# Patient Record
Sex: MALE | Race: WHITE | Employment: OTHER | URBAN - METROPOLITAN AREA
[De-identification: names, ages, dates, MRNs, and addresses within clinical notes are randomized per-mention and may not be internally consistent; named-entity substitution may affect disease eponyms.]

---

## 2017-02-07 ENCOUNTER — APPOINTMENT (OUTPATIENT)
Dept: CT IMAGING | Age: 77
End: 2017-02-07
Payer: MEDICARE

## 2017-02-07 ENCOUNTER — HOSPITAL ENCOUNTER (EMERGENCY)
Age: 77
Discharge: HOME | End: 2017-02-07
Attending: EMERGENCY MEDICINE
Payer: MEDICARE

## 2017-02-07 ENCOUNTER — APPOINTMENT (OUTPATIENT)
Dept: GENERAL RADIOLOGY | Age: 77
End: 2017-02-07
Payer: MEDICARE

## 2017-02-07 VITALS
RESPIRATION RATE: 15 BRPM | HEART RATE: 83 BPM | DIASTOLIC BLOOD PRESSURE: 70 MMHG | BODY MASS INDEX: 35.36 KG/M2 | WEIGHT: 220 LBS | TEMPERATURE: 97.7 F | SYSTOLIC BLOOD PRESSURE: 127 MMHG | OXYGEN SATURATION: 95 % | HEIGHT: 66 IN

## 2017-02-07 DIAGNOSIS — K57.32 DIVERTICULITIS OF COLON: Primary | ICD-10-CM

## 2017-02-07 LAB
-: ABNORMAL
ABSOLUTE BANDS #: 0.67 K/UL (ref 0–1)
ABSOLUTE EOS #: 0 K/UL (ref 0–0.4)
ABSOLUTE LYMPH #: 1.01 K/UL (ref 1–4.8)
ABSOLUTE MONO #: 1.51 K/UL (ref 0.1–1.3)
ALBUMIN SERPL-MCNC: 3.8 G/DL (ref 3.5–5.2)
ALBUMIN/GLOBULIN RATIO: NORMAL (ref 1–2.5)
ALP BLD-CCNC: 52 U/L (ref 40–129)
ALT SERPL-CCNC: 15 U/L (ref 5–41)
AMORPHOUS: ABNORMAL
ANION GAP SERPL CALCULATED.3IONS-SCNC: 16 MMOL/L (ref 9–17)
AST SERPL-CCNC: 21 U/L
BACTERIA: ABNORMAL
BANDS: 4 % (ref 0–10)
BASOPHILS # BLD: 1 % (ref 0–2)
BASOPHILS ABSOLUTE: 0.17 K/UL (ref 0–0.2)
BILIRUB SERPL-MCNC: 0.47 MG/DL (ref 0.3–1.2)
BILIRUBIN DIRECT: 0.14 MG/DL
BILIRUBIN URINE: ABNORMAL
BILIRUBIN, INDIRECT: 0.33 MG/DL (ref 0–1)
BUN BLDV-MCNC: 17 MG/DL (ref 8–23)
BUN/CREAT BLD: ABNORMAL (ref 9–20)
CALCIUM SERPL-MCNC: 9.6 MG/DL (ref 8.6–10.4)
CASTS UA: ABNORMAL /LPF
CHLORIDE BLD-SCNC: 100 MMOL/L (ref 98–107)
CO2: 23 MMOL/L (ref 20–31)
COLOR: ABNORMAL
COMMENT UA: ABNORMAL
CREAT SERPL-MCNC: 0.94 MG/DL (ref 0.7–1.2)
CRYSTALS, UA: ABNORMAL /HPF
DIFFERENTIAL TYPE: ABNORMAL
EOSINOPHILS RELATIVE PERCENT: 0 % (ref 0–4)
EPITHELIAL CELLS UA: ABNORMAL /HPF
GFR AFRICAN AMERICAN: >60 ML/MIN
GFR NON-AFRICAN AMERICAN: >60 ML/MIN
GFR SERPL CREATININE-BSD FRML MDRD: ABNORMAL ML/MIN/{1.73_M2}
GFR SERPL CREATININE-BSD FRML MDRD: ABNORMAL ML/MIN/{1.73_M2}
GLOBULIN: NORMAL G/DL (ref 1.5–3.8)
GLUCOSE BLD-MCNC: 119 MG/DL (ref 70–99)
GLUCOSE URINE: NEGATIVE
HCT VFR BLD CALC: 47.5 % (ref 41–53)
HEMOGLOBIN: 15.9 G/DL (ref 13.5–17.5)
KETONES, URINE: ABNORMAL
LEUKOCYTE ESTERASE, URINE: NEGATIVE
LYMPHOCYTES # BLD: 6 % (ref 24–44)
MCH RBC QN AUTO: 30.2 PG (ref 26–34)
MCHC RBC AUTO-ENTMCNC: 33.5 G/DL (ref 31–37)
MCV RBC AUTO: 90.3 FL (ref 80–100)
MONOCYTES # BLD: 9 % (ref 1–7)
MORPHOLOGY: NORMAL
MUCUS: ABNORMAL
NITRITE, URINE: NEGATIVE
OTHER OBSERVATIONS UA: ABNORMAL
PDW BLD-RTO: 13.7 % (ref 11.5–14.9)
PH UA: 5 (ref 5–8)
PLATELET # BLD: 200 K/UL (ref 150–450)
PLATELET ESTIMATE: ABNORMAL
PMV BLD AUTO: 8 FL (ref 6–12)
POTASSIUM SERPL-SCNC: 3.9 MMOL/L (ref 3.7–5.3)
PROTEIN UA: NEGATIVE
RBC # BLD: 5.26 M/UL (ref 4.5–5.9)
RBC # BLD: ABNORMAL 10*6/UL
RBC UA: ABNORMAL /HPF
RENAL EPITHELIAL, UA: ABNORMAL /HPF
SEG NEUTROPHILS: 80 % (ref 36–66)
SEGMENTED NEUTROPHILS ABSOLUTE COUNT: 13.44 K/UL (ref 1.3–9.1)
SODIUM BLD-SCNC: 139 MMOL/L (ref 135–144)
SPECIFIC GRAVITY UA: 1.03 (ref 1–1.03)
TOTAL PROTEIN: 7.3 G/DL (ref 6.4–8.3)
TRICHOMONAS: ABNORMAL
TURBIDITY: CLEAR
URINE HGB: NEGATIVE
UROBILINOGEN, URINE: NORMAL
WBC # BLD: 16.8 K/UL (ref 3.5–11)
WBC # BLD: ABNORMAL 10*3/UL
WBC UA: ABNORMAL /HPF
YEAST: ABNORMAL

## 2017-02-07 PROCEDURE — 2580000003 HC RX 258: Performed by: EMERGENCY MEDICINE

## 2017-02-07 PROCEDURE — 71020 XR CHEST STANDARD TWO VW: CPT

## 2017-02-07 PROCEDURE — 96375 TX/PRO/DX INJ NEW DRUG ADDON: CPT

## 2017-02-07 PROCEDURE — 36415 COLL VENOUS BLD VENIPUNCTURE: CPT

## 2017-02-07 PROCEDURE — 93005 ELECTROCARDIOGRAM TRACING: CPT

## 2017-02-07 PROCEDURE — 99285 EMERGENCY DEPT VISIT HI MDM: CPT

## 2017-02-07 PROCEDURE — 71020 XR CHEST STANDARD TWO VW: CPT | Performed by: RADIOLOGY

## 2017-02-07 PROCEDURE — 6370000000 HC RX 637 (ALT 250 FOR IP): Performed by: EMERGENCY MEDICINE

## 2017-02-07 PROCEDURE — 96374 THER/PROPH/DIAG INJ IV PUSH: CPT

## 2017-02-07 PROCEDURE — 6360000004 HC RX CONTRAST MEDICATION: Performed by: EMERGENCY MEDICINE

## 2017-02-07 PROCEDURE — 6360000002 HC RX W HCPCS: Performed by: EMERGENCY MEDICINE

## 2017-02-07 PROCEDURE — 74177 CT ABD & PELVIS W/CONTRAST: CPT | Performed by: RADIOLOGY

## 2017-02-07 PROCEDURE — 74177 CT ABD & PELVIS W/CONTRAST: CPT

## 2017-02-07 PROCEDURE — 81001 URINALYSIS AUTO W/SCOPE: CPT

## 2017-02-07 PROCEDURE — 94762 N-INVAS EAR/PLS OXIMTRY CONT: CPT

## 2017-02-07 PROCEDURE — 80076 HEPATIC FUNCTION PANEL: CPT

## 2017-02-07 PROCEDURE — 87493 C DIFF AMPLIFIED PROBE: CPT

## 2017-02-07 PROCEDURE — 87505 NFCT AGENT DETECTION GI: CPT

## 2017-02-07 PROCEDURE — 85025 COMPLETE CBC W/AUTO DIFF WBC: CPT

## 2017-02-07 PROCEDURE — 80048 BASIC METABOLIC PNL TOTAL CA: CPT

## 2017-02-07 RX ORDER — DILTIAZEM HYDROCHLORIDE 300 MG/1
300 CAPSULE, COATED, EXTENDED RELEASE ORAL DAILY
COMMUNITY
End: 2021-04-07

## 2017-02-07 RX ORDER — METRONIDAZOLE 500 MG/1
500 TABLET ORAL ONCE
Status: COMPLETED | OUTPATIENT
Start: 2017-02-07 | End: 2017-02-07

## 2017-02-07 RX ORDER — ONDANSETRON 2 MG/ML
4 INJECTION INTRAMUSCULAR; INTRAVENOUS ONCE
Status: COMPLETED | OUTPATIENT
Start: 2017-02-07 | End: 2017-02-07

## 2017-02-07 RX ORDER — ONDANSETRON 4 MG/1
4 TABLET, ORALLY DISINTEGRATING ORAL EVERY 8 HOURS PRN
Qty: 6 TABLET | Refills: 0 | Status: SHIPPED | OUTPATIENT
Start: 2017-02-07 | End: 2017-02-07 | Stop reason: ALTCHOICE

## 2017-02-07 RX ORDER — CIPROFLOXACIN 500 MG/1
500 TABLET, FILM COATED ORAL ONCE
Status: COMPLETED | OUTPATIENT
Start: 2017-02-07 | End: 2017-02-07

## 2017-02-07 RX ORDER — MORPHINE SULFATE 4 MG/ML
4 INJECTION, SOLUTION INTRAMUSCULAR; INTRAVENOUS ONCE
Status: COMPLETED | OUTPATIENT
Start: 2017-02-07 | End: 2017-02-07

## 2017-02-07 RX ORDER — 0.9 % SODIUM CHLORIDE 0.9 %
1000 INTRAVENOUS SOLUTION INTRAVENOUS ONCE
Status: COMPLETED | OUTPATIENT
Start: 2017-02-07 | End: 2017-02-07

## 2017-02-07 RX ORDER — HYDROCODONE BITARTRATE AND ACETAMINOPHEN 5; 325 MG/1; MG/1
1 TABLET ORAL EVERY 6 HOURS PRN
Qty: 10 TABLET | Refills: 0 | Status: SHIPPED | OUTPATIENT
Start: 2017-02-07 | End: 2017-02-14

## 2017-02-07 RX ORDER — CIPROFLOXACIN 500 MG/1
500 TABLET, FILM COATED ORAL 2 TIMES DAILY
Qty: 14 TABLET | Refills: 0 | Status: SHIPPED | OUTPATIENT
Start: 2017-02-07 | End: 2017-02-14

## 2017-02-07 RX ORDER — ONDANSETRON 4 MG/1
4 TABLET, ORALLY DISINTEGRATING ORAL EVERY 8 HOURS PRN
Qty: 6 TABLET | Refills: 0 | Status: SHIPPED | OUTPATIENT
Start: 2017-02-07 | End: 2017-03-22 | Stop reason: ALTCHOICE

## 2017-02-07 RX ORDER — METRONIDAZOLE 500 MG/1
500 TABLET ORAL 3 TIMES DAILY
Qty: 21 TABLET | Refills: 0 | Status: SHIPPED | OUTPATIENT
Start: 2017-02-07 | End: 2017-02-14

## 2017-02-07 RX ADMIN — IOVERSOL 130 ML: 741 INJECTION INTRA-ARTERIAL; INTRAVENOUS at 18:06

## 2017-02-07 RX ADMIN — CIPROFLOXACIN HYDROCHLORIDE 500 MG: 500 TABLET, FILM COATED ORAL at 20:52

## 2017-02-07 RX ADMIN — SODIUM CHLORIDE 1000 ML: 9 INJECTION, SOLUTION INTRAVENOUS at 16:00

## 2017-02-07 RX ADMIN — ONDANSETRON 4 MG: 2 INJECTION INTRAMUSCULAR; INTRAVENOUS at 19:47

## 2017-02-07 RX ADMIN — MORPHINE SULFATE 4 MG: 4 INJECTION, SOLUTION INTRAMUSCULAR; INTRAVENOUS at 19:47

## 2017-02-07 RX ADMIN — METRONIDAZOLE 500 MG: 500 TABLET ORAL at 20:52

## 2017-02-07 RX ADMIN — SODIUM CHLORIDE 1000 ML: 9 INJECTION, SOLUTION INTRAVENOUS at 19:47

## 2017-02-07 ASSESSMENT — PAIN DESCRIPTION - DESCRIPTORS: DESCRIPTORS: BURNING;ACHING

## 2017-02-07 ASSESSMENT — PAIN DESCRIPTION - LOCATION: LOCATION: ABDOMEN;RECTUM

## 2017-02-07 ASSESSMENT — PAIN SCALES - GENERAL: PAINLEVEL_OUTOF10: 8

## 2017-02-07 ASSESSMENT — PAIN DESCRIPTION - PAIN TYPE: TYPE: ACUTE PAIN

## 2017-02-07 ASSESSMENT — PAIN DESCRIPTION - FREQUENCY: FREQUENCY: INTERMITTENT

## 2017-02-08 LAB
EKG ATRIAL RATE: 82 BPM
EKG P AXIS: 31 DEGREES
EKG P-R INTERVAL: 178 MS
EKG Q-T INTERVAL: 360 MS
EKG QRS DURATION: 90 MS
EKG QTC CALCULATION (BAZETT): 420 MS
EKG R AXIS: 86 DEGREES
EKG T AXIS: -5 DEGREES
EKG VENTRICULAR RATE: 82 BPM

## 2017-02-08 ASSESSMENT — ENCOUNTER SYMPTOMS
ABDOMINAL PAIN: 0
BACK PAIN: 0
BLOOD IN STOOL: 0
DIARRHEA: 1
VOMITING: 0
RHINORRHEA: 0
NAUSEA: 1
COUGH: 0
CONSTIPATION: 0
SHORTNESS OF BREATH: 0

## 2017-02-09 ENCOUNTER — TELEPHONE (OUTPATIENT)
Dept: PHARMACY | Age: 77
End: 2017-02-09

## 2017-02-09 LAB
C DIFFICILE TOXINS, PCR: ABNORMAL
CAMPYLOBACTER PCR: NORMAL
SALMONELLA PCR: NORMAL
SHIGATOXIN GENE PCR: NORMAL
SHIGELLA SP PCR: NORMAL
SPECIMEN DESCRIPTION: ABNORMAL
SPECIMEN: NORMAL

## 2017-02-10 ENCOUNTER — HOSPITAL ENCOUNTER (OUTPATIENT)
Dept: PHARMACY | Age: 77
Setting detail: THERAPIES SERIES
Discharge: HOME | End: 2017-02-10
Payer: MEDICARE

## 2017-02-10 LAB
INR BLD: 3.2
PROTIME: 38.7 SECONDS

## 2017-02-10 PROCEDURE — G0463 HOSPITAL OUTPT CLINIC VISIT: HCPCS

## 2017-02-10 PROCEDURE — 85610 PROTHROMBIN TIME: CPT

## 2017-02-17 ENCOUNTER — HOSPITAL ENCOUNTER (OUTPATIENT)
Dept: PHARMACY | Age: 77
Setting detail: THERAPIES SERIES
Discharge: HOME | End: 2017-02-17
Payer: MEDICARE

## 2017-02-17 DIAGNOSIS — I48.19 PERSISTENT ATRIAL FIBRILLATION (HCC): ICD-10-CM

## 2017-02-17 LAB
INR BLD: 2.6
PROTIME: 31.4 SECONDS

## 2017-02-17 PROCEDURE — 85610 PROTHROMBIN TIME: CPT

## 2017-02-17 PROCEDURE — G0463 HOSPITAL OUTPT CLINIC VISIT: HCPCS

## 2017-02-24 ENCOUNTER — HOSPITAL ENCOUNTER (OUTPATIENT)
Dept: PHARMACY | Age: 77
Setting detail: THERAPIES SERIES
Discharge: HOME | End: 2017-02-24
Payer: MEDICARE

## 2017-02-24 LAB
INR BLD: 1.8
PROTIME: 21.2 SECONDS

## 2017-02-24 PROCEDURE — 85610 PROTHROMBIN TIME: CPT

## 2017-02-24 PROCEDURE — G0463 HOSPITAL OUTPT CLINIC VISIT: HCPCS

## 2017-03-10 ENCOUNTER — HOSPITAL ENCOUNTER (OUTPATIENT)
Dept: PHARMACY | Age: 77
Setting detail: THERAPIES SERIES
Discharge: HOME | End: 2017-03-10
Payer: MEDICARE

## 2017-03-10 ENCOUNTER — HOSPITAL ENCOUNTER (OUTPATIENT)
Dept: ULTRASOUND IMAGING | Age: 77
Discharge: HOME | End: 2017-03-10
Payer: MEDICARE

## 2017-03-10 LAB
INR BLD: 1.7
PROTIME: 20.3 SECONDS

## 2017-03-10 PROCEDURE — G0463 HOSPITAL OUTPT CLINIC VISIT: HCPCS

## 2017-03-10 PROCEDURE — 76882 US LMTD JT/FCL EVL NVASC XTR: CPT

## 2017-03-10 PROCEDURE — 85610 PROTHROMBIN TIME: CPT

## 2017-03-22 ENCOUNTER — HOSPITAL ENCOUNTER (OUTPATIENT)
Dept: PHARMACY | Age: 77
Setting detail: THERAPIES SERIES
Discharge: HOME | End: 2017-03-22
Payer: MEDICARE

## 2017-03-22 DIAGNOSIS — I48.19 PERSISTENT ATRIAL FIBRILLATION (HCC): ICD-10-CM

## 2017-03-22 LAB
INR BLD: 1.6
PROTIME: 18.6 SECONDS

## 2017-03-22 PROCEDURE — 85610 PROTHROMBIN TIME: CPT

## 2017-03-22 PROCEDURE — G0463 HOSPITAL OUTPT CLINIC VISIT: HCPCS

## 2017-03-22 RX ORDER — WARFARIN SODIUM 5 MG/1
5 TABLET ORAL DAILY
COMMUNITY
End: 2021-04-07

## 2017-04-05 ENCOUNTER — HOSPITAL ENCOUNTER (OUTPATIENT)
Dept: PHARMACY | Age: 77
Setting detail: THERAPIES SERIES
Discharge: HOME | End: 2017-04-05
Payer: MEDICARE

## 2017-04-05 DIAGNOSIS — I48.19 PERSISTENT ATRIAL FIBRILLATION (HCC): ICD-10-CM

## 2017-04-05 LAB
INR BLD: 1.7
PROTIME: 20.2 SECONDS

## 2017-04-05 PROCEDURE — 85610 PROTHROMBIN TIME: CPT

## 2017-04-05 PROCEDURE — G0463 HOSPITAL OUTPT CLINIC VISIT: HCPCS

## 2017-04-19 ENCOUNTER — HOSPITAL ENCOUNTER (OUTPATIENT)
Dept: PHARMACY | Age: 77
Setting detail: THERAPIES SERIES
Discharge: HOME | End: 2017-04-19
Payer: MEDICARE

## 2017-04-19 DIAGNOSIS — I48.19 PERSISTENT ATRIAL FIBRILLATION (HCC): ICD-10-CM

## 2017-04-19 LAB
INR BLD: 2.2
PROTIME: 25.9 SECONDS

## 2017-04-19 PROCEDURE — 85610 PROTHROMBIN TIME: CPT

## 2017-04-19 PROCEDURE — G0463 HOSPITAL OUTPT CLINIC VISIT: HCPCS

## 2017-05-17 ENCOUNTER — HOSPITAL ENCOUNTER (OUTPATIENT)
Dept: PHARMACY | Age: 77
Setting detail: THERAPIES SERIES
Discharge: HOME | End: 2017-05-17
Payer: MEDICARE

## 2017-05-17 LAB
INR BLD: 3
PROTIME: 36.3 SECONDS

## 2017-05-17 PROCEDURE — G0463 HOSPITAL OUTPT CLINIC VISIT: HCPCS

## 2017-05-17 PROCEDURE — 85610 PROTHROMBIN TIME: CPT

## 2017-05-17 PROCEDURE — 99211 OFF/OP EST MAY X REQ PHY/QHP: CPT

## 2017-06-14 ENCOUNTER — HOSPITAL ENCOUNTER (OUTPATIENT)
Dept: PHARMACY | Age: 77
Setting detail: THERAPIES SERIES
Discharge: HOME | End: 2017-06-14
Payer: MEDICARE

## 2017-06-14 DIAGNOSIS — I48.19 PERSISTENT ATRIAL FIBRILLATION (HCC): ICD-10-CM

## 2017-06-14 LAB
INR BLD: 2.5
PROTIME: 29.9 SECONDS

## 2017-06-14 PROCEDURE — 85610 PROTHROMBIN TIME: CPT

## 2017-06-14 PROCEDURE — 99211 OFF/OP EST MAY X REQ PHY/QHP: CPT

## 2017-06-14 PROCEDURE — G0463 HOSPITAL OUTPT CLINIC VISIT: HCPCS

## 2017-07-12 ENCOUNTER — HOSPITAL ENCOUNTER (OUTPATIENT)
Dept: PHARMACY | Age: 77
Setting detail: THERAPIES SERIES
Discharge: HOME | End: 2017-07-12
Payer: MEDICARE

## 2017-07-12 DIAGNOSIS — I48.19 PERSISTENT ATRIAL FIBRILLATION (HCC): ICD-10-CM

## 2017-07-12 LAB
INR BLD: 2.6
PROTIME: 30.9 SECONDS

## 2017-07-12 PROCEDURE — G0463 HOSPITAL OUTPT CLINIC VISIT: HCPCS

## 2017-07-12 PROCEDURE — 99211 OFF/OP EST MAY X REQ PHY/QHP: CPT

## 2017-07-12 PROCEDURE — 85610 PROTHROMBIN TIME: CPT

## 2017-08-09 ENCOUNTER — HOSPITAL ENCOUNTER (OUTPATIENT)
Dept: PHARMACY | Age: 77
Setting detail: THERAPIES SERIES
Discharge: HOME | End: 2017-08-09
Payer: MEDICARE

## 2017-08-09 LAB
INR BLD: 2.4
PROTIME: 31.3 SECONDS

## 2017-08-09 PROCEDURE — 99211 OFF/OP EST MAY X REQ PHY/QHP: CPT

## 2017-08-09 PROCEDURE — 85610 PROTHROMBIN TIME: CPT

## 2017-09-13 ENCOUNTER — HOSPITAL ENCOUNTER (OUTPATIENT)
Dept: PHARMACY | Age: 77
Setting detail: THERAPIES SERIES
Discharge: HOME | End: 2017-09-13
Payer: MEDICARE

## 2017-09-13 DIAGNOSIS — I48.19 PERSISTENT ATRIAL FIBRILLATION (HCC): ICD-10-CM

## 2017-09-13 LAB
INR BLD: 2.5
PROTIME: 30.2 SECONDS

## 2017-09-13 PROCEDURE — 85610 PROTHROMBIN TIME: CPT

## 2017-09-13 PROCEDURE — 99211 OFF/OP EST MAY X REQ PHY/QHP: CPT

## 2017-10-11 ENCOUNTER — HOSPITAL ENCOUNTER (OUTPATIENT)
Dept: PHARMACY | Age: 77
Setting detail: THERAPIES SERIES
Discharge: HOME | End: 2017-10-11
Payer: MEDICARE

## 2017-10-11 DIAGNOSIS — I48.19 PERSISTENT ATRIAL FIBRILLATION (HCC): ICD-10-CM

## 2017-10-11 LAB
INR BLD: 3.6
PROTIME: 42.7 SECONDS

## 2017-10-11 PROCEDURE — 85610 PROTHROMBIN TIME: CPT

## 2017-10-11 PROCEDURE — 99211 OFF/OP EST MAY X REQ PHY/QHP: CPT

## 2017-10-18 ENCOUNTER — HOSPITAL ENCOUNTER (OUTPATIENT)
Dept: PHARMACY | Age: 77
Setting detail: THERAPIES SERIES
Discharge: HOME | End: 2017-10-18
Payer: MEDICARE

## 2017-10-18 DIAGNOSIS — I48.19 PERSISTENT ATRIAL FIBRILLATION (HCC): ICD-10-CM

## 2017-10-18 LAB
INR BLD: 2.4
PROTIME: 29.2 SECONDS

## 2017-10-18 PROCEDURE — 99211 OFF/OP EST MAY X REQ PHY/QHP: CPT

## 2017-10-18 PROCEDURE — 85610 PROTHROMBIN TIME: CPT

## 2017-10-18 NOTE — PROGRESS NOTES
Patient states compliant all of the time with regimen. No bleeding or thromboembolic side effects noted. No significant med or dietary changes. No significant recent illness or disease state changes. PT/INR done in office per protocol. INR was therapeutic at 2.4 (Goal 2-3). Warfarin regimen will be continued at current dose of 5mg every day. Will retest in 2 weeks. Patient understands dosing directions and information discussed. Dosing schedule and follow up appointment given to patient. Progress note routed to referring physicians office.

## 2017-11-01 ENCOUNTER — HOSPITAL ENCOUNTER (OUTPATIENT)
Dept: PHARMACY | Age: 77
Setting detail: THERAPIES SERIES
Discharge: HOME | End: 2017-11-01
Payer: MEDICARE

## 2017-11-01 DIAGNOSIS — I48.19 PERSISTENT ATRIAL FIBRILLATION (HCC): ICD-10-CM

## 2017-11-01 LAB
INR BLD: 3
PROTIME: 36.5 SECONDS

## 2017-11-01 PROCEDURE — 85610 PROTHROMBIN TIME: CPT

## 2017-11-01 PROCEDURE — 99211 OFF/OP EST MAY X REQ PHY/QHP: CPT

## 2017-11-29 ENCOUNTER — HOSPITAL ENCOUNTER (OUTPATIENT)
Age: 77
Discharge: HOME | End: 2017-11-29
Payer: MEDICARE

## 2017-11-29 ENCOUNTER — HOSPITAL ENCOUNTER (OUTPATIENT)
Dept: PHARMACY | Age: 77
Setting detail: THERAPIES SERIES
Discharge: HOME | End: 2017-11-29
Payer: MEDICARE

## 2017-11-29 DIAGNOSIS — I48.19 PERSISTENT ATRIAL FIBRILLATION (HCC): ICD-10-CM

## 2017-11-29 LAB
-: NORMAL
CREATININE URINE: 110.2 MG/DL (ref 39–259)
INR BLD: 3.4
MICROALBUMIN/CREAT 24H UR: <12 MG/L
MICROALBUMIN/CREAT UR-RTO: NORMAL MCG/MG CREAT
PROTIME: 40.6 SECONDS
REASON FOR REJECTION: NORMAL
ZZ NTE CLEAN UP: ORDERED TEST: NORMAL
ZZ NTE WITH NAME CLEAN UP: SPECIMEN SOURCE: NORMAL

## 2017-11-29 PROCEDURE — 85610 PROTHROMBIN TIME: CPT

## 2017-11-29 PROCEDURE — 36415 COLL VENOUS BLD VENIPUNCTURE: CPT

## 2017-11-29 PROCEDURE — 99211 OFF/OP EST MAY X REQ PHY/QHP: CPT

## 2017-11-29 PROCEDURE — 82043 UR ALBUMIN QUANTITATIVE: CPT

## 2017-11-29 PROCEDURE — 82570 ASSAY OF URINE CREATININE: CPT

## 2017-11-29 RX ORDER — TAMSULOSIN HYDROCHLORIDE 0.4 MG/1
0.4 CAPSULE ORAL DAILY
COMMUNITY
End: 2018-08-28

## 2017-12-13 ENCOUNTER — HOSPITAL ENCOUNTER (OUTPATIENT)
Age: 77
Setting detail: SPECIMEN
Discharge: HOME | End: 2017-12-13
Payer: MEDICARE

## 2017-12-13 ENCOUNTER — HOSPITAL ENCOUNTER (OUTPATIENT)
Dept: PHARMACY | Age: 77
Setting detail: THERAPIES SERIES
Discharge: HOME | End: 2017-12-13
Payer: MEDICARE

## 2017-12-13 DIAGNOSIS — I48.19 PERSISTENT ATRIAL FIBRILLATION (HCC): ICD-10-CM

## 2017-12-13 LAB
ALT SERPL-CCNC: 19 U/L (ref 5–41)
ANION GAP SERPL CALCULATED.3IONS-SCNC: 12 MMOL/L (ref 9–17)
BUN BLDV-MCNC: 19 MG/DL (ref 8–23)
CHLORIDE BLD-SCNC: 108 MMOL/L (ref 98–107)
CHOLESTEROL/HDL RATIO: 4.2
CHOLESTEROL: 142 MG/DL
CO2: 26 MMOL/L (ref 20–31)
CREAT SERPL-MCNC: 0.82 MG/DL (ref 0.7–1.2)
GFR AFRICAN AMERICAN: >60 ML/MIN
GFR NON-AFRICAN AMERICAN: >60 ML/MIN
GFR SERPL CREATININE-BSD FRML MDRD: NORMAL ML/MIN/{1.73_M2}
GFR SERPL CREATININE-BSD FRML MDRD: NORMAL ML/MIN/{1.73_M2}
HDLC SERPL-MCNC: 34 MG/DL
INR BLD: 2.4
LDL CHOLESTEROL: 79 MG/DL (ref 0–130)
POTASSIUM SERPL-SCNC: 4.3 MMOL/L (ref 3.7–5.3)
PROTIME: 29.3 SECONDS
SODIUM BLD-SCNC: 146 MMOL/L (ref 135–144)
TRIGL SERPL-MCNC: 144 MG/DL
VLDLC SERPL CALC-MCNC: ABNORMAL MG/DL (ref 1–30)

## 2017-12-13 PROCEDURE — 99211 OFF/OP EST MAY X REQ PHY/QHP: CPT

## 2017-12-13 PROCEDURE — 80051 ELECTROLYTE PANEL: CPT

## 2017-12-13 PROCEDURE — 84460 ALANINE AMINO (ALT) (SGPT): CPT

## 2017-12-13 PROCEDURE — 80061 LIPID PANEL: CPT

## 2017-12-13 PROCEDURE — 85610 PROTHROMBIN TIME: CPT

## 2017-12-13 PROCEDURE — 84520 ASSAY OF UREA NITROGEN: CPT

## 2017-12-13 PROCEDURE — 82565 ASSAY OF CREATININE: CPT

## 2017-12-13 NOTE — PROGRESS NOTES
Patient states compliant all of the time with regimen. No bleeding or thromboembolic side effects noted. No significant med or dietary changes. No significant recent illness or disease state changes. PT/INR done in office per protocol. INR was therapeutic at 2.4 (Goal 2-3). Warfarin regimen will be continued at current dose of 2.5mg on Wed/Sat and 5mg 5x weekly. Will retest in 4 weeks. Patient to have colonoscopy done on 1/17/18. Need INR on 1/16/18 with results faxed to [phone]. Will be off warfarin 5 days prior to colonoscopy per patient. Patient understands dosing directions and information discussed. Dosing schedule and follow up appointment given to patient. Progress note routed to referring physicians office.

## 2018-01-16 ENCOUNTER — HOSPITAL ENCOUNTER (OUTPATIENT)
Dept: PHARMACY | Age: 78
Setting detail: THERAPIES SERIES
Discharge: HOME | End: 2018-01-16
Payer: MEDICARE

## 2018-01-16 LAB
INR BLD: 2
PROTIME: 24 SECONDS

## 2018-01-16 PROCEDURE — 85610 PROTHROMBIN TIME: CPT

## 2018-01-16 PROCEDURE — 99211 OFF/OP EST MAY X REQ PHY/QHP: CPT

## 2018-02-13 ENCOUNTER — HOSPITAL ENCOUNTER (OUTPATIENT)
Dept: PHARMACY | Age: 78
Setting detail: THERAPIES SERIES
Discharge: HOME | End: 2018-02-13
Payer: MEDICARE

## 2018-02-13 DIAGNOSIS — I48.19 PERSISTENT ATRIAL FIBRILLATION (HCC): ICD-10-CM

## 2018-02-13 LAB
INR BLD: 2.2
PROTIME: 26.5 SECONDS

## 2018-02-13 PROCEDURE — 85610 PROTHROMBIN TIME: CPT

## 2018-02-13 PROCEDURE — 99211 OFF/OP EST MAY X REQ PHY/QHP: CPT

## 2018-03-13 ENCOUNTER — HOSPITAL ENCOUNTER (OUTPATIENT)
Dept: PHARMACY | Age: 78
Setting detail: THERAPIES SERIES
Discharge: HOME | End: 2018-03-13
Payer: MEDICARE

## 2018-03-13 LAB
INR BLD: 2.3
PROTIME: 27.6 SECONDS

## 2018-03-13 PROCEDURE — 99211 OFF/OP EST MAY X REQ PHY/QHP: CPT

## 2018-03-13 PROCEDURE — 85610 PROTHROMBIN TIME: CPT

## 2018-03-13 NOTE — PROGRESS NOTES
Patient states compliant with regimen. No bleeding or thromboembolic side effects noted. No significant med or dietary changes. No significant recent illness or disease state changes. PT/INR done in office per protocol. INR today is 2.3. Patient understands dosing directions and information discussed. Dosing card given to patient. Progress note faxed to office. INR therapeutic at 2.3. Goal 2-3  Continue warfarin 2.5 mg Sun/Thurs and 5 mg 5x weekly  Recheck INR in four weeks  The patient has an appt with  [facility] next week for clearance to be off warfarin  For a colonoscopy. Once the colonoscopy is rescheduled , our clinic will set up lovenox bridging.

## 2018-04-10 ENCOUNTER — HOSPITAL ENCOUNTER (OUTPATIENT)
Dept: PHARMACY | Age: 78
Discharge: HOME | End: 2018-04-10

## 2018-04-11 ENCOUNTER — HOSPITAL ENCOUNTER (OUTPATIENT)
Dept: PHARMACY | Age: 78
Setting detail: THERAPIES SERIES
Discharge: HOME | End: 2018-04-11
Payer: MEDICARE

## 2018-04-11 DIAGNOSIS — I48.91 ATRIAL FIBRILLATION, UNSPECIFIED TYPE (HCC): ICD-10-CM

## 2018-04-11 LAB
INR BLD: 1.1
PROTIME: 13.7 SECONDS

## 2018-04-11 PROCEDURE — 99211 OFF/OP EST MAY X REQ PHY/QHP: CPT

## 2018-04-11 PROCEDURE — 85610 PROTHROMBIN TIME: CPT

## 2018-04-20 ENCOUNTER — HOSPITAL ENCOUNTER (OUTPATIENT)
Dept: PHARMACY | Age: 78
Setting detail: THERAPIES SERIES
Discharge: HOME | End: 2018-04-20
Payer: MEDICARE

## 2018-04-20 LAB
INR BLD: 1.6
PROTIME: 19.4 SECONDS

## 2018-04-20 PROCEDURE — 99211 OFF/OP EST MAY X REQ PHY/QHP: CPT

## 2018-04-20 PROCEDURE — 85610 PROTHROMBIN TIME: CPT

## 2018-04-27 ENCOUNTER — HOSPITAL ENCOUNTER (OUTPATIENT)
Dept: VASCULAR LAB | Age: 78
Discharge: HOME | End: 2018-04-27
Payer: MEDICARE

## 2018-04-27 PROCEDURE — 93978 VASCULAR STUDY: CPT

## 2018-05-04 ENCOUNTER — HOSPITAL ENCOUNTER (OUTPATIENT)
Dept: PHARMACY | Age: 78
Setting detail: THERAPIES SERIES
Discharge: HOME | End: 2018-05-04
Payer: MEDICARE

## 2018-05-04 LAB
INR BLD: 2.7
PROTIME: 32.2 SECONDS

## 2018-05-04 PROCEDURE — 85610 PROTHROMBIN TIME: CPT

## 2018-05-04 PROCEDURE — 99211 OFF/OP EST MAY X REQ PHY/QHP: CPT

## 2018-06-01 ENCOUNTER — HOSPITAL ENCOUNTER (OUTPATIENT)
Dept: PHARMACY | Age: 78
Setting detail: THERAPIES SERIES
Discharge: HOME | End: 2018-06-01
Payer: MEDICARE

## 2018-06-01 LAB
INR BLD: 2.7
PROTIME: 32.4 SECONDS

## 2018-06-01 PROCEDURE — 85610 PROTHROMBIN TIME: CPT

## 2018-06-01 PROCEDURE — 99211 OFF/OP EST MAY X REQ PHY/QHP: CPT

## 2018-06-29 ENCOUNTER — HOSPITAL ENCOUNTER (OUTPATIENT)
Dept: PHARMACY | Age: 78
Setting detail: THERAPIES SERIES
Discharge: HOME | End: 2018-06-29
Payer: MEDICARE

## 2018-06-29 DIAGNOSIS — I48.19 PERSISTENT ATRIAL FIBRILLATION (HCC): ICD-10-CM

## 2018-06-29 LAB
INR BLD: 3.2
PROTIME: 38.8 SECONDS

## 2018-06-29 PROCEDURE — 99211 OFF/OP EST MAY X REQ PHY/QHP: CPT

## 2018-06-29 PROCEDURE — 85610 PROTHROMBIN TIME: CPT

## 2018-07-13 ENCOUNTER — HOSPITAL ENCOUNTER (OUTPATIENT)
Dept: PHARMACY | Age: 78
Setting detail: THERAPIES SERIES
Discharge: HOME | End: 2018-07-13
Payer: MEDICARE

## 2018-07-13 LAB
INR BLD: 2.5
PROTIME: 30.4 SECONDS

## 2018-07-13 PROCEDURE — 85610 PROTHROMBIN TIME: CPT

## 2018-07-13 PROCEDURE — 99211 OFF/OP EST MAY X REQ PHY/QHP: CPT

## 2018-07-13 NOTE — PROGRESS NOTES
Patient states compliant with regimen. No bleeding or thromboembolic side effects noted. No significant med or dietary changes. No significant recent illness or disease state changes. PT/INR done in office per protocol. INR today is 2.5. Patient understands dosing directions and information discussed. Dosing card given to patient. Progress note faxed to office. INR therapeutic at 2.5.  Goal 2-3  Continue warfarin dose of 2.5 mg Mon/Fri and 5 mg 5x weekly  The patient is to hold warfarin x five days for a colonoscopy on 7-  The INR is to be checked on 7- with results faxed to Dr Roseanne Michelle at [phone]

## 2018-07-23 ENCOUNTER — HOSPITAL ENCOUNTER (OUTPATIENT)
Dept: PHARMACY | Age: 78
Setting detail: THERAPIES SERIES
Discharge: HOME | End: 2018-07-23
Payer: MEDICARE

## 2018-07-23 LAB
INR BLD: 1.2
PROTIME: 13.9 SECONDS

## 2018-07-23 PROCEDURE — 85610 PROTHROMBIN TIME: CPT

## 2018-07-23 PROCEDURE — 99211 OFF/OP EST MAY X REQ PHY/QHP: CPT

## 2018-07-24 ENCOUNTER — ANESTHESIA EVENT (OUTPATIENT)
Dept: ENDOSCOPY | Age: 78
End: 2018-07-24
Payer: MEDICARE

## 2018-07-24 ENCOUNTER — ANESTHESIA (OUTPATIENT)
Dept: ENDOSCOPY | Age: 78
End: 2018-07-24
Payer: MEDICARE

## 2018-07-24 ENCOUNTER — HOSPITAL ENCOUNTER (OUTPATIENT)
Age: 78
Setting detail: OUTPATIENT SURGERY
Discharge: HOME | End: 2018-07-24
Attending: INTERNAL MEDICINE | Admitting: INTERNAL MEDICINE
Payer: MEDICARE

## 2018-07-24 VITALS
OXYGEN SATURATION: 93 % | DIASTOLIC BLOOD PRESSURE: 69 MMHG | RESPIRATION RATE: 12 BRPM | SYSTOLIC BLOOD PRESSURE: 123 MMHG

## 2018-07-24 VITALS
WEIGHT: 225 LBS | OXYGEN SATURATION: 96 % | RESPIRATION RATE: 16 BRPM | SYSTOLIC BLOOD PRESSURE: 120 MMHG | BODY MASS INDEX: 36.16 KG/M2 | DIASTOLIC BLOOD PRESSURE: 73 MMHG | HEART RATE: 57 BPM | HEIGHT: 66 IN | TEMPERATURE: 98.6 F

## 2018-07-24 PROCEDURE — 2500000003 HC RX 250 WO HCPCS: Performed by: NURSE ANESTHETIST, CERTIFIED REGISTERED

## 2018-07-24 PROCEDURE — 2709999900 HC NON-CHARGEABLE SUPPLY: Performed by: INTERNAL MEDICINE

## 2018-07-24 PROCEDURE — 2580000003 HC RX 258: Performed by: NURSE ANESTHETIST, CERTIFIED REGISTERED

## 2018-07-24 PROCEDURE — 88305 TISSUE EXAM BY PATHOLOGIST: CPT

## 2018-07-24 PROCEDURE — 6360000002 HC RX W HCPCS: Performed by: NURSE ANESTHETIST, CERTIFIED REGISTERED

## 2018-07-24 PROCEDURE — 3609010300 HC COLONOSCOPY W/BIOPSY SINGLE/MULTIPLE: Performed by: INTERNAL MEDICINE

## 2018-07-24 PROCEDURE — 7100000010 HC PHASE II RECOVERY - FIRST 15 MIN: Performed by: INTERNAL MEDICINE

## 2018-07-24 PROCEDURE — 3700000000 HC ANESTHESIA ATTENDED CARE: Performed by: INTERNAL MEDICINE

## 2018-07-24 PROCEDURE — 3700000001 HC ADD 15 MINUTES (ANESTHESIA): Performed by: INTERNAL MEDICINE

## 2018-07-24 PROCEDURE — 7100000011 HC PHASE II RECOVERY - ADDTL 15 MIN: Performed by: INTERNAL MEDICINE

## 2018-07-24 RX ORDER — LIDOCAINE HYDROCHLORIDE 10 MG/ML
INJECTION, SOLUTION EPIDURAL; INFILTRATION; INTRACAUDAL; PERINEURAL PRN
Status: DISCONTINUED | OUTPATIENT
Start: 2018-07-24 | End: 2018-07-24 | Stop reason: SDUPTHER

## 2018-07-24 RX ORDER — GLYCOPYRROLATE 1 MG/5 ML
SYRINGE (ML) INTRAVENOUS PRN
Status: DISCONTINUED | OUTPATIENT
Start: 2018-07-24 | End: 2018-07-24 | Stop reason: SDUPTHER

## 2018-07-24 RX ORDER — SODIUM CHLORIDE 9 MG/ML
INJECTION, SOLUTION INTRAVENOUS CONTINUOUS PRN
Status: DISCONTINUED | OUTPATIENT
Start: 2018-07-24 | End: 2018-07-24 | Stop reason: SDUPTHER

## 2018-07-24 RX ORDER — PROPOFOL 10 MG/ML
INJECTION, EMULSION INTRAVENOUS PRN
Status: DISCONTINUED | OUTPATIENT
Start: 2018-07-24 | End: 2018-07-24 | Stop reason: SDUPTHER

## 2018-07-24 RX ORDER — SODIUM CHLORIDE 9 MG/ML
INJECTION, SOLUTION INTRAVENOUS CONTINUOUS
Status: CANCELLED | OUTPATIENT
Start: 2018-07-24

## 2018-07-24 RX ADMIN — PROPOFOL 320 MG: 10 INJECTION, EMULSION INTRAVENOUS at 09:51

## 2018-07-24 RX ADMIN — Medication 0.2 MG: at 10:02

## 2018-07-24 RX ADMIN — SODIUM CHLORIDE: 9 INJECTION, SOLUTION INTRAVENOUS at 09:46

## 2018-07-24 RX ADMIN — LIDOCAINE HYDROCHLORIDE 50 MG: 10 INJECTION, SOLUTION EPIDURAL; INFILTRATION; INTRACAUDAL at 09:51

## 2018-07-24 ASSESSMENT — PAIN - FUNCTIONAL ASSESSMENT: PAIN_FUNCTIONAL_ASSESSMENT: 0-10

## 2018-07-24 ASSESSMENT — PAIN SCALES - GENERAL
PAINLEVEL_OUTOF10: 0

## 2018-07-24 NOTE — ANESTHESIA POSTPROCEDURE EVALUATION
Department of Anesthesiology  Postprocedure Note    Patient: Angelina Coronado  MRN: [id number]  YOB: 1940  Date of evaluation: 7/24/2018  Time:  10:34 AM     Procedure Summary     Date:  07/24/18 Room / Location:  [place] [address] / [facility]    Anesthesia Start:  0946 Anesthesia Stop:  1022    Procedure:  COLONOSCOPY WITH BIOPSY Diagnosis:  (HISTORY OF POLYPS )    Surgeon:  Kyleigh Hall MD Responsible Provider:  Jayla Bhardwaj MD    Anesthesia Type:  MAC ASA Status:  3          Anesthesia Type: MAC    Tania Phase I: Tania Score: 10    Tania Phase II: Tania Score: 8    Last vitals: Reviewed and per EMR flowsheets.        Anesthesia Post Evaluation    Patient location during evaluation: PACU  Patient participation: complete - patient participated  Level of consciousness: awake and alert  Pain score: 0  Airway patency: patent  Nausea & Vomiting: no nausea and no vomiting  Complications: no  Cardiovascular status: blood pressure returned to baseline and hemodynamically stable  Respiratory status: acceptable  Hydration status: stable

## 2018-07-24 NOTE — ANESTHESIA PRE PROCEDURE
Department of Anesthesiology  Preprocedure Note       Name:  Carey Phan   Age:  78 y.o.  :  1940                                          MRN:  [id number]         Date:  2018      Surgeon: Xander Caal):  Christin Belle MD    Procedure: Procedure(s):  COLONOSCOPY WITH APC    Medications prior to admission:   Prior to Admission medications    Medication Sig Start Date End Date Taking? Authorizing Provider   diltiazem (CARTIA XT) 300 MG extended release capsule Take 300 mg by mouth daily   Yes Historical Provider, MD   metoprolol (LOPRESSOR) 50 MG tablet Take 50 mg by mouth 2 times daily. Yes Historical Provider, MD   aspirin 325 MG tablet Take 325 mg by mouth daily. Yes Historical Provider, MD   pravastatin (PRAVACHOL) 40 MG tablet Take 40 mg by mouth daily. Yes Historical Provider, MD   omeprazole (PRILOSEC) 20 MG capsule Take 20 mg by mouth daily. Yes Historical Provider, MD   tamsulosin (FLOMAX) 0.4 MG capsule Take 0.4 mg by mouth daily    Historical Provider, MD   warfarin (COUMADIN) 5 MG tablet Take 5 mg by mouth daily Managed by [facility] Coumadin clinic - 2.5mg on   and 5mg 6x weekly    Historical Provider, MD       Current medications:    No current facility-administered medications for this encounter. Allergies:     Allergies   Allergen Reactions    Oxycodone     Pcn [Penicillins]        Problem List:    Patient Active Problem List   Diagnosis Code    Atrial fibrillation ([facility] [address] I48.91    Chest pain R07.9       Past Medical History:        Diagnosis Date    Arthritis     Atrial fibrillation ([facility] [address]     CAD (coronary artery disease)     Colon polyps     Hyperlipidemia     Hypertension     Prediabetes     Sleep apnea     not tested, no cpap       Past Surgical History:        Procedure Laterality Date    CATARACT REMOVAL Bilateral     CORONARY ARTERY BYPASS GRAFT      4 vessel    TOTAL KNEE ARTHROPLASTY Bilateral     VENTRAL HERNIA REPAIR         Social

## 2018-07-24 NOTE — H&P
History and Physical    Pt Name: Jazmin Tavares  MRN: [id number]  YOB: 1940  Date of evaluation: 7/24/2018    SUBJECTIVE:   History of Chief Complaint:    Patient complains of history of colon polyps. He had a colonoscopy in the office several months ago, had polyps. He has been scheduled for this today for a \"clean out\" of the polyps. He has had colonoscopy in the past.  Past Medical History    has a past medical history of Arthritis; Atrial fibrillation ([address] CAD (coronary artery disease); Colon polyps; Hyperlipidemia; Hypertension; Prediabetes; and Sleep apnea. Past Surgical History   has a past surgical history that includes Coronary artery bypass graft; Cataract removal (Bilateral); ventral hernia repair; and Total knee arthroplasty (Bilateral). Medications  Prior to Admission medications    Medication Sig Start Date End Date Taking? Authorizing Provider   diltiazem (CARTIA XT) 300 MG extended release capsule Take 300 mg by mouth daily   Yes Historical Provider, MD   metoprolol (LOPRESSOR) 50 MG tablet Take 50 mg by mouth 2 times daily. Yes Historical Provider, MD   aspirin 325 MG tablet Take 325 mg by mouth daily. Yes Historical Provider, MD   pravastatin (PRAVACHOL) 40 MG tablet Take 40 mg by mouth daily. Yes Historical Provider, MD   omeprazole (PRILOSEC) 20 MG capsule Take 20 mg by mouth daily. Yes Historical Provider, MD   tamsulosin (FLOMAX) 0.4 MG capsule Take 0.4 mg by mouth daily    Historical Provider, MD   warfarin (COUMADIN) 5 MG tablet Take 5 mg by mouth daily Managed by [facility] Coumadin clinic - 2.5mg on Thursdays  and 5mg 6x weekly    Historical Provider, MD     Allergies  is allergic to oxycodone and pcn [penicillins]. Family History  family history includes Alzheimer's Disease in his mother; Arthritis in his mother; Heart Disease in his father. Social History   reports that he has never smoked. He does not have any smokeless tobacco history on file.   He actually

## 2018-07-25 LAB — SURGICAL PATHOLOGY REPORT: NORMAL

## 2018-08-01 ENCOUNTER — HOSPITAL ENCOUNTER (OUTPATIENT)
Dept: PHARMACY | Age: 78
Setting detail: THERAPIES SERIES
Discharge: HOME | End: 2018-08-01
Payer: MEDICARE

## 2018-08-01 DIAGNOSIS — I48.19 PERSISTENT ATRIAL FIBRILLATION (HCC): ICD-10-CM

## 2018-08-01 LAB
INR BLD: 1.6
INR BLD: 6
PROTIME: 19 SECONDS

## 2018-08-01 PROCEDURE — 99211 OFF/OP EST MAY X REQ PHY/QHP: CPT

## 2018-08-01 PROCEDURE — 85610 PROTHROMBIN TIME: CPT

## 2018-08-01 NOTE — PROGRESS NOTES
Patient states compliant all of the time with regimen. Patient resumed warfarin after colonoscopy on 7/24/18. Medication list reviewed. No changes. No bleeding or thromboembolic side effects noted. Diet, use of Vitamin K reviewed. No significant changes. No significant recent illness or disease state changes. No upcomming surgeries or procedures. New prescription for warfarin needed? No    PT/INR done in office per protocol. INR was subtherapeutic. INR = 1.6, goal range 2-3    Warfarin regimen will be continued at current dose 2.5 mg Mon/Thu and 5 mg all other days. Will retest in 2 weeks. Patient understands dosing directions and information discussed. Dosing schedule and follow up appointment given to patient. Progress note routed to referring physicians office.

## 2018-08-09 ENCOUNTER — HOSPITAL ENCOUNTER (OUTPATIENT)
Dept: PHARMACY | Age: 78
Setting detail: THERAPIES SERIES
Discharge: HOME | End: 2018-08-09
Payer: MEDICARE

## 2018-08-09 DIAGNOSIS — I48.19 PERSISTENT ATRIAL FIBRILLATION (HCC): ICD-10-CM

## 2018-08-09 LAB
INR BLD: 2.6
PROTIME: 31.6 SECONDS

## 2018-08-09 PROCEDURE — 85610 PROTHROMBIN TIME: CPT

## 2018-08-09 PROCEDURE — 99211 OFF/OP EST MAY X REQ PHY/QHP: CPT

## 2018-08-19 ENCOUNTER — HOSPITAL ENCOUNTER (OUTPATIENT)
Age: 78
Setting detail: OBSERVATION
Discharge: HOME | End: 2018-08-20
Attending: EMERGENCY MEDICINE | Admitting: INTERNAL MEDICINE
Payer: MEDICARE

## 2018-08-19 DIAGNOSIS — K92.2 LOWER GI BLEED: Primary | ICD-10-CM

## 2018-08-19 PROBLEM — K62.5 RECTAL BLEEDING: Status: ACTIVE | Noted: 2018-08-19

## 2018-08-19 LAB
ABO/RH: NORMAL
ABSOLUTE EOS #: 0.2 K/UL (ref 0–0.4)
ABSOLUTE IMMATURE GRANULOCYTE: ABNORMAL K/UL (ref 0–0.3)
ABSOLUTE LYMPH #: 2.3 K/UL (ref 1–4.8)
ABSOLUTE MONO #: 0.9 K/UL (ref 0.1–1.3)
ANION GAP SERPL CALCULATED.3IONS-SCNC: 12 MMOL/L (ref 9–17)
ANTIBODY SCREEN: NEGATIVE
ARM BAND NUMBER: NORMAL
BASOPHILS # BLD: 1 % (ref 0–2)
BASOPHILS ABSOLUTE: 0.1 K/UL (ref 0–0.2)
BLOOD BANK COMMENT: NORMAL
BUN BLDV-MCNC: 21 MG/DL (ref 8–23)
BUN/CREAT BLD: NORMAL (ref 9–20)
CALCIUM SERPL-MCNC: 9.9 MG/DL (ref 8.6–10.4)
CHLORIDE BLD-SCNC: 106 MMOL/L (ref 98–107)
CO2: 24 MMOL/L (ref 20–31)
CREAT SERPL-MCNC: 1.1 MG/DL (ref 0.7–1.2)
DIFFERENTIAL TYPE: ABNORMAL
EOSINOPHILS RELATIVE PERCENT: 2 % (ref 0–4)
EXPIRATION DATE: NORMAL
GFR AFRICAN AMERICAN: >60 ML/MIN
GFR NON-AFRICAN AMERICAN: >60 ML/MIN
GFR SERPL CREATININE-BSD FRML MDRD: NORMAL ML/MIN/{1.73_M2}
GFR SERPL CREATININE-BSD FRML MDRD: NORMAL ML/MIN/{1.73_M2}
GLUCOSE BLD-MCNC: 98 MG/DL (ref 70–99)
HCT VFR BLD CALC: 45.3 % (ref 41–53)
HEMOGLOBIN: 15.2 G/DL (ref 13.5–17.5)
IMMATURE GRANULOCYTES: ABNORMAL %
INR BLD: 2.8
LYMPHOCYTES # BLD: 24 % (ref 24–44)
MCH RBC QN AUTO: 30.4 PG (ref 26–34)
MCHC RBC AUTO-ENTMCNC: 33.5 G/DL (ref 31–37)
MCV RBC AUTO: 90.6 FL (ref 80–100)
MONOCYTES # BLD: 10 % (ref 1–7)
NRBC AUTOMATED: ABNORMAL PER 100 WBC
PDW BLD-RTO: 13.7 % (ref 11.5–14.9)
PLATELET # BLD: 212 K/UL (ref 150–450)
PLATELET ESTIMATE: ABNORMAL
PMV BLD AUTO: 7.8 FL (ref 6–12)
POTASSIUM SERPL-SCNC: 4.6 MMOL/L (ref 3.7–5.3)
PROTHROMBIN TIME: 27.4 SEC (ref 9.7–12)
RBC # BLD: 5 M/UL (ref 4.5–5.9)
RBC # BLD: ABNORMAL 10*6/UL
SEG NEUTROPHILS: 63 % (ref 36–66)
SEGMENTED NEUTROPHILS ABSOLUTE COUNT: 5.9 K/UL (ref 1.3–9.1)
SODIUM BLD-SCNC: 142 MMOL/L (ref 135–144)
WBC # BLD: 9.4 K/UL (ref 3.5–11)
WBC # BLD: ABNORMAL 10*3/UL

## 2018-08-19 PROCEDURE — 85025 COMPLETE CBC W/AUTO DIFF WBC: CPT

## 2018-08-19 PROCEDURE — 99219 PR INITIAL OBSERVATION CARE/DAY 50 MINUTES: CPT | Performed by: INTERNAL MEDICINE

## 2018-08-19 PROCEDURE — 99284 EMERGENCY DEPT VISIT MOD MDM: CPT

## 2018-08-19 PROCEDURE — 80048 BASIC METABOLIC PNL TOTAL CA: CPT

## 2018-08-19 PROCEDURE — 86850 RBC ANTIBODY SCREEN: CPT

## 2018-08-19 PROCEDURE — 85610 PROTHROMBIN TIME: CPT

## 2018-08-19 PROCEDURE — 36415 COLL VENOUS BLD VENIPUNCTURE: CPT

## 2018-08-19 PROCEDURE — 86900 BLOOD TYPING SEROLOGIC ABO: CPT

## 2018-08-19 PROCEDURE — G0378 HOSPITAL OBSERVATION PER HR: HCPCS

## 2018-08-19 PROCEDURE — 86901 BLOOD TYPING SEROLOGIC RH(D): CPT

## 2018-08-19 RX ORDER — SODIUM CHLORIDE 0.9 % (FLUSH) 0.9 %
10 SYRINGE (ML) INJECTION EVERY 12 HOURS SCHEDULED
Status: DISCONTINUED | OUTPATIENT
Start: 2018-08-19 | End: 2018-08-20 | Stop reason: HOSPADM

## 2018-08-19 RX ORDER — ACETAMINOPHEN 325 MG/1
650 TABLET ORAL EVERY 4 HOURS PRN
Status: DISCONTINUED | OUTPATIENT
Start: 2018-08-19 | End: 2018-08-20 | Stop reason: HOSPADM

## 2018-08-19 RX ORDER — SODIUM CHLORIDE 0.9 % (FLUSH) 0.9 %
10 SYRINGE (ML) INJECTION PRN
Status: DISCONTINUED | OUTPATIENT
Start: 2018-08-19 | End: 2018-08-20 | Stop reason: HOSPADM

## 2018-08-19 ASSESSMENT — ENCOUNTER SYMPTOMS
VOMITING: 0
EYE ITCHING: 0
NAUSEA: 0
COUGH: 0
CONSTIPATION: 0
ABDOMINAL PAIN: 0
DIARRHEA: 0
SHORTNESS OF BREATH: 0
RHINORRHEA: 0
BACK PAIN: 0
BLOOD IN STOOL: 1
EYE REDNESS: 0

## 2018-08-19 ASSESSMENT — PAIN SCALES - GENERAL: PAINLEVEL_OUTOF10: 0

## 2018-08-20 VITALS
BODY MASS INDEX: 36.16 KG/M2 | SYSTOLIC BLOOD PRESSURE: 147 MMHG | HEART RATE: 54 BPM | HEIGHT: 66 IN | DIASTOLIC BLOOD PRESSURE: 76 MMHG | WEIGHT: 225 LBS | RESPIRATION RATE: 14 BRPM | TEMPERATURE: 97 F | OXYGEN SATURATION: 95 %

## 2018-08-20 LAB
HCT VFR BLD CALC: 41 % (ref 41–53)
HCT VFR BLD CALC: 44.8 % (ref 41–53)
HEMOGLOBIN: 14 G/DL (ref 13.5–17.5)
HEMOGLOBIN: 15.3 G/DL (ref 13.5–17.5)

## 2018-08-20 PROCEDURE — 99215 OFFICE O/P EST HI 40 MIN: CPT | Performed by: INTERNAL MEDICINE

## 2018-08-20 PROCEDURE — 36415 COLL VENOUS BLD VENIPUNCTURE: CPT

## 2018-08-20 PROCEDURE — 99217 PR OBSERVATION CARE DISCHARGE MANAGEMENT: CPT | Performed by: INTERNAL MEDICINE

## 2018-08-20 PROCEDURE — 85014 HEMATOCRIT: CPT

## 2018-08-20 PROCEDURE — 85018 HEMOGLOBIN: CPT

## 2018-08-20 PROCEDURE — G0378 HOSPITAL OBSERVATION PER HR: HCPCS

## 2018-08-20 NOTE — PROGRESS NOTES
Pt is a/o x4, denies any bm overnight. Pt states he is passing gas but denies pain or discomfort at this time. Dr. Pike Common paged for diet order.

## 2018-08-20 NOTE — CONSULTS
Inpatient consult to GI  Consult performed by: Neli Arteaga ordered by: Cori Roman           GI Consult Note:    Name: Arabella Dougherty  MRN: [id number]     Micaelalyside: [de-identified]  Room: 2061/2061-01    Admit Date: 8/19/2018  PCP: Kym Mei    Physician Requesting Consult: Nicol Bonds MD     Reason for Consult:  Rectal bleeding  Elevated INR  Recent Colonoscopy   Morbid obesity     Chief Complaint:     Chief Complaint   Patient presents with    Rectal Bleeding       History Obtained From:     Patient his wife and EMR    History of Present Illness:      Arabella Dougherty is a  78 y.o.  male who presents with Rectal Bleeding    This patient has hx of multiple ch med issues  He has been admitted with above  He had a recent Colonoscopy done by Dr Nancy Valderrama and had polypectomy  He has been on coumadin and had a large BM apparently with some blood at home  He also claims passing some clots? He has stable HGB  Has not had any further bleeding since admission   Has morbid obesity   Has mild abd pains  No nausea or any vomiting  Eager to eat and go home    Symptoms:  Onset:  Location:  abdomen  Duration:  day(s)  Severity:  mild  Quality:  stable      Past Medical History:     Past Medical History:   Diagnosis Date    Arthritis     Atrial fibrillation ([facility] [address]     CAD (coronary artery disease)     Colon polyps     Hyperlipidemia     Hypertension     Prediabetes     Sleep apnea     not tested, no cpap        Past Surgical History:     Past Surgical History:   Procedure Laterality Date    CATARACT REMOVAL Bilateral     COLONOSCOPY  7/24/2018    COLONOSCOPY WITH BIOPSY performed by Tree Yusuf MD at [facility]      4 vessel    TOTAL KNEE ARTHROPLASTY Bilateral     VENTRAL HERNIA REPAIR          Medications Prior to Admission:       Prior to Admission medications    Medication Sig Start Date End Date Taking?  Authorizing Provider   tamsulosin (FLOMAX) 0.4 08/19/2018    BASOSABS 0.10 08/19/2018    DIFFTYPE NOT REPORTED 08/19/2018     Hemoglobin/Hematocrit:    Lab Results   Component Value Date    HGB 14.0 08/20/2018    HCT 41.0 08/20/2018     CMP:    Lab Results   Component Value Date     08/19/2018    K 4.6 08/19/2018     08/19/2018    CO2 24 08/19/2018    BUN 21 08/19/2018    CREATININE 1.10 08/19/2018    GFRAA >60 08/19/2018    LABGLOM >60 08/19/2018    GLUCOSE 98 08/19/2018    GLUCOSE 120 03/26/2012    PROT 7.3 02/07/2017    LABALBU 3.8 02/07/2017    CALCIUM 9.9 08/19/2018    BILITOT 0.47 02/07/2017    ALKPHOS 52 02/07/2017    AST 21 02/07/2017    ALT 19 12/13/2017     BMP:    Lab Results   Component Value Date     08/19/2018    K 4.6 08/19/2018     08/19/2018    CO2 24 08/19/2018    BUN 21 08/19/2018    LABALBU 3.8 02/07/2017    CREATININE 1.10 08/19/2018    CALCIUM 9.9 08/19/2018    GFRAA >60 08/19/2018    LABGLOM >60 08/19/2018    GLUCOSE 98 08/19/2018    GLUCOSE 120 03/26/2012     PT/INR:    Lab Results   Component Value Date    PROTIME 27.4 08/19/2018    PROTIME 31.6 08/09/2018    INR 2.8 08/19/2018     PTT:    Lab Results   Component Value Date    APTT 39.4 10/15/2013   [APTT}    Assesment:     Primary Problem  <principal problem not specified>    Active Hospital Problems    Diagnosis Date Noted    Rectal bleeding [K62.5] 08/19/2018     Rectal bleeding  Elevated INR  Recent Colonoscopy   Morbid obesity       Plan:     1. At this time patient is doing well with stable HGB  2. His colonoscopy report was reviewed with him  3. Will give him soft diet  4. F/u HGB  5. Watch for another 12 hours or so   6. If stable will be OK for DC to be followed as out patient with Dr Alvarenga Ask  7. This was explained to the patient and his wife and their questions were answered  8. Was  nursing staff        Thank you for allowing me to participate in the care of your patient. Please feel free to contact me with any questions or concerns.

## 2018-08-20 NOTE — DISCHARGE SUMMARY
[place] [address] Internal Medicine    Discharge Summary     Patient ID: Robles Skinner  :  1940   MRN: [id number]     ACCOUNT:  [de-identified]   Patient's PCP: Miguel A Medal Date: 2018   Discharge Date:  18    Length of Stay: 0  Code Status:  Full Code  Admitting Physician: Evelia Momin MD  Discharge Physician: Zahra Munguia MD     Active Discharge Diagnoses:     Primary Problem  <principal problem not specified>      [place] Problems    Diagnosis Date Noted    Lower GI bleed [K92.2]     Morbid obesity ([address] [E66.01]     S/P colonoscopy with polypectomy [Z98.890]     Elevated INR [R79.1]     Rectal bleeding [K62.5] 2018       Admission Condition:  fair     Discharged Condition: good    Hospital Stay:     Hospital Course:  Robles Skinner is a 78 y.o. male who was admitted for the management of   .     , presented with Rectal Bleeding      ,                         <principal problem not specified>;                               Active Problems:    Rectal bleeding    Lower GI bleed    Morbid obesity ([address]    S/P colonoscopy with polypectomy    Elevated INR  Resolved Problems:    * No resolved hospital problems. *       Significant therapeutic interventions:       supportive therapy  Rectal bleeding  Elevated INR  Recent Colonoscopy   Morbid obesity         Plan:      1. At this time patient is doing well with stable HGB  2. His colonoscopy report was reviewed with him  3. Will give him soft diet  4.  F/u HGB       Significant Diagnostic Studies:   Labs / Micro:       Results for orders placed or performed during the hospital encounter of 18   CBC Auto Differential   Result Value Ref Range    WBC 9.4 3.5 - 11.0 k/uL    RBC 5.00 4.5 - 5.9 m/uL    Hemoglobin 15.2 13.5 - 17.5 g/dL    Hematocrit 45.3 41 - 53 %    MCV 90.6 80 - 100 fL    MCH 30.4 26 - 34 pg    MCHC 33.5 31 - 37 g/dL    RDW 13.7 11.5 - 14.9 % Disposition: Home    Physician Follow Up: With PCP and as specified     Requiring Further Evaluation/Follow Up POST HOSPITALIZATION/Incidental Findings:    Diet: regular     Activity: As tolerated    I  Discharge Medications:      Medication List      CONTINUE taking these medications    CARTIA  MG extended release capsule  Generic drug:  diltiazem     metoprolol tartrate 50 MG tablet  Commonly known as:  LOPRESSOR     omeprazole 20 MG delayed release capsule  Commonly known as:  PRILOSEC     pravastatin 40 MG tablet  Commonly known as:  PRAVACHOL     tamsulosin 0.4 MG capsule  Commonly known as:  FLOMAX     warfarin 5 MG tablet  Commonly known as:  COUMADIN  Take as directed. If you are unsure how to take this medication, talk to your nurse or doctor. STOP taking these medications    aspirin 325 MG tablet              Time spent on discharge planning ;          [] less than 30 minutes . [x]   more  than 30 minutes . Ellectronically signed by   Chata Luis MD      Thank you Dr. Mingo Odom for the opportunity to be involved in this patient's care. Please note that this chart was generated using voice recognition Dragon dictation software. Although every effort was made to ensure the accuracy of this automated transcription, some errors in transcription may have occurred.

## 2018-08-20 NOTE — ED PROVIDER NOTES
[facility] [facility]  Emergency Department Encounter  Emergency Medicine Resident     Pt Name: Steven Souza  MRN: [id number]  Mylesgfbriseida 1940  Date of evaluation: 8/19/18  PCP:  Michael Marti       Chief Complaint   Patient presents with    Rectal Bleeding       HISTORY OF PRESENT ILLNESS  (Location/Symptom, Timing/Onset, Context/Setting, Quality, Duration, Modifying Factors, Severity.)      Steven Souza is a 78 y.o. male who presents with One-day history of rectal bleeding. Patient had recent colonoscopy approximately one month ago showing internal hemorrhoids, diverticulosis without evidence of colon cancer. Patient said that earlier today he had an episode of stool with blood saturating the entire bowel movement and filling the toilet with blood. Patient is anticoagulated on warfarin for atrial fibrillation, was concerned that this bleeding was significant and came to the emergency department for evaluation. Patient says that he has pain with defecation. long history of constipation, takes fiber supplements. PAST MEDICAL / SURGICAL / SOCIAL / FAMILY HISTORY      has a past medical history of Arthritis; Atrial fibrillation ([address] CAD (coronary artery disease); Colon polyps; Hyperlipidemia; Hypertension; Prediabetes; and Sleep apnea. has a past surgical history that includes Coronary artery bypass graft; Cataract removal (Bilateral); ventral hernia repair; Total knee arthroplasty (Bilateral); and Colonoscopy (7/24/2018). Social History     Social History    Marital status:      Spouse name: N/A    Number of children: N/A    Years of education: N/A     Occupational History    Not on file.      Social History Main Topics    Smoking status: Never Smoker    Smokeless tobacco: Not on file    Alcohol use No    Drug use: No    Sexual activity: Not on file     Other Topics Concern    Not on file     Social History Narrative    No narrative on file       Family (1.676 m)   Wt 225 lb (102.1 kg)   SpO2 95%   BMI 36.32 kg/m²     Physical Exam   Constitutional: He is oriented to person, place, and time. He appears well-developed and well-nourished. HENT:   Head: Normocephalic and atraumatic. Eyes: Pupils are equal, round, and reactive to light. Conjunctivae are normal.   Neck: Normal range of motion. Neck supple. Cardiovascular: Normal rate, regular rhythm and normal heart sounds. Pulmonary/Chest: Effort normal and breath sounds normal. No respiratory distress. He has no wheezes. Abdominal: Soft. Bowel sounds are normal. He exhibits no distension. There is no tenderness. Genitourinary:   Genitourinary Comments: Gross blood without evidence of fissure or hemorrhoid. No palpable hemorrhoids on digital rectal examination. Musculoskeletal: Normal range of motion. He exhibits no edema or deformity. Neurological: He is alert and oriented to person, place, and time. Coordination normal.   Skin: Skin is warm and dry. No rash noted.        DIFFERENTIAL  DIAGNOSIS     PLAN (LABS / IMAGING / EKG):  Orders Placed This Encounter   Procedures    CBC Auto Differential    Basic Metabolic Panel    Protime-INR    Vital signs per unit routine    Notify physician    Notify physician    Place intermittent pneumatic compression device    Full Code    Inpatient consult to Primary Care Provider    Inpatient consult to Internal Medicine    Inpatient consult to GI    TYPE AND SCREEN    PATIENT STATUS (FROM ED OR OR/PROCEDURAL) Observation       MEDICATIONS ORDERED:  Orders Placed This Encounter   Medications    sodium chloride flush 0.9 % injection 10 mL    sodium chloride flush 0.9 % injection 10 mL    acetaminophen (TYLENOL) tablet 650 mg       DDX: Internal hemorrhoid, diverticulosis    DIAGNOSTIC RESULTS / EMERGENCY DEPARTMENT COURSE / MDM     LABS:  Results for orders placed or performed during the hospital encounter of 08/19/18   CBC Auto Differential   Result suspect diverticular bleeding. Patient did have significant amount of blood in toilet. We will obtain CBC, BMP and INR. Likely admission. RADIOLOGY:  None    EKG  None    All EKG's are interpreted by the Emergency Department Physician who either signs or Co-signs this chart in the absence of a cardiologist.    EMERGENCY DEPARTMENT COURSE:  Patient's hemoglobin is within normal limits, blood pressure and other vital signs are within normal limits as well. Patient has an INR that is therapeutic for atrial fibrillation. Patient to be admitted to internal medicine for serial hemoglobin and hematocrit checks. PROCEDURES:  None    CONSULTS:  IP CONSULT TO PRIMARY CARE PROVIDER  IP CONSULT TO INTERNAL MEDICINE  IP CONSULT TO GI    CRITICAL CARE:  None    FINAL IMPRESSION      1.  Lower GI bleed          DISPOSITION / PLAN     DISPOSITION Admitted 08/19/2018 09:24:41 PM      PATIENT REFERRED TO:  Gerhard Serna  [address] [facility] [address] [id number]            DISCHARGE MEDICATIONS:  Current Discharge Medication List          Vincent Brown MD  Emergency Medicine Resident    (Please note that portions of this note were completed with a voice recognition program.  Efforts were made to edit the dictations but occasionally words are mis-transcribed.)       Vincent Brown MD  08/19/18 [id number]

## 2018-08-20 NOTE — DISCHARGE INSTR - DIET

## 2018-08-21 NOTE — H&P
History and Physical Service  [facility]    HISTORY AND PHYSICAL EXAMINATION            Date:   8/19/2018  Patient name:  Cindy Alvarez  MRN:   [id number]  Account:  [de-identified]  YOB: 1940  PCP:    Nallely Dixon  Code Status:    Prior    Chief Complaint:     Chief Complaint   Patient presents with    Rectal Bleeding       History obtained from  ; Patient, electronic medical record    History of Present Illness: The patient is a 78 y.o. Non-/non  male who presents ; Rectal bleeding . Hx colonoscopy and polypectomy a few weeks ago . Pro time elevated    Cindy Alvarez is a 78 y.o. male who presents with One-day history of rectal bleeding. Patient had recent colonoscopy approximately one month ago showing internal hemorrhoids, diverticulosis without evidence of colon cancer. Patient said that earlier today he had an episode of stool with blood saturating the entire bowel movement and filling the toilet with blood. Patient is anticoagulated on warfarin for atrial fibrillation, was concerned that this bleeding was significant and came to the emergency department for evaluation. Patient says that he has pain with defecation. long history of constipation, takes fiber supplements. Review of Systems:     POSITIVE AND NEGATIVES AS DESCRIBED IN HISTORY OF PRESENT ILLNESS ;  IN ADDITION ;            General ROS:                                                              Negative for worsening or new  PAIN . Psychological ROS:          Negative for depressed mood . Respiratory ROS:            Negative for cough,                                             Negative for shortness of breath . Negative for wheezing ,     Cardiovascular ROS:     Negative for chest pain,                                             Negative for palpitations . Mon/Fri  and 5mg 5x weekly   Yes Historical Provider, MD   diltiazem (CARTIA XT) 300 MG extended release capsule Take 300 mg by mouth daily   Yes Historical Provider, MD   metoprolol (LOPRESSOR) 50 MG tablet Take 50 mg by mouth 2 times daily. Yes Historical Provider, MD   pravastatin (PRAVACHOL) 40 MG tablet Take 40 mg by mouth daily. Yes Historical Provider, MD   omeprazole (PRILOSEC) 20 MG capsule Take 20 mg by mouth daily. Yes Historical Provider, MD         Physical Exam:     Physical Exam   Vitals: BP (!) 147/76   Pulse 54   Temp 97 °F (36.1 °C) (Oral)   Resp 14   Ht 5' 6\" (1.676 m)   Wt 225 lb (102.1 kg)   SpO2 95%   BMI 36.32 kg/m²                 Body mass index is 36.32 kg/m². General Appearance:   -WNL-                Skin:                             No rash or erythema  HEENT ;                           Head                        Symmetrical , within normal limits                                    No tenderness over frontal  sinuses. No tenderness over maxillary sinuses. Eye                           Conjunctiva normal ,, sclera non-icteric . Ear                           External ear ok . Hearing okay , no discharge from ears . [] Otoscopy findings wnl     Nose                         w.n.l. Throat                       Oropharynx  findings wnl               Neck:                            No mass , no thyroid enlargement                                           Pulmonary/Chest:        Clear to auscultation bilaterally . No wheezes, rales or rhonchi . No abnormality on percussion                                                        Cardiovascular:            Normal rate, regular rhythm,                                          No murmur or  Gallop . for input(s): PROT, LABALBU, LABA1C, I6FFQUH, H4NUQTD, FT4, TSH, AST, ALT, LDH, GGT, ALKPHOS, LABGGT, BILITOT, BILIDIR, AMMONIA, AMYLASE, LIPASE, LACTATE, CHOL, HDL, LDLCHOLESTEROL, CHOLHDLRATIO, TRIG, VLDL, CQG04LS, PHENYTOIN, PHENYF, URICACID, POCGLU in the last 72 hours. Imaging/Diagnostics:       No results found. Impressions :      1. Active Problems:    Rectal bleeding    Lower GI bleed    Morbid obesity (HCC)    S/P colonoscopy with polypectomy    Elevated INR  Resolved Problems:    * No resolved hospital problems. *        2.  has a past medical history of Arthritis; Atrial fibrillation ([address] CAD (coronary artery disease); Colon polyps; Hyperlipidemia; Hypertension; Prediabetes; and Sleep apnea. Plans: 1. Will workup and treat       Orders Placed This Encounter   Procedures    CBC Auto Differential    Basic Metabolic Panel    Protime-INR    Hgb/Hct    Hgb/Hct    Notify physician    Inpatient consult to Primary Care Provider    Inpatient consult to Internal Medicine    Inpatient consult to GI    TYPE AND SCREEN    PATIENT STATUS (FROM ED OR OR/PROCEDURAL) Observation    Discharge patient        No current facility-administered medications for this encounter. Current Outpatient Prescriptions   Medication Sig Dispense Refill    tamsulosin (FLOMAX) 0.4 MG capsule Take 0.4 mg by mouth daily      warfarin (COUMADIN) 5 MG tablet Take 5 mg by mouth daily Managed by [address] [place] [address] Coumadin clinic - 2.5mg on Mon/Fri  and 5mg 5x weekly      diltiazem (CARTIA XT) 300 MG extended release capsule Take 300 mg by mouth daily      metoprolol (LOPRESSOR) 50 MG tablet Take 50 mg by mouth 2 times daily.  pravastatin (PRAVACHOL) 40 MG tablet Take 40 mg by mouth daily.  omeprazole (PRILOSEC) 20 MG capsule Take 20 mg by mouth daily.           g I consult .        Ellis Ahumada MD  8/20/2018  9:18 PM      Please note that this chart was generated using voice recognition Dragon

## 2018-08-23 ENCOUNTER — HOSPITAL ENCOUNTER (OUTPATIENT)
Dept: PHARMACY | Age: 78
Discharge: HOME | End: 2018-08-23

## 2018-08-28 ENCOUNTER — HOSPITAL ENCOUNTER (EMERGENCY)
Age: 78
Discharge: HOME | End: 2018-08-28
Attending: EMERGENCY MEDICINE
Payer: MEDICARE

## 2018-08-28 VITALS
OXYGEN SATURATION: 94 % | TEMPERATURE: 97.7 F | HEIGHT: 66 IN | HEART RATE: 58 BPM | BODY MASS INDEX: 36.16 KG/M2 | DIASTOLIC BLOOD PRESSURE: 68 MMHG | RESPIRATION RATE: 18 BRPM | WEIGHT: 225 LBS | SYSTOLIC BLOOD PRESSURE: 135 MMHG

## 2018-08-28 DIAGNOSIS — K62.5 RECTAL BLEEDING: Primary | ICD-10-CM

## 2018-08-28 LAB
ABO/RH: NORMAL
ABSOLUTE EOS #: 0.2 K/UL (ref 0–0.4)
ABSOLUTE IMMATURE GRANULOCYTE: ABNORMAL K/UL (ref 0–0.3)
ABSOLUTE LYMPH #: 2.4 K/UL (ref 1–4.8)
ABSOLUTE MONO #: 0.9 K/UL (ref 0.1–1.3)
ANION GAP SERPL CALCULATED.3IONS-SCNC: 11 MMOL/L (ref 9–17)
ANTIBODY SCREEN: NEGATIVE
ARM BAND NUMBER: NORMAL
BASOPHILS # BLD: 1 % (ref 0–2)
BASOPHILS ABSOLUTE: 0.1 K/UL (ref 0–0.2)
BUN BLDV-MCNC: 13 MG/DL (ref 8–23)
BUN/CREAT BLD: ABNORMAL (ref 9–20)
CALCIUM SERPL-MCNC: 9.6 MG/DL (ref 8.6–10.4)
CHLORIDE BLD-SCNC: 107 MMOL/L (ref 98–107)
CO2: 23 MMOL/L (ref 20–31)
CREAT SERPL-MCNC: 1.31 MG/DL (ref 0.7–1.2)
DIFFERENTIAL TYPE: ABNORMAL
EOSINOPHILS RELATIVE PERCENT: 3 % (ref 0–4)
EXPIRATION DATE: NORMAL
GFR AFRICAN AMERICAN: >60 ML/MIN
GFR NON-AFRICAN AMERICAN: 53 ML/MIN
GFR SERPL CREATININE-BSD FRML MDRD: ABNORMAL ML/MIN/{1.73_M2}
GFR SERPL CREATININE-BSD FRML MDRD: ABNORMAL ML/MIN/{1.73_M2}
GLUCOSE BLD-MCNC: 94 MG/DL (ref 70–99)
HCT VFR BLD CALC: 45.3 % (ref 41–53)
HEMOGLOBIN: 14.9 G/DL (ref 13.5–17.5)
IMMATURE GRANULOCYTES: ABNORMAL %
INR BLD: 2.7
LYMPHOCYTES # BLD: 30 % (ref 24–44)
MCH RBC QN AUTO: 29.2 PG (ref 26–34)
MCHC RBC AUTO-ENTMCNC: 32.8 G/DL (ref 31–37)
MCV RBC AUTO: 89.2 FL (ref 80–100)
MONOCYTES # BLD: 11 % (ref 1–7)
NRBC AUTOMATED: ABNORMAL PER 100 WBC
PARTIAL THROMBOPLASTIN TIME: 41 SEC (ref 23–31)
PDW BLD-RTO: 13.8 % (ref 11.5–14.9)
PLATELET # BLD: 202 K/UL (ref 150–450)
PLATELET ESTIMATE: ABNORMAL
PMV BLD AUTO: 7.5 FL (ref 6–12)
POTASSIUM SERPL-SCNC: 4.2 MMOL/L (ref 3.7–5.3)
PROTHROMBIN TIME: 27.2 SEC (ref 9.7–12)
RBC # BLD: 5.08 M/UL (ref 4.5–5.9)
RBC # BLD: ABNORMAL 10*6/UL
SEG NEUTROPHILS: 55 % (ref 36–66)
SEGMENTED NEUTROPHILS ABSOLUTE COUNT: 4.4 K/UL (ref 1.3–9.1)
SODIUM BLD-SCNC: 141 MMOL/L (ref 135–144)
WBC # BLD: 7.9 K/UL (ref 3.5–11)
WBC # BLD: ABNORMAL 10*3/UL

## 2018-08-28 PROCEDURE — 99284 EMERGENCY DEPT VISIT MOD MDM: CPT

## 2018-08-28 PROCEDURE — 86850 RBC ANTIBODY SCREEN: CPT

## 2018-08-28 PROCEDURE — 85730 THROMBOPLASTIN TIME PARTIAL: CPT

## 2018-08-28 PROCEDURE — 86901 BLOOD TYPING SEROLOGIC RH(D): CPT

## 2018-08-28 PROCEDURE — 85025 COMPLETE CBC W/AUTO DIFF WBC: CPT

## 2018-08-28 PROCEDURE — 86900 BLOOD TYPING SEROLOGIC ABO: CPT

## 2018-08-28 PROCEDURE — 80048 BASIC METABOLIC PNL TOTAL CA: CPT

## 2018-08-28 PROCEDURE — 85610 PROTHROMBIN TIME: CPT

## 2018-08-28 PROCEDURE — 36415 COLL VENOUS BLD VENIPUNCTURE: CPT

## 2018-08-28 RX ORDER — ACETAMINOPHEN 325 MG/1
650 TABLET ORAL 2 TIMES DAILY PRN
COMMUNITY

## 2018-08-28 ASSESSMENT — ENCOUNTER SYMPTOMS
BACK PAIN: 0
VOMITING: 0
BLOOD IN STOOL: 1
ABDOMINAL PAIN: 0
NAUSEA: 0
SHORTNESS OF BREATH: 0

## 2018-08-28 ASSESSMENT — PAIN DESCRIPTION - LOCATION: LOCATION: RECTUM

## 2018-08-28 ASSESSMENT — PAIN DESCRIPTION - PAIN TYPE: TYPE: ACUTE PAIN

## 2018-08-28 ASSESSMENT — PAIN SCALES - GENERAL: PAINLEVEL_OUTOF10: 3

## 2018-08-28 NOTE — ED PROVIDER NOTES
kg)   SpO2 94%   BMI 36.32 kg/m²     Physical Exam   Constitutional: He is oriented to person, place, and time. He appears well-developed. No distress. HENT:   Head: Normocephalic and atraumatic. Eyes: Pupils are equal, round, and reactive to light. EOM are normal.   Cardiovascular: Normal rate. Pulmonary/Chest: Effort normal and breath sounds normal. No stridor. No respiratory distress. Abdominal: Soft. He exhibits no distension. There is no tenderness. There is no guarding. Musculoskeletal: Normal range of motion. Neurological: He is alert and oriented to person, place, and time. Skin: Skin is warm. He is not diaphoretic. No pallor. DIFFERENTIAL  DIAGNOSIS     PLAN (LABS / IMAGING / EKG):  Orders Placed This Encounter   Procedures    CBC Auto Differential    Protime-INR    APTT    Basic Metabolic Panel    Inpatient consult to Primary Care Provider    Inpatient consult to General Surgery    TYPE AND SCREEN       MEDICATIONS ORDERED:  No orders of the defined types were placed in this encounter.       DDX: Diverticular bleed, hemorrhoidal bleed, anal fissure, elevated INR    DIAGNOSTIC RESULTS / EMERGENCY DEPARTMENT COURSE / MDM     LABS:  Results for orders placed or performed during the hospital encounter of 08/28/18   CBC Auto Differential   Result Value Ref Range    WBC 7.9 3.5 - 11.0 k/uL    RBC 5.08 4.5 - 5.9 m/uL    Hemoglobin 14.9 13.5 - 17.5 g/dL    Hematocrit 45.3 41 - 53 %    MCV 89.2 80 - 100 fL    MCH 29.2 26 - 34 pg    MCHC 32.8 31 - 37 g/dL    RDW 13.8 11.5 - 14.9 %    Platelets 298 072 - 720 k/uL    MPV 7.5 6.0 - 12.0 fL    NRBC Automated NOT REPORTED per 100 WBC    Differential Type NOT REPORTED     Seg Neutrophils 55 36 - 66 %    Lymphocytes 30 24 - 44 %    Monocytes 11 (H) 1 - 7 %    Eosinophils % 3 0 - 4 %    Basophils 1 0 - 2 %    Immature Granulocytes NOT REPORTED 0 %    Segs Absolute 4.40 1.3 - 9.1 k/uL    Absolute Lymph # 2.40 1.0 - 4.8 k/uL    Absolute Mono # 0. 90 0.1 - 1.3 k/uL    Absolute Eos # 0.20 0.0 - 0.4 k/uL    Basophils # 0.10 0.0 - 0.2 k/uL    Absolute Immature Granulocyte NOT REPORTED 0.00 - 0.30 k/uL    WBC Morphology NOT REPORTED     RBC Morphology NOT REPORTED     Platelet Estimate NOT REPORTED    Protime-INR   Result Value Ref Range    Protime 27.2 (H) 9.7 - 12.0 sec    INR 2.7    APTT   Result Value Ref Range    PTT 41.0 (H) 23.0 - 31.0 sec   Basic Metabolic Panel   Result Value Ref Range    Glucose 94 70 - 99 mg/dL    BUN 13 8 - 23 mg/dL    CREATININE 1.31 (H) 0.70 - 1.20 mg/dL    Bun/Cre Ratio NOT REPORTED 9 - 20    Calcium 9.6 8.6 - 10.4 mg/dL    Sodium 141 135 - 144 mmol/L    Potassium 4.2 3.7 - 5.3 mmol/L    Chloride 107 98 - 107 mmol/L    CO2 23 20 - 31 mmol/L    Anion Gap 11 9 - 17 mmol/L    GFR Non-African American 53 (L) >60 mL/min    GFR African American >60 >60 mL/min    GFR Comment          GFR Staging NOT REPORTED    TYPE AND SCREEN   Result Value Ref Range    Expiration Date 08/31/2018     Arm Band Number [id number]     ABO/Rh AB POSITIVE     Antibody Screen NEGATIVE          RADIOLOGY:  None    EKG  None    All EKG's are interpreted by the Emergency Department Physician who either signs or Co-signs this chart in the absence of a cardiologist.    EMERGENCY DEPARTMENT COURSE:  68-year-old male presents with rectal bleeding. Patient appears well in room, stable vital signs. Not actively hemorrhaging. Patient concerns she's had multiple episodes today of bloody bowel movements. Currently taking Coumadin for history of A. fib. Will get CBC, BMP, INR, PTT. Hemoccult-positive, rectal exam performed patient does have skin tags without evidence of thrombosed or bleeding hemorrhoids. No rectal pain on exam.    6:49 PM Spoke with Dr Royce Stanley, he is recommended patient call his office tomorrow morning to arrange outpatient colonoscopy. He is recommending holding Coumadin for 2 days to limit GI bleeding. Up to the patient on plan, agreeable. Encourage patient to return immediately if he would've worsening symptoms. PROCEDURES:  None    CONSULTS:  IP CONSULT TO PRIMARY CARE PROVIDER  IP CONSULT TO GENERAL SURGERY    CRITICAL CARE:  None    FINAL IMPRESSION      1. Rectal bleeding          DISPOSITION / PLAN     DISPOSITION  Discharged home      PATIENT REFERRED TO:  Waqar Shanks MD  [address] [place] [address]  [phone]    Call in 1 day  call on 8/29 to schedule outpatient appointment.     [facility] ED  [facility] [id number] [phone]    If symptoms worsen      DISCHARGE MEDICATIONS:  New Prescriptions    No medications on file       Diejeffrey Stage, DO  Emergency Medicine Resident    (Please note that portions of this note were completed with a voice recognition program.  Efforts were made to edit the dictations but occasionally words are mis-transcribed.)        Marilin Patrick DO  08/28/18 [id number]

## 2018-08-28 NOTE — ED PROVIDER NOTES
[facility]     Emergency Department     Faculty Attestation    I performed a history and physical examination of the patient and discussed management with the resident. I reviewed the residents note and agree with the documented findings and plan of care. Any areas of disagreement are noted on the chart. I was personally present for the key portions of any procedures. I have documented in the chart those procedures where I was not present during the key portions. I have reviewed the emergency nurses triage note. I agree with the chief complaint, past medical history, past surgical history, allergies, medications, social and family history as documented unless otherwise noted below. Documentation of the HPI, Physical Exam and Medical Decision Making performed by medical students or scribes is based on my personal performance of the HPI, PE and MDM. For APC cases, I have personally evaluated and examined the patient in conjunction with the APC and agree with the assessment, treatment plan, and disposition of the patient as recorded by the APCAdditional findings are as noted.       [address] [place] [address]       Chief Complaint   Patient presents with    Rectal Bleeding       RECENT VITALS:   BP (!) 172/82   Pulse 62   Temp 97.7 °F (36.5 °C) (Oral)   Resp 16   Ht 5' 6\" (1.676 m)   Wt 225 lb (102.1 kg)   SpO2 96%   BMI 36.32 kg/m²       PERTINENT ATTENDING PHYSICIAN COMMENTS:      [address],  MD, [place], [facility]  Attending Emergency Physician           Maggi Galan MD  08/28/18 7241

## 2018-08-29 ENCOUNTER — HOSPITAL ENCOUNTER (OUTPATIENT)
Dept: PHARMACY | Age: 78
Setting detail: THERAPIES SERIES
Discharge: HOME | End: 2018-08-29
Payer: MEDICARE

## 2018-08-29 DIAGNOSIS — I48.91 ATRIAL FIBRILLATION, UNSPECIFIED TYPE (HCC): ICD-10-CM

## 2018-08-29 LAB
INR BLD: 2
PROTIME: 23.9 SECONDS

## 2018-08-29 PROCEDURE — 99212 OFFICE O/P EST SF 10 MIN: CPT

## 2018-08-29 PROCEDURE — 85610 PROTHROMBIN TIME: CPT

## 2018-08-29 NOTE — PROGRESS NOTES
Patient states compliant all of the time with regimen skipping dose yesterday as instructed. No bleeding or thromboembolic side effects noted. No significant med or dietary changes. Patient was to ED twice in last week for rectal bleeding. Most recent was yesterday with INR in ED of 2.7. Patient states he has not had a bowel movement since yesterday so no further blood. Patient has been constipated. Taking stool softenen and has a laxative to take when no bowel movement in two days. PT/INR done in office per protocol. INR was therapeutic at 2 (goal 2-3) and moving down as did skip dose of warfarin yesterday as instructed. Patient has appt with Dr. Jayro Edwards later today to discuss potential of colonoscopy. Warfarin regimen will be held for today with further instructions by Dr Jayro Edwards. Patient instructed to call us with plan for any procedures after appt this afternoon. Will retest as needed to be determined after appt later today with Dr Jayro Edwards depending on scheduled procedures. Patient understands dosing directions and information discussed. Dosing schedule and follow up appointment given to patient. Progress note routed to referring physicians office.

## 2018-08-30 ENCOUNTER — TELEPHONE (OUTPATIENT)
Dept: PHARMACY | Age: 78
End: 2018-08-30

## 2018-08-30 NOTE — TELEPHONE ENCOUNTER
Patient called to say he has told to remain off warfarin until next Monday 9/3. Will resume warfarin at normal dose of 2.5mg on Monday and Friday and 5mg 5x weekly. Patient will come in for INR on 9/10. Patient states he has had 3 BM with no blood since speaking to him yesterday so no scopes planned as of now but will inform us of any planned procedures.

## 2018-09-10 ENCOUNTER — HOSPITAL ENCOUNTER (OUTPATIENT)
Dept: PHARMACY | Age: 78
Setting detail: THERAPIES SERIES
Discharge: HOME | End: 2018-09-10
Payer: MEDICARE

## 2018-09-10 LAB
INR BLD: 1.5
PROTIME: 17.9 SECONDS

## 2018-09-10 PROCEDURE — 85610 PROTHROMBIN TIME: CPT

## 2018-09-10 PROCEDURE — 99212 OFFICE O/P EST SF 10 MIN: CPT

## 2018-09-10 NOTE — PROGRESS NOTES
Patient states compliant most of the time with regimen. Patient restarted warfarin at 2.5 mg Mon/Fri and 5 mg all other days on 9/3. Pt saw Dr. Tiffany Perdomo and no colonscopy scheduled at this time. No blood since ER visit on 8/28. No thromboembolic side effects noted. Aspirin was stopped at ER visit on 8/28. No significant dietary changes. No significant recent illness or disease state changes. PT/INR done in office per protocol. INR was subtherapeutic at 1.5 (goal 2-3). Warfarin regimen will be 5 mg today and then 2.5 mg Mon/Fri and 5 mg all other days. Will retest in 1 week. Patient is leaving to go out of town and will not be back until 9/18 late. Will schedule appointment on 9/19. Patient understands dosing directions and information discussed. Dosing schedule and follow up appointment given to patient. Progress note routed to referring physicians office.

## 2018-09-19 ENCOUNTER — HOSPITAL ENCOUNTER (OUTPATIENT)
Dept: PHARMACY | Age: 78
Setting detail: THERAPIES SERIES
Discharge: HOME | End: 2018-09-19
Payer: MEDICARE

## 2018-09-19 DIAGNOSIS — I48.91 ATRIAL FIBRILLATION, UNSPECIFIED TYPE (HCC): ICD-10-CM

## 2018-09-19 LAB
INR BLD: 2.5
PROTIME: 30.5 SECONDS

## 2018-09-19 PROCEDURE — 99211 OFF/OP EST MAY X REQ PHY/QHP: CPT

## 2018-09-19 PROCEDURE — 85610 PROTHROMBIN TIME: CPT

## 2018-09-19 NOTE — PROGRESS NOTES
Patient states compliant all of the time with regimen. No bleeding or thromboembolic side effects noted. No significant med or dietary changes. Still off ASA. No further rectal bleeding per patient. PT/INR done in office per protocol. INR was therapeutic at 2.5 (goal 2-3). Warfarin regimen will be continued at current dose of 2.5mg on Mon/Fri and 5mg all other days. Will retest in 3 weeks. Patient understands dosing directions and information discussed. Dosing schedule and follow up appointment given to patient. Progress note routed to referring physicians office.

## 2018-10-09 ENCOUNTER — HOSPITAL ENCOUNTER (OUTPATIENT)
Dept: PHARMACY | Age: 78
Setting detail: THERAPIES SERIES
Discharge: HOME | End: 2018-10-09
Payer: MEDICARE

## 2018-10-09 DIAGNOSIS — I48.91 ATRIAL FIBRILLATION, UNSPECIFIED TYPE (HCC): ICD-10-CM

## 2018-10-09 LAB
INR BLD: 2.8
PROTIME: 33.8 SECONDS

## 2018-10-09 PROCEDURE — 99211 OFF/OP EST MAY X REQ PHY/QHP: CPT

## 2018-10-09 PROCEDURE — 85610 PROTHROMBIN TIME: CPT

## 2018-11-06 ENCOUNTER — HOSPITAL ENCOUNTER (OUTPATIENT)
Dept: PHARMACY | Age: 78
Setting detail: THERAPIES SERIES
Discharge: HOME | End: 2018-11-06
Payer: MEDICARE

## 2018-11-06 ENCOUNTER — HOSPITAL ENCOUNTER (OUTPATIENT)
Age: 78
Discharge: HOME | End: 2018-11-06
Payer: MEDICARE

## 2018-11-06 LAB
INR BLD: 2.4
PROTHROMBIN TIME: 24.1 SEC (ref 9.7–12)

## 2018-11-06 PROCEDURE — 99211 OFF/OP EST MAY X REQ PHY/QHP: CPT

## 2018-11-06 PROCEDURE — 85610 PROTHROMBIN TIME: CPT

## 2018-11-06 PROCEDURE — 36415 COLL VENOUS BLD VENIPUNCTURE: CPT

## 2018-12-04 ENCOUNTER — HOSPITAL ENCOUNTER (OUTPATIENT)
Dept: PHARMACY | Age: 78
Setting detail: THERAPIES SERIES
Discharge: HOME | End: 2018-12-04
Payer: MEDICARE

## 2018-12-04 LAB
INR BLD: 2.6
PROTIME: 31.1 SECONDS

## 2018-12-04 PROCEDURE — 85610 PROTHROMBIN TIME: CPT

## 2018-12-04 PROCEDURE — 99211 OFF/OP EST MAY X REQ PHY/QHP: CPT

## 2019-01-08 ENCOUNTER — HOSPITAL ENCOUNTER (OUTPATIENT)
Dept: PHARMACY | Age: 79
Setting detail: THERAPIES SERIES
Discharge: HOME | End: 2019-01-08
Payer: MEDICARE

## 2019-01-08 LAB
INR BLD: 2.7
PROTIME: 32.3 SECONDS

## 2019-01-08 PROCEDURE — 85610 PROTHROMBIN TIME: CPT

## 2019-01-08 PROCEDURE — 99211 OFF/OP EST MAY X REQ PHY/QHP: CPT

## 2019-02-12 ENCOUNTER — HOSPITAL ENCOUNTER (OUTPATIENT)
Dept: PHARMACY | Age: 79
Setting detail: THERAPIES SERIES
Discharge: HOME | End: 2019-02-12
Payer: MEDICARE

## 2019-02-12 LAB
INR BLD: 2.5
PROTIME: 29.7 SECONDS

## 2019-02-12 PROCEDURE — 85610 PROTHROMBIN TIME: CPT

## 2019-02-12 PROCEDURE — 99211 OFF/OP EST MAY X REQ PHY/QHP: CPT

## 2019-02-16 ENCOUNTER — APPOINTMENT (OUTPATIENT)
Dept: GENERAL RADIOLOGY | Age: 79
End: 2019-02-16
Payer: MEDICARE

## 2019-02-16 ENCOUNTER — HOSPITAL ENCOUNTER (OUTPATIENT)
Age: 79
Setting detail: OBSERVATION
Discharge: HOME | End: 2019-02-17
Attending: EMERGENCY MEDICINE | Admitting: FAMILY MEDICINE
Payer: MEDICARE

## 2019-02-16 DIAGNOSIS — R07.9 CHEST PAIN, UNSPECIFIED TYPE: Primary | ICD-10-CM

## 2019-02-16 LAB
ABSOLUTE EOS #: 0.2 K/UL (ref 0–0.4)
ABSOLUTE IMMATURE GRANULOCYTE: ABNORMAL K/UL (ref 0–0.3)
ABSOLUTE LYMPH #: 1.8 K/UL (ref 1–4.8)
ABSOLUTE MONO #: 0.8 K/UL (ref 0.1–1.3)
ANION GAP SERPL CALCULATED.3IONS-SCNC: 10 MMOL/L (ref 9–17)
BASOPHILS # BLD: 1 % (ref 0–2)
BASOPHILS ABSOLUTE: 0.1 K/UL (ref 0–0.2)
BNP INTERPRETATION: NORMAL
BUN BLDV-MCNC: 16 MG/DL (ref 8–23)
BUN/CREAT BLD: ABNORMAL (ref 9–20)
CALCIUM SERPL-MCNC: 9.7 MG/DL (ref 8.6–10.4)
CHLORIDE BLD-SCNC: 105 MMOL/L (ref 98–107)
CHOLESTEROL/HDL RATIO: 5.2
CHOLESTEROL: 140 MG/DL
CO2: 27 MMOL/L (ref 20–31)
CREAT SERPL-MCNC: 0.86 MG/DL (ref 0.7–1.2)
DIFFERENTIAL TYPE: ABNORMAL
EOSINOPHILS RELATIVE PERCENT: 2 % (ref 0–4)
GFR AFRICAN AMERICAN: >60 ML/MIN
GFR NON-AFRICAN AMERICAN: >60 ML/MIN
GFR SERPL CREATININE-BSD FRML MDRD: ABNORMAL ML/MIN/{1.73_M2}
GFR SERPL CREATININE-BSD FRML MDRD: ABNORMAL ML/MIN/{1.73_M2}
GLUCOSE BLD-MCNC: 113 MG/DL (ref 70–99)
HCT VFR BLD CALC: 44.7 % (ref 41–53)
HDLC SERPL-MCNC: 27 MG/DL
HEMOGLOBIN: 15.3 G/DL (ref 13.5–17.5)
IMMATURE GRANULOCYTES: ABNORMAL %
INR BLD: 2.3
LDL CHOLESTEROL: 61 MG/DL (ref 0–130)
LYMPHOCYTES # BLD: 18 % (ref 24–44)
MCH RBC QN AUTO: 31.1 PG (ref 26–34)
MCHC RBC AUTO-ENTMCNC: 34.2 G/DL (ref 31–37)
MCV RBC AUTO: 91 FL (ref 80–100)
MONOCYTES # BLD: 8 % (ref 1–7)
NRBC AUTOMATED: ABNORMAL PER 100 WBC
PDW BLD-RTO: 13.5 % (ref 11.5–14.9)
PLATELET # BLD: 184 K/UL (ref 150–450)
PLATELET ESTIMATE: ABNORMAL
PMV BLD AUTO: 7.5 FL (ref 6–12)
POTASSIUM SERPL-SCNC: 4.1 MMOL/L (ref 3.7–5.3)
PRO-BNP: 102 PG/ML
PROTHROMBIN TIME: 25.5 SEC (ref 11.8–14.6)
RBC # BLD: 4.91 M/UL (ref 4.5–5.9)
RBC # BLD: ABNORMAL 10*6/UL
SEG NEUTROPHILS: 71 % (ref 36–66)
SEGMENTED NEUTROPHILS ABSOLUTE COUNT: 6.9 K/UL (ref 1.3–9.1)
SODIUM BLD-SCNC: 142 MMOL/L (ref 135–144)
TRIGL SERPL-MCNC: 259 MG/DL
TROPONIN INTERP: NORMAL
TROPONIN T: NORMAL NG/ML
TROPONIN, HIGH SENSITIVITY: 11 NG/L (ref 0–22)
VLDLC SERPL CALC-MCNC: ABNORMAL MG/DL (ref 1–30)
WBC # BLD: 9.7 K/UL (ref 3.5–11)
WBC # BLD: ABNORMAL 10*3/UL

## 2019-02-16 PROCEDURE — 99285 EMERGENCY DEPT VISIT HI MDM: CPT

## 2019-02-16 PROCEDURE — 85610 PROTHROMBIN TIME: CPT

## 2019-02-16 PROCEDURE — 84484 ASSAY OF TROPONIN QUANT: CPT

## 2019-02-16 PROCEDURE — 80061 LIPID PANEL: CPT

## 2019-02-16 PROCEDURE — 83880 ASSAY OF NATRIURETIC PEPTIDE: CPT

## 2019-02-16 PROCEDURE — G0378 HOSPITAL OBSERVATION PER HR: HCPCS

## 2019-02-16 PROCEDURE — 6370000000 HC RX 637 (ALT 250 FOR IP): Performed by: EMERGENCY MEDICINE

## 2019-02-16 PROCEDURE — 71046 X-RAY EXAM CHEST 2 VIEWS: CPT

## 2019-02-16 PROCEDURE — 36415 COLL VENOUS BLD VENIPUNCTURE: CPT

## 2019-02-16 PROCEDURE — 80048 BASIC METABOLIC PNL TOTAL CA: CPT

## 2019-02-16 PROCEDURE — 85025 COMPLETE CBC W/AUTO DIFF WBC: CPT

## 2019-02-16 PROCEDURE — 2580000003 HC RX 258: Performed by: INTERNAL MEDICINE

## 2019-02-16 RX ORDER — OMEPRAZOLE 20 MG/1
20 CAPSULE, DELAYED RELEASE ORAL DAILY
Status: DISCONTINUED | OUTPATIENT
Start: 2019-02-16 | End: 2019-02-17 | Stop reason: HOSPADM

## 2019-02-16 RX ORDER — ONDANSETRON 2 MG/ML
4 INJECTION INTRAMUSCULAR; INTRAVENOUS EVERY 6 HOURS PRN
Status: DISCONTINUED | OUTPATIENT
Start: 2019-02-16 | End: 2019-02-17 | Stop reason: HOSPADM

## 2019-02-16 RX ORDER — WARFARIN SODIUM 5 MG/1
5 TABLET ORAL
Status: DISCONTINUED | OUTPATIENT
Start: 2019-02-16 | End: 2019-02-17 | Stop reason: HOSPADM

## 2019-02-16 RX ORDER — WARFARIN SODIUM 5 MG/1
5 TABLET ORAL
Status: DISCONTINUED | OUTPATIENT
Start: 2019-02-16 | End: 2019-02-16 | Stop reason: DRUGHIGH

## 2019-02-16 RX ORDER — ACETAMINOPHEN 325 MG/1
650 TABLET ORAL EVERY 4 HOURS PRN
Status: DISCONTINUED | OUTPATIENT
Start: 2019-02-16 | End: 2019-02-16 | Stop reason: SDUPTHER

## 2019-02-16 RX ORDER — ASPIRIN 81 MG/1
324 TABLET, CHEWABLE ORAL ONCE
Status: COMPLETED | OUTPATIENT
Start: 2019-02-16 | End: 2019-02-16

## 2019-02-16 RX ORDER — NITROGLYCERIN 0.4 MG/1
0.4 TABLET SUBLINGUAL EVERY 5 MIN PRN
Status: DISCONTINUED | OUTPATIENT
Start: 2019-02-16 | End: 2019-02-17 | Stop reason: HOSPADM

## 2019-02-16 RX ORDER — BISACODYL 10 MG
10 SUPPOSITORY, RECTAL RECTAL DAILY PRN
Status: DISCONTINUED | OUTPATIENT
Start: 2019-02-16 | End: 2019-02-17 | Stop reason: HOSPADM

## 2019-02-16 RX ORDER — POTASSIUM CHLORIDE 20 MEQ/1
40 TABLET, EXTENDED RELEASE ORAL PRN
Status: DISCONTINUED | OUTPATIENT
Start: 2019-02-16 | End: 2019-02-17 | Stop reason: HOSPADM

## 2019-02-16 RX ORDER — SODIUM CHLORIDE 0.9 % (FLUSH) 0.9 %
10 SYRINGE (ML) INJECTION PRN
Status: DISCONTINUED | OUTPATIENT
Start: 2019-02-16 | End: 2019-02-17

## 2019-02-16 RX ORDER — POTASSIUM CHLORIDE 20MEQ/15ML
40 LIQUID (ML) ORAL PRN
Status: DISCONTINUED | OUTPATIENT
Start: 2019-02-16 | End: 2019-02-17 | Stop reason: HOSPADM

## 2019-02-16 RX ORDER — SODIUM CHLORIDE 0.9 % (FLUSH) 0.9 %
10 SYRINGE (ML) INJECTION EVERY 12 HOURS SCHEDULED
Status: DISCONTINUED | OUTPATIENT
Start: 2019-02-16 | End: 2019-02-17 | Stop reason: HOSPADM

## 2019-02-16 RX ORDER — WARFARIN SODIUM 5 MG/1
2.5 TABLET ORAL
Status: DISCONTINUED | OUTPATIENT
Start: 2019-02-18 | End: 2019-02-17 | Stop reason: HOSPADM

## 2019-02-16 RX ORDER — PRAVASTATIN SODIUM 40 MG
40 TABLET ORAL DAILY
Status: DISCONTINUED | OUTPATIENT
Start: 2019-02-16 | End: 2019-02-16

## 2019-02-16 RX ORDER — METOPROLOL TARTRATE 50 MG/1
50 TABLET, FILM COATED ORAL 2 TIMES DAILY
Status: DISCONTINUED | OUTPATIENT
Start: 2019-02-16 | End: 2019-02-17 | Stop reason: HOSPADM

## 2019-02-16 RX ORDER — SODIUM CHLORIDE 0.9 % (FLUSH) 0.9 %
10 SYRINGE (ML) INJECTION PRN
Status: DISCONTINUED | OUTPATIENT
Start: 2019-02-16 | End: 2019-02-17 | Stop reason: HOSPADM

## 2019-02-16 RX ORDER — ISOSORBIDE MONONITRATE 30 MG/1
30 TABLET, EXTENDED RELEASE ORAL DAILY
Status: DISCONTINUED | OUTPATIENT
Start: 2019-02-16 | End: 2019-02-17 | Stop reason: HOSPADM

## 2019-02-16 RX ORDER — WARFARIN SODIUM 5 MG/1
5 TABLET ORAL DAILY
Status: DISCONTINUED | OUTPATIENT
Start: 2019-02-16 | End: 2019-02-16 | Stop reason: DRUGHIGH

## 2019-02-16 RX ORDER — ACETAMINOPHEN 325 MG/1
650 TABLET ORAL EVERY 4 HOURS PRN
Status: DISCONTINUED | OUTPATIENT
Start: 2019-02-16 | End: 2019-02-17

## 2019-02-16 RX ORDER — NITROGLYCERIN 0.4 MG/1
0.4 TABLET SUBLINGUAL ONCE
Status: DISCONTINUED | OUTPATIENT
Start: 2019-02-16 | End: 2019-02-17

## 2019-02-16 RX ORDER — POTASSIUM CHLORIDE 7.45 MG/ML
10 INJECTION INTRAVENOUS PRN
Status: DISCONTINUED | OUTPATIENT
Start: 2019-02-16 | End: 2019-02-17 | Stop reason: HOSPADM

## 2019-02-16 RX ORDER — MORPHINE SULFATE 2 MG/ML
2 INJECTION, SOLUTION INTRAMUSCULAR; INTRAVENOUS ONCE
Status: DISCONTINUED | OUTPATIENT
Start: 2019-02-16 | End: 2019-02-17 | Stop reason: HOSPADM

## 2019-02-16 RX ORDER — PANTOPRAZOLE SODIUM 40 MG/1
40 TABLET, DELAYED RELEASE ORAL
Status: DISCONTINUED | OUTPATIENT
Start: 2019-02-16 | End: 2019-02-16 | Stop reason: CLARIF

## 2019-02-16 RX ORDER — DOCUSATE SODIUM 100 MG/1
100 CAPSULE, LIQUID FILLED ORAL 2 TIMES DAILY
Status: DISCONTINUED | OUTPATIENT
Start: 2019-02-16 | End: 2019-02-17 | Stop reason: HOSPADM

## 2019-02-16 RX ORDER — DILTIAZEM HYDROCHLORIDE 300 MG/1
300 CAPSULE, COATED, EXTENDED RELEASE ORAL DAILY
Status: DISCONTINUED | OUTPATIENT
Start: 2019-02-16 | End: 2019-02-16

## 2019-02-16 RX ORDER — SODIUM CHLORIDE 0.9 % (FLUSH) 0.9 %
10 SYRINGE (ML) INJECTION EVERY 12 HOURS SCHEDULED
Status: DISCONTINUED | OUTPATIENT
Start: 2019-02-16 | End: 2019-02-17

## 2019-02-16 RX ORDER — DILTIAZEM HYDROCHLORIDE 300 MG/1
300 CAPSULE, COATED, EXTENDED RELEASE ORAL
Status: DISCONTINUED | OUTPATIENT
Start: 2019-02-17 | End: 2019-02-17 | Stop reason: HOSPADM

## 2019-02-16 RX ORDER — MORPHINE SULFATE 2 MG/ML
2 INJECTION, SOLUTION INTRAMUSCULAR; INTRAVENOUS EVERY 4 HOURS PRN
Status: DISCONTINUED | OUTPATIENT
Start: 2019-02-16 | End: 2019-02-17 | Stop reason: HOSPADM

## 2019-02-16 RX ORDER — PRAVASTATIN SODIUM 40 MG
40 TABLET ORAL NIGHTLY
Status: DISCONTINUED | OUTPATIENT
Start: 2019-02-17 | End: 2019-02-17 | Stop reason: HOSPADM

## 2019-02-16 RX ADMIN — Medication 10 ML: at 21:54

## 2019-02-16 RX ADMIN — METOPROLOL TARTRATE 50 MG: 50 TABLET, FILM COATED ORAL at 21:46

## 2019-02-16 RX ADMIN — ASPIRIN 81 MG 324 MG: 81 TABLET ORAL at 17:09

## 2019-02-16 RX ADMIN — WARFARIN SODIUM 5 MG: 5 TABLET ORAL at 21:46

## 2019-02-16 ASSESSMENT — ENCOUNTER SYMPTOMS
VOMITING: 0
COUGH: 0
COLOR CHANGE: 0
CONSTIPATION: 1
SHORTNESS OF BREATH: 0
TROUBLE SWALLOWING: 0
NAUSEA: 1
DIARRHEA: 0
EYE REDNESS: 0
ABDOMINAL PAIN: 0

## 2019-02-16 ASSESSMENT — PAIN SCALES - GENERAL: PAINLEVEL_OUTOF10: 0

## 2019-02-16 ASSESSMENT — HEART SCORE: ECG: 0

## 2019-02-17 ENCOUNTER — APPOINTMENT (OUTPATIENT)
Dept: CT IMAGING | Age: 79
End: 2019-02-17
Payer: MEDICARE

## 2019-02-17 VITALS
DIASTOLIC BLOOD PRESSURE: 61 MMHG | BODY MASS INDEX: 36.56 KG/M2 | HEIGHT: 66 IN | RESPIRATION RATE: 17 BRPM | SYSTOLIC BLOOD PRESSURE: 130 MMHG | TEMPERATURE: 97.8 F | WEIGHT: 227.51 LBS | OXYGEN SATURATION: 93 % | HEART RATE: 54 BPM

## 2019-02-17 PROBLEM — R91.1 LUNG NODULE SEEN ON IMAGING STUDY: Status: ACTIVE | Noted: 2019-02-17

## 2019-02-17 LAB
ABSOLUTE EOS #: 0.2 K/UL (ref 0–0.4)
ABSOLUTE IMMATURE GRANULOCYTE: ABNORMAL K/UL (ref 0–0.3)
ABSOLUTE LYMPH #: 2 K/UL (ref 1–4.8)
ABSOLUTE MONO #: 0.7 K/UL (ref 0.1–1.3)
ALBUMIN SERPL-MCNC: 3.7 G/DL (ref 3.5–5.2)
ALBUMIN/GLOBULIN RATIO: ABNORMAL (ref 1–2.5)
ALP BLD-CCNC: 50 U/L (ref 40–129)
ALT SERPL-CCNC: 22 U/L (ref 5–41)
ANION GAP SERPL CALCULATED.3IONS-SCNC: 9 MMOL/L (ref 9–17)
AST SERPL-CCNC: 19 U/L
BASOPHILS # BLD: 1 % (ref 0–2)
BASOPHILS ABSOLUTE: 0 K/UL (ref 0–0.2)
BILIRUB SERPL-MCNC: 0.44 MG/DL (ref 0.3–1.2)
BUN BLDV-MCNC: 11 MG/DL (ref 8–23)
BUN/CREAT BLD: ABNORMAL (ref 9–20)
CALCIUM SERPL-MCNC: 9.5 MG/DL (ref 8.6–10.4)
CHLORIDE BLD-SCNC: 108 MMOL/L (ref 98–107)
CO2: 26 MMOL/L (ref 20–31)
CREAT SERPL-MCNC: 0.75 MG/DL (ref 0.7–1.2)
DIFFERENTIAL TYPE: ABNORMAL
EOSINOPHILS RELATIVE PERCENT: 3 % (ref 0–4)
GFR AFRICAN AMERICAN: >60 ML/MIN
GFR NON-AFRICAN AMERICAN: >60 ML/MIN
GFR SERPL CREATININE-BSD FRML MDRD: ABNORMAL ML/MIN/{1.73_M2}
GFR SERPL CREATININE-BSD FRML MDRD: ABNORMAL ML/MIN/{1.73_M2}
GLUCOSE BLD-MCNC: 102 MG/DL (ref 70–99)
HCT VFR BLD CALC: 44.2 % (ref 41–53)
HEMOGLOBIN: 14.9 G/DL (ref 13.5–17.5)
IMMATURE GRANULOCYTES: ABNORMAL %
INR BLD: 2.5
LYMPHOCYTES # BLD: 29 % (ref 24–44)
MCH RBC QN AUTO: 30.2 PG (ref 26–34)
MCHC RBC AUTO-ENTMCNC: 33.7 G/DL (ref 31–37)
MCV RBC AUTO: 89.6 FL (ref 80–100)
MONOCYTES # BLD: 10 % (ref 1–7)
NRBC AUTOMATED: ABNORMAL PER 100 WBC
PDW BLD-RTO: 13.7 % (ref 11.5–14.9)
PLATELET # BLD: 161 K/UL (ref 150–450)
PLATELET ESTIMATE: ABNORMAL
PMV BLD AUTO: 7.7 FL (ref 6–12)
POTASSIUM SERPL-SCNC: 4.2 MMOL/L (ref 3.7–5.3)
PROTHROMBIN TIME: 26.8 SEC (ref 11.8–14.6)
RBC # BLD: 4.94 M/UL (ref 4.5–5.9)
RBC # BLD: ABNORMAL 10*6/UL
SEG NEUTROPHILS: 57 % (ref 36–66)
SEGMENTED NEUTROPHILS ABSOLUTE COUNT: 4.1 K/UL (ref 1.3–9.1)
SODIUM BLD-SCNC: 143 MMOL/L (ref 135–144)
TOTAL PROTEIN: 6.7 G/DL (ref 6.4–8.3)
TROPONIN INTERP: NORMAL
TROPONIN INTERP: NORMAL
TROPONIN T: NORMAL NG/ML
TROPONIN T: NORMAL NG/ML
TROPONIN, HIGH SENSITIVITY: 10 NG/L (ref 0–22)
TROPONIN, HIGH SENSITIVITY: 11 NG/L (ref 0–22)
WBC # BLD: 7.1 K/UL (ref 3.5–11)
WBC # BLD: ABNORMAL 10*3/UL

## 2019-02-17 PROCEDURE — 84484 ASSAY OF TROPONIN QUANT: CPT

## 2019-02-17 PROCEDURE — G0378 HOSPITAL OBSERVATION PER HR: HCPCS

## 2019-02-17 PROCEDURE — 71260 CT THORAX DX C+: CPT

## 2019-02-17 PROCEDURE — 85610 PROTHROMBIN TIME: CPT

## 2019-02-17 PROCEDURE — 6370000000 HC RX 637 (ALT 250 FOR IP): Performed by: INTERNAL MEDICINE

## 2019-02-17 PROCEDURE — 97162 PT EVAL MOD COMPLEX 30 MIN: CPT

## 2019-02-17 PROCEDURE — 93005 ELECTROCARDIOGRAM TRACING: CPT

## 2019-02-17 PROCEDURE — 80053 COMPREHEN METABOLIC PANEL: CPT

## 2019-02-17 PROCEDURE — 6360000004 HC RX CONTRAST MEDICATION: Performed by: FAMILY MEDICINE

## 2019-02-17 PROCEDURE — 83036 HEMOGLOBIN GLYCOSYLATED A1C: CPT

## 2019-02-17 PROCEDURE — 36415 COLL VENOUS BLD VENIPUNCTURE: CPT

## 2019-02-17 PROCEDURE — 2580000003 HC RX 258: Performed by: FAMILY MEDICINE

## 2019-02-17 PROCEDURE — 85025 COMPLETE CBC W/AUTO DIFF WBC: CPT

## 2019-02-17 RX ORDER — 0.9 % SODIUM CHLORIDE 0.9 %
80 INTRAVENOUS SOLUTION INTRAVENOUS ONCE
Status: COMPLETED | OUTPATIENT
Start: 2019-02-17 | End: 2019-02-17

## 2019-02-17 RX ORDER — NITROGLYCERIN 0.4 MG/1
TABLET SUBLINGUAL
Qty: 25 TABLET | Refills: 3 | Status: SHIPPED | OUTPATIENT
Start: 2019-02-17

## 2019-02-17 RX ORDER — ASPIRIN 81 MG/1
81 TABLET, CHEWABLE ORAL DAILY
Qty: 30 TABLET | Refills: 3 | Status: SHIPPED | OUTPATIENT
Start: 2019-02-17 | End: 2021-04-07

## 2019-02-17 RX ORDER — ISOSORBIDE MONONITRATE 30 MG/1
30 TABLET, EXTENDED RELEASE ORAL DAILY
Qty: 30 TABLET | Refills: 3 | Status: ON HOLD | OUTPATIENT
Start: 2019-02-18 | End: 2022-05-09 | Stop reason: HOSPADM

## 2019-02-17 RX ORDER — ACETAMINOPHEN 500 MG
1000 TABLET ORAL EVERY 4 HOURS PRN
Status: DISCONTINUED | OUTPATIENT
Start: 2019-02-17 | End: 2019-02-17 | Stop reason: HOSPADM

## 2019-02-17 RX ORDER — ASPIRIN 81 MG/1
81 TABLET, CHEWABLE ORAL DAILY
Status: DISCONTINUED | OUTPATIENT
Start: 2019-02-17 | End: 2019-02-17 | Stop reason: HOSPADM

## 2019-02-17 RX ORDER — SODIUM CHLORIDE 0.9 % (FLUSH) 0.9 %
10 SYRINGE (ML) INJECTION PRN
Status: DISCONTINUED | OUTPATIENT
Start: 2019-02-17 | End: 2019-02-17 | Stop reason: HOSPADM

## 2019-02-17 RX ADMIN — ISOSORBIDE MONONITRATE 30 MG: 30 TABLET, EXTENDED RELEASE ORAL at 08:57

## 2019-02-17 RX ADMIN — IOVERSOL 75 ML: 741 INJECTION INTRA-ARTERIAL; INTRAVENOUS at 14:13

## 2019-02-17 RX ADMIN — Medication 10 ML: at 08:58

## 2019-02-17 RX ADMIN — Medication 10 ML: at 14:14

## 2019-02-17 RX ADMIN — DILTIAZEM HYDROCHLORIDE 300 MG: 300 CAPSULE, COATED, EXTENDED RELEASE ORAL at 12:33

## 2019-02-17 RX ADMIN — OMEPRAZOLE 20 MG: 20 CAPSULE, DELAYED RELEASE ORAL at 09:00

## 2019-02-17 RX ADMIN — METOPROLOL TARTRATE 50 MG: 50 TABLET, FILM COATED ORAL at 08:56

## 2019-02-17 RX ADMIN — SODIUM CHLORIDE 80 ML: 9 INJECTION, SOLUTION INTRAVENOUS at 14:14

## 2019-02-18 ENCOUNTER — TELEPHONE (OUTPATIENT)
Dept: PHARMACY | Age: 79
End: 2019-02-18

## 2019-02-18 LAB
EKG ATRIAL RATE: 56 BPM
EKG ATRIAL RATE: 57 BPM
EKG P AXIS: 10 DEGREES
EKG P AXIS: 31 DEGREES
EKG P-R INTERVAL: 208 MS
EKG P-R INTERVAL: 210 MS
EKG Q-T INTERVAL: 394 MS
EKG Q-T INTERVAL: 410 MS
EKG QRS DURATION: 88 MS
EKG QRS DURATION: 92 MS
EKG QTC CALCULATION (BAZETT): 380 MS
EKG QTC CALCULATION (BAZETT): 399 MS
EKG R AXIS: 76 DEGREES
EKG R AXIS: 79 DEGREES
EKG T AXIS: 29 DEGREES
EKG T AXIS: 43 DEGREES
EKG VENTRICULAR RATE: 56 BPM
EKG VENTRICULAR RATE: 57 BPM
ESTIMATED AVERAGE GLUCOSE: 128 MG/DL
HBA1C MFR BLD: 6.1 % (ref 4–6)

## 2019-03-11 ENCOUNTER — HOSPITAL ENCOUNTER (OUTPATIENT)
Dept: NUCLEAR MEDICINE | Age: 79
Discharge: HOME | End: 2019-03-13
Payer: MEDICARE

## 2019-03-11 ENCOUNTER — HOSPITAL ENCOUNTER (OUTPATIENT)
Dept: NON INVASIVE DIAGNOSTICS | Age: 79
Discharge: HOME | End: 2019-03-11
Payer: MEDICARE

## 2019-03-11 VITALS — BODY MASS INDEX: 36.32 KG/M2 | WEIGHT: 226 LBS | HEIGHT: 66 IN

## 2019-03-11 DIAGNOSIS — R07.9 CHEST PAIN, UNSPECIFIED TYPE: ICD-10-CM

## 2019-03-11 DIAGNOSIS — I25.119 CORONARY ARTERY DISEASE WITH ANGINA PECTORIS, UNSPECIFIED VESSEL OR LESION TYPE, UNSPECIFIED WHETHER NATIVE OR TRANSPLANTED HEART (HCC): ICD-10-CM

## 2019-03-11 LAB
LV EF: 65 %
LVEF MODALITY: NORMAL

## 2019-03-11 PROCEDURE — 2580000003 HC RX 258: Performed by: FAMILY MEDICINE

## 2019-03-11 PROCEDURE — 93017 CV STRESS TEST TRACING ONLY: CPT

## 2019-03-11 PROCEDURE — A9500 TC99M SESTAMIBI: HCPCS | Performed by: FAMILY MEDICINE

## 2019-03-11 PROCEDURE — 78452 HT MUSCLE IMAGE SPECT MULT: CPT

## 2019-03-11 PROCEDURE — 3430000000 HC RX DIAGNOSTIC RADIOPHARMACEUTICAL: Performed by: FAMILY MEDICINE

## 2019-03-11 RX ORDER — NITROGLYCERIN 0.4 MG/1
0.4 TABLET SUBLINGUAL EVERY 5 MIN PRN
Status: ACTIVE | OUTPATIENT
Start: 2019-03-11 | End: 2019-03-12

## 2019-03-11 RX ORDER — SODIUM CHLORIDE 0.9 % (FLUSH) 0.9 %
10 SYRINGE (ML) INJECTION PRN
Status: ACTIVE | OUTPATIENT
Start: 2019-03-11 | End: 2019-03-12

## 2019-03-11 RX ORDER — METOPROLOL TARTRATE 5 MG/5ML
2.5 INJECTION INTRAVENOUS PRN
Status: ACTIVE | OUTPATIENT
Start: 2019-03-11 | End: 2019-03-12

## 2019-03-11 RX ORDER — 0.9 % SODIUM CHLORIDE 0.9 %
250 INTRAVENOUS SOLUTION INTRAVENOUS ONCE
Status: DISCONTINUED | OUTPATIENT
Start: 2019-03-11 | End: 2019-03-14 | Stop reason: HOSPADM

## 2019-03-11 RX ORDER — SODIUM CHLORIDE 0.9 % (FLUSH) 0.9 %
10 SYRINGE (ML) INJECTION PRN
Status: DISCONTINUED | OUTPATIENT
Start: 2019-03-11 | End: 2019-03-14 | Stop reason: HOSPADM

## 2019-03-11 RX ADMIN — TETRAKIS(2-METHOXYISOBUTYLISOCYANIDE)COPPER(I) TETRAFLUOROBORATE 11.5 MILLICURIE: 1 INJECTION, POWDER, LYOPHILIZED, FOR SOLUTION INTRAVENOUS at 06:52

## 2019-03-11 RX ADMIN — Medication 10 ML: at 09:05

## 2019-03-11 RX ADMIN — Medication 10 ML: at 06:53

## 2019-03-11 RX ADMIN — TETRAKIS(2-METHOXYISOBUTYLISOCYANIDE)COPPER(I) TETRAFLUOROBORATE 33.7 MILLICURIE: 1 INJECTION, POWDER, LYOPHILIZED, FOR SOLUTION INTRAVENOUS at 09:07

## 2019-03-19 ENCOUNTER — HOSPITAL ENCOUNTER (OUTPATIENT)
Dept: PHARMACY | Age: 79
Setting detail: THERAPIES SERIES
Discharge: HOME | End: 2019-03-19
Payer: MEDICARE

## 2019-03-19 LAB
INR BLD: 2.8
PROTIME: 33.3 SECONDS

## 2019-03-19 PROCEDURE — 85610 PROTHROMBIN TIME: CPT

## 2019-03-19 PROCEDURE — 99211 OFF/OP EST MAY X REQ PHY/QHP: CPT

## 2019-03-20 ENCOUNTER — TELEPHONE (OUTPATIENT)
Dept: PHARMACY | Age: 79
End: 2019-03-20

## 2019-03-21 ENCOUNTER — APPOINTMENT (OUTPATIENT)
Dept: GENERAL RADIOLOGY | Age: 79
DRG: 310 | End: 2019-03-21
Payer: MEDICARE

## 2019-03-21 ENCOUNTER — APPOINTMENT (OUTPATIENT)
Dept: GENERAL RADIOLOGY | Age: 79
End: 2019-03-21
Payer: MEDICARE

## 2019-03-21 ENCOUNTER — HOSPITAL ENCOUNTER (INPATIENT)
Age: 79
LOS: 2 days | Discharge: LEFT BEFORE DISCHARGE | DRG: 310 | End: 2019-03-23
Attending: EMERGENCY MEDICINE | Admitting: INTERNAL MEDICINE
Payer: MEDICARE

## 2019-03-21 ENCOUNTER — HOSPITAL ENCOUNTER (EMERGENCY)
Age: 79
Discharge: ANOTHER HOSPITAL | End: 2019-03-21
Attending: EMERGENCY MEDICINE
Payer: MEDICARE

## 2019-03-21 VITALS
WEIGHT: 226 LBS | SYSTOLIC BLOOD PRESSURE: 87 MMHG | HEART RATE: 46 BPM | HEIGHT: 66 IN | BODY MASS INDEX: 36.32 KG/M2 | OXYGEN SATURATION: 95 % | RESPIRATION RATE: 14 BRPM | DIASTOLIC BLOOD PRESSURE: 53 MMHG | TEMPERATURE: 97.3 F

## 2019-03-21 DIAGNOSIS — R00.1 BRADYCARDIA: Primary | ICD-10-CM

## 2019-03-21 LAB
-: NORMAL
ABSOLUTE EOS #: 0.06 K/UL (ref 0–0.44)
ABSOLUTE EOS #: 0.2 K/UL (ref 0–0.4)
ABSOLUTE IMMATURE GRANULOCYTE: 0.05 K/UL (ref 0–0.3)
ABSOLUTE IMMATURE GRANULOCYTE: ABNORMAL K/UL (ref 0–0.3)
ABSOLUTE LYMPH #: 1.3 K/UL (ref 1–4.8)
ABSOLUTE LYMPH #: 1.43 K/UL (ref 1.1–3.7)
ABSOLUTE MONO #: 0.4 K/UL (ref 0.1–1.3)
ABSOLUTE MONO #: 0.9 K/UL (ref 0.1–1.2)
ANION GAP SERPL CALCULATED.3IONS-SCNC: 11 MMOL/L (ref 9–17)
ANION GAP SERPL CALCULATED.3IONS-SCNC: 12 MMOL/L (ref 9–17)
BASOPHILS # BLD: 0 % (ref 0–2)
BASOPHILS # BLD: 1 % (ref 0–2)
BASOPHILS ABSOLUTE: 0.03 K/UL (ref 0–0.2)
BASOPHILS ABSOLUTE: 0.1 K/UL (ref 0–0.2)
BNP INTERPRETATION: NORMAL
BUN BLDV-MCNC: 15 MG/DL (ref 8–23)
BUN BLDV-MCNC: 16 MG/DL (ref 8–23)
BUN/CREAT BLD: ABNORMAL (ref 9–20)
BUN/CREAT BLD: ABNORMAL (ref 9–20)
CALCIUM IONIZED: 1.28 MMOL/L (ref 1.13–1.33)
CALCIUM SERPL-MCNC: 9.4 MG/DL (ref 8.6–10.4)
CALCIUM SERPL-MCNC: 9.7 MG/DL (ref 8.6–10.4)
CHLORIDE BLD-SCNC: 109 MMOL/L (ref 98–107)
CHLORIDE BLD-SCNC: 109 MMOL/L (ref 98–107)
CO2: 19 MMOL/L (ref 20–31)
CO2: 23 MMOL/L (ref 20–31)
CREAT SERPL-MCNC: 0.75 MG/DL (ref 0.7–1.2)
CREAT SERPL-MCNC: 0.96 MG/DL (ref 0.7–1.2)
DIFFERENTIAL TYPE: ABNORMAL
DIFFERENTIAL TYPE: ABNORMAL
EOSINOPHILS RELATIVE PERCENT: 1 % (ref 1–4)
EOSINOPHILS RELATIVE PERCENT: 3 % (ref 0–4)
GFR AFRICAN AMERICAN: >60 ML/MIN
GFR AFRICAN AMERICAN: >60 ML/MIN
GFR NON-AFRICAN AMERICAN: >60 ML/MIN
GFR NON-AFRICAN AMERICAN: >60 ML/MIN
GFR SERPL CREATININE-BSD FRML MDRD: ABNORMAL ML/MIN/{1.73_M2}
GLUCOSE BLD-MCNC: 123 MG/DL (ref 70–99)
GLUCOSE BLD-MCNC: 134 MG/DL (ref 70–99)
HCT VFR BLD CALC: 43.3 % (ref 41–53)
HCT VFR BLD CALC: 45.7 % (ref 40.7–50.3)
HEMOGLOBIN: 14.5 G/DL (ref 13.5–17.5)
HEMOGLOBIN: 15 G/DL (ref 13–17)
IMMATURE GRANULOCYTES: 0 %
IMMATURE GRANULOCYTES: ABNORMAL %
INR BLD: 2.1
INR BLD: 2.1
LYMPHOCYTES # BLD: 12 % (ref 24–43)
LYMPHOCYTES # BLD: 24 % (ref 24–44)
MAGNESIUM: 2 MG/DL (ref 1.6–2.6)
MCH RBC QN AUTO: 30.7 PG (ref 25.2–33.5)
MCH RBC QN AUTO: 30.8 PG (ref 26–34)
MCHC RBC AUTO-ENTMCNC: 32.8 G/DL (ref 28.4–34.8)
MCHC RBC AUTO-ENTMCNC: 33.5 G/DL (ref 31–37)
MCV RBC AUTO: 92 FL (ref 80–100)
MCV RBC AUTO: 93.6 FL (ref 82.6–102.9)
MONOCYTES # BLD: 8 % (ref 1–7)
MONOCYTES # BLD: 8 % (ref 3–12)
NRBC AUTOMATED: 0 PER 100 WBC
NRBC AUTOMATED: ABNORMAL PER 100 WBC
PDW BLD-RTO: 12.8 % (ref 11.8–14.4)
PDW BLD-RTO: 13.8 % (ref 11.5–14.9)
PHOSPHORUS: 2.9 MG/DL (ref 2.5–4.5)
PLATELET # BLD: 173 K/UL (ref 138–453)
PLATELET # BLD: 173 K/UL (ref 150–450)
PLATELET ESTIMATE: ABNORMAL
PLATELET ESTIMATE: ABNORMAL
PMV BLD AUTO: 7.6 FL (ref 6–12)
PMV BLD AUTO: 9.2 FL (ref 8.1–13.5)
POTASSIUM SERPL-SCNC: 4.1 MMOL/L (ref 3.7–5.3)
POTASSIUM SERPL-SCNC: 4.2 MMOL/L (ref 3.7–5.3)
PRO-BNP: 253 PG/ML
PROTHROMBIN TIME: 21.1 SEC (ref 9–12)
PROTHROMBIN TIME: 23.6 SEC (ref 11.8–14.6)
RBC # BLD: 4.7 M/UL (ref 4.5–5.9)
RBC # BLD: 4.88 M/UL (ref 4.21–5.77)
RBC # BLD: ABNORMAL 10*6/UL
RBC # BLD: ABNORMAL 10*6/UL
REASON FOR REJECTION: NORMAL
SEG NEUTROPHILS: 64 % (ref 36–66)
SEG NEUTROPHILS: 79 % (ref 36–65)
SEGMENTED NEUTROPHILS ABSOLUTE COUNT: 3.5 K/UL (ref 1.3–9.1)
SEGMENTED NEUTROPHILS ABSOLUTE COUNT: 9.16 K/UL (ref 1.5–8.1)
SODIUM BLD-SCNC: 140 MMOL/L (ref 135–144)
SODIUM BLD-SCNC: 143 MMOL/L (ref 135–144)
TROPONIN INTERP: NORMAL
TROPONIN T: NORMAL NG/ML
TROPONIN, HIGH SENSITIVITY: 14 NG/L (ref 0–22)
TROPONIN, HIGH SENSITIVITY: 17 NG/L (ref 0–22)
TROPONIN, HIGH SENSITIVITY: 17 NG/L (ref 0–22)
TSH SERPL DL<=0.05 MIU/L-ACNC: 0.75 MIU/L (ref 0.3–5)
WBC # BLD: 11.6 K/UL (ref 3.5–11.3)
WBC # BLD: 5.4 K/UL (ref 3.5–11)
WBC # BLD: ABNORMAL 10*3/UL
WBC # BLD: ABNORMAL 10*3/UL
ZZ NTE CLEAN UP: ORDERED TEST: NORMAL
ZZ NTE WITH NAME CLEAN UP: SPECIMEN SOURCE: NORMAL

## 2019-03-21 PROCEDURE — 80048 BASIC METABOLIC PNL TOTAL CA: CPT

## 2019-03-21 PROCEDURE — 36415 COLL VENOUS BLD VENIPUNCTURE: CPT

## 2019-03-21 PROCEDURE — 93005 ELECTROCARDIOGRAM TRACING: CPT

## 2019-03-21 PROCEDURE — 51798 US URINE CAPACITY MEASURE: CPT

## 2019-03-21 PROCEDURE — 84100 ASSAY OF PHOSPHORUS: CPT

## 2019-03-21 PROCEDURE — 51701 INSERT BLADDER CATHETER: CPT

## 2019-03-21 PROCEDURE — 96374 THER/PROPH/DIAG INJ IV PUSH: CPT

## 2019-03-21 PROCEDURE — 99285 EMERGENCY DEPT VISIT HI MDM: CPT

## 2019-03-21 PROCEDURE — 6360000002 HC RX W HCPCS: Performed by: EMERGENCY MEDICINE

## 2019-03-21 PROCEDURE — 6360000002 HC RX W HCPCS: Performed by: STUDENT IN AN ORGANIZED HEALTH CARE EDUCATION/TRAINING PROGRAM

## 2019-03-21 PROCEDURE — 82330 ASSAY OF CALCIUM: CPT

## 2019-03-21 PROCEDURE — 6370000000 HC RX 637 (ALT 250 FOR IP): Performed by: STUDENT IN AN ORGANIZED HEALTH CARE EDUCATION/TRAINING PROGRAM

## 2019-03-21 PROCEDURE — 96372 THER/PROPH/DIAG INJ SC/IM: CPT

## 2019-03-21 PROCEDURE — 85610 PROTHROMBIN TIME: CPT

## 2019-03-21 PROCEDURE — 2580000003 HC RX 258: Performed by: STUDENT IN AN ORGANIZED HEALTH CARE EDUCATION/TRAINING PROGRAM

## 2019-03-21 PROCEDURE — 2500000003 HC RX 250 WO HCPCS: Performed by: EMERGENCY MEDICINE

## 2019-03-21 PROCEDURE — 71045 X-RAY EXAM CHEST 1 VIEW: CPT

## 2019-03-21 PROCEDURE — 85025 COMPLETE CBC W/AUTO DIFF WBC: CPT

## 2019-03-21 PROCEDURE — 2000000000 HC ICU R&B

## 2019-03-21 PROCEDURE — 84484 ASSAY OF TROPONIN QUANT: CPT

## 2019-03-21 PROCEDURE — 2580000003 HC RX 258: Performed by: EMERGENCY MEDICINE

## 2019-03-21 PROCEDURE — 87641 MR-STAPH DNA AMP PROBE: CPT

## 2019-03-21 PROCEDURE — 84443 ASSAY THYROID STIM HORMONE: CPT

## 2019-03-21 PROCEDURE — 83735 ASSAY OF MAGNESIUM: CPT

## 2019-03-21 PROCEDURE — 83880 ASSAY OF NATRIURETIC PEPTIDE: CPT

## 2019-03-21 PROCEDURE — 99291 CRITICAL CARE FIRST HOUR: CPT | Performed by: INTERNAL MEDICINE

## 2019-03-21 RX ORDER — SODIUM CHLORIDE 0.9 % (FLUSH) 0.9 %
10 SYRINGE (ML) INJECTION PRN
Status: CANCELLED | OUTPATIENT
Start: 2019-03-21

## 2019-03-21 RX ORDER — SODIUM CHLORIDE 0.9 % (FLUSH) 0.9 %
10 SYRINGE (ML) INJECTION PRN
Status: DISCONTINUED | OUTPATIENT
Start: 2019-03-21 | End: 2019-03-23 | Stop reason: HOSPADM

## 2019-03-21 RX ORDER — FAMOTIDINE 20 MG/1
20 TABLET, FILM COATED ORAL 2 TIMES DAILY
Status: DISCONTINUED | OUTPATIENT
Start: 2019-03-21 | End: 2019-03-23 | Stop reason: HOSPADM

## 2019-03-21 RX ORDER — DOXAZOSIN 2 MG/1
2 TABLET ORAL NIGHTLY
COMMUNITY

## 2019-03-21 RX ORDER — DOPAMINE HYDROCHLORIDE 160 MG/100ML
10 INJECTION, SOLUTION INTRAVENOUS CONTINUOUS
Status: DISCONTINUED | OUTPATIENT
Start: 2019-03-21 | End: 2019-03-22

## 2019-03-21 RX ORDER — ONDANSETRON 2 MG/ML
4 INJECTION INTRAMUSCULAR; INTRAVENOUS ONCE
Status: COMPLETED | OUTPATIENT
Start: 2019-03-21 | End: 2019-03-21

## 2019-03-21 RX ORDER — 0.9 % SODIUM CHLORIDE 0.9 %
1000 INTRAVENOUS SOLUTION INTRAVENOUS ONCE
Status: COMPLETED | OUTPATIENT
Start: 2019-03-21 | End: 2019-03-21

## 2019-03-21 RX ORDER — ATROPINE SULFATE 0.1 MG/ML
1 INJECTION INTRAVENOUS ONCE
Status: COMPLETED | OUTPATIENT
Start: 2019-03-21 | End: 2019-03-21

## 2019-03-21 RX ORDER — SODIUM CHLORIDE 0.9 % (FLUSH) 0.9 %
10 SYRINGE (ML) INJECTION EVERY 12 HOURS SCHEDULED
Status: CANCELLED | OUTPATIENT
Start: 2019-03-21

## 2019-03-21 RX ORDER — DOPAMINE HYDROCHLORIDE 160 MG/100ML
5 INJECTION, SOLUTION INTRAVENOUS CONTINUOUS
Status: DISCONTINUED | OUTPATIENT
Start: 2019-03-21 | End: 2019-03-21 | Stop reason: HOSPADM

## 2019-03-21 RX ORDER — SODIUM CHLORIDE 0.9 % (FLUSH) 0.9 %
10 SYRINGE (ML) INJECTION EVERY 12 HOURS SCHEDULED
Status: DISCONTINUED | OUTPATIENT
Start: 2019-03-21 | End: 2019-03-23 | Stop reason: HOSPADM

## 2019-03-21 RX ORDER — ASPIRIN 81 MG/1
81 TABLET, CHEWABLE ORAL DAILY
Status: DISCONTINUED | OUTPATIENT
Start: 2019-03-21 | End: 2019-03-23 | Stop reason: HOSPADM

## 2019-03-21 RX ORDER — PRAVASTATIN SODIUM 20 MG
40 TABLET ORAL DAILY
Status: DISCONTINUED | OUTPATIENT
Start: 2019-03-21 | End: 2019-03-23 | Stop reason: HOSPADM

## 2019-03-21 RX ORDER — ACETAMINOPHEN 325 MG/1
650 TABLET ORAL EVERY 4 HOURS PRN
Status: CANCELLED | OUTPATIENT
Start: 2019-03-21

## 2019-03-21 RX ORDER — ONDANSETRON 2 MG/ML
4 INJECTION INTRAMUSCULAR; INTRAVENOUS EVERY 6 HOURS PRN
Status: DISCONTINUED | OUTPATIENT
Start: 2019-03-21 | End: 2019-03-23 | Stop reason: HOSPADM

## 2019-03-21 RX ORDER — CALCIUM GLUCONATE 94 MG/ML
2 INJECTION, SOLUTION INTRAVENOUS ONCE
Status: DISCONTINUED | OUTPATIENT
Start: 2019-03-21 | End: 2019-03-21

## 2019-03-21 RX ORDER — ISOSORBIDE MONONITRATE 30 MG/1
30 TABLET, EXTENDED RELEASE ORAL DAILY
Status: DISCONTINUED | OUTPATIENT
Start: 2019-03-21 | End: 2019-03-23 | Stop reason: HOSPADM

## 2019-03-21 RX ADMIN — GLUCAGON HYDROCHLORIDE 1 MG: KIT at 11:51

## 2019-03-21 RX ADMIN — DOPAMINE HYDROCHLORIDE IN DEXTROSE 10 MCG/KG/MIN: 1.6 INJECTION, SOLUTION INTRAVENOUS at 14:29

## 2019-03-21 RX ADMIN — Medication 10 ML: at 20:17

## 2019-03-21 RX ADMIN — ENOXAPARIN SODIUM 40 MG: 40 INJECTION SUBCUTANEOUS at 20:16

## 2019-03-21 RX ADMIN — ATROPINE SULFATE 1 MG: 0.1 INJECTION PARENTERAL at 11:27

## 2019-03-21 RX ADMIN — FAMOTIDINE 20 MG: 20 TABLET, FILM COATED ORAL at 20:16

## 2019-03-21 RX ADMIN — DOPAMINE HYDROCHLORIDE 5 MCG/KG/MIN: 160 INJECTION, SOLUTION INTRAVENOUS at 11:32

## 2019-03-21 RX ADMIN — CALCIUM GLUCONATE 2 G: 98 INJECTION, SOLUTION INTRAVENOUS at 12:01

## 2019-03-21 RX ADMIN — ASPIRIN 81 MG: 81 TABLET, CHEWABLE ORAL at 20:16

## 2019-03-21 RX ADMIN — PRAVASTATIN SODIUM 40 MG: 20 TABLET ORAL at 20:17

## 2019-03-21 RX ADMIN — ONDANSETRON 4 MG: 2 INJECTION INTRAMUSCULAR; INTRAVENOUS at 13:33

## 2019-03-21 RX ADMIN — DOPAMINE HYDROCHLORIDE IN DEXTROSE 9 MCG/KG/MIN: 1.6 INJECTION, SOLUTION INTRAVENOUS at 19:08

## 2019-03-21 RX ADMIN — SODIUM CHLORIDE 1000 ML: 9 INJECTION, SOLUTION INTRAVENOUS at 11:23

## 2019-03-21 ASSESSMENT — ENCOUNTER SYMPTOMS
COUGH: 0
DIARRHEA: 0
BACK PAIN: 0
SHORTNESS OF BREATH: 0
VOMITING: 0
CHEST TIGHTNESS: 0
ABDOMINAL PAIN: 0
NAUSEA: 0

## 2019-03-21 ASSESSMENT — PAIN SCALES - GENERAL: PAINLEVEL_OUTOF10: 0

## 2019-03-22 VITALS
OXYGEN SATURATION: 98 % | WEIGHT: 233.91 LBS | DIASTOLIC BLOOD PRESSURE: 82 MMHG | SYSTOLIC BLOOD PRESSURE: 161 MMHG | RESPIRATION RATE: 15 BRPM | HEART RATE: 66 BPM | BODY MASS INDEX: 37.75 KG/M2 | TEMPERATURE: 98.4 F

## 2019-03-22 LAB
-: NORMAL
ANION GAP SERPL CALCULATED.3IONS-SCNC: 14 MMOL/L (ref 9–17)
BUN BLDV-MCNC: 12 MG/DL (ref 8–23)
BUN/CREAT BLD: ABNORMAL (ref 9–20)
CALCIUM SERPL-MCNC: 9.7 MG/DL (ref 8.6–10.4)
CHLORIDE BLD-SCNC: 110 MMOL/L (ref 98–107)
CO2: 21 MMOL/L (ref 20–31)
CREAT SERPL-MCNC: 0.8 MG/DL (ref 0.7–1.2)
EKG ATRIAL RATE: 51 BPM
EKG P-R INTERVAL: 180 MS
EKG Q-T INTERVAL: 458 MS
EKG QRS DURATION: 78 MS
EKG QTC CALCULATION (BAZETT): 422 MS
EKG R AXIS: 77 DEGREES
EKG T AXIS: 49 DEGREES
EKG VENTRICULAR RATE: 51 BPM
GFR AFRICAN AMERICAN: >60 ML/MIN
GFR NON-AFRICAN AMERICAN: >60 ML/MIN
GFR SERPL CREATININE-BSD FRML MDRD: ABNORMAL ML/MIN/{1.73_M2}
GFR SERPL CREATININE-BSD FRML MDRD: ABNORMAL ML/MIN/{1.73_M2}
GLUCOSE BLD-MCNC: 105 MG/DL (ref 70–99)
INR BLD: 1.9
MRSA, DNA, NASAL: NORMAL
POTASSIUM SERPL-SCNC: 4 MMOL/L (ref 3.7–5.3)
PROTHROMBIN TIME: 18.8 SEC (ref 9–12)
REASON FOR REJECTION: NORMAL
SODIUM BLD-SCNC: 145 MMOL/L (ref 135–144)
SPECIMEN DESCRIPTION: NORMAL
ZZ NTE CLEAN UP: ORDERED TEST: NORMAL
ZZ NTE WITH NAME CLEAN UP: SPECIMEN SOURCE: NORMAL

## 2019-03-22 PROCEDURE — 80048 BASIC METABOLIC PNL TOTAL CA: CPT

## 2019-03-22 PROCEDURE — 36415 COLL VENOUS BLD VENIPUNCTURE: CPT

## 2019-03-22 PROCEDURE — 85610 PROTHROMBIN TIME: CPT

## 2019-03-22 PROCEDURE — 94762 N-INVAS EAR/PLS OXIMTRY CONT: CPT

## 2019-03-22 PROCEDURE — 99233 SBSQ HOSP IP/OBS HIGH 50: CPT | Performed by: INTERNAL MEDICINE

## 2019-03-22 PROCEDURE — 2580000003 HC RX 258: Performed by: STUDENT IN AN ORGANIZED HEALTH CARE EDUCATION/TRAINING PROGRAM

## 2019-03-22 PROCEDURE — 1200000000 HC SEMI PRIVATE

## 2019-03-22 PROCEDURE — 6360000002 HC RX W HCPCS: Performed by: STUDENT IN AN ORGANIZED HEALTH CARE EDUCATION/TRAINING PROGRAM

## 2019-03-22 PROCEDURE — 6370000000 HC RX 637 (ALT 250 FOR IP): Performed by: INTERNAL MEDICINE

## 2019-03-22 PROCEDURE — 6370000000 HC RX 637 (ALT 250 FOR IP): Performed by: STUDENT IN AN ORGANIZED HEALTH CARE EDUCATION/TRAINING PROGRAM

## 2019-03-22 RX ORDER — METOPROLOL TARTRATE 50 MG/1
50 TABLET, FILM COATED ORAL 2 TIMES DAILY
Status: DISCONTINUED | OUTPATIENT
Start: 2019-03-22 | End: 2019-03-23 | Stop reason: HOSPADM

## 2019-03-22 RX ORDER — METOPROLOL TARTRATE 50 MG/1
50 TABLET, FILM COATED ORAL 2 TIMES DAILY
Status: DISCONTINUED | OUTPATIENT
Start: 2019-03-22 | End: 2019-03-22

## 2019-03-22 RX ADMIN — FAMOTIDINE 20 MG: 20 TABLET, FILM COATED ORAL at 08:07

## 2019-03-22 RX ADMIN — METOPROLOL TARTRATE 50 MG: 50 TABLET, FILM COATED ORAL at 12:45

## 2019-03-22 RX ADMIN — PRAVASTATIN SODIUM 40 MG: 20 TABLET ORAL at 08:07

## 2019-03-22 RX ADMIN — MAGNESIUM HYDROXIDE 30 ML: 400 SUSPENSION ORAL at 08:25

## 2019-03-22 RX ADMIN — Medication 10 ML: at 20:41

## 2019-03-22 RX ADMIN — FAMOTIDINE 20 MG: 20 TABLET, FILM COATED ORAL at 20:39

## 2019-03-22 RX ADMIN — ISOSORBIDE MONONITRATE 30 MG: 30 TABLET ORAL at 08:07

## 2019-03-22 RX ADMIN — Medication 10 ML: at 08:13

## 2019-03-22 RX ADMIN — ENOXAPARIN SODIUM 40 MG: 40 INJECTION SUBCUTANEOUS at 08:07

## 2019-03-22 RX ADMIN — ASPIRIN 81 MG: 81 TABLET, CHEWABLE ORAL at 08:07

## 2019-03-22 RX ADMIN — APIXABAN 5 MG: 5 TABLET, FILM COATED ORAL at 20:39

## 2019-03-22 RX ADMIN — METOPROLOL TARTRATE 50 MG: 50 TABLET, FILM COATED ORAL at 20:39

## 2019-03-22 ASSESSMENT — PAIN SCALES - GENERAL
PAINLEVEL_OUTOF10: 0
PAINLEVEL_OUTOF10: 0

## 2019-03-23 PROBLEM — I95.9 HYPOTENSION: Status: ACTIVE | Noted: 2019-03-23

## 2019-03-23 PROBLEM — I95.9 HYPOTENSION: Status: RESOLVED | Noted: 2019-03-23 | Resolved: 2019-03-23

## 2019-03-23 PROBLEM — F03.90 DEMENTIA (HCC): Status: ACTIVE | Noted: 2019-03-23

## 2019-03-23 PROBLEM — R00.1 BRADYCARDIA: Status: RESOLVED | Noted: 2019-03-21 | Resolved: 2019-03-23

## 2019-05-15 LAB
EKG ATRIAL RATE: 53 BPM
EKG P AXIS: 34 DEGREES
EKG P-R INTERVAL: 200 MS
EKG Q-T INTERVAL: 424 MS
EKG QRS DURATION: 76 MS
EKG QTC CALCULATION (BAZETT): 397 MS
EKG R AXIS: 88 DEGREES
EKG T AXIS: 63 DEGREES
EKG VENTRICULAR RATE: 53 BPM

## 2020-03-09 ENCOUNTER — APPOINTMENT (OUTPATIENT)
Dept: GENERAL RADIOLOGY | Age: 80
End: 2020-03-09
Payer: MEDICARE

## 2020-03-09 ENCOUNTER — HOSPITAL ENCOUNTER (EMERGENCY)
Age: 80
Discharge: HOME | End: 2020-03-09
Attending: EMERGENCY MEDICINE
Payer: MEDICARE

## 2020-03-09 VITALS
SYSTOLIC BLOOD PRESSURE: 154 MMHG | RESPIRATION RATE: 14 BRPM | TEMPERATURE: 97.6 F | OXYGEN SATURATION: 96 % | HEART RATE: 48 BPM | BODY MASS INDEX: 32.95 KG/M2 | DIASTOLIC BLOOD PRESSURE: 77 MMHG | HEIGHT: 66 IN | WEIGHT: 205 LBS

## 2020-03-09 LAB
ABSOLUTE EOS #: 0.1 K/UL (ref 0–0.4)
ABSOLUTE IMMATURE GRANULOCYTE: ABNORMAL K/UL (ref 0–0.3)
ABSOLUTE LYMPH #: 1.6 K/UL (ref 1–4.8)
ABSOLUTE MONO #: 0.6 K/UL (ref 0.1–1.3)
ANION GAP SERPL CALCULATED.3IONS-SCNC: 11 MMOL/L (ref 9–17)
BASOPHILS # BLD: 1 % (ref 0–2)
BASOPHILS ABSOLUTE: 0 K/UL (ref 0–0.2)
BUN BLDV-MCNC: 17 MG/DL (ref 8–23)
BUN/CREAT BLD: ABNORMAL (ref 9–20)
CALCIUM SERPL-MCNC: 9.6 MG/DL (ref 8.6–10.4)
CHLORIDE BLD-SCNC: 107 MMOL/L (ref 98–107)
CO2: 22 MMOL/L (ref 20–31)
CREAT SERPL-MCNC: 0.83 MG/DL (ref 0.7–1.2)
DIFFERENTIAL TYPE: ABNORMAL
EOSINOPHILS RELATIVE PERCENT: 3 % (ref 0–4)
GFR AFRICAN AMERICAN: >60 ML/MIN
GFR NON-AFRICAN AMERICAN: >60 ML/MIN
GFR SERPL CREATININE-BSD FRML MDRD: ABNORMAL ML/MIN/{1.73_M2}
GFR SERPL CREATININE-BSD FRML MDRD: ABNORMAL ML/MIN/{1.73_M2}
GLUCOSE BLD-MCNC: 117 MG/DL (ref 70–99)
HCT VFR BLD CALC: 42 % (ref 41–53)
HEMOGLOBIN: 14 G/DL (ref 13.5–17.5)
IMMATURE GRANULOCYTES: ABNORMAL %
LYMPHOCYTES # BLD: 30 % (ref 24–44)
MCH RBC QN AUTO: 30.7 PG (ref 26–34)
MCHC RBC AUTO-ENTMCNC: 33.4 G/DL (ref 31–37)
MCV RBC AUTO: 92 FL (ref 80–100)
MONOCYTES # BLD: 11 % (ref 1–7)
NRBC AUTOMATED: ABNORMAL PER 100 WBC
PDW BLD-RTO: 13.2 % (ref 11.5–14.9)
PLATELET # BLD: 168 K/UL (ref 150–450)
PLATELET ESTIMATE: ABNORMAL
PMV BLD AUTO: 7.4 FL (ref 6–12)
POTASSIUM SERPL-SCNC: 4.1 MMOL/L (ref 3.7–5.3)
RBC # BLD: 4.56 M/UL (ref 4.5–5.9)
RBC # BLD: ABNORMAL 10*6/UL
SEG NEUTROPHILS: 55 % (ref 36–66)
SEGMENTED NEUTROPHILS ABSOLUTE COUNT: 2.9 K/UL (ref 1.3–9.1)
SODIUM BLD-SCNC: 140 MMOL/L (ref 135–144)
TROPONIN INTERP: NORMAL
TROPONIN INTERP: NORMAL
TROPONIN T: NORMAL NG/ML
TROPONIN T: NORMAL NG/ML
TROPONIN, HIGH SENSITIVITY: 12 NG/L (ref 0–22)
TROPONIN, HIGH SENSITIVITY: 13 NG/L (ref 0–22)
WBC # BLD: 5.2 K/UL (ref 3.5–11)
WBC # BLD: ABNORMAL 10*3/UL

## 2020-03-09 PROCEDURE — 93005 ELECTROCARDIOGRAM TRACING: CPT | Performed by: STUDENT IN AN ORGANIZED HEALTH CARE EDUCATION/TRAINING PROGRAM

## 2020-03-09 PROCEDURE — 84484 ASSAY OF TROPONIN QUANT: CPT

## 2020-03-09 PROCEDURE — 71046 X-RAY EXAM CHEST 2 VIEWS: CPT

## 2020-03-09 PROCEDURE — 36415 COLL VENOUS BLD VENIPUNCTURE: CPT

## 2020-03-09 PROCEDURE — 85025 COMPLETE CBC W/AUTO DIFF WBC: CPT

## 2020-03-09 PROCEDURE — 99285 EMERGENCY DEPT VISIT HI MDM: CPT

## 2020-03-09 PROCEDURE — 80048 BASIC METABOLIC PNL TOTAL CA: CPT

## 2020-03-09 ASSESSMENT — ENCOUNTER SYMPTOMS
CHEST TIGHTNESS: 0
SHORTNESS OF BREATH: 0
ABDOMINAL PAIN: 0
VOMITING: 0
BACK PAIN: 0
NAUSEA: 0
WHEEZING: 0
COUGH: 0

## 2020-03-09 ASSESSMENT — PAIN SCALES - GENERAL: PAINLEVEL_OUTOF10: 4

## 2020-03-09 NOTE — ED PROVIDER NOTES
and examined the patient in conjunction with the resident and agree with the assessment, treatment plan, and disposition of the patient as recorded by the resident. I performed a history and physical examination of the patient and discussed management with the resident. I reviewed the residents note and agree with the documented findings and plan of care. Any areas of disagreement are noted on the chart. I was personally present for the key portions of any procedures. I have documented in the chart those procedures where I was not present during the key portions. I have personally reviewed all images and agree with the resident's interpretation. I have reviewed the emergency nurses triage note. I agree with the chief complaint, past medical history, past surgical history, allergies, medications, social and family history as documented unless otherwise noted.     Mavis Tripp MD  Attending Emergency Physician            Esteban Reddy MD  03/09/20 [id number]

## 2020-03-09 NOTE — ED NOTES
Report given to Isis Walls RN from ER. Report method in person   The following was reviewed with receiving RN:   Current vital signs:  /77   Pulse (!) 48   Temp 97.6 °F (36.4 °C) (Oral)   Resp 13   Ht 5' 6\" (1.676 m)   Wt 205 lb (93 kg)   SpO2 95%   BMI 33.09 kg/m²                MEWS Score: 1     Any medication or safety alerts were reviewed. Any pending diagnostics and notifications were also reviewed, as well as any safety concerns or issues, abnormal labs, abnormal imaging, and abnormal assessment findings. Questions were answered.            Dexter Barnhart RN  03/09/20 [id number]

## 2020-03-09 NOTE — ED PROVIDER NOTES
insecurity     Worry: Not on file     Inability: Not on file    Transportation needs     Medical: Not on file     Non-medical: Not on file   Tobacco Use    Smoking status: Never Smoker    Smokeless tobacco: Never Used   Substance and Sexual Activity    Alcohol use: No    Drug use: No    Sexual activity: Not on file   Lifestyle    Physical activity     Days per week: Not on file     Minutes per session: Not on file    Stress: Not on file   Relationships    Social connections     Talks on phone: Not on file     Gets together: Not on file     Attends Anabaptist service: Not on file     Active member of club or organization: Not on file     Attends meetings of clubs or organizations: Not on file     Relationship status: Not on file    Intimate partner violence     Fear of current or ex partner: Not on file     Emotionally abused: Not on file     Physically abused: Not on file     Forced sexual activity: Not on file   Other Topics Concern    Not on file   Social History Narrative    Not on file       Family History   Problem Relation Age of Onset    Alzheimer's Disease Mother     Arthritis Mother         lumbar disc disease    Heart Disease Father         Allergies:  Oxycodone and Pcn [penicillins]    Home Medications:  Prior to Admission medications    Medication Sig Start Date End Date Taking? Authorizing Provider   doxazosin (CARDURA) 2 MG tablet Take 2 mg by mouth nightly    Historical Provider, MD   aspirin 81 MG chewable tablet Take 1 tablet by mouth daily 2/17/19   Vitaly Rueda MD   isosorbide mononitrate (IMDUR) 30 MG extended release tablet Take 1 tablet by mouth daily 2/18/19   Vitaly Rueda MD   nitroGLYCERIN (NITROSTAT) 0.4 MG SL tablet up to max of 3 total doses.  If no relief after 1 dose, call 911. 2/17/19   Vitaly Rueda MD   acetaminophen (TYLENOL) 325 MG tablet Take 1,300 mg by mouth 2 times daily    Historical Provider, MD   warfarin (COUMADIN) 5 MG tablet Take 5 mg by mouth daily Managed by Professor Celeste [facility] Coumadin clinic - 2.5mg on Mon/Fri  and 5mg 5x weekly    Historical Provider, MD   diltiazem (CARTIA XT) 300 MG extended release capsule Take 300 mg by mouth daily    Historical Provider, MD   metoprolol (LOPRESSOR) 50 MG tablet Take 50 mg by mouth 2 times daily. Historical Provider, MD   pravastatin (PRAVACHOL) 40 MG tablet Take 40 mg by mouth daily. Historical Provider, MD   omeprazole (PRILOSEC) 20 MG capsule Take 20 mg by mouth daily. Historical Provider, MD       REVIEW OFSYSTEMS    (2-9 systems for level 4, 10 or more for level 5)      Review of Systems   Constitutional: Negative for chills and fever. HENT: Negative. Eyes: Negative for visual disturbance. Respiratory: Negative for cough, chest tightness, shortness of breath and wheezing. Cardiovascular: Positive for chest pain. Negative for palpitations and leg swelling. Gastrointestinal: Negative for abdominal pain, nausea and vomiting. Musculoskeletal: Negative for back pain and neck pain. Skin: Negative for rash and wound. Neurological: Negative for dizziness, syncope, weakness, light-headedness, numbness and headaches. Hematological: Does not bruise/bleed easily. PHYSICAL EXAM   (up to 7 for level 4, 8 or more forlevel 5)      INITIAL VITALS:   ED Triage Vitals [03/09/20 1644]   BP Temp Temp src Pulse Resp SpO2 Height Weight   (!) 143/69 -- -- 57 16 96 % 5' 6\" (1.676 m) 205 lb (93 kg)       Physical Exam  Vitals signs and nursing note reviewed. Constitutional:       General: He is not in acute distress. Appearance: Normal appearance. He is not ill-appearing, toxic-appearing or diaphoretic. HENT:      Head: Normocephalic and atraumatic. Cardiovascular:      Rate and Rhythm: Regular rhythm. Bradycardia present. Heart sounds: No murmur. No gallop. Pulmonary:      Effort: Pulmonary effort is normal. No respiratory distress. Breath sounds: Normal breath sounds. No stridor.  No wheezing, rhonchi or rales. Abdominal:      General: There is no distension. Palpations: Abdomen is soft. Tenderness: There is no abdominal tenderness. There is no guarding. Musculoskeletal:         General: No tenderness. Right lower leg: No edema. Left lower leg: No edema. Skin:     General: Skin is warm and dry. Neurological:      Mental Status: He is alert and oriented to person, place, and time. DIFFERENTIAL  DIAGNOSIS     PLAN (LABS / IMAGING / EKG):  Orders Placed This Encounter   Procedures    XR CHEST STANDARD (2 VW)    CBC Auto Differential    Basic Metabolic Panel    Troponin    Troponin    Inpatient consult to Cardiology    EKG 12 Lead       MEDICATIONS ORDERED:  No orders of the defined types were placed in this encounter. DDX: MSK, drug reaction, ACS, pneumonia, pneumothorax, viral illness    Initial MDM/Plan/ED course: 70 y.o. male who presents with chest pain. Patient recently started on muscle relaxer and every time he takes it he gets a few minutes of sharp midsternal chest pain. This is not exertional as the patient is very active. On exam vitals are normal except for sinus bradycardia secondary to medications. Physical exam unremarkable with normal heart and lung sounds, abdomen soft nontender, no leg swelling or calf cramping. Patient currently asymptomatic. Cardiac work-up obtained and unremarkable with normal troponins. Spoke with patient's cardiologist Dr. Tanesha Luo who agreed that patient symptoms are nonexertional and atypical and likely not related to cardiac pain. Patient also admits to doing a lot of physical work recently and being sore from this. Patient does have an appointment with his cardiologist this week. Cardiologist agrees that the patient is safe for discharge with follow-up in the office. Patient agrees with this plan as well was given return precautions and discharged home.     DIAGNOSTIC RESULTS / EMERGENCY DEPARTMENT

## 2020-03-09 NOTE — ED NOTES
Mode of arrival:  Walk in      Residence prior to admit: home      Chief complaint on admission: chest pain. Pt arrives to ED c/o chest pain that started around 2 hours PTA. Pt states he recently started Zanaflex yesterday and was concerned that this was a reason for the pain. Pt states upon arrival to ER his chest pain has resolved. Pt is A&Ox4, eupneic, PWD. GCS=15. Call light in reach. C= \"Have you ever felt that you should Cut down on your drinking? \"  No  A= \"Have people Annoyed you by criticizing your drinking? \"  No  G= \"Have you ever felt bad or Guilty about your drinking? \"  No  E= \"Have you ever had a drink as an Eye-opener first thing in the morning to steady your nerves or to help a hangover? \"  No      Deferred []      Reason for deferring: N/A    *If yes to two or more: probable alcohol abuse. Tanna Ramos RN  03/09/20 9848

## 2020-03-10 LAB
EKG ATRIAL RATE: 54 BPM
EKG P AXIS: 27 DEGREES
EKG P-R INTERVAL: 196 MS
EKG Q-T INTERVAL: 406 MS
EKG QRS DURATION: 92 MS
EKG QTC CALCULATION (BAZETT): 385 MS
EKG R AXIS: 79 DEGREES
EKG T AXIS: 39 DEGREES
EKG VENTRICULAR RATE: 54 BPM

## 2020-03-10 PROCEDURE — 93010 ELECTROCARDIOGRAM REPORT: CPT | Performed by: INTERNAL MEDICINE

## 2020-04-15 ENCOUNTER — HOSPITAL ENCOUNTER (OUTPATIENT)
Age: 80
Discharge: HOME | End: 2020-04-17
Payer: MEDICARE

## 2020-04-15 ENCOUNTER — HOSPITAL ENCOUNTER (OUTPATIENT)
Dept: GENERAL RADIOLOGY | Age: 80
Discharge: HOME | End: 2020-04-17
Payer: MEDICARE

## 2020-04-15 PROCEDURE — 72072 X-RAY EXAM THORAC SPINE 3VWS: CPT

## 2020-04-15 PROCEDURE — 72100 X-RAY EXAM L-S SPINE 2/3 VWS: CPT

## 2020-05-04 ENCOUNTER — HOSPITAL ENCOUNTER (EMERGENCY)
Age: 80
Discharge: HOME | End: 2020-05-04
Attending: EMERGENCY MEDICINE
Payer: MEDICARE

## 2020-05-04 ENCOUNTER — APPOINTMENT (OUTPATIENT)
Dept: GENERAL RADIOLOGY | Age: 80
End: 2020-05-04
Payer: MEDICARE

## 2020-05-04 ENCOUNTER — APPOINTMENT (OUTPATIENT)
Dept: CT IMAGING | Age: 80
End: 2020-05-04
Payer: MEDICARE

## 2020-05-04 VITALS
BODY MASS INDEX: 31.82 KG/M2 | HEIGHT: 66 IN | RESPIRATION RATE: 16 BRPM | TEMPERATURE: 97.1 F | OXYGEN SATURATION: 95 % | SYSTOLIC BLOOD PRESSURE: 160 MMHG | DIASTOLIC BLOOD PRESSURE: 65 MMHG | WEIGHT: 198 LBS | HEART RATE: 61 BPM

## 2020-05-04 LAB
ABSOLUTE EOS #: 0.2 K/UL (ref 0–0.4)
ABSOLUTE IMMATURE GRANULOCYTE: ABNORMAL K/UL (ref 0–0.3)
ABSOLUTE LYMPH #: 1.7 K/UL (ref 1–4.8)
ABSOLUTE MONO #: 0.6 K/UL (ref 0.1–1.3)
ALBUMIN SERPL-MCNC: 3.9 G/DL (ref 3.5–5.2)
ALBUMIN/GLOBULIN RATIO: NORMAL (ref 1–2.5)
ALP BLD-CCNC: 50 U/L (ref 40–129)
ALT SERPL-CCNC: 12 U/L (ref 5–41)
ANION GAP SERPL CALCULATED.3IONS-SCNC: 10 MMOL/L (ref 9–17)
AST SERPL-CCNC: 15 U/L
BASOPHILS # BLD: 1 % (ref 0–2)
BASOPHILS ABSOLUTE: 0.1 K/UL (ref 0–0.2)
BILIRUB SERPL-MCNC: 0.36 MG/DL (ref 0.3–1.2)
BUN BLDV-MCNC: 11 MG/DL (ref 8–23)
BUN/CREAT BLD: NORMAL (ref 9–20)
CALCIUM SERPL-MCNC: 9.8 MG/DL (ref 8.6–10.4)
CHLORIDE BLD-SCNC: 106 MMOL/L (ref 98–107)
CO2: 25 MMOL/L (ref 20–31)
CREAT SERPL-MCNC: 0.81 MG/DL (ref 0.7–1.2)
DIFFERENTIAL TYPE: ABNORMAL
EOSINOPHILS RELATIVE PERCENT: 3 % (ref 0–4)
GFR AFRICAN AMERICAN: >60 ML/MIN
GFR NON-AFRICAN AMERICAN: >60 ML/MIN
GFR SERPL CREATININE-BSD FRML MDRD: NORMAL ML/MIN/{1.73_M2}
GFR SERPL CREATININE-BSD FRML MDRD: NORMAL ML/MIN/{1.73_M2}
GLUCOSE BLD-MCNC: 87 MG/DL (ref 70–99)
HCT VFR BLD CALC: 43.3 % (ref 41–53)
HEMOGLOBIN: 14.5 G/DL (ref 13.5–17.5)
IMMATURE GRANULOCYTES: ABNORMAL %
LIPASE: 42 U/L (ref 13–60)
LYMPHOCYTES # BLD: 25 % (ref 24–44)
MCH RBC QN AUTO: 31.3 PG (ref 26–34)
MCHC RBC AUTO-ENTMCNC: 33.6 G/DL (ref 31–37)
MCV RBC AUTO: 93.2 FL (ref 80–100)
MONOCYTES # BLD: 9 % (ref 1–7)
NRBC AUTOMATED: ABNORMAL PER 100 WBC
PDW BLD-RTO: 13.4 % (ref 11.5–14.9)
PLATELET # BLD: 151 K/UL (ref 150–450)
PLATELET ESTIMATE: ABNORMAL
PMV BLD AUTO: 7.4 FL (ref 6–12)
POTASSIUM SERPL-SCNC: 4.1 MMOL/L (ref 3.7–5.3)
RBC # BLD: 4.64 M/UL (ref 4.5–5.9)
RBC # BLD: ABNORMAL 10*6/UL
SEG NEUTROPHILS: 62 % (ref 36–66)
SEGMENTED NEUTROPHILS ABSOLUTE COUNT: 4.1 K/UL (ref 1.3–9.1)
SODIUM BLD-SCNC: 141 MMOL/L (ref 135–144)
TOTAL PROTEIN: 7 G/DL (ref 6.4–8.3)
WBC # BLD: 6.6 K/UL (ref 3.5–11)
WBC # BLD: ABNORMAL 10*3/UL

## 2020-05-04 PROCEDURE — 83690 ASSAY OF LIPASE: CPT

## 2020-05-04 PROCEDURE — 99284 EMERGENCY DEPT VISIT MOD MDM: CPT

## 2020-05-04 PROCEDURE — 80053 COMPREHEN METABOLIC PANEL: CPT

## 2020-05-04 PROCEDURE — 36415 COLL VENOUS BLD VENIPUNCTURE: CPT

## 2020-05-04 PROCEDURE — 6360000004 HC RX CONTRAST MEDICATION: Performed by: PHYSICIAN ASSISTANT

## 2020-05-04 PROCEDURE — 2580000003 HC RX 258: Performed by: PHYSICIAN ASSISTANT

## 2020-05-04 PROCEDURE — 85025 COMPLETE CBC W/AUTO DIFF WBC: CPT

## 2020-05-04 PROCEDURE — 71101 X-RAY EXAM UNILAT RIBS/CHEST: CPT

## 2020-05-04 PROCEDURE — 71260 CT THORAX DX C+: CPT

## 2020-05-04 PROCEDURE — 6370000000 HC RX 637 (ALT 250 FOR IP): Performed by: PHYSICIAN ASSISTANT

## 2020-05-04 RX ORDER — SODIUM CHLORIDE 0.9 % (FLUSH) 0.9 %
10 SYRINGE (ML) INJECTION ONCE
Status: COMPLETED | OUTPATIENT
Start: 2020-05-04 | End: 2020-05-04

## 2020-05-04 RX ORDER — ACETAMINOPHEN 325 MG/1
650 TABLET ORAL ONCE
Status: COMPLETED | OUTPATIENT
Start: 2020-05-04 | End: 2020-05-04

## 2020-05-04 RX ORDER — 0.9 % SODIUM CHLORIDE 0.9 %
80 INTRAVENOUS SOLUTION INTRAVENOUS ONCE
Status: COMPLETED | OUTPATIENT
Start: 2020-05-04 | End: 2020-05-04

## 2020-05-04 RX ADMIN — Medication 10 ML: at 21:37

## 2020-05-04 RX ADMIN — ACETAMINOPHEN 650 MG: 325 TABLET, FILM COATED ORAL at 19:54

## 2020-05-04 RX ADMIN — SODIUM CHLORIDE 80 ML: 9 INJECTION, SOLUTION INTRAVENOUS at 21:37

## 2020-05-04 RX ADMIN — IOVERSOL 75 ML: 741 INJECTION INTRA-ARTERIAL; INTRAVENOUS at 21:37

## 2020-05-04 ASSESSMENT — ENCOUNTER SYMPTOMS
BOWEL INCONTINENCE: 0
WHEEZING: 0
ABDOMINAL PAIN: 0
VISUAL CHANGE: 0
SHORTNESS OF BREATH: 0
NAUSEA: 0
VOMITING: 0
STRIDOR: 0

## 2020-05-04 ASSESSMENT — PAIN SCALES - GENERAL
PAINLEVEL_OUTOF10: 6
PAINLEVEL_OUTOF10: 6

## 2020-05-05 NOTE — ED PROVIDER NOTES
[address]  eMERGENCY dEPARTMENT eNCOUnter   Independent Attestation     Pt Name: Selina Salomon  MRN: [id number]  [place] 1940  Date of evaluation: 5/4/20       Selina Salomon is a 70 y.o. male who presents with Fall and Rib Pain (Left)        Based on the medical record, the care appears appropriate. I was personally available for consultation in the Emergency Department.     Nile Altamirano MD  Attending Emergency  Physician                  Nile Altamirano MD  05/04/20 4831
Appearance: He is well-developed. HENT:      Head: Normocephalic and atraumatic. Cardiovascular:      Rate and Rhythm: Normal rate and regular rhythm. Pulses: Normal pulses. Heart sounds: Normal heart sounds. No friction rub. Pulmonary:      Effort: Pulmonary effort is normal.      Breath sounds: Normal breath sounds. Comments: Left chest without crepitus there is no bruising no flail segment he has pain worse with cough deep breath and movement. Chest:      Chest wall: Tenderness present. Skin:     Capillary Refill: Capillary refill takes less than 2 seconds. Neurological:      Mental Status: He is alert and oriented to person, place, and time. MEDICAL DECISION MAKING:     Use the incentive spirometer as directed every hour while awake use Tylenol for pain if desired plenty of fluids and close follow-up with primary care provider. Return to the emergency department for any worsening of symptoms difficulty breathing or any other concerns. Patient verbalized understanding discharge instructions and is agreeable with plan. DIAGNOSTIC RESULTS     EKG: All EKG's are interpreted by the Emergency Department Physician who either signs or Co-signs this chart in the absence of acardiologist.        RADIOLOGY:Allplain film, CT, MRI, and formal ultrasound images (except ED bedside ultrasound) are read by the radiologist and the images and interpretations are directly viewed by the emergency physician. LABS:All lab results were reviewed by myself, and all abnormals are listed below.   Labs Reviewed   CBC WITH AUTO DIFFERENTIAL - Abnormal; Notable for the following components:       Result Value    Monocytes 9 (*)     All other components within normal limits   COMPREHENSIVE METABOLIC PANEL W/ REFLEX TO MG FOR LOW K   LIPASE         EMERGENCY DEPARTMENT COURSE:   Vitals:    Vitals:    05/04/20 1857 05/04/20 1859   BP:  (!) 160/65   Pulse: 61    Resp: 16    Temp: 97.1 °F (36.2

## 2020-05-14 ENCOUNTER — HOSPITAL ENCOUNTER (OUTPATIENT)
Age: 80
Setting detail: OUTPATIENT SURGERY
End: 2020-05-14
Attending: SURGERY | Admitting: SURGERY
Payer: MEDICARE

## 2020-05-17 ENCOUNTER — HOSPITAL ENCOUNTER (OUTPATIENT)
Dept: PREADMISSION TESTING | Age: 80
Setting detail: SPECIMEN
Discharge: HOME | End: 2020-05-21
Payer: MEDICARE

## 2020-05-17 PROCEDURE — 36415 COLL VENOUS BLD VENIPUNCTURE: CPT

## 2020-05-17 PROCEDURE — U0004 COV-19 TEST NON-CDC HGH THRU: HCPCS

## 2020-05-18 LAB
SARS-COV-2, PCR: NOT DETECTED
SARS-COV-2, RAPID: NORMAL
SARS-COV-2: NORMAL
SOURCE: NORMAL

## 2020-05-19 ENCOUNTER — TELEPHONE (OUTPATIENT)
Dept: PRIMARY CARE CLINIC | Age: 80
End: 2020-05-19

## 2020-07-30 ENCOUNTER — HOSPITAL ENCOUNTER (OUTPATIENT)
Age: 80
Discharge: HOME | End: 2020-08-01
Payer: MEDICARE

## 2020-07-30 ENCOUNTER — HOSPITAL ENCOUNTER (OUTPATIENT)
Dept: GENERAL RADIOLOGY | Age: 80
Discharge: HOME | End: 2020-08-01
Payer: MEDICARE

## 2020-07-30 PROCEDURE — 73120 X-RAY EXAM OF HAND: CPT

## 2020-09-24 ENCOUNTER — HOSPITAL ENCOUNTER (EMERGENCY)
Age: 80
Discharge: HOME | End: 2020-09-24
Attending: EMERGENCY MEDICINE
Payer: MEDICARE

## 2020-09-24 VITALS
HEART RATE: 50 BPM | DIASTOLIC BLOOD PRESSURE: 81 MMHG | RESPIRATION RATE: 16 BRPM | TEMPERATURE: 97.2 F | BODY MASS INDEX: 32.78 KG/M2 | SYSTOLIC BLOOD PRESSURE: 122 MMHG | OXYGEN SATURATION: 97 % | WEIGHT: 204 LBS | HEIGHT: 66 IN

## 2020-09-24 LAB
ABSOLUTE EOS #: 0.2 K/UL (ref 0–0.4)
ABSOLUTE IMMATURE GRANULOCYTE: ABNORMAL K/UL (ref 0–0.3)
ABSOLUTE LYMPH #: 1.5 K/UL (ref 1–4.8)
ABSOLUTE MONO #: 0.7 K/UL (ref 0.1–1.3)
ANION GAP SERPL CALCULATED.3IONS-SCNC: 10 MMOL/L (ref 9–17)
BASOPHILS # BLD: 1 % (ref 0–2)
BASOPHILS ABSOLUTE: 0.1 K/UL (ref 0–0.2)
BUN BLDV-MCNC: 15 MG/DL (ref 8–23)
BUN/CREAT BLD: ABNORMAL (ref 9–20)
CALCIUM SERPL-MCNC: 9.6 MG/DL (ref 8.6–10.4)
CHLORIDE BLD-SCNC: 107 MMOL/L (ref 98–107)
CO2: 24 MMOL/L (ref 20–31)
CREAT SERPL-MCNC: 0.76 MG/DL (ref 0.7–1.2)
DIFFERENTIAL TYPE: ABNORMAL
EOSINOPHILS RELATIVE PERCENT: 3 % (ref 0–4)
GFR AFRICAN AMERICAN: >60 ML/MIN
GFR NON-AFRICAN AMERICAN: >60 ML/MIN
GFR SERPL CREATININE-BSD FRML MDRD: ABNORMAL ML/MIN/{1.73_M2}
GFR SERPL CREATININE-BSD FRML MDRD: ABNORMAL ML/MIN/{1.73_M2}
GLUCOSE BLD-MCNC: 105 MG/DL (ref 70–99)
HCT VFR BLD CALC: 42.1 % (ref 41–53)
HEMOGLOBIN: 14.3 G/DL (ref 13.5–17.5)
IMMATURE GRANULOCYTES: ABNORMAL %
INR BLD: 1.1
LYMPHOCYTES # BLD: 21 % (ref 24–44)
MCH RBC QN AUTO: 31.6 PG (ref 26–34)
MCHC RBC AUTO-ENTMCNC: 33.9 G/DL (ref 31–37)
MCV RBC AUTO: 93.3 FL (ref 80–100)
MONOCYTES # BLD: 9 % (ref 1–7)
NRBC AUTOMATED: ABNORMAL PER 100 WBC
PARTIAL THROMBOPLASTIN TIME: 31.4 SEC (ref 24–36)
PDW BLD-RTO: 12.6 % (ref 11.5–14.9)
PLATELET # BLD: 162 K/UL (ref 150–450)
PLATELET ESTIMATE: ABNORMAL
PMV BLD AUTO: 7.1 FL (ref 6–12)
POTASSIUM SERPL-SCNC: 4.1 MMOL/L (ref 3.7–5.3)
PROTHROMBIN TIME: 14.3 SEC (ref 11.8–14.6)
RBC # BLD: 4.52 M/UL (ref 4.5–5.9)
RBC # BLD: ABNORMAL 10*6/UL
SEG NEUTROPHILS: 66 % (ref 36–66)
SEGMENTED NEUTROPHILS ABSOLUTE COUNT: 4.6 K/UL (ref 1.3–9.1)
SODIUM BLD-SCNC: 141 MMOL/L (ref 135–144)
WBC # BLD: 7.1 K/UL (ref 3.5–11)
WBC # BLD: ABNORMAL 10*3/UL

## 2020-09-24 PROCEDURE — 85730 THROMBOPLASTIN TIME PARTIAL: CPT

## 2020-09-24 PROCEDURE — 99283 EMERGENCY DEPT VISIT LOW MDM: CPT

## 2020-09-24 PROCEDURE — 80048 BASIC METABOLIC PNL TOTAL CA: CPT

## 2020-09-24 PROCEDURE — 93971 EXTREMITY STUDY: CPT

## 2020-09-24 PROCEDURE — 85025 COMPLETE CBC W/AUTO DIFF WBC: CPT

## 2020-09-24 PROCEDURE — 36415 COLL VENOUS BLD VENIPUNCTURE: CPT

## 2020-09-24 PROCEDURE — 85610 PROTHROMBIN TIME: CPT

## 2020-09-24 ASSESSMENT — PAIN DESCRIPTION - PAIN TYPE: TYPE: ACUTE PAIN

## 2020-09-24 ASSESSMENT — PAIN DESCRIPTION - FREQUENCY: FREQUENCY: CONTINUOUS

## 2020-09-24 ASSESSMENT — PAIN DESCRIPTION - DESCRIPTORS: DESCRIPTORS: THROBBING

## 2020-09-24 ASSESSMENT — PAIN DESCRIPTION - LOCATION: LOCATION: ARM

## 2020-09-24 ASSESSMENT — ENCOUNTER SYMPTOMS
SHORTNESS OF BREATH: 0
ABDOMINAL PAIN: 0
COLOR CHANGE: 0
BACK PAIN: 0
EYE PAIN: 0

## 2020-09-24 ASSESSMENT — PAIN DESCRIPTION - ORIENTATION: ORIENTATION: LEFT

## 2020-09-24 ASSESSMENT — PAIN SCALES - GENERAL: PAINLEVEL_OUTOF10: 4

## 2020-09-24 NOTE — ED PROVIDER NOTES
EMERGENCY DEPARTMENT ENCOUNTER    Pt Name: Mary Farnsworth  MRN: [id number]  [place] 1940  Date of evaluation: 9/24/20  CHIEF COMPLAINT       Chief Complaint   Patient presents with    Arm Swelling    Arm Pain     HISTORY OF PRESENT ILLNESS   44-year-old male presents with complaint of right upper extremity pain. Patient states he noticed a small area of swelling to his left upper arm yesterday. Patient states he was working in his yard today and noticed that he was having pain in that arm and that the swelling was increased. Patient denies any recent injury or trauma to the area. Patient states he has had these before but this 1 is larger than normal, and more painful. Patient states he is on Eliquis and wanted to make sure that this was not something going on. The history is provided by the patient. REVIEW OF SYSTEMS     Review of Systems   Constitutional: Negative for chills and fever. HENT: Negative for congestion and ear pain. Eyes: Negative for pain. Respiratory: Negative for shortness of breath. Cardiovascular: Negative for chest pain, palpitations and leg swelling. Gastrointestinal: Negative for abdominal pain. Genitourinary: Negative for dysuria and flank pain. Musculoskeletal: Negative for back pain. Left arm pain   Skin: Negative for color change. Neurological: Negative for numbness and headaches. Psychiatric/Behavioral: Negative for confusion. All other systems reviewed and are negative.     PASTMEDICAL HISTORY     Past Medical History:   Diagnosis Date    Arthritis     Atrial fibrillation ([address]     CAD (coronary artery disease)     Colon polyps     Hyperlipidemia     Hypertension     Prediabetes     Sleep apnea     not tested, no cpap     Past Problem List  Patient Active Problem List   Diagnosis Code    Atrial fibrillation (HCC) I48.91    Chest pain R07.9    Rectal bleeding K62.5    Lower GI bleed K92.2    Morbid obesity ([address] E66.01    S/P colonoscopy with polypectomy Z98.890    Elevated INR R79.1    Lung nodule seen on imaging study R91.1    Coronary artery disease involving coronary bypass graft of native heart without angina pectoris I25.810    Dementia ([facility] F03.90     SURGICAL HISTORY       Past Surgical History:   Procedure Laterality Date    CATARACT REMOVAL Bilateral     COLONOSCOPY  7/24/2018    COLONOSCOPY WITH BIOPSY performed by Jennifer Nelson MD at [place] [address]      4 vessel    TOTAL KNEE ARTHROPLASTY Bilateral    [address] MEDICATIONS       Discharge Medication List as of 9/24/2020  3:13 PM      CONTINUE these medications which have NOT CHANGED    Details   doxazosin (CARDURA) 2 MG tablet Take 2 mg by mouth nightlyHistorical Med      aspirin 81 MG chewable tablet Take 1 tablet by mouth daily, Disp-30 tablet, R-3Print      isosorbide mononitrate (IMDUR) 30 MG extended release tablet Take 1 tablet by mouth daily, Disp-30 tablet, R-3Print      nitroGLYCERIN (NITROSTAT) 0.4 MG SL tablet up to max of 3 total doses. If no relief after 1 dose, call 911., Disp-25 tablet, R-3Print      acetaminophen (TYLENOL) 325 MG tablet Take 1,300 mg by mouth 2 times dailyHistorical Med      warfarin (COUMADIN) 5 MG tablet Take 5 mg by mouth daily Managed by Professor Cel[facility] - 2.5mg on Mon/Fri  and 5mg 5x weeklyHistorical Med      diltiazem (CARTIA XT) 300 MG extended release capsule Take 300 mg by mouth daily      metoprolol (LOPRESSOR) 50 MG tablet Take 50 mg by mouth 2 times daily. pravastatin (PRAVACHOL) 40 MG tablet Take 40 mg by mouth daily. omeprazole (PRILOSEC) 20 MG capsule Take 20 mg by mouth daily. ALLERGIES     is allergic to oxycodone and pcn [penicillins]. FAMILY HISTORY     He indicated that the status of his mother is unknown. He indicated that the status of his father is unknown.      SOCIAL HISTORY       Social History     Tobacco Use  Smoking status: Never Smoker    Smokeless tobacco: Never Used   Substance Use Topics    Alcohol use: No    Drug use: No     PHYSICAL EXAM     INITIAL VITALS: /81   Pulse 50   Temp 97.2 °F (36.2 °C) (Oral)   Resp 16   Ht 5' 6\" (1.676 m)   Wt 204 lb (92.5 kg)   SpO2 97%   BMI 32.93 kg/m²    Physical Exam  Vitals signs and nursing note reviewed. Constitutional:       Appearance: Normal appearance. HENT:      Head: Normocephalic and atraumatic. Right Ear: External ear normal.      Left Ear: External ear normal.      Nose: Nose normal.      Mouth/Throat:      Mouth: Mucous membranes are moist.   Eyes:      Pupils: Pupils are equal, round, and reactive to light. Neck:      Musculoskeletal: Neck supple. Cardiovascular:      Rate and Rhythm: Normal rate and regular rhythm. Pulses: Normal pulses. Heart sounds: Normal heart sounds. Pulmonary:      Effort: Pulmonary effort is normal.      Breath sounds: Normal breath sounds. Abdominal:      General: Abdomen is flat. Palpations: Abdomen is soft. Tenderness: There is no abdominal tenderness. Musculoskeletal: Normal range of motion. General: No tenderness. Left upper arm: He exhibits swelling. Comments: 3cm x3cm area of swelling in left upper arm, no fluctuance, no overlying skin changes, mild TTP   Skin:     General: Skin is warm and dry. Capillary Refill: Capillary refill takes less than 2 seconds. Neurological:      General: No focal deficit present. Mental Status: He is alert and oriented to person, place, and time. Psychiatric:         Behavior: Behavior normal.         MEDICAL DECISION MAKING:   Initial exam patient is no acute distress, vital signs are stable, patient with swelling to the upper left arm with tenderness palpation. Will obtain ultrasound as well as basic labs and reevaluate.     Labs reviewed and unremarkable, left upper extremity venous Doppler was negative for lb (92.5 kg)    Height: 5' 6\" (1.676 m)        The patient was given the following medications while in the emergency department:  No orders of the defined types were placed in this encounter. CONSULTS:  None    FINAL IMPRESSION      1.  Left arm pain          DISPOSITION/PLAN   DISPOSITION Decision To Discharge 09/24/2020 02:58:38 PM      PATIENT REFERRED TO:  Morris Keys  [address] [address]     Schedule an appointment as soon as possible for a visit       [address] ED  Altaf Cervantes [address] [place] [id number]  [phone]    As needed, If symptoms worsen    DISCHARGE MEDICATIONS:  Discharge Medication List as of 9/24/2020  3:13 PM        Karina Jeffries DO  Attending Emergency Physician                  Karina Jeffries DO  09/24/20 9556

## 2020-09-24 NOTE — ED NOTES
Pt discharged in stable condition with discharge instructions. Pt ambulates to door with steady gait and without assistance.       Josep Person RN  09/24/20 5983

## 2020-09-24 NOTE — ED NOTES
Mode of arrival (squad #, walk in, police, etc) : walk in, from home        Chief complaint(s): left arm pain/swelling        Arrival Note (brief scenario, treatment PTA, etc). : Patient reports that was outside working in his shed when he suddenly developed left arm pain and swelling. He states that the swelling and pain has improved since arriving to ED. Swelling is noted to left upper arm. Patient alert and oriented x4, respirations even non-labored. C= \"Have you ever felt that you should Cut down on your drinking? \"  No  A= \"Have people Annoyed you by criticizing your drinking? \"  No  G= \"Have you ever felt bad or Guilty about your drinking? \"  No  E= \"Have you ever had a drink as an Eye-opener first thing in the morning to steady your nerves or to help a hangover? \"  No      Deferred []       Reason for deferring: NA    *If yes to two or more: probable alcohol abuse. Alex Alexander RN  09/24/20 3744

## 2021-01-04 ENCOUNTER — HOSPITAL ENCOUNTER (OUTPATIENT)
Dept: LAB | Age: 81
Setting detail: SPECIMEN
Discharge: HOME | End: 2021-01-04
Payer: MEDICARE

## 2021-01-04 DIAGNOSIS — Z01.818 PREOP TESTING: Primary | ICD-10-CM

## 2021-01-04 PROCEDURE — U0003 INFECTIOUS AGENT DETECTION BY NUCLEIC ACID (DNA OR RNA); SEVERE ACUTE RESPIRATORY SYNDROME CORONAVIRUS 2 (SARS-COV-2) (CORONAVIRUS DISEASE [COVID-19]), AMPLIFIED PROBE TECHNIQUE, MAKING USE OF HIGH THROUGHPUT TECHNOLOGIES AS DESCRIBED BY CMS-2020-01-R: HCPCS

## 2021-01-05 LAB
SARS-COV-2, RAPID: NORMAL
SARS-COV-2: NORMAL
SARS-COV-2: NOT DETECTED
SOURCE: NORMAL

## 2021-01-06 ENCOUNTER — TELEPHONE (OUTPATIENT)
Dept: FAMILY MEDICINE CLINIC | Age: 81
End: 2021-01-06

## 2021-02-12 ENCOUNTER — HOSPITAL ENCOUNTER (OUTPATIENT)
Dept: GENERAL RADIOLOGY | Age: 81
Discharge: HOME | End: 2021-02-14
Payer: MEDICARE

## 2021-02-12 ENCOUNTER — HOSPITAL ENCOUNTER (OUTPATIENT)
Age: 81
Discharge: HOME | End: 2021-02-14
Payer: MEDICARE

## 2021-02-12 DIAGNOSIS — G89.29 CHRONIC LOW BACK PAIN WITHOUT SCIATICA, UNSPECIFIED BACK PAIN LATERALITY: ICD-10-CM

## 2021-02-12 DIAGNOSIS — M54.50 CHRONIC LOW BACK PAIN WITHOUT SCIATICA, UNSPECIFIED BACK PAIN LATERALITY: ICD-10-CM

## 2021-02-12 PROCEDURE — 72170 X-RAY EXAM OF PELVIS: CPT

## 2021-04-07 ENCOUNTER — HOSPITAL ENCOUNTER (OUTPATIENT)
Dept: PREADMISSION TESTING | Age: 81
Discharge: HOME | End: 2021-04-11
Payer: MEDICARE

## 2021-04-07 VITALS — HEIGHT: 66 IN | BODY MASS INDEX: 32.78 KG/M2 | WEIGHT: 204 LBS

## 2021-04-07 RX ORDER — DILTIAZEM HYDROCHLORIDE 180 MG/1
180 CAPSULE, COATED, EXTENDED RELEASE ORAL DAILY
Status: ON HOLD | COMMUNITY
End: 2022-05-09 | Stop reason: HOSPADM

## 2021-04-07 NOTE — PROGRESS NOTES
Pre-op Instructions For Out-Patient Endoscopy Surgery    Medication Instructions:  · Please stop herbs and any supplements now (includes vitamins and minerals). · Please contact your surgeon and prescribing physician for pre-op instructions for any blood thinners. Aspirin and eliquis as directed. Pt states 3 days before procedure. · If you have inhalers/aerosol treatments at home, please use them the morning of your surgery and bring the inhalers with you to the hospital.    · Please take the following medications the morning of your surgery with a sip of water:    Isosorbide, metoprolol, diltiazem, omeprazole    Surgery Instructions:  1. After midnight before surgery:  Do not eat or drink anything, including water, mints, gum, and hard candy. You may brush your teeth without swallowing. No smoking, chewing tobacco, or street drugs. 2. Please shower or bathe before surgery. 3. Please do not wear any cologne, lotion, powder, jewelry, piercings, perfume, makeup, nail polish, hair accessories, or hair spray on the day of surgery. Wear loose comfortable clothing. 4. Leave your valuables at home. Bring a storage case for any glasses/contacts. 5. An adult who is responsible for you MUST drive you home and should be with you for the first 24 hours after surgery. The Day of Surgery:  · Arrive at [address] [place] [address] Surgery Entrance at the time directed by your surgeon and check in at the desk. · If you have a living will or healthcare power of , please bring a copy. · You will be taken to the pre-op holding area where you will be prepared for surgery. A physical assessment will be performed by a nurse practitioner or house officer. Your IV will be started and you will meet your anesthesiologist.    · We are currently limiting visitors to only one designated person in the pre-op holding area.   When you go to surgery, your family will be directed to the surgical waiting room, where the doctor should speak with them after your surgery. · After surgery, you will be taken to the recovery room then when you are awake and stable you will go to the short stay unit for preparation to be discharged. Only your one designated person is allowed to come to short stay for your discharge. Patient states that he has a tailbone injury and his back/tailbone is sensitive, worried about moving him around in surgery. Patient has cardiac clearance from 1/5/21 note is included in chart.

## 2021-04-15 ENCOUNTER — HOSPITAL ENCOUNTER (OUTPATIENT)
Dept: LAB | Age: 81
Setting detail: SPECIMEN
Discharge: HOME | End: 2021-04-15
Payer: MEDICARE

## 2021-04-15 DIAGNOSIS — Z01.818 PREOP TESTING: Primary | ICD-10-CM

## 2021-04-15 LAB
SARS-COV-2: NORMAL
SARS-COV-2: NOT DETECTED
SOURCE: NORMAL

## 2021-04-15 PROCEDURE — U0003 INFECTIOUS AGENT DETECTION BY NUCLEIC ACID (DNA OR RNA); SEVERE ACUTE RESPIRATORY SYNDROME CORONAVIRUS 2 (SARS-COV-2) (CORONAVIRUS DISEASE [COVID-19]), AMPLIFIED PROBE TECHNIQUE, MAKING USE OF HIGH THROUGHPUT TECHNOLOGIES AS DESCRIBED BY CMS-2020-01-R: HCPCS

## 2021-04-15 PROCEDURE — U0005 INFEC AGEN DETEC AMPLI PROBE: HCPCS

## 2021-04-16 ENCOUNTER — ANESTHESIA EVENT (OUTPATIENT)
Dept: ENDOSCOPY | Age: 81
End: 2021-04-16
Payer: MEDICARE

## 2021-04-16 ENCOUNTER — TELEPHONE (OUTPATIENT)
Dept: PRIMARY CARE CLINIC | Age: 81
End: 2021-04-16

## 2021-04-19 ENCOUNTER — ANESTHESIA (OUTPATIENT)
Dept: ENDOSCOPY | Age: 81
End: 2021-04-19
Payer: MEDICARE

## 2021-04-19 ENCOUNTER — HOSPITAL ENCOUNTER (OUTPATIENT)
Age: 81
Setting detail: OUTPATIENT SURGERY
Discharge: HOME | End: 2021-04-19
Attending: SURGERY | Admitting: SURGERY
Payer: MEDICARE

## 2021-04-19 VITALS — TEMPERATURE: 95.9 F | OXYGEN SATURATION: 99 % | DIASTOLIC BLOOD PRESSURE: 58 MMHG | SYSTOLIC BLOOD PRESSURE: 107 MMHG

## 2021-04-19 VITALS
SYSTOLIC BLOOD PRESSURE: 131 MMHG | DIASTOLIC BLOOD PRESSURE: 71 MMHG | BODY MASS INDEX: 32.78 KG/M2 | WEIGHT: 204 LBS | OXYGEN SATURATION: 95 % | HEART RATE: 63 BPM | RESPIRATION RATE: 13 BRPM | HEIGHT: 66 IN | TEMPERATURE: 97.1 F

## 2021-04-19 DIAGNOSIS — K64.0 GRADE I HEMORRHOIDS: ICD-10-CM

## 2021-04-19 DIAGNOSIS — R19.4 CHANGE IN BOWEL HABITS: ICD-10-CM

## 2021-04-19 DIAGNOSIS — Q43.8 REDUNDANT COLON: Primary | ICD-10-CM

## 2021-04-19 PROCEDURE — 3700000000 HC ANESTHESIA ATTENDED CARE: Performed by: SURGERY

## 2021-04-19 PROCEDURE — 7100000001 HC PACU RECOVERY - ADDTL 15 MIN: Performed by: SURGERY

## 2021-04-19 PROCEDURE — 2580000003 HC RX 258: Performed by: ANESTHESIOLOGY

## 2021-04-19 PROCEDURE — 7100000031 HC ASPR PHASE II RECOVERY - ADDTL 15 MIN: Performed by: SURGERY

## 2021-04-19 PROCEDURE — 3700000001 HC ADD 15 MINUTES (ANESTHESIA): Performed by: SURGERY

## 2021-04-19 PROCEDURE — 3609027000 HC COLONOSCOPY: Performed by: SURGERY

## 2021-04-19 PROCEDURE — 7100000000 HC PACU RECOVERY - FIRST 15 MIN: Performed by: SURGERY

## 2021-04-19 PROCEDURE — 2500000003 HC RX 250 WO HCPCS: Performed by: NURSE ANESTHETIST, CERTIFIED REGISTERED

## 2021-04-19 PROCEDURE — 7100000030 HC ASPR PHASE II RECOVERY - FIRST 15 MIN: Performed by: SURGERY

## 2021-04-19 PROCEDURE — 2709999900 HC NON-CHARGEABLE SUPPLY: Performed by: SURGERY

## 2021-04-19 PROCEDURE — 6360000002 HC RX W HCPCS: Performed by: NURSE ANESTHETIST, CERTIFIED REGISTERED

## 2021-04-19 RX ORDER — LIDOCAINE HYDROCHLORIDE 20 MG/ML
INJECTION, SOLUTION EPIDURAL; INFILTRATION; INTRACAUDAL; PERINEURAL PRN
Status: DISCONTINUED | OUTPATIENT
Start: 2021-04-19 | End: 2021-04-19 | Stop reason: SDUPTHER

## 2021-04-19 RX ORDER — MEPERIDINE HYDROCHLORIDE 25 MG/ML
12.5 INJECTION INTRAMUSCULAR; INTRAVENOUS; SUBCUTANEOUS EVERY 5 MIN PRN
Status: DISCONTINUED | OUTPATIENT
Start: 2021-04-19 | End: 2021-04-19 | Stop reason: HOSPADM

## 2021-04-19 RX ORDER — SODIUM CHLORIDE 0.9 % (FLUSH) 0.9 %
5-40 SYRINGE (ML) INJECTION EVERY 12 HOURS SCHEDULED
Status: DISCONTINUED | OUTPATIENT
Start: 2021-04-19 | End: 2021-04-19 | Stop reason: HOSPADM

## 2021-04-19 RX ORDER — PROPOFOL 10 MG/ML
INJECTION, EMULSION INTRAVENOUS PRN
Status: DISCONTINUED | OUTPATIENT
Start: 2021-04-19 | End: 2021-04-19 | Stop reason: SDUPTHER

## 2021-04-19 RX ORDER — DIPHENHYDRAMINE HYDROCHLORIDE 50 MG/ML
12.5 INJECTION INTRAMUSCULAR; INTRAVENOUS
Status: DISCONTINUED | OUTPATIENT
Start: 2021-04-19 | End: 2021-04-19 | Stop reason: HOSPADM

## 2021-04-19 RX ORDER — DEXAMETHASONE SODIUM PHOSPHATE 4 MG/ML
INJECTION, SOLUTION INTRA-ARTICULAR; INTRALESIONAL; INTRAMUSCULAR; INTRAVENOUS; SOFT TISSUE PRN
Status: DISCONTINUED | OUTPATIENT
Start: 2021-04-19 | End: 2021-04-19 | Stop reason: SDUPTHER

## 2021-04-19 RX ORDER — PHENYLEPHRINE HYDROCHLORIDE 10 MG/ML
INJECTION INTRAVENOUS PRN
Status: DISCONTINUED | OUTPATIENT
Start: 2021-04-19 | End: 2021-04-19 | Stop reason: SDUPTHER

## 2021-04-19 RX ORDER — LIDOCAINE HYDROCHLORIDE 10 MG/ML
1 INJECTION, SOLUTION EPIDURAL; INFILTRATION; INTRACAUDAL; PERINEURAL
Status: DISCONTINUED | OUTPATIENT
Start: 2021-04-19 | End: 2021-04-19 | Stop reason: HOSPADM

## 2021-04-19 RX ORDER — OXYCODONE HYDROCHLORIDE AND ACETAMINOPHEN 5; 325 MG/1; MG/1
1 TABLET ORAL PRN
Status: DISCONTINUED | OUTPATIENT
Start: 2021-04-19 | End: 2021-04-19 | Stop reason: HOSPADM

## 2021-04-19 RX ORDER — GLYCOPYRROLATE 1 MG/5 ML
SYRINGE (ML) INTRAVENOUS PRN
Status: DISCONTINUED | OUTPATIENT
Start: 2021-04-19 | End: 2021-04-19 | Stop reason: SDUPTHER

## 2021-04-19 RX ORDER — FENTANYL CITRATE 50 UG/ML
INJECTION, SOLUTION INTRAMUSCULAR; INTRAVENOUS PRN
Status: DISCONTINUED | OUTPATIENT
Start: 2021-04-19 | End: 2021-04-19 | Stop reason: SDUPTHER

## 2021-04-19 RX ORDER — OXYCODONE HYDROCHLORIDE AND ACETAMINOPHEN 5; 325 MG/1; MG/1
2 TABLET ORAL PRN
Status: DISCONTINUED | OUTPATIENT
Start: 2021-04-19 | End: 2021-04-19 | Stop reason: HOSPADM

## 2021-04-19 RX ORDER — SODIUM CHLORIDE 9 MG/ML
INJECTION, SOLUTION INTRAVENOUS CONTINUOUS
Status: DISCONTINUED | OUTPATIENT
Start: 2021-04-19 | End: 2021-04-19 | Stop reason: HOSPADM

## 2021-04-19 RX ORDER — ONDANSETRON 2 MG/ML
4 INJECTION INTRAMUSCULAR; INTRAVENOUS
Status: DISCONTINUED | OUTPATIENT
Start: 2021-04-19 | End: 2021-04-19 | Stop reason: HOSPADM

## 2021-04-19 RX ORDER — LABETALOL HYDROCHLORIDE 5 MG/ML
5 INJECTION, SOLUTION INTRAVENOUS EVERY 10 MIN PRN
Status: DISCONTINUED | OUTPATIENT
Start: 2021-04-19 | End: 2021-04-19 | Stop reason: HOSPADM

## 2021-04-19 RX ORDER — ONDANSETRON 2 MG/ML
INJECTION INTRAMUSCULAR; INTRAVENOUS PRN
Status: DISCONTINUED | OUTPATIENT
Start: 2021-04-19 | End: 2021-04-19 | Stop reason: SDUPTHER

## 2021-04-19 RX ADMIN — PHENYLEPHRINE HYDROCHLORIDE 50 MCG: 10 INJECTION INTRAVENOUS at 09:47

## 2021-04-19 RX ADMIN — SODIUM CHLORIDE: 9 INJECTION, SOLUTION INTRAVENOUS at 09:27

## 2021-04-19 RX ADMIN — FENTANYL CITRATE 25 MCG: 50 INJECTION, SOLUTION INTRAMUSCULAR; INTRAVENOUS at 09:57

## 2021-04-19 RX ADMIN — PROPOFOL 200 MG: 10 INJECTION, EMULSION INTRAVENOUS at 09:32

## 2021-04-19 RX ADMIN — PHENYLEPHRINE HYDROCHLORIDE 100 MCG: 10 INJECTION INTRAVENOUS at 10:01

## 2021-04-19 RX ADMIN — FENTANYL CITRATE 25 MCG: 50 INJECTION, SOLUTION INTRAMUSCULAR; INTRAVENOUS at 09:54

## 2021-04-19 RX ADMIN — DEXAMETHASONE SODIUM PHOSPHATE 4 MG: 4 INJECTION, SOLUTION INTRAMUSCULAR; INTRAVENOUS at 09:40

## 2021-04-19 RX ADMIN — PHENYLEPHRINE HYDROCHLORIDE 100 MCG: 10 INJECTION INTRAVENOUS at 09:59

## 2021-04-19 RX ADMIN — PHENYLEPHRINE HYDROCHLORIDE 100 MCG: 10 INJECTION INTRAVENOUS at 09:49

## 2021-04-19 RX ADMIN — LIDOCAINE HYDROCHLORIDE 100 MG: 20 INJECTION, SOLUTION EPIDURAL; INFILTRATION; INTRACAUDAL at 09:32

## 2021-04-19 RX ADMIN — Medication 0.2 MG: at 09:51

## 2021-04-19 RX ADMIN — ONDANSETRON 4 MG: 2 INJECTION, SOLUTION INTRAMUSCULAR; INTRAVENOUS at 09:40

## 2021-04-19 ASSESSMENT — ENCOUNTER SYMPTOMS
ANAL BLEEDING: 1
WHEEZING: 0
ABDOMINAL PAIN: 1
CONSTIPATION: 1
SHORTNESS OF BREATH: 1
SHORTNESS OF BREATH: 1
SORE THROAT: 0
TROUBLE SWALLOWING: 0
BACK PAIN: 1
ABDOMINAL DISTENTION: 1
VOICE CHANGE: 0
NAUSEA: 0
VOMITING: 0
BLOOD IN STOOL: 1
COUGH: 0

## 2021-04-19 ASSESSMENT — PULMONARY FUNCTION TESTS
PIF_VALUE: 13
PIF_VALUE: 13
PIF_VALUE: 1
PIF_VALUE: 13
PIF_VALUE: 13
PIF_VALUE: 14
PIF_VALUE: 10
PIF_VALUE: 12
PIF_VALUE: 1
PIF_VALUE: 1
PIF_VALUE: 13
PIF_VALUE: 1
PIF_VALUE: 13
PIF_VALUE: 13
PIF_VALUE: 3
PIF_VALUE: 12
PIF_VALUE: 13
PIF_VALUE: 14
PIF_VALUE: 13
PIF_VALUE: 12
PIF_VALUE: 13
PIF_VALUE: 11
PIF_VALUE: 14
PIF_VALUE: 13
PIF_VALUE: 15
PIF_VALUE: 0
PIF_VALUE: 15

## 2021-04-19 ASSESSMENT — PAIN SCALES - GENERAL
PAINLEVEL_OUTOF10: 0
PAINLEVEL_OUTOF10: 0

## 2021-04-19 ASSESSMENT — PAIN - FUNCTIONAL ASSESSMENT: PAIN_FUNCTIONAL_ASSESSMENT: 0-10

## 2021-04-19 NOTE — OP NOTE
Operative Note      Patient: Ellie Coleman  YOB: 1940  MRN: [id number]    Date of Procedure: 4/19/2021    PROCEDURE NOTE    DATE OF PROCEDURE: 4/19/2021    SURGEON: Seyda Ly MD    ASSISTANT: None    PREOPERATIVE DIAGNOSIS: Change in bowel habits    POSTOPERATIVE DIAGNOSIS: Extremely redundant colon. Grade 1 internal hemorrhoid. OPERATION: Colonoscopy to ascending colon. ANESTHESIA: General    ESTIMATED BLOOD LOSS: None    COMPLICATIONS: None     SPECIMENS:  Was Not Obtained    HISTORY: The patient is a 68y.o. year old male with history of above preop diagnosis. I recommended colonoscopy with possible biopsy or polypectomy and I explained the risk, benefits, expected outcome, and alternatives to the procedure. Risks included but are not limited to bleeding, infection, respiratory distress, hypotension, and perforation of the colon and possibility of missing a lesion. The patient understands and is in agreement. PROCEDURE: The patient was given IV conscious sedation. The patient's SPO2 remained above 90% throughout the procedure. Digital rectal exam was normal.  The colonoscope was inserted through the anus into the rectum and advanced under direct vision to the cecum with difficulty. Terminal ileum was examined for approximately 2 inches. The prep was fair. Findings:  Terminal ileum: not examined    Cecum/Ascending colon: Colonoscopy could be advanced to distal ascending hepatic flexure region. Transverse colon: normal    Descending/Sigmoid colon: normal.  Extremely redundant colon. Rectum/Anus: examined in normal and retroflexed positions and was abnormal: Grade 1 internal hemorrhoid. Withdrawal Time was (minutes): 16        The colon was decompressed. While withdrawing the scope the above findings were verified and the scope was removed. The patient tolerated the procedure and conscious sedation without unusual events.     In the recovery room patient was examined and remains hemodynamically stable. Discharge home when criteria met. Recommendations/Plan:   1. Air-contrast barium enema as an outpatient. 2. Discussed with the family  3. High fiber diet   4.  Precautions to avoid constipation    Electronically signed by Naila Perdue MD  on 4/19/2021 at 10:06 AM

## 2021-04-19 NOTE — H&P
HISTORY and [facility] [id number]       NAME:  Kieran Pena  MRN: [id number]   YOB: 1940   Date: 4/19/2021   Age: 68 y.o. Gender: male     COMPLAINT AND PRESENT HISTORY:   Kieran Pena is 68 y.o.,  male, undergoing COLONOSCOPY DIAGNOSTIC for CONSTIPATION per Dr. Allison Dominique. Patient states that he went to a chiropractor, slid forward during adjustment- has had issues since constipation since this time which was over a year ago, he wonder if his tail bone is involved and has difficulty walking. He has been taking laxative to help have a BM- sometimes takes 2-3 days. He does feel ABD bloating/discomfort. He has tried PT/traction x1 year, nothing has helped. Every once in a while he notes intermittent BRB per stool, sometimes a large amount, then clears up. Sometime he does note black/tarry stools. Denies N&V/hematemesis. Denies recent unintended weight loss/appetite change. Denies dysphagia, pharyngitis, voice change. Hx of colon polyps- has had hx of colonoscopies- last was 2018. Hx of GI bleed per chart, patient does not recall details. HX OF GERD. Review of additional significant medical hx:  HTN, HLD, I-CPR-S-FLUTTER, CAD, hx of CABG: He does follow w/cardiologist. Denies chest pain, palpitations, leg swelling, headache. States he has had dizziness since this incident, denies currently. He does note SOB w/constipation- denies currently. Current medications r/t condition: ELIQUIS, CARDIZEM CD, IMDUR, NITRO PRN, LOPRESSOR, PRAVASTATIN    PREDIABETES:  Lab Results   Component Value Date    LABA1C 6.1 (H) 02/17/2019     Lab Results   Component Value Date     02/17/2019     SLEEP APNEA: NO MACHINE USE.    NPO status: Patient states they have been NPO since before midnight EXCEPT 3-4 OZ OF WATER AT 08:00 THIS MORNING (DR. BENITEZ NOTIFIED). Medications taken TODAY (with sip of water): NONE.   Anticoagulation status: MAINTAINED ON ELIQUIS- STOPPED X3 DAYS- STATES THAT THERE WAS CONFUSION REGARDING WHICH MEDICATION HE COULD TAKE TODAY OR NOT. HE HAS NOT TAKEN ANY ASA IN OVER 2 WEEKS. Prep fully completed: YES. Pertinent family hx: Denies family hx of esophageal and colon CA. Denies personal hx of blood clots. Denies personal hx of MRSA infection. Denies any personal or family hx of previous complications w/anesthesia. Nicotine status: FORMER.   PAST MEDICAL HISTORY     Past Medical History:   Diagnosis Date    Anxiety attack     Arthritis     Atrial fibrillation (HCC)     Atrial flutter (HCC)     CAD (coronary artery disease)     Chest pain 04/12/2016    Colon polyps     Constipation     Dementia ([facility] 03/23/2019    Dizziness     GERD (gastroesophageal reflux disease)     Hyperlipidemia     Hypertension     Lower GI bleed     Lung nodule seen on imaging study 02/17/2019    Prediabetes     S/P colonoscopy with polypectomy     Seizure ([facility] [facility]     NOTED PER CARE EVERYWHERE- PATIENT DENIES HX OF, STATES HE HAS NEVER BEEN ON SEIZURE MEDICATION    Sleep apnea     not tested, no cpap    SOB (shortness of breath)     Status post coronary artery bypass graft     Wears partial dentures     TOP       SURGICAL HISTORY       Past Surgical History:   Procedure Laterality Date    CARDIAC CATHETERIZATION      CATARACT REMOVAL Bilateral     COLONOSCOPY  07/24/2018    COLONOSCOPY WITH BIOPSY performed by Jason Calvert MD at [place] [address]  2004    x4 vessel    TOTAL KNEE ARTHROPLASTY Bilateral     VENTRAL HERNIA REPAIR         SOCIAL HISTORY       Social History     Socioeconomic History    Marital status:      Spouse name: None    Number of children: None    Years of education: None    Highest education level: None   Occupational History    None   Social Needs    Financial resource strain: None    Food insecurity     Worry: None     Inability: None    Transportation needs     Medical: None     Non-medical: None   Tobacco Use    Smoking status: Former Smoker     Quit date:      Years since quittin.3    Smokeless tobacco: Never Used   Substance and Sexual Activity    Alcohol use: No    Drug use: No    Sexual activity: None   Lifestyle    Physical activity     Days per week: None     Minutes per session: None    Stress: None   Relationships    Social connections     Talks on phone: None     Gets together: None     Attends Anabaptism service: None     Active member of club or organization: None     Attends meetings of clubs or organizations: None     Relationship status: None    Intimate partner violence     Fear of current or ex partner: None     Emotionally abused: None     Physically abused: None     Forced sexual activity: None   Other Topics Concern    None   Social History Narrative    None       REVIEW OF SYSTEMS      Allergies   Allergen Reactions    Oxycodone Other (See Comments)     Attacking people    Pcn [Penicillins]        No current facility-administered medications on file prior to encounter. Current Outpatient Medications on File Prior to Encounter   Medication Sig Dispense Refill    doxazosin (CARDURA) 2 MG tablet Take 2 mg by mouth nightly      isosorbide mononitrate (IMDUR) 30 MG extended release tablet Take 1 tablet by mouth daily 30 tablet 3    nitroGLYCERIN (NITROSTAT) 0.4 MG SL tablet up to max of 3 total doses. If no relief after 1 dose, call 911. 25 tablet 3    acetaminophen (TYLENOL) 325 MG tablet Take 1,300 mg by mouth 2 times daily      metoprolol (LOPRESSOR) 50 MG tablet Take 50 mg by mouth 2 times daily.  pravastatin (PRAVACHOL) 40 MG tablet Take 40 mg by mouth daily.  omeprazole (PRILOSEC) 20 MG capsule Take 20 mg by mouth daily. (Notation: Medications listed above are not currently reconciled at the signing of this H&P note, to be reconciled in pre-op per RN)    Review of Systems   Constitutional: Negative for chills and fever.    HENT: Negative for congestion, ear pain, postnasal drip, sore throat, trouble swallowing and voice change. Respiratory: Positive for shortness of breath (HX OF, DENIES CURRENTLY). Negative for cough and wheezing. Cardiovascular: Negative for chest pain, palpitations and leg swelling. Gastrointestinal: Positive for abdominal distention, abdominal pain, anal bleeding, blood in stool and constipation. Negative for nausea and vomiting. Genitourinary: Positive for difficulty urinating. Musculoskeletal: Positive for back pain and gait problem. Neurological: Positive for dizziness (HX OF, NONE CURRENTLY). Negative for headaches. Hematological: Does not bruise/bleed easily. GENERAL PHYSICAL EXAM     Vitals: Review current vital signs per RN flow sheet. GENERAL APPEARANCE:   Jose Bonner is 68 y.o.,  male, mildly obese, nourished, conscious, alert. Does not appear to be in any distress or pain at this time. Physical Exam   Constitutional: He is oriented to person, place, and time. He appears well-developed and well-nourished. Non-toxic appearance. He does not have a sickly appearance. He does not appear ill. No distress. HENT:   Head: Normocephalic. Right Ear: External ear normal.   Left Ear: External ear normal.   Mouth/Throat: Oropharynx is clear and moist and mucous membranes are normal. No oropharyngeal exudate, posterior oropharyngeal edema, posterior oropharyngeal erythema or tonsillar abscesses. Eyes: Conjunctivae are normal. Right eye exhibits no discharge. Left eye exhibits no discharge. Cardiovascular: Normal rate, regular rhythm, normal heart sounds and intact distal pulses. Pulses:       Radial pulses are 2+ on the right side and 2+ on the left side. Dorsalis pedis pulses are 2+ on the right side and 2+ on the left side. Posterior tibial pulses are 2+ on the right side and 2+ on the left side.    Pulmonary/Chest: Effort normal and breath sounds normal. No accessory muscle usage. No respiratory distress. He has no decreased breath sounds. He has no wheezes. He has no rhonchi. He has no rales. Abdominal: Soft. Bowel sounds are normal. He exhibits no distension and no mass. There is no abdominal tenderness. There is no rebound and no guarding. Musculoskeletal: Normal range of motion. Right lower leg: He exhibits no tenderness and no swelling. Left lower leg: He exhibits no tenderness and no swelling. Neurological: He is alert and oriented to person, place, and time. Skin: Skin is warm and dry. He is not diaphoretic. Psychiatric: He has a normal mood and affect.  His behavior is normal.       RECENT LAB WORK     Lab Results   Component Value Date     09/24/2020    K 4.1 09/24/2020     09/24/2020    CO2 24 09/24/2020    BUN 15 09/24/2020    CREATININE 0.76 09/24/2020    GLUCOSE 105 (H) 09/24/2020    CALCIUM 9.6 09/24/2020    PROT 7.0 05/04/2020    LABALBU 3.9 05/04/2020    BILITOT 0.36 05/04/2020    ALKPHOS 50 05/04/2020    AST 15 05/04/2020    ALT 12 05/04/2020    LABGLOM >60 09/24/2020    GFRAA >60 09/24/2020    GLOB NOT REPORTED 02/07/2017     Lab Results   Component Value Date    WBC 7.1 09/24/2020    HGB 14.3 09/24/2020    HCT 42.1 09/24/2020    MCV 93.3 09/24/2020     09/24/2020     PROVISIONAL DIAGNOSES / SURGERY:      CONSTIPATION    COLONOSCOPY DIAGNOSTIC    Patient Active Problem List    Diagnosis Date Noted    Dementia ([facility] [address] 03/23/2019    Coronary artery disease involving coronary bypass graft of native heart without angina pectoris     Lung nodule seen on imaging study 02/17/2019    Lower GI bleed     Morbid obesity ([facility] [address]     S/P colonoscopy with polypectomy     Elevated INR     Rectal bleeding 08/19/2018    Chest pain 04/12/2016    Atrial fibrillation ([facility] [address] 05/21/2015           ANUPAMA Leung - CNP on 4/19/2021 at 8:51 AM

## 2021-04-19 NOTE — PROGRESS NOTES
Poor historian, patient origionally stated he ate steak and corn yesterday at 4pm ,attempted to call wife with no answer, patient then states he didn't eat yesterday and ate on saturday

## 2021-04-19 NOTE — ANESTHESIA POSTPROCEDURE EVALUATION
Department of Anesthesiology  Postprocedure Note    Patient: Estefany Dumont  MRN: [id number]  YOB: 1940  Date of evaluation: 4/19/2021  Time:  2:11 PM     Procedure Summary     Date: 04/19/21 Room / Location: [address] [place] [address] ENDO 04 / [address] [facility] ENDO    Anesthesia Start: 4414 Anesthesia Stop: 9221    Procedure: COLONOSCOPY DIAGNOSTIC (N/A ) Diagnosis: (CONSTIPATION)    Surgeons: Gris Espinosa MD Responsible Provider: Argenis Altman MD    Anesthesia Type: general ASA Status: 3          Anesthesia Type: general    Tania Phase I: Tania Score: 9    Tania Phase II: Tania Score: 10    Last vitals: Reviewed and per EMR flowsheets.        Anesthesia Post Evaluation    Comments: POST- ANESTHESIA EVALUATION       Pt Name: Estefany Dumont  MRN: [id number]  [place]: 1940  Date of evaluation: 4/19/2021  Time:  2:11 PM      /71   Pulse 63   Temp 97.1 °F (36.2 °C)   Resp 13   Ht 5' 6\" (1.676 m)   Wt 204 lb (92.5 kg)   SpO2 95%   BMI 32.93 kg/m²      Consciousness Level  Awake  Cardiopulmonary Status  Stable  Pain Adequately Treated YES  Nausea / Vomiting  NO  Adequate Hydration  YES  Anesthesia Related Complications NONE      Electronically signed by Argenis Altman MD on 4/19/2021 at 2:11 PM

## 2021-04-19 NOTE — ANESTHESIA PRE PROCEDURE
Department of Anesthesiology  Preprocedure Note       Name:  Ruy Melgar   Age:  68 y.o.  :  1940                                          MRN:  [id number]         Date:  2021      Surgeon: Elbert Vaughan):  Ty Rubalcava MD    Procedure: Procedure(s):  COLONOSCOPY DIAGNOSTIC    Medications prior to admission:   Prior to Admission medications    Medication Sig Start Date End Date Taking? Authorizing Provider   acetaminophen (TYLENOL) 325 MG tablet Take 1,300 mg by mouth 2 times daily   Yes Historical Provider, MD   apixaban (ELIQUIS) 5 MG TABS tablet Take 5 mg by mouth 2 times daily    Historical Provider, MD   dilTIAZem (CARDIZEM CD) 180 MG extended release capsule Take 180 mg by mouth daily    Historical Provider, MD   doxazosin (CARDURA) 2 MG tablet Take 2 mg by mouth nightly    Historical Provider, MD   isosorbide mononitrate (IMDUR) 30 MG extended release tablet Take 1 tablet by mouth daily 19   Darrell Carcamo MD   nitroGLYCERIN (NITROSTAT) 0.4 MG SL tablet up to max of 3 total doses. If no relief after 1 dose, call 911. 19   Darrell Carcamo MD   metoprolol (LOPRESSOR) 50 MG tablet Take 50 mg by mouth 2 times daily. Historical Provider, MD   pravastatin (PRAVACHOL) 40 MG tablet Take 40 mg by mouth daily. Historical Provider, MD   omeprazole (PRILOSEC) 20 MG capsule Take 20 mg by mouth daily. Historical Provider, MD       Current medications:    Current Facility-Administered Medications   Medication Dose Route Frequency Provider Last Rate Last Admin    sodium chloride flush 0.9 % injection 5-40 mL  5-40 mL Intravenous 2 times per day Farhana Han MD        lidocaine PF 1 % injection 1 mL  1 mL Intradermal Once PRN Farhana Han MD        0.9 % sodium chloride infusion   Intravenous Continuous Farhana Han MD           Allergies:     Allergies   Allergen Reactions    Oxycodone Other (See Comments)     Attacking people    Pcn [Penicillins]        Problem List:    Patient Active Problem List   Diagnosis Code    Atrial fibrillation (HCC) I48.91    Chest pain R07.9    Rectal bleeding K62.5    Lower GI bleed K92.2    Morbid obesity ([facility] [address] E66.01    S/P colonoscopy with polypectomy Z98.890    Elevated INR R79.1    Lung nodule seen on imaging study R91.1    Coronary artery disease involving coronary bypass graft of native heart without angina pectoris I25.810    Dementia (HCC) F03.90       Past Medical History:        Diagnosis Date    Anxiety attack     Arthritis     Atrial fibrillation (HCC)     Atrial flutter (HCC)     Back pain     CAD (coronary artery disease)     Chest pain 2016    Colon polyps     Constipation     Dementia ([facility] [address] 2019    Dizziness     GERD (gastroesophageal reflux disease)     Hyperlipidemia     Hypertension     Lower GI bleed     Lung nodule seen on imaging study 2019    Prediabetes     Rectal bleeding     S/P colonoscopy with polypectomy     Seizure ([facility] [address]     NOTED PER CARE EVERYWHERE- PATIENT DENIES HX OF, STATES HE HAS NEVER BEEN ON SEIZURE MEDICATION    Sleep apnea     not tested, no cpap    SOB (shortness of breath)     Status post coronary artery bypass graft     Wears partial dentures     TOP       Past Surgical History:        Procedure Laterality Date    CARDIAC CATHETERIZATION      CATARACT REMOVAL Bilateral     COLONOSCOPY  2018    COLONOSCOPY WITH BIOPSY performed by Amarilis Starr MD at [place] [address]  2004    x4 vessel    TOTAL KNEE ARTHROPLASTY Bilateral     VENTRAL HERNIA REPAIR         Social History:    Social History     Tobacco Use    Smoking status: Former Smoker     Quit date:      Years since quittin.3    Smokeless tobacco: Never Used   Substance Use Topics    Alcohol use:  No                                Counseling given: Not Answered      Vital Signs (Current):   Vitals:    21 0847   Weight: 204 lb (92.5 kg)   Height: 5' 6\" (1.676 full  Mouth opening: > = 3 FB Dental:    (+) edentulous  Comment: Upper edentulous    Pulmonary:normal exam  breath sounds clear to auscultation  (+) shortness of breath:  sleep apnea:                             Cardiovascular:    (+) hypertension:, CAD:, CABG/stent (CABG):, dysrhythmias: atrial fibrillation, hyperlipidemia      ECG reviewed  Rhythm: regular  Rate: normal           Beta Blocker:  Dose within 24 Hrs         Neuro/Psych:   (+) psychiatric history:depression/anxiety              ROS comment: dementia GI/Hepatic/Renal:   (+) GERD:,           Endo/Other:                     Abdominal:           Vascular: negative vascular ROS. Anesthesia Plan      general     ASA 3       Induction: intravenous. MIPS: Prophylactic antiemetics administered. Anesthetic plan and risks discussed with patient. Plan discussed with CRNA.                   David Thornton MD   4/19/2021

## 2021-05-04 ENCOUNTER — APPOINTMENT (OUTPATIENT)
Dept: GENERAL RADIOLOGY | Age: 81
End: 2021-05-04
Payer: MEDICARE

## 2021-05-04 ENCOUNTER — HOSPITAL ENCOUNTER (EMERGENCY)
Age: 81
Discharge: HOME | End: 2021-05-04
Attending: EMERGENCY MEDICINE
Payer: MEDICARE

## 2021-05-04 VITALS
TEMPERATURE: 97.7 F | RESPIRATION RATE: 21 BRPM | OXYGEN SATURATION: 96 % | HEART RATE: 82 BPM | SYSTOLIC BLOOD PRESSURE: 133 MMHG | BODY MASS INDEX: 32.14 KG/M2 | HEIGHT: 66 IN | DIASTOLIC BLOOD PRESSURE: 86 MMHG | WEIGHT: 200 LBS

## 2021-05-04 DIAGNOSIS — R19.7 DIARRHEA, UNSPECIFIED TYPE: ICD-10-CM

## 2021-05-04 DIAGNOSIS — E87.6 HYPOKALEMIA: Primary | ICD-10-CM

## 2021-05-04 LAB
ABSOLUTE EOS #: 0.1 K/UL (ref 0–0.4)
ABSOLUTE IMMATURE GRANULOCYTE: ABNORMAL K/UL (ref 0–0.3)
ABSOLUTE LYMPH #: 1.1 K/UL (ref 1–4.8)
ABSOLUTE MONO #: 0.5 K/UL (ref 0.1–1.3)
ALBUMIN SERPL-MCNC: 3.3 G/DL (ref 3.5–5.2)
ALBUMIN/GLOBULIN RATIO: ABNORMAL (ref 1–2.5)
ALP BLD-CCNC: 72 U/L (ref 40–129)
ALT SERPL-CCNC: 12 U/L (ref 5–41)
ANION GAP SERPL CALCULATED.3IONS-SCNC: 11 MMOL/L (ref 9–17)
AST SERPL-CCNC: 13 U/L
BASOPHILS # BLD: 1 % (ref 0–2)
BASOPHILS ABSOLUTE: 0 K/UL (ref 0–0.2)
BILIRUB SERPL-MCNC: 0.65 MG/DL (ref 0.3–1.2)
BUN BLDV-MCNC: 12 MG/DL (ref 8–23)
BUN/CREAT BLD: ABNORMAL (ref 9–20)
CALCIUM SERPL-MCNC: 8.9 MG/DL (ref 8.6–10.4)
CHLORIDE BLD-SCNC: 105 MMOL/L (ref 98–107)
CO2: 20 MMOL/L (ref 20–31)
CREAT SERPL-MCNC: 0.84 MG/DL (ref 0.7–1.2)
DIFFERENTIAL TYPE: ABNORMAL
EOSINOPHILS RELATIVE PERCENT: 2 % (ref 0–4)
GFR AFRICAN AMERICAN: >60 ML/MIN
GFR NON-AFRICAN AMERICAN: >60 ML/MIN
GFR SERPL CREATININE-BSD FRML MDRD: ABNORMAL ML/MIN/{1.73_M2}
GFR SERPL CREATININE-BSD FRML MDRD: ABNORMAL ML/MIN/{1.73_M2}
GLUCOSE BLD-MCNC: 88 MG/DL (ref 70–99)
HCT VFR BLD CALC: 40.4 % (ref 41–53)
HEMOGLOBIN: 13.5 G/DL (ref 13.5–17.5)
IMMATURE GRANULOCYTES: ABNORMAL %
LIPASE: 32 U/L (ref 13–60)
LYMPHOCYTES # BLD: 16 % (ref 24–44)
MAGNESIUM: 1.9 MG/DL (ref 1.6–2.6)
MCH RBC QN AUTO: 30.1 PG (ref 26–34)
MCHC RBC AUTO-ENTMCNC: 33.4 G/DL (ref 31–37)
MCV RBC AUTO: 90 FL (ref 80–100)
MONOCYTES # BLD: 7 % (ref 1–7)
NRBC AUTOMATED: ABNORMAL PER 100 WBC
PDW BLD-RTO: 13.1 % (ref 11.5–14.9)
PLATELET # BLD: 155 K/UL (ref 150–450)
PLATELET ESTIMATE: ABNORMAL
PMV BLD AUTO: 7.4 FL (ref 6–12)
POTASSIUM SERPL-SCNC: 3.6 MMOL/L (ref 3.7–5.3)
RBC # BLD: 4.49 M/UL (ref 4.5–5.9)
RBC # BLD: ABNORMAL 10*6/UL
SEG NEUTROPHILS: 74 % (ref 36–66)
SEGMENTED NEUTROPHILS ABSOLUTE COUNT: 5.3 K/UL (ref 1.3–9.1)
SODIUM BLD-SCNC: 136 MMOL/L (ref 135–144)
TOTAL PROTEIN: 6.2 G/DL (ref 6.4–8.3)
TROPONIN INTERP: NORMAL
TROPONIN INTERP: NORMAL
TROPONIN T: NORMAL NG/ML
TROPONIN T: NORMAL NG/ML
TROPONIN, HIGH SENSITIVITY: 13 NG/L (ref 0–22)
TROPONIN, HIGH SENSITIVITY: 15 NG/L (ref 0–22)
WBC # BLD: 7 K/UL (ref 3.5–11)
WBC # BLD: ABNORMAL 10*3/UL

## 2021-05-04 PROCEDURE — 99285 EMERGENCY DEPT VISIT HI MDM: CPT

## 2021-05-04 PROCEDURE — 80053 COMPREHEN METABOLIC PANEL: CPT

## 2021-05-04 PROCEDURE — 83735 ASSAY OF MAGNESIUM: CPT

## 2021-05-04 PROCEDURE — 6370000000 HC RX 637 (ALT 250 FOR IP): Performed by: EMERGENCY MEDICINE

## 2021-05-04 PROCEDURE — 71045 X-RAY EXAM CHEST 1 VIEW: CPT

## 2021-05-04 PROCEDURE — 84484 ASSAY OF TROPONIN QUANT: CPT

## 2021-05-04 PROCEDURE — 36415 COLL VENOUS BLD VENIPUNCTURE: CPT

## 2021-05-04 PROCEDURE — 83690 ASSAY OF LIPASE: CPT

## 2021-05-04 PROCEDURE — 2580000003 HC RX 258: Performed by: EMERGENCY MEDICINE

## 2021-05-04 PROCEDURE — 85025 COMPLETE CBC W/AUTO DIFF WBC: CPT

## 2021-05-04 PROCEDURE — 93005 ELECTROCARDIOGRAM TRACING: CPT | Performed by: EMERGENCY MEDICINE

## 2021-05-04 RX ORDER — 0.9 % SODIUM CHLORIDE 0.9 %
1000 INTRAVENOUS SOLUTION INTRAVENOUS ONCE
Status: COMPLETED | OUTPATIENT
Start: 2021-05-04 | End: 2021-05-04

## 2021-05-04 RX ORDER — POTASSIUM CHLORIDE 20 MEQ/1
40 TABLET, EXTENDED RELEASE ORAL ONCE
Status: COMPLETED | OUTPATIENT
Start: 2021-05-04 | End: 2021-05-04

## 2021-05-04 RX ADMIN — SODIUM CHLORIDE 1000 ML: 9 INJECTION, SOLUTION INTRAVENOUS at 18:00

## 2021-05-04 RX ADMIN — POTASSIUM CHLORIDE 40 MEQ: 1500 TABLET, EXTENDED RELEASE ORAL at 20:35

## 2021-05-04 ASSESSMENT — ENCOUNTER SYMPTOMS
TROUBLE SWALLOWING: 0
BACK PAIN: 0
COLOR CHANGE: 0
VOMITING: 0
CONSTIPATION: 0
COUGH: 0
SHORTNESS OF BREATH: 0
ABDOMINAL PAIN: 0
SORE THROAT: 0
DIARRHEA: 1
BLOOD IN STOOL: 0
NAUSEA: 0

## 2021-05-04 ASSESSMENT — PAIN SCALES - GENERAL: PAINLEVEL_OUTOF10: 6

## 2021-05-04 NOTE — ED PROVIDER NOTES
[address]  EMERGENCY DEPARTMENT ENCOUNTER    Pt Name: Benita Gill  MRN: [id number]  YOB: 1940  Date of evaluation:5/4/21  PCP: Michael Marti       Chief Complaint   Patient presents with    Loss of Consciousness       HISTORY OF PRESENT ILLNESS    Benita Gill is a 68 y.o. male who presents with a chief complaint of presyncope. Patient states he has had several episodes today where he feels dizzy and lightheaded like he is going to pass out. He has not actually gotten to the point where he is passed out. States he is in the middle of a prep for a colonoscopy. He has been having a lot of diarrhea secondary to this. No blood in the stool. No nausea or vomiting. No abdominal pain. No chest pain, difficulty breathing, palpitations. He does take blood thinners. Has a history of atrial fibrillation. Symptoms are acute. Symptoms are moderate peer nothing make symptoms better or worse. He is mostly at the colonoscopy tomorrow. Patient has no other complaints at this time. REVIEW OF SYSTEMS       Review of Systems   Constitutional: Negative for chills, fatigue and fever. HENT: Negative for congestion, ear pain, sore throat and trouble swallowing. Eyes: Negative for visual disturbance. Respiratory: Negative for cough and shortness of breath. Cardiovascular: Negative for chest pain, palpitations and leg swelling. Gastrointestinal: Positive for diarrhea. Negative for abdominal pain, blood in stool, constipation, nausea and vomiting. Genitourinary: Negative for dysuria and flank pain. Musculoskeletal: Negative for arthralgias, back pain, myalgias and neck pain. Skin: Negative for color change, rash and wound. Neurological: Positive for dizziness, syncope and light-headedness. Negative for weakness, numbness and headaches. Psychiatric/Behavioral: Negative for confusion. All other systems reviewed and are negative.     Negative in 10 essential MG tablet Take 40 mg by mouth daily. omeprazole (PRILOSEC) 20 MG capsule Take 20 mg by mouth daily. ALLERGIES     is allergic to oxycodone and pcn [penicillins]. FAMILY HISTORY     He indicated that the status of his mother is unknown. He indicated that the status of his father is unknown.     family history includes Alzheimer's Disease in his mother; Arthritis in his mother; Heart Disease in his father. SOCIAL HISTORY      reports that he quit smoking about 41 years ago. He has never used smokeless tobacco. He reports that he does not drink alcohol or use drugs. PHYSICAL EXAM     INITIAL VITALS:  height is 5' 6\" (1.676 m) and weight is 200 lb (90.7 kg). His oral temperature is 97.7 °F (36.5 °C). His blood pressure is 133/86 and his pulse is 82. His respiration is 21 and oxygen saturation is 96%. Physical Exam  Vitals signs and nursing note reviewed. Constitutional:       General: He is not in acute distress. HENT:      Head: Normocephalic and atraumatic. Eyes:      Conjunctiva/sclera: Conjunctivae normal.      Pupils: Pupils are equal, round, and reactive to light. Neck:      Musculoskeletal: Neck supple. Cardiovascular:      Rate and Rhythm: Normal rate and regular rhythm. Heart sounds: Normal heart sounds. No murmur. Pulmonary:      Effort: Pulmonary effort is normal. No respiratory distress. Breath sounds: Normal breath sounds. Abdominal:      General: Bowel sounds are normal. There is no distension. Palpations: Abdomen is soft. Tenderness: There is no abdominal tenderness. Musculoskeletal:         General: No tenderness. Lymphadenopathy:      Cervical: No cervical adenopathy. Skin:     General: Skin is warm and dry. Findings: No rash. Neurological:      Mental Status: He is alert and oriented to person, place, and time.    Psychiatric:         Judgment: Judgment normal.           DIFFERENTIAL DIAGNOSIS/MDM:   81-year-old male presents with several episodes of presyncope. He is afebrile, nontoxic, normal vital signs at this time. No acute distress. He is in the middle of a preparation for a colonoscopy. I strongly suspect his symptoms are secondary to this. Probably volume depleted. We will give IV fluids, check lab work here, orthostatic vital signs. His abdomen is soft, nontender. Low suspicion for acute intra-abdominal pathology. I doubt a cardiac etiology causing his syncope as it is coinciding with his bowel prep however I am still going to get a cardiac work-up including EKG, cardiac enzymes. DIAGNOSTIC RESULTS     EKG: All EKG's are interpreted by the Emergency Department Physician who either signs or Co-signs this chart in the absence of a cardiologist.    EKG Interpretation    Interpreted by me    Rhythm: Atrial fibrillation  Rate: normal  Axis: normal  Ectopy: none  Conduction: normal  ST Segments: no acute change  T Waves: Nonspecific changes  Q Waves: none    Clinical Impression: Nonspecific EKG    RADIOLOGY:   I directly visualized the following  images and reviewed the radiologist interpretations:  XR CHEST PORTABLE   Final Result      No acute intrathoracic pathology.                  ED BEDSIDE ULTRASOUND:      LABS:  Labs Reviewed   CBC WITH AUTO DIFFERENTIAL - Abnormal; Notable for the following components:       Result Value    RBC 4.49 (*)     Hematocrit 40.4 (*)     Seg Neutrophils 74 (*)     Lymphocytes 16 (*)     All other components within normal limits   COMPREHENSIVE METABOLIC PANEL - Abnormal; Notable for the following components:    Potassium 3.6 (*)     Total Protein 6.2 (*)     Albumin 3.3 (*)     All other components within normal limits   TROPONIN   TROPONIN   LIPASE   MAGNESIUM         EMERGENCY DEPARTMENT COURSE:   Vitals:    Vitals:    05/04/21 2015 05/04/21 2030 05/04/21 2045 05/04/21 2100   BP: 131/88 126/76 130/81 133/86   Pulse: 83 80 93 82   Resp: 23 17 16 21   Temp:       TempSrc: SpO2: 95% 96% 97% 96%   Weight:       Height:         9:21 PM EDT  Work appears mostly unremarkable. Potassium borderline low at 3.6. This was replaced orally. Orthostatics have been tested here and are negative. Patient not dizzy or lightheaded and has been asymptomatic while he is been here. Cardiac enzymes are negative x2. I think patient probably was mildly dehydrated with low potassium from his bowel prep for his procedure tomorrow. He is tolerating orals. Advised him to be compliant with his medications, return if any symptoms worsen. He is agreeable with plan will be discharged home today. CRITICALCARE:      CONSULTS:  None      PROCEDURES:      FINAL IMPRESSION      1. Hypokalemia    2.  Diarrhea, unspecified type            DISPOSITION/PLAN   DISPOSITION Decision To Discharge 05/04/2021 09:19:17 PM          PATIENT REFERRED TO:  Nadia Hernández  [address] [place] [address] [address] [address]    Schedule an appointment as soon as possible for a visit       [facility] ED  [address]  [address] [address] [phone]    As needed, If symptoms worsen      DISCHARGE MEDICATIONS:  Current Discharge Medication List          (Please note that portions ofthis note were completed with a voice recognition program.  Efforts were made to edit the dictations but occasionally words are mis-transcribed.)    Mj Delgado DO  Attending Emergency Physician          Mj Delgado DO  05/04/21 9043

## 2021-05-04 NOTE — ED NOTES
Bed: 15  Expected date:   Expected time:   Means of arrival:   Comments:  [facility], [facility]  05/04/21 7375

## 2021-05-04 NOTE — ED TRIAGE NOTES
Mode of arrival (squad #, walk in, police, etc) : [address]        Chief complaint(s): near syncope        Arrival Note (brief scenario, treatment PTA, etc). : Pt states he has been having a few near syncopal episodes today, but has not actually passed out. Pt has been doing a colon prep today for a colonoscopy. Pt has been having diarrhea due to the prep. Pt denies chest pain, SOB, cough. Pt reports some nausea. Pt states he is feeling better from some fluids. C= \"Have you ever felt that you should Cut down on your drinking? \"  No  A= \"Have people Annoyed you by criticizing your drinking? \"  No  G= \"Have you ever felt bad or Guilty about your drinking? \"  No  E= \"Have you ever had a drink as an Eye-opener first thing in the morning to steady your nerves or to help a hangover? \"  No      Deferred []      Reason for deferring: N/A    *If yes to two or more: probable alcohol abuse. *

## 2021-05-05 ENCOUNTER — HOSPITAL ENCOUNTER (OUTPATIENT)
Dept: GENERAL RADIOLOGY | Age: 81
Discharge: HOME | End: 2021-05-07
Payer: MEDICARE

## 2021-05-05 DIAGNOSIS — Q43.8 REDUNDANT COLON: ICD-10-CM

## 2021-05-05 DIAGNOSIS — K64.0 GRADE I HEMORRHOIDS: ICD-10-CM

## 2021-05-05 DIAGNOSIS — R19.4 CHANGE IN BOWEL HABITS: ICD-10-CM

## 2021-05-05 LAB
EKG ATRIAL RATE: 97 BPM
EKG Q-T INTERVAL: 398 MS
EKG QRS DURATION: 90 MS
EKG QTC CALCULATION (BAZETT): 423 MS
EKG R AXIS: 78 DEGREES
EKG T AXIS: -20 DEGREES
EKG VENTRICULAR RATE: 68 BPM

## 2021-05-05 PROCEDURE — A4641 RADIOPHARM DX AGENT NOC: HCPCS | Performed by: SURGERY

## 2021-05-05 PROCEDURE — 74280 X-RAY XM COLON 2CNTRST STD: CPT

## 2021-05-05 PROCEDURE — 93010 ELECTROCARDIOGRAM REPORT: CPT | Performed by: INTERNAL MEDICINE

## 2021-05-05 PROCEDURE — 6360000004 HC RX CONTRAST MEDICATION: Performed by: SURGERY

## 2021-05-05 RX ADMIN — BARIUM SULFATE 1000 ML: 1.05 SUSPENSION ORAL; RECTAL at 09:00

## 2021-05-27 ENCOUNTER — APPOINTMENT (OUTPATIENT)
Dept: CT IMAGING | Age: 81
End: 2021-05-27
Payer: MEDICARE

## 2021-05-27 ENCOUNTER — HOSPITAL ENCOUNTER (EMERGENCY)
Age: 81
Discharge: HOME | End: 2021-05-27
Attending: EMERGENCY MEDICINE
Payer: MEDICARE

## 2021-05-27 VITALS
SYSTOLIC BLOOD PRESSURE: 134 MMHG | RESPIRATION RATE: 17 BRPM | DIASTOLIC BLOOD PRESSURE: 63 MMHG | TEMPERATURE: 97.5 F | BODY MASS INDEX: 32.95 KG/M2 | HEIGHT: 66 IN | HEART RATE: 61 BPM | WEIGHT: 205 LBS | OXYGEN SATURATION: 96 %

## 2021-05-27 DIAGNOSIS — N20.0 KIDNEY STONE: Primary | ICD-10-CM

## 2021-05-27 LAB
-: ABNORMAL
ABSOLUTE EOS #: 0 K/UL (ref 0–0.4)
ABSOLUTE IMMATURE GRANULOCYTE: ABNORMAL K/UL (ref 0–0.3)
ABSOLUTE LYMPH #: 1.5 K/UL (ref 1–4.8)
ABSOLUTE MONO #: 1.2 K/UL (ref 0.1–1.3)
ALBUMIN SERPL-MCNC: 4.2 G/DL (ref 3.5–5.2)
ALBUMIN/GLOBULIN RATIO: ABNORMAL (ref 1–2.5)
ALP BLD-CCNC: 74 U/L (ref 40–129)
ALT SERPL-CCNC: 11 U/L (ref 5–41)
AMORPHOUS: ABNORMAL
ANION GAP SERPL CALCULATED.3IONS-SCNC: 12 MMOL/L (ref 9–17)
AST SERPL-CCNC: 13 U/L
BACTERIA: ABNORMAL
BASOPHILS # BLD: 0 % (ref 0–2)
BASOPHILS ABSOLUTE: 0 K/UL (ref 0–0.2)
BILIRUB SERPL-MCNC: 0.55 MG/DL (ref 0.3–1.2)
BILIRUBIN URINE: NEGATIVE
BUN BLDV-MCNC: 14 MG/DL (ref 8–23)
BUN/CREAT BLD: ABNORMAL (ref 9–20)
CALCIUM SERPL-MCNC: 10.1 MG/DL (ref 8.6–10.4)
CASTS UA: ABNORMAL /LPF
CASTS UA: ABNORMAL /LPF
CHLORIDE BLD-SCNC: 106 MMOL/L (ref 98–107)
CO2: 23 MMOL/L (ref 20–31)
COLOR: YELLOW
COMMENT UA: ABNORMAL
CREAT SERPL-MCNC: 1 MG/DL (ref 0.7–1.2)
CRYSTALS, UA: ABNORMAL /HPF
DIFFERENTIAL TYPE: ABNORMAL
EOSINOPHILS RELATIVE PERCENT: 0 % (ref 0–4)
EPITHELIAL CELLS UA: ABNORMAL /HPF
GFR AFRICAN AMERICAN: >60 ML/MIN
GFR NON-AFRICAN AMERICAN: >60 ML/MIN
GFR SERPL CREATININE-BSD FRML MDRD: ABNORMAL ML/MIN/{1.73_M2}
GFR SERPL CREATININE-BSD FRML MDRD: ABNORMAL ML/MIN/{1.73_M2}
GLUCOSE BLD-MCNC: 141 MG/DL (ref 70–99)
GLUCOSE URINE: NEGATIVE
HCT VFR BLD CALC: 43.1 % (ref 41–53)
HEMOGLOBIN: 14.7 G/DL (ref 13.5–17.5)
IMMATURE GRANULOCYTES: ABNORMAL %
KETONES, URINE: ABNORMAL
LACTIC ACID, SEPSIS WHOLE BLOOD: ABNORMAL MMOL/L (ref 0.5–1.9)
LACTIC ACID, SEPSIS WHOLE BLOOD: NORMAL MMOL/L (ref 0.5–1.9)
LACTIC ACID, SEPSIS: 1.7 MMOL/L (ref 0.5–1.9)
LACTIC ACID, SEPSIS: 2.9 MMOL/L (ref 0.5–1.9)
LEUKOCYTE ESTERASE, URINE: NEGATIVE
LIPASE: 27 U/L (ref 13–60)
LYMPHOCYTES # BLD: 12 % (ref 24–44)
MCH RBC QN AUTO: 30.3 PG (ref 26–34)
MCHC RBC AUTO-ENTMCNC: 34 G/DL (ref 31–37)
MCV RBC AUTO: 89.3 FL (ref 80–100)
MONOCYTES # BLD: 9 % (ref 1–7)
MUCUS: ABNORMAL
NITRITE, URINE: NEGATIVE
NRBC AUTOMATED: ABNORMAL PER 100 WBC
OTHER OBSERVATIONS UA: ABNORMAL
PDW BLD-RTO: 13.4 % (ref 11.5–14.9)
PH UA: 6 (ref 5–8)
PLATELET # BLD: 195 K/UL (ref 150–450)
PLATELET ESTIMATE: ABNORMAL
PMV BLD AUTO: 7.5 FL (ref 6–12)
POTASSIUM SERPL-SCNC: 4.1 MMOL/L (ref 3.7–5.3)
PROTEIN UA: NEGATIVE
RBC # BLD: 4.83 M/UL (ref 4.5–5.9)
RBC # BLD: ABNORMAL 10*6/UL
RBC UA: ABNORMAL /HPF
RENAL EPITHELIAL, UA: ABNORMAL /HPF
SEG NEUTROPHILS: 79 % (ref 36–66)
SEGMENTED NEUTROPHILS ABSOLUTE COUNT: 9.7 K/UL (ref 1.3–9.1)
SODIUM BLD-SCNC: 141 MMOL/L (ref 135–144)
SPECIFIC GRAVITY UA: 1.03 (ref 1–1.03)
TOTAL PROTEIN: 7.2 G/DL (ref 6.4–8.3)
TRICHOMONAS: ABNORMAL
TURBIDITY: CLEAR
URINE HGB: ABNORMAL
UROBILINOGEN, URINE: NORMAL
WBC # BLD: 12.4 K/UL (ref 3.5–11)
WBC # BLD: ABNORMAL 10*3/UL
WBC UA: ABNORMAL /HPF
YEAST: ABNORMAL

## 2021-05-27 PROCEDURE — 2580000003 HC RX 258: Performed by: EMERGENCY MEDICINE

## 2021-05-27 PROCEDURE — 81001 URINALYSIS AUTO W/SCOPE: CPT

## 2021-05-27 PROCEDURE — 99285 EMERGENCY DEPT VISIT HI MDM: CPT

## 2021-05-27 PROCEDURE — 96375 TX/PRO/DX INJ NEW DRUG ADDON: CPT

## 2021-05-27 PROCEDURE — 85025 COMPLETE CBC W/AUTO DIFF WBC: CPT

## 2021-05-27 PROCEDURE — 6360000002 HC RX W HCPCS: Performed by: EMERGENCY MEDICINE

## 2021-05-27 PROCEDURE — 83605 ASSAY OF LACTIC ACID: CPT

## 2021-05-27 PROCEDURE — 96374 THER/PROPH/DIAG INJ IV PUSH: CPT

## 2021-05-27 PROCEDURE — 80053 COMPREHEN METABOLIC PANEL: CPT

## 2021-05-27 PROCEDURE — 87086 URINE CULTURE/COLONY COUNT: CPT

## 2021-05-27 PROCEDURE — 96361 HYDRATE IV INFUSION ADD-ON: CPT

## 2021-05-27 PROCEDURE — 74177 CT ABD & PELVIS W/CONTRAST: CPT

## 2021-05-27 PROCEDURE — 6360000004 HC RX CONTRAST MEDICATION: Performed by: EMERGENCY MEDICINE

## 2021-05-27 PROCEDURE — 36415 COLL VENOUS BLD VENIPUNCTURE: CPT

## 2021-05-27 PROCEDURE — 83690 ASSAY OF LIPASE: CPT

## 2021-05-27 PROCEDURE — 6370000000 HC RX 637 (ALT 250 FOR IP): Performed by: EMERGENCY MEDICINE

## 2021-05-27 RX ORDER — CIPROFLOXACIN 500 MG/1
500 TABLET, FILM COATED ORAL ONCE
Status: COMPLETED | OUTPATIENT
Start: 2021-05-27 | End: 2021-05-27

## 2021-05-27 RX ORDER — KETOROLAC TROMETHAMINE 30 MG/ML
15 INJECTION, SOLUTION INTRAMUSCULAR; INTRAVENOUS ONCE
Status: COMPLETED | OUTPATIENT
Start: 2021-05-27 | End: 2021-05-27

## 2021-05-27 RX ORDER — SODIUM CHLORIDE 0.9 % (FLUSH) 0.9 %
10 SYRINGE (ML) INJECTION PRN
Status: DISCONTINUED | OUTPATIENT
Start: 2021-05-27 | End: 2021-05-27 | Stop reason: HOSPADM

## 2021-05-27 RX ORDER — ACETAMINOPHEN 325 MG/1
650 TABLET ORAL ONCE
Status: COMPLETED | OUTPATIENT
Start: 2021-05-27 | End: 2021-05-27

## 2021-05-27 RX ORDER — 0.9 % SODIUM CHLORIDE 0.9 %
80 INTRAVENOUS SOLUTION INTRAVENOUS ONCE
Status: COMPLETED | OUTPATIENT
Start: 2021-05-27 | End: 2021-05-27

## 2021-05-27 RX ORDER — ACETAMINOPHEN 500 MG
1000 TABLET ORAL ONCE
Status: DISCONTINUED | OUTPATIENT
Start: 2021-05-27 | End: 2021-05-27

## 2021-05-27 RX ORDER — ONDANSETRON 2 MG/ML
4 INJECTION INTRAMUSCULAR; INTRAVENOUS ONCE
Status: COMPLETED | OUTPATIENT
Start: 2021-05-27 | End: 2021-05-27

## 2021-05-27 RX ORDER — CIPROFLOXACIN 500 MG/1
500 TABLET, FILM COATED ORAL 2 TIMES DAILY
Qty: 20 TABLET | Refills: 0 | Status: SHIPPED | OUTPATIENT
Start: 2021-05-27 | End: 2021-06-06

## 2021-05-27 RX ORDER — 0.9 % SODIUM CHLORIDE 0.9 %
1000 INTRAVENOUS SOLUTION INTRAVENOUS ONCE
Status: COMPLETED | OUTPATIENT
Start: 2021-05-27 | End: 2021-05-27

## 2021-05-27 RX ADMIN — CIPROFLOXACIN HYDROCHLORIDE 500 MG: 500 TABLET, FILM COATED ORAL at 19:00

## 2021-05-27 RX ADMIN — ACETAMINOPHEN 650 MG: 325 TABLET, FILM COATED ORAL at 17:18

## 2021-05-27 RX ADMIN — SODIUM CHLORIDE 1000 ML: 9 INJECTION, SOLUTION INTRAVENOUS at 15:55

## 2021-05-27 RX ADMIN — SODIUM CHLORIDE, PRESERVATIVE FREE 10 ML: 5 INJECTION INTRAVENOUS at 16:38

## 2021-05-27 RX ADMIN — SODIUM CHLORIDE 80 ML: 9 INJECTION, SOLUTION INTRAVENOUS at 16:38

## 2021-05-27 RX ADMIN — ONDANSETRON 4 MG: 2 INJECTION INTRAMUSCULAR; INTRAVENOUS at 15:53

## 2021-05-27 RX ADMIN — KETOROLAC TROMETHAMINE 15 MG: 30 INJECTION, SOLUTION INTRAMUSCULAR at 16:58

## 2021-05-27 RX ADMIN — ONDANSETRON 4 MG: 2 INJECTION INTRAMUSCULAR; INTRAVENOUS at 16:58

## 2021-05-27 RX ADMIN — IOPAMIDOL 75 ML: 755 INJECTION, SOLUTION INTRAVENOUS at 16:38

## 2021-05-27 ASSESSMENT — PAIN SCALES - GENERAL
PAINLEVEL_OUTOF10: 8
PAINLEVEL_OUTOF10: 8

## 2021-05-27 ASSESSMENT — ENCOUNTER SYMPTOMS
SORE THROAT: 0
SHORTNESS OF BREATH: 0
BACK PAIN: 0
EYE PAIN: 0
CHEST TIGHTNESS: 0
NAUSEA: 1
VOMITING: 0
CONSTIPATION: 0
COUGH: 0
DIARRHEA: 0
ABDOMINAL PAIN: 1

## 2021-05-27 ASSESSMENT — PAIN DESCRIPTION - PAIN TYPE: TYPE: ACUTE PAIN

## 2021-05-27 NOTE — ED TRIAGE NOTES
Mode of arrival (squad #, walk in, police, etc) : [facility] complaint(s): Abdominal pain        Arrival Note (brief scenario, treatment PTA, etc). : Pt arrives to ED c/o lower abdominal pain that started about 30 minutes ago. Patient states that he had a nerve block done on his lower back yesterday for pain. Patient states that today he developed sudden pain and burning in his abdomen. Patient had a bowel movement today. C= \"Have you ever felt that you should Cut down on your drinking? \"  No  A= \"Have people Annoyed you by criticizing your drinking? \"  No  G= \"Have you ever felt bad or Guilty about your drinking? \"  No  E= \"Have you ever had a drink as an Eye-opener first thing in the morning to steady your nerves or to help a hangover? \"  No      Deferred []      Reason for deferring: N/A    *If yes to two or more: probable alcohol abuse. *

## 2021-05-27 NOTE — ED PROVIDER NOTES
[address] ED  eMERGENCY dEPARTMENT eNCOUnter    Pt Name: Benita Gill  MRN: [id number]  [place] 1940  Date of evaluation: 5/27/21  CHIEF COMPLAINT       Chief Complaint   Patient presents with    Abdominal Pain     HISTORY OF PRESENT ILLNESS   HPI   Nerve blocks around the \"tailbone\" done yesterday. On Eliquis. Acute onset of lower abdominal pain about 1-2 hours prior to arrival, pain is mostly in LLQ, described as burning in nature. He reports he had a bowel movement earlier today which he does not feel was \"complete\". Associated with nausea but no vomiting. REVIEW OF SYSTEMS     Review of Systems   Constitutional: Negative for chills and fever. HENT: Negative for congestion, ear pain and sore throat. Eyes: Negative for pain and visual disturbance. Respiratory: Negative for cough, chest tightness and shortness of breath. Cardiovascular: Negative for chest pain and palpitations. Gastrointestinal: Positive for abdominal pain and nausea. Negative for constipation, diarrhea and vomiting. Genitourinary: Negative for dysuria and testicular pain. Musculoskeletal: Negative for back pain. Skin: Negative for rash and wound. Neurological: Negative for seizures, syncope and headaches.      PASTMEDICAL HISTORY     Past Medical History:   Diagnosis Date    Anxiety attack     Arthritis     Atrial fibrillation (HCC)     Atrial flutter (HCC)     Back pain     CAD (coronary artery disease)     Chest pain 04/12/2016    Colon polyps     Constipation     Dementia ([address] 03/23/2019    Dizziness     GERD (gastroesophageal reflux disease)     Hyperlipidemia     Hypertension     Lower GI bleed     Lung nodule seen on imaging study 02/17/2019    Prediabetes     Rectal bleeding     S/P colonoscopy with polypectomy     Seizure ([address]     NOTED PER CARE EVERYWHERE- PATIENT DENIES HX OF, STATES HE HAS NEVER BEEN ON SEIZURE MEDICATION    Sleep apnea     not tested, no cpap    SOB (shortness of breath)     Status post coronary artery bypass graft     Wears dentures     TOP     SURGICAL HISTORY       Past Surgical History:   Procedure Laterality Date    CARDIAC CATHETERIZATION      CATARACT REMOVAL Bilateral     COLONOSCOPY  07/24/2018    COL[facility]COPY [facility]OPSY [facility] [address] COLONOSCOPY N/A 4/19/2021    COLONOSCOPY DIAGNOSTIC performed by Daron Mcleod MD at [facility]  2004    x4 vessel    TOTAL KNEE ARTHROPLASTY Bilateral     VENTRAL HERNIA REPAIR       CURRENT MEDICATIONS       Previous Medications    ACETAMINOPHEN (TYLENOL) 325 MG TABLET    Take 1,300 mg by mouth 2 times daily    APIXABAN (ELIQUIS) 5 MG TABS TABLET    Take 5 mg by mouth 2 times daily    DILTIAZEM (CARDIZEM CD) 180 MG EXTENDED RELEASE CAPSULE    Take 180 mg by mouth daily    DOXAZOSIN (CARDURA) 2 MG TABLET    Take 2 mg by mouth nightly    ISOSORBIDE MONONITRATE (IMDUR) 30 MG EXTENDED RELEASE TABLET    Take 1 tablet by mouth daily    METOPROLOL (LOPRESSOR) 50 MG TABLET    Take 50 mg by mouth 2 times daily. NITROGLYCERIN (NITROSTAT) 0.4 MG SL TABLET    up to max of 3 total doses. If no relief after 1 dose, call 911. OMEPRAZOLE (PRILOSEC) 20 MG CAPSULE    Take 20 mg by mouth daily. PRAVASTATIN (PRAVACHOL) 40 MG TABLET    Take 40 mg by mouth daily. ALLERGIES     is allergic to oxycodone, pcn [penicillins], and warfarin and related. FAMILY HISTORY     He indicated that the status of his mother is unknown. He indicated that the status of his father is unknown. SOCIALHISTORY      reports that he quit smoking about 41 years ago. He has never used smokeless tobacco. He reports that he does not drink alcohol and does not use drugs.   PHYSICAL EXAM     INITIAL VITALS: /63   Pulse 61   Temp 97.5 °F (36.4 °C) (Oral)   Resp 17   Ht 5' 6\" (1.676 m)   Wt 205 lb (93 kg)   SpO2 96%   BMI 33.09 kg/m²    Physical Exam  Vitals and nursing note reviewed. Constitutional:       Appearance: He is well-developed. HENT:      Head: Normocephalic and atraumatic. Right Ear: External ear normal.      Left Ear: External ear normal.   Eyes:      Conjunctiva/sclera: Conjunctivae normal.      Pupils: Pupils are equal, round, and reactive to light. Neck:      Vascular: No JVD. Trachea: No tracheal deviation. Cardiovascular:      Rate and Rhythm: Normal rate and regular rhythm. Heart sounds: Normal heart sounds. Pulmonary:      Effort: Pulmonary effort is normal. No respiratory distress. Breath sounds: Normal breath sounds. No stridor. Abdominal:      General: Bowel sounds are normal. There is no distension. Palpations: Abdomen is soft. Tenderness: There is abdominal tenderness in the left lower quadrant. Musculoskeletal:         General: No tenderness or deformity. Normal range of motion. Cervical back: Normal range of motion and neck supple. Skin:     General: Skin is warm and dry. Neurological:      Mental Status: He is alert and oriented to person, place, and time. Cranial Nerves: No cranial nerve deficit. Coordination: Coordination normal.         MEDICAL DECISION MAKING:   Assessment:  Dm Joaquin is a 68 y.o. male who presents with abdominal pain. ED Course/MDM:   Patient arrived hemodynamically stable and in no acute distress. Patient's previous imaging and studies reviewed. CT with 4 mm renal stones. UA with bacteruria. Sx improved with toradol and tylenol. We will discharge with cipro and tylenol for pain, patient does not want any narcotics for pain control, we didn't prescribe flomax as patient is already on cardura. F/up with urology.               Procedures    DIAGNOSTIC RESULTS   EKG: All EKG's are interpreted by the Emergency Department Physician who either signs or Co-signs this chart inthe absence of a cardiologist.      RADIOLOGY:All plain film, CT, MRI, and formal ultrasound images (except ED bedside ultrasound) are read by the radiologist, see reports below, unless otherwise noted in MDM or here. CT ABDOMEN PELVIS W IV CONTRAST Additional Contrast? None   Final Result   1. Mild degree of left hydronephrosis and hydroureter, secondary to a 4 mm   calculus at the level of the UVJ   2. Scattered colonic diverticula, without evidence of diverticulitis   3. Cholelithiasis, without evidence of cholecystitis           LABS: All lab results were reviewed by myself, and all abnormals are listed below.   Labs Reviewed   CBC WITH AUTO DIFFERENTIAL - Abnormal; Notable for the following components:       Result Value    WBC 12.4 (*)     Seg Neutrophils 79 (*)     Lymphocytes 12 (*)     Monocytes 9 (*)     Segs Absolute 9.70 (*)     All other components within normal limits   COMPREHENSIVE METABOLIC PANEL - Abnormal; Notable for the following components:    Glucose 141 (*)     All other components within normal limits   LACTATE, SEPSIS - Abnormal; Notable for the following components:    Lactic Acid, Sepsis 2.9 (*)     All other components within normal limits   URINALYSIS - Abnormal; Notable for the following components:    Ketones, Urine TRACE (*)     Specific Gravity, UA 1.035 (*)     Urine Hgb LARGE (*)     All other components within normal limits   MICROSCOPIC URINALYSIS - Abnormal; Notable for the following components:    Bacteria, UA FEW (*)     All other components within normal limits   CULTURE, URINE   LACTATE, SEPSIS   LIPASE     EMERGENCY DEPARTMENT COURSE:   Vitals:    Vitals:    05/27/21 1430   BP: 134/63   Pulse: 61   Resp: 17   Temp: 97.5 °F (36.4 °C)   TempSrc: Oral   SpO2: 96%   Weight: 205 lb (93 kg)   Height: 5' 6\" (1.676 m)       The patient was given the following medications while in the emergency department:  Orders Placed This Encounter   Medications    DISCONTD: acetaminophen (TYLENOL) tablet 1,000 mg    0.9 % sodium chloride bolus    ondansetron [facility] injection 4 mg    0.9 % sodium chloride bolus    sodium chloride flush 0.9 % injection 10 mL    iopamidol (ISOVUE-370) 76 % injection 75 mL    ketorolac (TORADOL) injection 15 mg    acetaminophen (TYLENOL) tablet 650 mg    ondansetron (ZOFRAN) injection 4 mg    ciprofloxacin (CIPRO) tablet 500 mg     Order Specific Question:   Antimicrobial Indications     Answer:   Urinary Tract Infection    ciprofloxacin (CIPRO) 500 MG tablet     Sig: Take 1 tablet by mouth 2 times daily for 10 days     Dispense:  20 tablet     Refill:  0     CONSULTS:  None    FINAL IMPRESSION      1. Kidney stone          DISPOSITION/PLAN   DISPOSITION Decision To Discharge 05/27/2021 06:32:23 PM      PATIENT REFERRED TO:  Nina Ryan MD  [address]  [facility]  [phone]          DISCHARGE MEDICATIONS:  New Prescriptions    CIPROFLOXACIN (CIPRO) 500 MG TABLET    Take 1 tablet by mouth 2 times daily for 10 days     Vini Sanchez MD  AttendingEmergency Physician                        Josue Umanzor MD  05/27/21 2101

## 2021-05-27 NOTE — ED NOTES
Bed: 08  Expected date:   Expected time:   Means of arrival: [facility]  Comments:     Abelardo Hernandez RN  05/27/21 7429

## 2021-05-27 NOTE — ED NOTES
Report given to Aileen Villafuerte RN from ED. Report method in person   The following was reviewed with receiving RN:   Current vital signs:  /63   Pulse 61   Temp 97.5 °F (36.4 °C) (Oral)   Resp 17   Ht 5' 6\" (1.676 m)   Wt 205 lb (93 kg)   SpO2 96%   BMI 33.09 kg/m²                MEWS Score: 1     Any medication or safety alerts were reviewed. Any pending diagnostics and notifications were also reviewed, as well as any safety concerns or issues, abnormal labs, abnormal imaging, and abnormal assessment findings. Questions were answered.           Valerio Lancaster RN  05/27/21 2079

## 2021-05-28 LAB
CULTURE: NORMAL
Lab: NORMAL
SPECIMEN DESCRIPTION: NORMAL

## 2021-07-04 ENCOUNTER — APPOINTMENT (OUTPATIENT)
Dept: GENERAL RADIOLOGY | Age: 81
End: 2021-07-04
Payer: MEDICARE

## 2021-07-04 ENCOUNTER — HOSPITAL ENCOUNTER (EMERGENCY)
Age: 81
Discharge: HOME | End: 2021-07-04
Attending: EMERGENCY MEDICINE
Payer: MEDICARE

## 2021-07-04 VITALS
BODY MASS INDEX: 32.14 KG/M2 | HEIGHT: 66 IN | RESPIRATION RATE: 24 BRPM | OXYGEN SATURATION: 95 % | SYSTOLIC BLOOD PRESSURE: 134 MMHG | DIASTOLIC BLOOD PRESSURE: 64 MMHG | TEMPERATURE: 97.7 F | WEIGHT: 200 LBS | HEART RATE: 52 BPM

## 2021-07-04 DIAGNOSIS — R07.9 CHEST PAIN, UNSPECIFIED TYPE: Primary | ICD-10-CM

## 2021-07-04 LAB
ABSOLUTE EOS #: 0.1 K/UL (ref 0–0.4)
ABSOLUTE IMMATURE GRANULOCYTE: ABNORMAL K/UL (ref 0–0.3)
ABSOLUTE LYMPH #: 1.8 K/UL (ref 1–4.8)
ABSOLUTE MONO #: 0.7 K/UL (ref 0.1–1.3)
ALBUMIN SERPL-MCNC: 3.8 G/DL (ref 3.5–5.2)
ALBUMIN/GLOBULIN RATIO: ABNORMAL (ref 1–2.5)
ALP BLD-CCNC: 73 U/L (ref 40–129)
ALT SERPL-CCNC: 13 U/L (ref 5–41)
ANION GAP SERPL CALCULATED.3IONS-SCNC: 9 MMOL/L (ref 9–17)
AST SERPL-CCNC: 15 U/L
BASOPHILS # BLD: 1 % (ref 0–2)
BASOPHILS ABSOLUTE: 0 K/UL (ref 0–0.2)
BILIRUB SERPL-MCNC: 0.5 MG/DL (ref 0.3–1.2)
BUN BLDV-MCNC: 14 MG/DL (ref 8–23)
BUN/CREAT BLD: ABNORMAL (ref 9–20)
CALCIUM SERPL-MCNC: 9.4 MG/DL (ref 8.6–10.4)
CHLORIDE BLD-SCNC: 107 MMOL/L (ref 98–107)
CO2: 24 MMOL/L (ref 20–31)
CREAT SERPL-MCNC: 0.93 MG/DL (ref 0.7–1.2)
DIFFERENTIAL TYPE: ABNORMAL
EOSINOPHILS RELATIVE PERCENT: 2 % (ref 0–4)
GFR AFRICAN AMERICAN: >60 ML/MIN
GFR NON-AFRICAN AMERICAN: >60 ML/MIN
GFR SERPL CREATININE-BSD FRML MDRD: ABNORMAL ML/MIN/{1.73_M2}
GFR SERPL CREATININE-BSD FRML MDRD: ABNORMAL ML/MIN/{1.73_M2}
GLUCOSE BLD-MCNC: 106 MG/DL (ref 70–99)
HCT VFR BLD CALC: 41.7 % (ref 41–53)
HEMOGLOBIN: 13.9 G/DL (ref 13.5–17.5)
IMMATURE GRANULOCYTES: ABNORMAL %
LIPASE: 35 U/L (ref 13–60)
LYMPHOCYTES # BLD: 29 % (ref 24–44)
MCH RBC QN AUTO: 30.3 PG (ref 26–34)
MCHC RBC AUTO-ENTMCNC: 33.4 G/DL (ref 31–37)
MCV RBC AUTO: 90.4 FL (ref 80–100)
MONOCYTES # BLD: 12 % (ref 1–7)
NRBC AUTOMATED: ABNORMAL PER 100 WBC
PDW BLD-RTO: 14.5 % (ref 11.5–14.9)
PLATELET # BLD: 160 K/UL (ref 150–450)
PLATELET ESTIMATE: ABNORMAL
PMV BLD AUTO: 7.7 FL (ref 6–12)
POTASSIUM SERPL-SCNC: 4.1 MMOL/L (ref 3.7–5.3)
RBC # BLD: 4.61 M/UL (ref 4.5–5.9)
RBC # BLD: ABNORMAL 10*6/UL
SEG NEUTROPHILS: 56 % (ref 36–66)
SEGMENTED NEUTROPHILS ABSOLUTE COUNT: 3.6 K/UL (ref 1.3–9.1)
SODIUM BLD-SCNC: 140 MMOL/L (ref 135–144)
TOTAL PROTEIN: 6.6 G/DL (ref 6.4–8.3)
TROPONIN INTERP: NORMAL
TROPONIN INTERP: NORMAL
TROPONIN T: NORMAL NG/ML
TROPONIN T: NORMAL NG/ML
TROPONIN, HIGH SENSITIVITY: 14 NG/L (ref 0–22)
TROPONIN, HIGH SENSITIVITY: 14 NG/L (ref 0–22)
WBC # BLD: 6.3 K/UL (ref 3.5–11)
WBC # BLD: ABNORMAL 10*3/UL

## 2021-07-04 PROCEDURE — 99285 EMERGENCY DEPT VISIT HI MDM: CPT

## 2021-07-04 PROCEDURE — 80053 COMPREHEN METABOLIC PANEL: CPT

## 2021-07-04 PROCEDURE — 85025 COMPLETE CBC W/AUTO DIFF WBC: CPT

## 2021-07-04 PROCEDURE — 36415 COLL VENOUS BLD VENIPUNCTURE: CPT

## 2021-07-04 PROCEDURE — 83690 ASSAY OF LIPASE: CPT

## 2021-07-04 PROCEDURE — 93005 ELECTROCARDIOGRAM TRACING: CPT | Performed by: STUDENT IN AN ORGANIZED HEALTH CARE EDUCATION/TRAINING PROGRAM

## 2021-07-04 PROCEDURE — 71045 X-RAY EXAM CHEST 1 VIEW: CPT

## 2021-07-04 PROCEDURE — 84484 ASSAY OF TROPONIN QUANT: CPT

## 2021-07-04 ASSESSMENT — ENCOUNTER SYMPTOMS
NAUSEA: 1
PHOTOPHOBIA: 0
VOMITING: 1
ABDOMINAL PAIN: 1
SHORTNESS OF BREATH: 1

## 2021-07-04 NOTE — ED PROVIDER NOTES
[facility] ED  Emergency Department Encounter  Emergency Medicine Resident     Pt Name: Lu Galvan  MRN: [id number]  [place] 1940  Date of evaluation: 7/4/21  PCP:  Michael Marti       Chief Complaint   Patient presents with    Chest Pain    Nausea    Shortness of Breath       HISTORY OFPRESENT ILLNESS  (Location/Symptom, Timing/Onset, Context/Setting, Quality, Duration, Modifying Factors,Severity.)      uL Galvan is a 68 y. o.yo male with hx of CAD s/p CAGB 15 years ago who presents with sudden onset of substernal chest pain after he had dinner. He said he had associated symptoms of shortness of breath, releases, nausea but no vomiting. He states that he has not had similar episodes like this since he had his CABG 15 years ago. On review EMS, patient was given 2 doses of nitroglycerin and dose of 324 aspirin. Patient states right now he does not have any chest pain. He denies any recent fever, chills, headache, blurry vision, chills, cough, bilateral lower extremity edema. Denies any history of clots, recent long distance travel, recent surgery. Patient is states that yesterday he overexerted himself and unsure if that could be the cause of his chest pain. PAST MEDICAL / SURGICAL / SOCIAL / FAMILY HISTORY      has a past medical history of Anxiety attack, Arthritis, Atrial fibrillation (HCC), Atrial flutter (HCC), Back pain, CAD (coronary artery disease), Chest pain, Colon polyps, Constipation, Dementia ([address] Dizziness, GERD (gastroesophageal reflux disease), Hyperlipidemia, Hypertension, Lower GI bleed, Lung nodule seen on imaging study, Prediabetes, Rectal bleeding, S/P colonoscopy with polypectomy, Seizure ([address] Sleep apnea, SOB (shortness of breath), Status post coronary artery bypass graft, and Wears dentures. has a past surgical history that includes Coronary artery bypass graft (2004);  Cataract removal (Bilateral); ventral hernia repair; Total knee arthroplasty (Bilateral); Colonoscopy (2018); Cardiac catheterization; and Colonoscopy (N/A, 2021). Social History     Socioeconomic History    Marital status:      Spouse name: Not on file    Number of children: Not on file    Years of education: Not on file    Highest education level: Not on file   Occupational History    Not on file   Tobacco Use    Smoking status: Former Smoker     Quit date: 1980     Years since quittin.5    Smokeless tobacco: Never Used   Vaping Use    Vaping Use: Never used   Substance and Sexual Activity    Alcohol use: No    Drug use: No    Sexual activity: Not on file   Other Topics Concern    Not on file   Social History Narrative    Not on file     Social Determinants of Health     Financial Resource Strain:     Difficulty of Paying Living Expenses:    Food Insecurity:     Worried About [address] [employer] in the Last Year:     [address] [email or web address] in the Last Year:    Transportation Needs:     Lack of Transportation (Medical):      Lack of Transportation (Non-Medical):    Physical Activity:     Days of Exercise per Week:     Minutes of Exercise per Session:    Stress:     Feeling of Stress :    Social Connections:     Frequency of Communication with Friends and Family:     Frequency of Social Gatherings with Friends and Family:     Attends Islam Services:     Active Member of Clubs or Organizations:     Attends Club or Organization Meetings:     Marital Status:    Intimate Partner Violence:     Fear of Current or Ex-Partner:     Emotionally Abused:     Physically Abused:     Sexually Abused:        Family History   Problem Relation Age of Onset    Alzheimer's Disease Mother     Arthritis Mother         lumbar disc disease    Heart Disease Father         Allergies:  Oxycodone, Pcn [penicillins], and Warfarin and related    Home Medications:  Prior to Admission medications    Medication Sig Start Date End Date Taking? Authorizing Provider   apixaban (ELIQUIS) 5 MG TABS tablet Take 5 mg by mouth 2 times daily    Historical Provider, MD   dilTIAZem (CARDIZEM CD) 180 MG extended release capsule Take 180 mg by mouth daily    Historical Provider, MD   doxazosin (CARDURA) 2 MG tablet Take 2 mg by mouth nightly    Historical Provider, MD   isosorbide mononitrate (IMDUR) 30 MG extended release tablet Take 1 tablet by mouth daily 2/18/19   Lakshmi Olivares MD   nitroGLYCERIN (NITROSTAT) 0.4 MG SL tablet up to max of 3 total doses. If no relief after 1 dose, call 911. 2/17/19   Lakshmi Olivares MD   acetaminophen (TYLENOL) 325 MG tablet Take 1,300 mg by mouth 2 times daily    Historical Provider, MD   metoprolol (LOPRESSOR) 50 MG tablet Take 50 mg by mouth 2 times daily. Historical Provider, MD   pravastatin (PRAVACHOL) 40 MG tablet Take 40 mg by mouth daily. Historical Provider, MD   omeprazole (PRILOSEC) 20 MG capsule Take 20 mg by mouth daily. Historical Provider, MD       REVIEW OFSYSTEMS    (2-9 systems for level 4, 10 or more for level 5)      Review of Systems   Constitutional: Negative for diaphoresis and fatigue. Eyes: Negative for photophobia and visual disturbance. Respiratory: Positive for shortness of breath. Cardiovascular: Positive for chest pain. Gastrointestinal: Positive for abdominal pain, nausea and vomiting. Genitourinary: Negative for dysuria, flank pain and urgency. Musculoskeletal: Negative for neck pain and neck stiffness. Skin: Negative for rash and wound. Psychiatric/Behavioral: Negative for behavioral problems, confusion and decreased concentration. PHYSICAL EXAM   (up to 7 for level 4, 8 or more forlevel 5)      INITIAL VITALS:   ED Triage Vitals [07/04/21 1748]   BP Temp Temp Source Pulse Resp SpO2 Height Weight   -- 97.7 °F (36.5 °C) Oral 53 16 95 % 5' 6\" (1.676 m) 200 lb (90.7 kg)       Physical Exam  Constitutional:       Appearance: He is normal weight.    HENT: Head: Normocephalic and atraumatic. Eyes:      Extraocular Movements: Extraocular movements intact. Pupils: Pupils are equal, round, and reactive to light. Cardiovascular:      Rate and Rhythm: Regular rhythm. Bradycardia present. Pulmonary:      Effort: Pulmonary effort is normal. No tachypnea. Abdominal:      General: There is no distension. Palpations: Abdomen is soft. Tenderness: There is no abdominal tenderness. There is no right CVA tenderness or left CVA tenderness. Musculoskeletal:      Cervical back: No rigidity. Skin:     General: Skin is warm. Capillary Refill: Capillary refill takes less than 2 seconds. Coloration: Skin is not jaundiced. Neurological:      General: No focal deficit present. Mental Status: He is alert and oriented to person, place, and time. DIFFERENTIAL  DIAGNOSIS     PLAN (LABS / IMAGING / EKG):  Orders Placed This Encounter   Procedures    XR CHEST PORTABLE    Lipase    Comprehensive Metabolic Panel w/ Reflex to MG    CBC Auto Differential    Troponin    Troponin    EKG 12 Lead       MEDICATIONS ORDERED:  No orders of the defined types were placed in this encounter. DDX: ACS of STEMI versus NSTEMI versus pneumothorax versus pancreatitis versus food impaction from dinner    Initial MDM/Plan: 68 y.o. male who presents with plaints of chest pain. On presentation, patient no acute distress, afebrile, heart rate 53, oxygen saturation of 99% in room air. His oropharynx are clear, lungs clear to auscultate bilaterally, heart regular rhythm but bradycardic. Abdomen soft nontender nondistended presence of midline healed surgical scar over anterior chest.  No bilateral lower extremity edema. Given patient cardiac history, plan for cardiac work-up including EKG, troponin x2, check x-ray to assess for pneumothorax versus pneumonia.    Patient not complain of any chest pain was already given aspirin, will defer on any medical CARE:      FINAL IMPRESSION      1. Chest pain, unspecified type          DISPOSITION / PLAN     DISPOSITION        PATIENT REFERRED TO:  No follow-up provider specified.     DISCHARGE MEDICATIONS:  New Prescriptions    No medications on file       Arthur Guthrie MD  Emergency Medicine Resident    (Please note that portions of this note were completed with a voice recognition program.Efforts were made to edit the dictations but occasionally words are mis-transcribed.)       Arthur Guhtrie MD  Resident  07/04/21 1933       Arthur Guthrie MD  Resident  07/04/21 1956

## 2021-07-04 NOTE — ED PROVIDER NOTES
[address] ED  eMERGENCY dEPARTMENT eNCOUnter   Attending Attestation     Pt Name: Corey Rizzo  MRN: [id number]  [place] 1940  Date of evaluation: 7/4/21       Corey Rizzo is a 68 y.o. male who presents with Chest Pain, Nausea, and Shortness of Breath      History:   Patient states that about 5 to 10 minutes after eating dinner he started experiencing substernal chest pain broke out into a sweat little nausea and some shortness of breath. Patient took some nitroglycerin and aspirin and now the pain is completely gone. Patient does have an extensive cardiac history. Exam: Vitals:   Vitals:    07/04/21 1748   Pulse: 53   Resp: 16   Temp: 97.7 °F (36.5 °C)   TempSrc: Oral   SpO2: 95%   Weight: 200 lb (90.7 kg)   Height: 5' 6\" (1.676 m)     Heart regular rate rhythm no murmurs. Lungs clear to auscultation bilaterally. I performed a history and physical examination of the patient and discussed management with the resident. I reviewed the residents note and agree with the documented findings and plan of care. Any areas of disagreement are noted on the chart. I was personally present for the key portions of any procedures. I have documented in the chart those procedures where I was not present during the key portions. I have personally reviewed all images and agree with the resident's interpretation. I have reviewed the emergency nurses triage note. I agree with the chief complaint, past medical history, past surgical history, allergies, medications, social and family history as documented unless otherwise noted below. Documentation of the HPI, Physical Exam and Medical Decision Making performed by medical students or scribes is based on my personal performance of the HPI, PE and MDM. I personally evaluated and examined the patient in conjunction with the APC and agree with the assessment, treatment plan, and disposition of the patient as recorded by the APC.  Additional findings are as noted.    Syd Adams MD  Attending Emergency  Physician             Willy Betancourt MD  07/04/21 1975

## 2021-07-06 ENCOUNTER — APPOINTMENT (OUTPATIENT)
Dept: CT IMAGING | Age: 81
End: 2021-07-06
Payer: MEDICARE

## 2021-07-06 ENCOUNTER — HOSPITAL ENCOUNTER (OUTPATIENT)
Age: 81
Setting detail: OBSERVATION
Discharge: HOME | End: 2021-07-07
Attending: EMERGENCY MEDICINE | Admitting: STUDENT IN AN ORGANIZED HEALTH CARE EDUCATION/TRAINING PROGRAM
Payer: MEDICARE

## 2021-07-06 DIAGNOSIS — R07.9 CHEST PAIN, UNSPECIFIED TYPE: Primary | ICD-10-CM

## 2021-07-06 LAB
ABSOLUTE EOS #: 0.1 K/UL (ref 0–0.4)
ABSOLUTE IMMATURE GRANULOCYTE: ABNORMAL K/UL (ref 0–0.3)
ABSOLUTE LYMPH #: 1.3 K/UL (ref 1–4.8)
ABSOLUTE MONO #: 0.8 K/UL (ref 0.1–1.3)
ANION GAP SERPL CALCULATED.3IONS-SCNC: 9 MMOL/L (ref 9–17)
BASOPHILS # BLD: 0 % (ref 0–2)
BASOPHILS ABSOLUTE: 0 K/UL (ref 0–0.2)
BNP INTERPRETATION: NORMAL
BUN BLDV-MCNC: 12 MG/DL (ref 8–23)
BUN/CREAT BLD: NORMAL (ref 9–20)
CALCIUM SERPL-MCNC: 9.6 MG/DL (ref 8.6–10.4)
CHLORIDE BLD-SCNC: 105 MMOL/L (ref 98–107)
CO2: 25 MMOL/L (ref 20–31)
CREAT SERPL-MCNC: 0.71 MG/DL (ref 0.7–1.2)
DIFFERENTIAL TYPE: ABNORMAL
EKG ATRIAL RATE: 52 BPM
EKG P AXIS: 24 DEGREES
EKG P-R INTERVAL: 208 MS
EKG Q-T INTERVAL: 408 MS
EKG QRS DURATION: 90 MS
EKG QTC CALCULATION (BAZETT): 379 MS
EKG R AXIS: 82 DEGREES
EKG T AXIS: 50 DEGREES
EKG VENTRICULAR RATE: 52 BPM
EOSINOPHILS RELATIVE PERCENT: 1 % (ref 0–4)
GFR AFRICAN AMERICAN: >60 ML/MIN
GFR NON-AFRICAN AMERICAN: >60 ML/MIN
GFR SERPL CREATININE-BSD FRML MDRD: NORMAL ML/MIN/{1.73_M2}
GFR SERPL CREATININE-BSD FRML MDRD: NORMAL ML/MIN/{1.73_M2}
GLUCOSE BLD-MCNC: 89 MG/DL (ref 70–99)
HCT VFR BLD CALC: 43.5 % (ref 41–53)
HEMOGLOBIN: 14.4 G/DL (ref 13.5–17.5)
IMMATURE GRANULOCYTES: ABNORMAL %
INR BLD: 1.1
LYMPHOCYTES # BLD: 15 % (ref 24–44)
MAGNESIUM: 2 MG/DL (ref 1.6–2.6)
MCH RBC QN AUTO: 30.1 PG (ref 26–34)
MCHC RBC AUTO-ENTMCNC: 33.2 G/DL (ref 31–37)
MCV RBC AUTO: 90.8 FL (ref 80–100)
MONOCYTES # BLD: 10 % (ref 1–7)
NRBC AUTOMATED: ABNORMAL PER 100 WBC
PARTIAL THROMBOPLASTIN TIME: 27.8 SEC (ref 24–36)
PDW BLD-RTO: 14.6 % (ref 11.5–14.9)
PLATELET # BLD: 155 K/UL (ref 150–450)
PLATELET ESTIMATE: ABNORMAL
PMV BLD AUTO: 7.5 FL (ref 6–12)
POTASSIUM SERPL-SCNC: 3.9 MMOL/L (ref 3.7–5.3)
PRO-BNP: 223 PG/ML
PROTHROMBIN TIME: 14.6 SEC (ref 11.8–14.6)
RBC # BLD: 4.79 M/UL (ref 4.5–5.9)
RBC # BLD: ABNORMAL 10*6/UL
SEG NEUTROPHILS: 74 % (ref 36–66)
SEGMENTED NEUTROPHILS ABSOLUTE COUNT: 6.1 K/UL (ref 1.3–9.1)
SODIUM BLD-SCNC: 139 MMOL/L (ref 135–144)
TROPONIN INTERP: NORMAL
TROPONIN INTERP: NORMAL
TROPONIN T: NORMAL NG/ML
TROPONIN T: NORMAL NG/ML
TROPONIN, HIGH SENSITIVITY: 11 NG/L (ref 0–22)
TROPONIN, HIGH SENSITIVITY: 12 NG/L (ref 0–22)
TSH SERPL DL<=0.05 MIU/L-ACNC: 1.19 MIU/L (ref 0.3–5)
WBC # BLD: 8.2 K/UL (ref 3.5–11)
WBC # BLD: ABNORMAL 10*3/UL

## 2021-07-06 PROCEDURE — 6360000002 HC RX W HCPCS: Performed by: EMERGENCY MEDICINE

## 2021-07-06 PROCEDURE — 2580000003 HC RX 258: Performed by: EMERGENCY MEDICINE

## 2021-07-06 PROCEDURE — 85730 THROMBOPLASTIN TIME PARTIAL: CPT

## 2021-07-06 PROCEDURE — 96374 THER/PROPH/DIAG INJ IV PUSH: CPT

## 2021-07-06 PROCEDURE — 36415 COLL VENOUS BLD VENIPUNCTURE: CPT

## 2021-07-06 PROCEDURE — 84484 ASSAY OF TROPONIN QUANT: CPT

## 2021-07-06 PROCEDURE — 85025 COMPLETE CBC W/AUTO DIFF WBC: CPT

## 2021-07-06 PROCEDURE — 84443 ASSAY THYROID STIM HORMONE: CPT

## 2021-07-06 PROCEDURE — 6360000004 HC RX CONTRAST MEDICATION: Performed by: EMERGENCY MEDICINE

## 2021-07-06 PROCEDURE — 6370000000 HC RX 637 (ALT 250 FOR IP): Performed by: EMERGENCY MEDICINE

## 2021-07-06 PROCEDURE — 85610 PROTHROMBIN TIME: CPT

## 2021-07-06 PROCEDURE — 99285 EMERGENCY DEPT VISIT HI MDM: CPT

## 2021-07-06 PROCEDURE — 93010 ELECTROCARDIOGRAM REPORT: CPT | Performed by: INTERNAL MEDICINE

## 2021-07-06 PROCEDURE — 83735 ASSAY OF MAGNESIUM: CPT

## 2021-07-06 PROCEDURE — 80048 BASIC METABOLIC PNL TOTAL CA: CPT

## 2021-07-06 PROCEDURE — 83880 ASSAY OF NATRIURETIC PEPTIDE: CPT

## 2021-07-06 PROCEDURE — 74174 CTA ABD&PLVS W/CONTRAST: CPT

## 2021-07-06 PROCEDURE — 93005 ELECTROCARDIOGRAM TRACING: CPT | Performed by: INTERNAL MEDICINE

## 2021-07-06 RX ORDER — MORPHINE SULFATE 4 MG/ML
2 INJECTION, SOLUTION INTRAMUSCULAR; INTRAVENOUS ONCE
Status: COMPLETED | OUTPATIENT
Start: 2021-07-06 | End: 2021-07-06

## 2021-07-06 RX ORDER — 0.9 % SODIUM CHLORIDE 0.9 %
80 INTRAVENOUS SOLUTION INTRAVENOUS ONCE
Status: COMPLETED | OUTPATIENT
Start: 2021-07-06 | End: 2021-07-06

## 2021-07-06 RX ORDER — ASPIRIN 81 MG/1
162 TABLET, CHEWABLE ORAL ONCE
Status: COMPLETED | OUTPATIENT
Start: 2021-07-06 | End: 2021-07-06

## 2021-07-06 RX ORDER — SODIUM CHLORIDE 0.9 % (FLUSH) 0.9 %
10 SYRINGE (ML) INJECTION PRN
Status: DISCONTINUED | OUTPATIENT
Start: 2021-07-06 | End: 2021-07-07 | Stop reason: HOSPADM

## 2021-07-06 RX ADMIN — ASPIRIN 162 MG: 81 TABLET, CHEWABLE ORAL at 23:23

## 2021-07-06 RX ADMIN — IOPAMIDOL 100 ML: 755 INJECTION, SOLUTION INTRAVENOUS at 23:20

## 2021-07-06 RX ADMIN — SODIUM CHLORIDE, PRESERVATIVE FREE 10 ML: 5 INJECTION INTRAVENOUS at 23:20

## 2021-07-06 RX ADMIN — SODIUM CHLORIDE 80 ML: 9 INJECTION, SOLUTION INTRAVENOUS at 23:20

## 2021-07-06 RX ADMIN — MORPHINE SULFATE 2 MG: 4 INJECTION, SOLUTION INTRAMUSCULAR; INTRAVENOUS at 23:25

## 2021-07-06 ASSESSMENT — ENCOUNTER SYMPTOMS
BACK PAIN: 1
COUGH: 0
VOMITING: 0
ABDOMINAL PAIN: 1
DIARRHEA: 0
SHORTNESS OF BREATH: 0

## 2021-07-06 ASSESSMENT — PAIN SCALES - GENERAL: PAINLEVEL_OUTOF10: 10

## 2021-07-06 NOTE — ED TRIAGE NOTES
Pt presents to ED c/o upper back pain and chest pain that he was seen for in same ED on 7/4. Pt reports symptoms are unchanged since then. Pt is A&O x4, PWD, eupneic, and no distress noted.

## 2021-07-07 VITALS
OXYGEN SATURATION: 94 % | BODY MASS INDEX: 34.01 KG/M2 | HEART RATE: 64 BPM | HEIGHT: 66 IN | TEMPERATURE: 98.1 F | RESPIRATION RATE: 18 BRPM | DIASTOLIC BLOOD PRESSURE: 78 MMHG | SYSTOLIC BLOOD PRESSURE: 155 MMHG | WEIGHT: 211.64 LBS

## 2021-07-07 PROBLEM — E78.5 HYPERLIPIDEMIA: Status: ACTIVE | Noted: 2019-09-18

## 2021-07-07 LAB
ALBUMIN SERPL-MCNC: 3.5 G/DL (ref 3.5–5.2)
ALBUMIN/GLOBULIN RATIO: ABNORMAL (ref 1–2.5)
ALP BLD-CCNC: 75 U/L (ref 40–129)
ALT SERPL-CCNC: 10 U/L (ref 5–41)
ANION GAP SERPL CALCULATED.3IONS-SCNC: 8 MMOL/L (ref 9–17)
AST SERPL-CCNC: 15 U/L
BILIRUB SERPL-MCNC: 0.63 MG/DL (ref 0.3–1.2)
BUN BLDV-MCNC: 10 MG/DL (ref 8–23)
BUN/CREAT BLD: ABNORMAL (ref 9–20)
CALCIUM SERPL-MCNC: 9.2 MG/DL (ref 8.6–10.4)
CHLORIDE BLD-SCNC: 106 MMOL/L (ref 98–107)
CO2: 25 MMOL/L (ref 20–31)
CREAT SERPL-MCNC: 0.71 MG/DL (ref 0.7–1.2)
GFR AFRICAN AMERICAN: >60 ML/MIN
GFR NON-AFRICAN AMERICAN: >60 ML/MIN
GFR SERPL CREATININE-BSD FRML MDRD: ABNORMAL ML/MIN/{1.73_M2}
GFR SERPL CREATININE-BSD FRML MDRD: ABNORMAL ML/MIN/{1.73_M2}
GLUCOSE BLD-MCNC: 102 MG/DL (ref 70–99)
POTASSIUM SERPL-SCNC: 3.8 MMOL/L (ref 3.7–5.3)
SODIUM BLD-SCNC: 139 MMOL/L (ref 135–144)
TOTAL PROTEIN: 6.4 G/DL (ref 6.4–8.3)
TROPONIN INTERP: NORMAL
TROPONIN INTERP: NORMAL
TROPONIN T: NORMAL NG/ML
TROPONIN T: NORMAL NG/ML
TROPONIN, HIGH SENSITIVITY: 12 NG/L (ref 0–22)
TROPONIN, HIGH SENSITIVITY: 13 NG/L (ref 0–22)

## 2021-07-07 PROCEDURE — G0378 HOSPITAL OBSERVATION PER HR: HCPCS

## 2021-07-07 PROCEDURE — 2580000003 HC RX 258: Performed by: STUDENT IN AN ORGANIZED HEALTH CARE EDUCATION/TRAINING PROGRAM

## 2021-07-07 PROCEDURE — 36415 COLL VENOUS BLD VENIPUNCTURE: CPT

## 2021-07-07 PROCEDURE — 84484 ASSAY OF TROPONIN QUANT: CPT

## 2021-07-07 PROCEDURE — 6370000000 HC RX 637 (ALT 250 FOR IP): Performed by: STUDENT IN AN ORGANIZED HEALTH CARE EDUCATION/TRAINING PROGRAM

## 2021-07-07 PROCEDURE — 80053 COMPREHEN METABOLIC PANEL: CPT

## 2021-07-07 PROCEDURE — 6370000000 HC RX 637 (ALT 250 FOR IP): Performed by: INTERNAL MEDICINE

## 2021-07-07 PROCEDURE — 93005 ELECTROCARDIOGRAM TRACING: CPT | Performed by: EMERGENCY MEDICINE

## 2021-07-07 RX ORDER — DOXAZOSIN MESYLATE 1 MG/1
2 TABLET ORAL NIGHTLY
Status: DISCONTINUED | OUTPATIENT
Start: 2021-07-07 | End: 2021-07-07 | Stop reason: HOSPADM

## 2021-07-07 RX ORDER — ATORVASTATIN CALCIUM 40 MG/1
40 TABLET, FILM COATED ORAL NIGHTLY
Status: DISCONTINUED | OUTPATIENT
Start: 2021-07-07 | End: 2021-07-07 | Stop reason: HOSPADM

## 2021-07-07 RX ORDER — SODIUM CHLORIDE 0.9 % (FLUSH) 0.9 %
5-40 SYRINGE (ML) INJECTION EVERY 12 HOURS SCHEDULED
Status: DISCONTINUED | OUTPATIENT
Start: 2021-07-07 | End: 2021-07-07 | Stop reason: SDUPTHER

## 2021-07-07 RX ORDER — ACETAMINOPHEN 650 MG/1
650 SUPPOSITORY RECTAL EVERY 6 HOURS PRN
Status: DISCONTINUED | OUTPATIENT
Start: 2021-07-07 | End: 2021-07-07 | Stop reason: HOSPADM

## 2021-07-07 RX ORDER — ACETAMINOPHEN 325 MG/1
650 TABLET ORAL EVERY 4 HOURS PRN
Status: DISCONTINUED | OUTPATIENT
Start: 2021-07-07 | End: 2021-07-07 | Stop reason: SDUPTHER

## 2021-07-07 RX ORDER — ACETAMINOPHEN 325 MG/1
650 TABLET ORAL EVERY 6 HOURS PRN
Status: DISCONTINUED | OUTPATIENT
Start: 2021-07-07 | End: 2021-07-07 | Stop reason: HOSPADM

## 2021-07-07 RX ORDER — ONDANSETRON 4 MG/1
4 TABLET, ORALLY DISINTEGRATING ORAL EVERY 8 HOURS PRN
Status: DISCONTINUED | OUTPATIENT
Start: 2021-07-07 | End: 2021-07-07 | Stop reason: HOSPADM

## 2021-07-07 RX ORDER — SODIUM CHLORIDE 0.9 % (FLUSH) 0.9 %
5-40 SYRINGE (ML) INJECTION PRN
Status: DISCONTINUED | OUTPATIENT
Start: 2021-07-07 | End: 2021-07-07 | Stop reason: SDUPTHER

## 2021-07-07 RX ORDER — METOPROLOL TARTRATE 50 MG/1
50 TABLET, FILM COATED ORAL 2 TIMES DAILY
Status: DISCONTINUED | OUTPATIENT
Start: 2021-07-07 | End: 2021-07-07 | Stop reason: HOSPADM

## 2021-07-07 RX ORDER — DILTIAZEM HYDROCHLORIDE 180 MG/1
180 CAPSULE, COATED, EXTENDED RELEASE ORAL DAILY
Status: DISCONTINUED | OUTPATIENT
Start: 2021-07-07 | End: 2021-07-07 | Stop reason: HOSPADM

## 2021-07-07 RX ORDER — SODIUM CHLORIDE 9 MG/ML
25 INJECTION, SOLUTION INTRAVENOUS PRN
Status: DISCONTINUED | OUTPATIENT
Start: 2021-07-07 | End: 2021-07-07 | Stop reason: SDUPTHER

## 2021-07-07 RX ORDER — NITROGLYCERIN 0.4 MG/1
0.4 TABLET SUBLINGUAL EVERY 5 MIN PRN
Status: DISCONTINUED | OUTPATIENT
Start: 2021-07-07 | End: 2021-07-07 | Stop reason: HOSPADM

## 2021-07-07 RX ORDER — ASPIRIN 81 MG/1
81 TABLET, CHEWABLE ORAL DAILY
Status: CANCELLED | OUTPATIENT
Start: 2021-07-07

## 2021-07-07 RX ORDER — ASPIRIN 81 MG/1
81 TABLET, CHEWABLE ORAL DAILY
Status: CANCELLED | OUTPATIENT
Start: 2021-07-08

## 2021-07-07 RX ORDER — ONDANSETRON 2 MG/ML
4 INJECTION INTRAMUSCULAR; INTRAVENOUS EVERY 6 HOURS PRN
Status: DISCONTINUED | OUTPATIENT
Start: 2021-07-07 | End: 2021-07-07 | Stop reason: HOSPADM

## 2021-07-07 RX ORDER — SODIUM CHLORIDE 0.9 % (FLUSH) 0.9 %
5-40 SYRINGE (ML) INJECTION PRN
Status: DISCONTINUED | OUTPATIENT
Start: 2021-07-07 | End: 2021-07-07 | Stop reason: HOSPADM

## 2021-07-07 RX ORDER — ONDANSETRON 2 MG/ML
4 INJECTION INTRAMUSCULAR; INTRAVENOUS EVERY 6 HOURS PRN
Status: DISCONTINUED | OUTPATIENT
Start: 2021-07-07 | End: 2021-07-07 | Stop reason: SDUPTHER

## 2021-07-07 RX ORDER — SODIUM CHLORIDE 9 MG/ML
25 INJECTION, SOLUTION INTRAVENOUS PRN
Status: DISCONTINUED | OUTPATIENT
Start: 2021-07-07 | End: 2021-07-07 | Stop reason: HOSPADM

## 2021-07-07 RX ORDER — POLYETHYLENE GLYCOL 3350 17 G/17G
17 POWDER, FOR SOLUTION ORAL DAILY PRN
Status: DISCONTINUED | OUTPATIENT
Start: 2021-07-07 | End: 2021-07-07 | Stop reason: HOSPADM

## 2021-07-07 RX ORDER — ISOSORBIDE MONONITRATE 30 MG/1
30 TABLET, EXTENDED RELEASE ORAL DAILY
Status: DISCONTINUED | OUTPATIENT
Start: 2021-07-07 | End: 2021-07-07 | Stop reason: HOSPADM

## 2021-07-07 RX ORDER — ONDANSETRON 4 MG/1
4 TABLET, ORALLY DISINTEGRATING ORAL EVERY 8 HOURS PRN
Status: DISCONTINUED | OUTPATIENT
Start: 2021-07-07 | End: 2021-07-07 | Stop reason: SDUPTHER

## 2021-07-07 RX ORDER — SODIUM CHLORIDE 0.9 % (FLUSH) 0.9 %
5-40 SYRINGE (ML) INJECTION EVERY 12 HOURS SCHEDULED
Status: DISCONTINUED | OUTPATIENT
Start: 2021-07-07 | End: 2021-07-07 | Stop reason: HOSPADM

## 2021-07-07 RX ADMIN — SODIUM CHLORIDE, PRESERVATIVE FREE 10 ML: 5 INJECTION INTRAVENOUS at 12:17

## 2021-07-07 RX ADMIN — METOPROLOL TARTRATE 50 MG: 50 TABLET, FILM COATED ORAL at 12:23

## 2021-07-07 RX ADMIN — APIXABAN 5 MG: 5 TABLET, FILM COATED ORAL at 12:15

## 2021-07-07 RX ADMIN — ACETAMINOPHEN 650 MG: 325 TABLET ORAL at 12:15

## 2021-07-07 RX ADMIN — ONDANSETRON 4 MG: 4 TABLET, ORALLY DISINTEGRATING ORAL at 12:15

## 2021-07-07 RX ADMIN — DILTIAZEM HYDROCHLORIDE 180 MG: 180 CAPSULE, COATED, EXTENDED RELEASE ORAL at 12:20

## 2021-07-07 RX ADMIN — ISOSORBIDE MONONITRATE 30 MG: 30 TABLET, EXTENDED RELEASE ORAL at 12:15

## 2021-07-07 ASSESSMENT — PAIN DESCRIPTION - PROGRESSION
CLINICAL_PROGRESSION: NOT CHANGED

## 2021-07-07 ASSESSMENT — PAIN DESCRIPTION - ORIENTATION
ORIENTATION: LEFT;MID;UPPER
ORIENTATION: LEFT;MID;UPPER

## 2021-07-07 ASSESSMENT — PAIN - FUNCTIONAL ASSESSMENT
PAIN_FUNCTIONAL_ASSESSMENT: ACTIVITIES ARE NOT PREVENTED
PAIN_FUNCTIONAL_ASSESSMENT: ACTIVITIES ARE NOT PREVENTED

## 2021-07-07 ASSESSMENT — PAIN DESCRIPTION - PAIN TYPE
TYPE: ACUTE PAIN
TYPE: ACUTE PAIN

## 2021-07-07 ASSESSMENT — PAIN SCALES - GENERAL
PAINLEVEL_OUTOF10: 10
PAINLEVEL_OUTOF10: 6
PAINLEVEL_OUTOF10: 10

## 2021-07-07 ASSESSMENT — PAIN DESCRIPTION - DESCRIPTORS
DESCRIPTORS: ACHING;DISCOMFORT;DULL
DESCRIPTORS: ACHING;DISCOMFORT;DULL

## 2021-07-07 ASSESSMENT — PAIN DESCRIPTION - ONSET
ONSET: ON-GOING
ONSET: ON-GOING

## 2021-07-07 ASSESSMENT — PAIN DESCRIPTION - FREQUENCY
FREQUENCY: CONTINUOUS
FREQUENCY: CONTINUOUS

## 2021-07-07 ASSESSMENT — PAIN DESCRIPTION - LOCATION
LOCATION: BACK;SHOULDER
LOCATION: BACK

## 2021-07-07 NOTE — PROGRESS NOTES
Dr. Yancy Franks at bedside. Assessed patient. Orders received for STAT EKG and and for patient to have a stress test as an outpatient. MD orders aspirin daily. Continue home cardiac medications. MD states patient needs to follow up in 2-4 weeks. Patient states that he has an appointment with Dr. Natasha Covarrubias next week. Patient advised to keep appointment.

## 2021-07-07 NOTE — ED PROVIDER NOTES
EMERGENCY DEPARTMENT ENCOUNTER    Pt Name: Davide Pickard  MRN: [id number]  [place] 1940  Date of evaluation: 7/6/21  CHIEF COMPLAINT       Chief Complaint   Patient presents with    Back Pain     upper    Chest Pain     HISTORY OF PRESENT ILLNESS   HPI     This is a 30-year-old male with a history of atrial fibrillation on Cardizem and Eliquis who comes in today with chest pain the patient was evaluated in the emergency department 2 days ago he was chest pain-free they discharged him had a cardiology appointment on the 15th however the patient states that he continues to have chest pain it was it is midsternal and radiates to the back between his shoulder blades. He did do a lot of yard work 3 days ago and thinks that he might of pulled a muscle. He was having abdominal pain 2 days ago but he does not have abdominal pain today but he is scared to eat because of how much pain he is and he took a baby aspirin as well as a nitro without any improvement of his symptoms. He denies any vomiting or diarrhea no fevers chills or cough    REVIEW OF SYSTEMS     Review of Systems   Constitutional: Negative for fever. HENT: Negative for congestion. Respiratory: Negative for cough and shortness of breath. Cardiovascular: Positive for chest pain. Gastrointestinal: Positive for abdominal pain. Negative for diarrhea and vomiting. Genitourinary: Negative for dysuria. Musculoskeletal: Positive for back pain. Skin: Negative for rash. Neurological: Negative for headaches. All other systems reviewed and are negative.     PASTMEDICAL HISTORY     Past Medical History:   Diagnosis Date    Anxiety attack     Arthritis     Atrial fibrillation (HCC)     Atrial flutter (HCC)     Back pain     CAD (coronary artery disease)     Chest pain 04/12/2016    Colon polyps     Constipation     Dementia (HCC) 03/23/2019    Dizziness     GERD (gastroesophageal reflux disease)     Hyperlipidemia     Hypertension  Lower GI bleed     Lung nodule seen on imaging study 02/17/2019    Prediabetes     Rectal bleeding     S/P colonoscopy with polypectomy     Seizure ([facility] [address]     NOTED PER CARE EVERYWHERE- PATIENT DENIES HX OF, STATES HE HAS NEVER BEEN ON SEIZURE MEDICATION    Sleep apnea     not tested, no cpap    SOB (shortness of breath)     Status post coronary artery bypass graft     Wears dentures     TOP     SURGICAL HISTORY       Past Surgical History:   Procedure Laterality Date    CARDIAC CATHETERIZATION      CATARACT REMOVAL Bilateral     COLONOSCOPY  07/24/2018    COLONOSCOPY WITH BIOPSY performed by Arely Jensen MD at  [facility] [address] COLONOSCOPY N/A 4/19/2021    COLONOSCOPY DIAGNOSTIC performed by Dionicio Coburn MD at [facility].  2004    x4 vessel    TOTAL KNEE ARTHROPLASTY Bilateral     VENTRAL HERNIA REPAIR       CURRENT MEDICATIONS       Previous Medications    ACETAMINOPHEN (TYLENOL) 325 MG TABLET    Take 1,300 mg by mouth 2 times daily    APIXABAN (ELIQUIS) 5 MG TABS TABLET    Take 5 mg by mouth 2 times daily    DILTIAZEM (CARDIZEM CD) 180 MG EXTENDED RELEASE CAPSULE    Take 180 mg by mouth daily    DOXAZOSIN (CARDURA) 2 MG TABLET    Take 2 mg by mouth nightly    ISOSORBIDE MONONITRATE (IMDUR) 30 MG EXTENDED RELEASE TABLET    Take 1 tablet by mouth daily    METOPROLOL (LOPRESSOR) 50 MG TABLET    Take 50 mg by mouth 2 times daily. NITROGLYCERIN (NITROSTAT) 0.4 MG SL TABLET    up to max of 3 total doses. If no relief after 1 dose, call 911. OMEPRAZOLE (PRILOSEC) 20 MG CAPSULE    Take 20 mg by mouth daily. PRAVASTATIN (PRAVACHOL) 40 MG TABLET    Take 40 mg by mouth daily. ALLERGIES     is allergic to oxycodone, pcn [penicillins], and warfarin and related. FAMILY HISTORY     He indicated that the status of his mother is unknown. He indicated that the status of his father is unknown.      SOCIAL HISTORY       Social History     Tobacco Use    Smoking status: Former Smoker     Quit date:      Years since quittin.5    Smokeless tobacco: Never Used   Vaping Use    Vaping Use: Never used   Substance Use Topics    Alcohol use: No    Drug use: No     PHYSICAL EXAM     INITIAL VITALS: /60   Pulse 53   Temp 97.6 °F (36.4 °C) (Oral)   Resp 18   Ht 5' 6\" (1.676 m)   Wt 200 lb (90.7 kg)   SpO2 98%   BMI 32.28 kg/m²    Physical Exam  Vitals and nursing note reviewed. Constitutional:       General: He is not in acute distress. Appearance: He is well-developed. HENT:      Head: Normocephalic and atraumatic. Eyes:      Conjunctiva/sclera: Conjunctivae normal.   Cardiovascular:      Rate and Rhythm: Normal rate and regular rhythm. Pulses: Normal pulses. Heart sounds: No murmur heard. No friction rub. Comments: Equal strong DP and radial pulses bilaterally  Pulmonary:      Effort: Pulmonary effort is normal. No respiratory distress. Breath sounds: Normal breath sounds. No wheezing or rhonchi. Abdominal:      General: There is no distension. Palpations: Abdomen is soft. Tenderness: There is no abdominal tenderness. There is no guarding or rebound. Musculoskeletal:      Cervical back: Neck supple. Skin:     General: Skin is warm and dry. Capillary Refill: Capillary refill takes less than 2 seconds. Neurological:      Mental Status: He is alert. DIAGNOSTIC RESULTS   RADIOLOGY:All plain film, CT, MRI, and formal ultrasound images (except ED bedside ultrasound) are read by the radiologist, see reports below, unless otherwisenoted in MDM or here. CTA CHEST ABDOMEN PELVIS W CONTRAST   Final Result   1. No aortic dissection or other acute abnormality in the chest, abdomen, or   pelvis. 2. 10 mm left thyroid nodule. See recommendations below. 3. Mild emphysematous changes. 4. Bilateral indeterminate renal cystic lesions measuring up to 1.7 cm.    Recommend further evaluation with nonemergent renal protocol MRI or CT. 5. Cholelithiasis. 6. Colonic diverticulosis. RECOMMENDATIONS:   1 cm incidental left thyroid nodule. No follow-up imaging is recommended. Reference: J Am [place] Radiol. 2015 Feb;12(2): 143-50           LABS: All lab results were reviewed by myself, and all abnormals are listed below. Labs Reviewed   CBC WITH AUTO DIFFERENTIAL - Abnormal; Notable for the following components:       Result Value    Seg Neutrophils 74 (*)     Lymphocytes 15 (*)     Monocytes 10 (*)     All other components within normal limits   BASIC METABOLIC PANEL   BRAIN NATRIURETIC PEPTIDE   MAGNESIUM   TROPONIN   TROPONIN   TSH WITH REFLEX   PROTIME-INR   APTT       EMERGENCY DEPARTMENTCOURSE:   Differential diagnosis includes ACS pneumonia pneumothorax dissection. Given his chest pain radiating to his back concern for dissection will obtain a CT scan.   In addition the patient has significant cardiac history and this is his second emergency department visit in 2 days will admit for cardiac rule stratification    12:30 AM EDT  There is no elevation in creatinine no electrolyte abnormality no anion gap elevation BNP is not elevated troponin is 11 repeat is 12 TSH is normal there is no leukocytosis no anemia CTA is negative for dissection I did speak to Dr. Char Guzman who accepts admission       EKG:All EKG's are interpreted by the Emergency Department Physician who either signs or Co-signs this chart in the absence of a cardiologist.  Sinus bradycardia with a heart rate of 52 bpm first-degree AV block with a GA interval of 224 ms normal axis no acute ST or T wave changes      Vitals:    Vitals:    07/06/21 1954   BP: 127/60   Pulse: 53   Resp: 18   Temp: 97.6 °F (36.4 °C)   TempSrc: Oral   SpO2: 98%   Weight: 200 lb (90.7 kg)   Height: 5' 6\" (1.676 m)       The patient was given the following medications while in the emergency department:  Orders Placed This Encounter   Medications    aspirin chewable tablet 162 mg  morphine sulfate (PF) injection 2 mg    iopamidol (ISOVUE-370) 76 % injection 100 mL    0.9 % sodium chloride bolus    sodium chloride flush 0.9 % injection 10 mL         FINAL IMPRESSION      1.  Chest pain, unspecified type         DISPOSITION/PLAN   DISPOSITION Admitted 07/07/2021 12:30:16 AM    Gretchen Ortiz MD  Attending Emergency Physician    This charting supersedes any ED resident or staff charting and was written using speech recognition Babak Brown MD  07/07/21 0030

## 2021-07-07 NOTE — H&P
[facility]    HISTORY AND PHYSICAL EXAMINATION            Date:   7/7/2021  Patient name:  Davide Pickard  Date of admission:  7/6/2021  9:07 PM  MRN:   [id number]  Account:  [de-identified]  YOB: 1940  PCP:    Khai Alvarado  Room:   2096/2096-01  Code Status:    Prior    Chief Complaint:     Chief Complaint   Patient presents with    Back Pain     upper    Chest Pain       History Obtained From:     patient, electronic medical record    History of Present Illness:     Davide Pickard is a 68 y.o. Non-/non  male who presents with Back Pain (upper) and Chest Pain   and is admitted to the hospital for the management of chest pain. Patient presented with chest pain that started 1 day ago, pain was midsternal and left-sided radiating to the back. Was seen in the ER 2 days ago for similar complaints. Symptoms were moderate to severe in intensity. On arrival to the hospital EKG did not show any new changes, troponin was negative. Chest pain is now resolved. Patient has been evaluated by Cardiology and outpatient follow-up has been recommended. Patient currently denies any other complaints and states that he wants to go home.       Past Medical History:     Past Medical History:   Diagnosis Date    Anxiety attack     Arthritis     Atrial fibrillation (HCC)     Atrial flutter (HCC)     Back pain     CAD (coronary artery disease)     Chest pain 04/12/2016    Colon polyps     Constipation     Dementia ([address] 03/23/2019    Dizziness     GERD (gastroesophageal reflux disease)     Hyperlipidemia     Hypertension     Lower GI bleed     Lung nodule seen on imaging study 02/17/2019    Prediabetes     Rectal bleeding     S/P colonoscopy with polypectomy     Seizure ([facility] [address]     NOTED PER CARE EVERYWHERE- PATIENT DENIES HX OF, STATES HE HAS NEVER BEEN ON SEIZURE MEDICATION    Sleep apnea     not tested, no cpap    SOB (shortness of breath)     Status post coronary artery bypass graft     Wears dentures     TOP        Past Surgical History:     Past Surgical History:   Procedure Laterality Date    CARDIAC CATHETERIZATION      CATARACT REMOVAL Bilateral     COLONOSCOPY  07/24/2018    COLONOSCOPY WITH BIOPSY performed by Nandini Carrero MD at  [facility] [address] COLONOSCOPY N/A 4/19/2021    COLONOSCOPY DIAGNOSTIC performed by eMrari Cruz MD at . [address]  2004    x4 vessel    TOTAL KNEE ARTHROPLASTY Bilateral     VENTRAL HERNIA REPAIR          Medications Prior to Admission:     Prior to Admission medications    Medication Sig Start Date End Date Taking? Authorizing Provider   apixaban (ELIQUIS) 5 MG TABS tablet Take 5 mg by mouth 2 times daily   Yes Historical Provider, MD   dilTIAZem (CARDIZEM CD) 180 MG extended release capsule Take 180 mg by mouth daily   Yes Historical Provider, MD   doxazosin (CARDURA) 2 MG tablet Take 2 mg by mouth nightly   Yes Historical Provider, MD   isosorbide mononitrate (IMDUR) 30 MG extended release tablet Take 1 tablet by mouth daily 2/18/19  Yes Justus Guerra MD   nitroGLYCERIN (NITROSTAT) 0.4 MG SL tablet up to max of 3 total doses. If no relief after 1 dose, call 911. 2/17/19  Yes Justus Guerra MD   acetaminophen (TYLENOL) 325 MG tablet Take 650 mg by mouth 2 times daily    Yes Historical Provider, MD   metoprolol (LOPRESSOR) 50 MG tablet Take 50 mg by mouth 2 times daily. Yes Historical Provider, MD   pravastatin (PRAVACHOL) 40 MG tablet Take 40 mg by mouth daily. Yes Historical Provider, MD   omeprazole (PRILOSEC) 20 MG capsule Take 20 mg by mouth daily. Yes Historical Provider, MD        Allergies:     Oxycodone, Pcn [penicillins], and Warfarin and related    Social History:     Tobacco:    reports that he quit smoking about 41 years ago. He has never used smokeless tobacco.  Alcohol:      reports no history of alcohol use.   Drug Use:  reports no history of drug use. Family History:     Family History   Problem Relation Age of Onset    Alzheimer's Disease Mother     Arthritis Mother         lumbar disc disease    Heart Disease Father        Review of Systems:     Positive and Negative as described in HPI. CONSTITUTIONAL:  negative for fevers, chills, sweats, fatigue, weight loss  HEENT:  negative for vision, hearing changes, runny nose, throat pain  RESPIRATORY:  negative for shortness of breath, cough, congestion, wheezing  CARDIOVASCULAR:   positive for chest pain  GASTROINTESTINAL:  negative for nausea, vomiting, diarrhea, constipation, change in bowel habits, abdominal pain   GENITOURINARY:  negative for difficulty of urination, burning with urination, frequency   INTEGUMENT:  negative for rash, skin lesions, easy bruising   HEMATOLOGIC/LYMPHATIC:  negative for swelling/edema   ALLERGIC/IMMUNOLOGIC:  negative for urticaria , itching  ENDOCRINE:  negative increase in drinking, increase in urination, hot or cold intolerance  NEUROLOGICAL:  negative for headaches,  Seizures  BEHAVIOR/PSYCH:  negative for depression, anxiety    Physical Exam:   BP (!) 155/78   Pulse 64   Temp 98.1 °F (36.7 °C) (Oral)   Resp 18   Ht 5' 6\" (1.676 m)   Wt 211 lb 10.3 oz (96 kg)   SpO2 94%   BMI 34.16 kg/m²   Temp (24hrs), Av.2 °F (36.8 °C), Min:97.8 °F (36.6 °C), Max:98.6 °F (37 °C)    No results for input(s): POCGLU in the last 72 hours.     Intake/Output Summary (Last 24 hours) at 2021  Last data filed at 2021 1244  Gross per 24 hour   Intake 240 ml   Output --   Net 240 ml       General Appearance: alert, well appearing, and in no acute distress  Mental status: oriented to person, place, and time  Head: normocephalic, atraumatic  Neck: supple, no carotid bruits, thyroid not palpable  Lungs: Bilateral equal air entry, clear to ausculation, no wheezing, rales or rhonchi, normal effort  Cardiovascular: normal rate, regular rhythm,   Abdomen: Soft, nontender, nondistended, normal bowel sounds  Neurologic: There are no new focal motor or sensory deficits, normal muscle tone and bulk, no abnormal sensation, normal speech, cranial nerves II through XII grossly intact  Skin: No gross lesions, rashes, bruising or bleeding on exposed skin area  Extremities: peripheral pulses palpable, no pedal edema or calf pain with palpation  Psych: normal affect    Investigations:      Laboratory Testing:  Recent Results (from the past 24 hour(s))   EKG 12 Lead    Collection Time: 07/06/21  9:17 PM   Result Value Ref Range    Ventricular Rate 52 BPM    Atrial Rate 52 BPM    P-R Interval 224 ms    QRS Duration 92 ms    Q-T Interval 396 ms    QTc Calculation (Bazett) 368 ms    P Axis 33 degrees    R Axis 81 degrees    T Axis 45 degrees   CBC Auto Differential    Collection Time: 07/06/21  9:30 PM   Result Value Ref Range    WBC 8.2 3.5 - 11.0 k/uL    RBC 4.79 4.5 - 5.9 m/uL    Hemoglobin 14.4 13.5 - 17.5 g/dL    Hematocrit 43.5 41 - 53 %    MCV 90.8 80 - 100 fL    MCH 30.1 26 - 34 pg    MCHC 33.2 31 - 37 g/dL    RDW 14.6 11.5 - 14.9 %    Platelets 888 812 - 710 k/uL    MPV 7.5 6.0 - 12.0 fL    NRBC Automated NOT REPORTED per 100 WBC    Differential Type NOT REPORTED     Seg Neutrophils 74 (H) 36 - 66 %    Lymphocytes 15 (L) 24 - 44 %    Monocytes 10 (H) 1 - 7 %    Eosinophils % 1 0 - 4 %    Basophils 0 0 - 2 %    Immature Granulocytes NOT REPORTED 0 %    Segs Absolute 6.10 1.3 - 9.1 k/uL    Absolute Lymph # 1.30 1.0 - 4.8 k/uL    Absolute Mono # 0.80 0.1 - 1.3 k/uL    Absolute Eos # 0.10 0.0 - 0.4 k/uL    Basophils Absolute 0.00 0.0 - 0.2 k/uL    Absolute Immature Granulocyte NOT REPORTED 0.00 - 0.30 k/uL    WBC Morphology NOT REPORTED     RBC Morphology NOT REPORTED     Platelet Estimate NOT REPORTED    Basic Metabolic Panel    Collection Time: 07/06/21  9:30 PM   Result Value Ref Range    Glucose 89 70 - 99 mg/dL    BUN 12 8 - 23 mg/dL    CREATININE 0.71 0.70 - 1.20 mg/dL Bun/Cre Ratio NOT REPORTED 9 - 20    Calcium 9.6 8.6 - 10.4 mg/dL    Sodium 139 135 - 144 mmol/L    Potassium 3.9 3.7 - 5.3 mmol/L    Chloride 105 98 - 107 mmol/L    CO2 25 20 - 31 mmol/L    Anion Gap 9 9 - 17 mmol/L    GFR Non-African American >60 >60 mL/min    GFR African American >60 >60 mL/min    GFR Comment          GFR Staging NOT REPORTED    Brain Natriuretic Peptide    Collection Time: 07/06/21  9:30 PM   Result Value Ref Range    Pro- <300 pg/mL    BNP Interpretation Pro-BNP Reference Range:    Magnesium    Collection Time: 07/06/21  9:30 PM   Result Value Ref Range    Magnesium 2.0 1.6 - 2.6 mg/dL   Troponin    Collection Time: 07/06/21  9:30 PM   Result Value Ref Range    Troponin, High Sensitivity 11 0 - 22 ng/L    Troponin T NOT REPORTED <0.03 ng/mL    Troponin Interp NOT REPORTED    TSH with Reflex    Collection Time: 07/06/21  9:30 PM   Result Value Ref Range    TSH 1.19 0.30 - 5.00 mIU/L   Protime-INR    Collection Time: 07/06/21  9:30 PM   Result Value Ref Range    Protime 14.6 11.8 - 14.6 sec    INR 1.1    APTT    Collection Time: 07/06/21  9:30 PM   Result Value Ref Range    PTT 27.8 24.0 - 36.0 sec   Troponin    Collection Time: 07/06/21 11:27 PM   Result Value Ref Range    Troponin, High Sensitivity 12 0 - 22 ng/L    Troponin T NOT REPORTED <0.03 ng/mL    Troponin Interp NOT REPORTED    Troponin    Collection Time: 07/07/21  1:54 AM   Result Value Ref Range    Troponin, High Sensitivity 13 0 - 22 ng/L    Troponin T NOT REPORTED <0.03 ng/mL    Troponin Interp NOT REPORTED    Troponin    Collection Time: 07/07/21  5:11 AM   Result Value Ref Range    Troponin, High Sensitivity 12 0 - 22 ng/L    Troponin T NOT REPORTED <0.03 ng/mL    Troponin Interp NOT REPORTED    Comprehensive Metabolic Panel w/ Reflex to MG    Collection Time: 07/07/21  5:11 AM   Result Value Ref Range    Glucose 102 (H) 70 - 99 mg/dL    BUN 10 8 - 23 mg/dL    CREATININE 0.71 0.70 - 1.20 mg/dL    Bun/Cre Ratio NOT REPORTED 9 - 20    Calcium 9.2 8.6 - 10.4 mg/dL    Sodium 139 135 - 144 mmol/L    Potassium 3.8 3.7 - 5.3 mmol/L    Chloride 106 98 - 107 mmol/L    CO2 25 20 - 31 mmol/L    Anion Gap 8 (L) 9 - 17 mmol/L    Alkaline Phosphatase 75 40 - 129 U/L    ALT 10 5 - 41 U/L    AST 15 <40 U/L    Total Bilirubin 0.63 0.3 - 1.2 mg/dL    Total Protein 6.4 6.4 - 8.3 g/dL    Albumin 3.5 3.5 - 5.2 g/dL    Albumin/Globulin Ratio NOT REPORTED 1.0 - 2.5    GFR Non-African American >60 >60 mL/min    GFR African American >60 >60 mL/min    GFR Comment          GFR Staging NOT REPORTED        Imaging/Diagnostics:  XR CHEST PORTABLE    Result Date: 7/4/2021  No acute abnormality. CTA CHEST ABDOMEN PELVIS W CONTRAST    Result Date: 7/6/2021  1. No aortic dissection or other acute abnormality in the chest, abdomen, or pelvis. 2. 10 mm left thyroid nodule. See recommendations below. 3. Mild emphysematous changes. 4. Bilateral indeterminate renal cystic lesions measuring up to 1.7 cm. Recommend further evaluation with nonemergent renal protocol MRI or CT. 5. Cholelithiasis. 6. Colonic diverticulosis. RECOMMENDATIONS: 1 cm incidental left thyroid nodule. No follow-up imaging is recommended. Reference: J Am [place] Radiol. 2015 Feb;12(2): 143-50       Assessment :      Hospital Problems         Last Modified POA    * (Principal) Chest pain 7/7/2021 Yes    Atrial fibrillation ([address] 7/7/2021 Yes    Coronary artery disease involving coronary bypass graft of native heart without angina pectoris 7/7/2021 Yes    Dementia ([address] 7/7/2021 Yes    Hyperlipidemia 7/7/2021 Yes          Plan:     Patient status observation in the Progressive Unit/Step down    1. Chest pain, resolved. Has been evaluated by Cardiology. Medical management and outpatient follow-up advised. Needs stress test as outpatient. Continue pravastatin, Imdur, metoprolol. 2.  History of AFib, continue Cardizem, Eliquis    3.   Will discharge home today with outpatient Cardiology follow-up. Consultations:   IP CONSULT TO FAMILY MEDICINE  IP CONSULT TO CARDIOLOGY     Patient is admitted as inpatient status because of co-morbidities listed above, severity of signs and symptoms as outlined, requirement for current medical therapies and most importantly because of direct risk to patient if care not provided in a hospital setting. Expected length of stay > 48 hours.     Lianna Garrison MD  7/7/2021  8:36 PM    Copy sent to Dr. Katerin Augustin

## 2021-07-07 NOTE — CONSULTS
[facility] PHYSICIANS CARDIOLOGY CONSULT NOTE      Date of Admission:  7/6/2021    Date of Consultation:  7/7/2021    PCP:  Fausto Alvarado:  Chest pain. HISTORY OF PRESENT ILLNESS:  Danyel Hawkins is a 68 y.o. male   With history of CAD, CABG x 4 16 years ago at [facility], paroxysmal atrial fibrillation/ flutter, on chronic oral anticoagulants, hypertension, hyperlipidemia, history of TIP who was admitted after developing left scapular pain and epigastric spasms. Initially presented to the emergency room on 07/04/2021 and was discharged to home from the ER but had recurrent discomfort prompting admission. Per daughter Angel Knowles who was at bedside at the time of my evaluation, patient was moving bulging bags etc..      Since admission, patient was ruled out for MI by serial cardiac enzymes. We are asked to see for further evaluation and management of chest pain. At the time of my evaluation patient is resting comfortably. Denies any palpitation or lightheadedness or dizziness or leg swelling or bleeding or syncope. No cough or fever or chills. Please refer to admitting history and physical and other providers notes for further details. Patient is normally followed up by Dr. Wally Fernandez in our office and I reviewed his last office visit note. Most recently seen by Dr. Chilango Yo in the office, upon review of EHR data.         PMH:   has a past medical history of Anxiety attack, Arthritis, Atrial fibrillation ([address] Atrial flutter ([address] Back pain, CAD (coronary artery disease), Chest pain, Colon polyps, Constipation, Dementia ([address] Dizziness, GERD (gastroesophageal reflux disease), Hyperlipidemia, Hypertension, Lower GI bleed, Lung nodule seen on imaging study, Prediabetes, Rectal bleeding, S/P colonoscopy with polypectomy, Seizure ([address] Sleep apnea, SOB (shortness of breath), Status post coronary artery bypass graft, and Wears dentures. PSH:   has a past surgical history that includes Coronary artery bypass graft (2004); Cataract removal (Bilateral); ventral hernia repair; Total knee arthroplasty (Bilateral); Colonoscopy (07/24/2018); Cardiac catheterization; and Colonoscopy (N/A, 4/19/2021). Allergies: Allergies   Allergen Reactions    Oxycodone Other (See Comments)     Attacking people, black out     Pcn [Penicillins] Other (See Comments)     dizziness    Warfarin And Related      \" I see things\"         Home Meds:    Prior to Admission medications    Medication Sig Start Date End Date Taking? Authorizing Provider   apixaban (ELIQUIS) 5 MG TABS tablet Take 5 mg by mouth 2 times daily   Yes Historical Provider, MD   dilTIAZem (CARDIZEM CD) 180 MG extended release capsule Take 180 mg by mouth daily   Yes Historical Provider, MD   doxazosin (CARDURA) 2 MG tablet Take 2 mg by mouth nightly   Yes Historical Provider, MD   isosorbide mononitrate (IMDUR) 30 MG extended release tablet Take 1 tablet by mouth daily 2/18/19  Yes Bernarda Conley MD   nitroGLYCERIN (NITROSTAT) 0.4 MG SL tablet up to max of 3 total doses. If no relief after 1 dose, call 911. 2/17/19  Yes Bernarda Conley MD   acetaminophen (TYLENOL) 325 MG tablet Take 650 mg by mouth 2 times daily    Yes Historical Provider, MD   metoprolol (LOPRESSOR) 50 MG tablet Take 50 mg by mouth 2 times daily. Yes Historical Provider, MD   pravastatin (PRAVACHOL) 40 MG tablet Take 40 mg by mouth daily. Yes Historical Provider, MD   omeprazole (PRILOSEC) 20 MG capsule Take 20 mg by mouth daily.    Yes Historical Provider, MD        Hospital Meds:    Current Facility-Administered Medications   Medication Dose Route Frequency Provider Last Rate Last Admin    sodium chloride flush 0.9 % injection 5-40 mL  5-40 mL Intravenous 2 times per day Jim Vieyra MD        sodium chloride flush 0.9 % injection 5-40 mL  5-40 mL Intravenous PRN Jim Vieyra MD        0.9 % sodium chloride infusion  25 mL Intravenous PRN Bella Gomez MD        ondansetron (ZOFRAN-ODT) disintegrating tablet 4 mg  4 mg Oral Q8H PRN Bella Gomez MD        Or    ondansetron [facility]) injection 4 mg  4 mg Intravenous Q6H PRN Bella Gomez MD        acetaminophen (TYLENOL) tablet 650 mg  650 mg Oral Q6H PRN Bella Gomez MD        Or    acetaminophen (TYLENOL) suppository 650 mg  650 mg Rectal Q6H PRN Bella Gomez MD        polyethylene glycol (GLYCOLAX) packet 17 g  17 g Oral Daily PRN Bella Gomez MD        atorvastatin (LIPITOR) tablet 40 mg  40 mg Oral Nightly Bella Gomez MD        apixaban (ELIQUIS) tablet 5 mg  5 mg Oral BID Bella Gomez MD        isosorbide mononitrate (IMDUR) extended release tablet 30 mg  30 mg Oral Daily Bella Gomez MD        dilTIAZem (CARDIZEM CD) extended release capsule 180 mg  180 mg Oral Daily Conchis Raya MD        metoprolol tartrate (LOPRESSOR) tablet 50 mg  50 mg Oral BID Conchis Raya MD        nitroGLYCERIN (NITROSTAT) SL tablet 0.4 mg  0.4 mg Sublingual Q5 Min PRN Conchis Raya MD        doxazosin (CARDURA) tablet 2 mg  2 mg Oral Nightly Royce Matias MD        sodium chloride flush 0.9 % injection 10 mL  10 mL Intravenous PRN Sumi Diaz MD   10 mL at 21 [id number]       Social History:    Social History     Socioeconomic History    Marital status:      Spouse name: None    Number of children: None    Years of education: None    Highest education level: None   Occupational History    None   Tobacco Use    Smoking status: Former Smoker     Quit date:      Years since quittin.5    Smokeless tobacco: Never Used   Vaping Use    Vaping Use: Never used   Substance and Sexual Activity    Alcohol use: No    Drug use: No    Sexual activity: None   Other Topics Concern    None   Social History Narrative    None     Social Determinants of Health     Financial Resource Strain:     Difficulty of Paying Living Expenses:    Food Insecurity:     Worried About Running Out of Food in the Last Year:     [address] in the Last Year:    Transportation Needs:     Lack of Transportation (Medical):  Lack of Transportation (Non-Medical):    Physical Activity:     Days of Exercise per Week:     Minutes of Exercise per Session:    Stress:     Feeling of Stress :    Social Connections:     Frequency of Communication with Friends and Family:     Frequency of Social Gatherings with Friends and Family:     Attends Rastafari Services:     Active Member of Clubs or Organizations:     Attends Club or Organization Meetings:     Marital Status:    Intimate Partner Violence:     Fear of Current or Ex-Partner:     Emotionally Abused:     Physically Abused:     Sexually Abused:        Family History:    Family History   Problem Relation Age of Onset    Alzheimer's Disease Mother     Arthritis Mother         lumbar disc disease    Heart Disease Father        Review of Systems:      · Constitutional: no weight loss or gain, no fever or chills. · Eyes: No new visual changes. · ENT: No new hearing loss. · Cardiovascular: see HPI. · Respiratory: No cough. No hemoptysis. · Gastrointestinal: No abdominal pain, no blood in stools. · Genitourinary: No hematuria or increased frequency. · Musculoskeletal:  No new gait disturbance. · Integumentary: No rash or pruritis. · Neurological: No headache. No seizures or recent CVA/TIA. · Psychiatric: No anxiety or depression. · Hematologic/Lymphatic: No abnormal bruising or bleeding. · Allergic/Immunologic: No new allergies. Physical Exam   Vital Signs: BP (!) 147/77   Pulse 57   Temp 98.6 °F (37 °C) (Oral)   Resp 18   Ht 5' 6\" (1.676 m)   Wt 211 lb 10.3 oz (96 kg)   SpO2 96%   BMI 34.16 kg/m²        Admission Weight: 200 lb (90.7 kg)     General appearance: Awake, Alert, well developed, well nourished, pleasant. Cooperative.   Laying in bed comfortably. Head: Normocephalic, atraumatic. Neck: no JVD, no carotid bruits, no thyromegaly. Chest: Clear bilaterally. No chest wall tenderness. No wheezing. Cardiac: Regular rate and rhythm, no S3 or S4 gallop, no murmur or rubs or clicks. Abdomen: Soft, non-tender. Extremities: no cyanosis or clubbing or leg edema. No calf tenderness. Pulses:  Bilaterally symmetrical and intact radial pulses. Neurologic:  Able to move all 4 extremities. Skin:  No rashes. Warm and dry.       EKG:     Sinus bradycardia with 1st degree A-V block   Otherwise normal ECG   When compared with ECG of 04-JUL-2021 17:50,   No significant change was found      Labs:      CBC:   Recent Labs     07/04/21 1750 07/06/21 2130   WBC 6.3 8.2   HGB 13.9 14.4   HCT 41.7 43.5   MCV 90.4 90.8    155     BMP:   Recent Labs     07/04/21 1750 07/06/21 2130 07/07/21  0511    139 139   K 4.1 3.9 3.8    105 106   CO2 24 25 25   BUN 14 12 10   CREATININE 0.93 0.71 0.71     PT/INR:   Recent Labs     07/06/21 2130   PROTIME 14.6   INR 1.1     APTT:   Recent Labs     07/06/21 2130   APTT 27.8     MAG:   Recent Labs     07/06/21 2130   MG 2.0       Recent Results (from the past 24 hour(s))   EKG 12 Lead    Collection Time: 07/06/21  9:17 PM   Result Value Ref Range    Ventricular Rate 52 BPM    Atrial Rate 52 BPM    P-R Interval 224 ms    QRS Duration 92 ms    Q-T Interval 396 ms    QTc Calculation (Bazett) 368 ms    P Axis 33 degrees    R Axis 81 degrees    T Axis 45 degrees   CBC Auto Differential    Collection Time: 07/06/21  9:30 PM   Result Value Ref Range    WBC 8.2 3.5 - 11.0 k/uL    RBC 4.79 4.5 - 5.9 m/uL    Hemoglobin 14.4 13.5 - 17.5 g/dL    Hematocrit 43.5 41 - 53 %    MCV 90.8 80 - 100 fL    MCH 30.1 26 - 34 pg    MCHC 33.2 31 - 37 g/dL    RDW 14.6 11.5 - 14.9 %    Platelets 774 530 - 502 k/uL    MPV 7.5 6.0 - 12.0 fL    NRBC Automated NOT REPORTED per 100 WBC    Differential Type NOT REPORTED Seg Neutrophils 74 (H) 36 - 66 %    Lymphocytes 15 (L) 24 - 44 %    Monocytes 10 (H) 1 - 7 %    Eosinophils % 1 0 - 4 %    Basophils 0 0 - 2 %    Immature Granulocytes NOT REPORTED 0 %    Segs Absolute 6.10 1.3 - 9.1 k/uL    Absolute Lymph # 1.30 1.0 - 4.8 k/uL    Absolute Mono # 0.80 0.1 - 1.3 k/uL    Absolute Eos # 0.10 0.0 - 0.4 k/uL    Basophils Absolute 0.00 0.0 - 0.2 k/uL    Absolute Immature Granulocyte NOT REPORTED 0.00 - 0.30 k/uL    WBC Morphology NOT REPORTED     RBC Morphology NOT REPORTED     Platelet Estimate NOT REPORTED    Basic Metabolic Panel    Collection Time: 07/06/21  9:30 PM   Result Value Ref Range    Glucose 89 70 - 99 mg/dL    BUN 12 8 - 23 mg/dL    CREATININE 0.71 0.70 - 1.20 mg/dL    Bun/Cre Ratio NOT REPORTED 9 - 20    Calcium 9.6 8.6 - 10.4 mg/dL    Sodium 139 135 - 144 mmol/L    Potassium 3.9 3.7 - 5.3 mmol/L    Chloride 105 98 - 107 mmol/L    CO2 25 20 - 31 mmol/L    Anion Gap 9 9 - 17 mmol/L    GFR Non-African American >60 >60 mL/min    GFR African American >60 >60 mL/min    GFR Comment          GFR Staging NOT REPORTED    Brain Natriuretic Peptide    Collection Time: 07/06/21  9:30 PM   Result Value Ref Range    Pro- <300 pg/mL    BNP Interpretation Pro-BNP Reference Range:    Magnesium    Collection Time: 07/06/21  9:30 PM   Result Value Ref Range    Magnesium 2.0 1.6 - 2.6 mg/dL   Troponin    Collection Time: 07/06/21  9:30 PM   Result Value Ref Range    Troponin, High Sensitivity 11 0 - 22 ng/L    Troponin T NOT REPORTED <0.03 ng/mL    Troponin Interp NOT REPORTED    TSH with Reflex    Collection Time: 07/06/21  9:30 PM   Result Value Ref Range    TSH 1.19 0.30 - 5.00 mIU/L   Protime-INR    Collection Time: 07/06/21  9:30 PM   Result Value Ref Range    Protime 14.6 11.8 - 14.6 sec    INR 1.1    APTT    Collection Time: 07/06/21  9:30 PM   Result Value Ref Range    PTT 27.8 24.0 - 36.0 sec   Troponin    Collection Time: 07/06/21 11:27 PM   Result Value Ref Range Troponin, High Sensitivity 12 0 - 22 ng/L    Troponin T NOT REPORTED <0.03 ng/mL    Troponin Interp NOT REPORTED    Troponin    Collection Time: 07/07/21  1:54 AM   Result Value Ref Range    Troponin, High Sensitivity 13 0 - 22 ng/L    Troponin T NOT REPORTED <0.03 ng/mL    Troponin Interp NOT REPORTED    Troponin    Collection Time: 07/07/21  5:11 AM   Result Value Ref Range    Troponin, High Sensitivity 12 0 - 22 ng/L    Troponin T NOT REPORTED <0.03 ng/mL    Troponin Interp NOT REPORTED    Comprehensive Metabolic Panel w/ Reflex to MG    Collection Time: 07/07/21  5:11 AM   Result Value Ref Range    Glucose 102 (H) 70 - 99 mg/dL    BUN 10 8 - 23 mg/dL    CREATININE 0.71 0.70 - 1.20 mg/dL    Bun/Cre Ratio NOT REPORTED 9 - 20    Calcium 9.2 8.6 - 10.4 mg/dL    Sodium 139 135 - 144 mmol/L    Potassium 3.8 3.7 - 5.3 mmol/L    Chloride 106 98 - 107 mmol/L    CO2 25 20 - 31 mmol/L    Anion Gap 8 (L) 9 - 17 mmol/L    Alkaline Phosphatase 75 40 - 129 U/L    ALT 10 5 - 41 U/L    AST 15 <40 U/L    Total Bilirubin 0.63 0.3 - 1.2 mg/dL    Total Protein 6.4 6.4 - 8.3 g/dL    Albumin 3.5 3.5 - 5.2 g/dL    Albumin/Globulin Ratio NOT REPORTED 1.0 - 2.5    GFR Non-African American >60 >60 mL/min    GFR African American >60 >60 mL/min    GFR Comment          GFR Staging NOT REPORTED          IMPRESSION:      Atypical chest pain. Ruled out for myocardial infarction. Most likely musculoskeletal etiology but cannot rule out any angina or underlying CAD related pain. ASCAD, prior CABG x 4 sixteen years ago at [facility].      Essential hypertension. Hyperlipidemia. History of TIP. Other problems as charted. REC/PLAN:     As ordered. Resume all prior home cardiac medications.       Okay to discharge patient to home and perform outpatient nuclear stress test and follow up closely in the office with Dr. Madhavi Schilling, primary cardiologist.      If stress test is abnormal patient will require a repeat diagnostic cardiac catheterization to rule out any revascularizable coronary artery disease. Discussed with patient's daughter at bedside and in the presence of the charge nurse at the time of my rounds. Thank you once again for your kind consultation. Electronically signed by Vero Carrero MD, [facility]      PLEASE NOTE:  This progress note was completed using a voice transcription system. Every effort was made to ensure accuracy. However, inadvertent computerized transcription errors may be present.

## 2021-07-07 NOTE — FLOWSHEET NOTE
07/07/21 0227   Provider Notification   Reason for Communication Evaluate  (New consult)   Provider Name Dr. Ronda Christina   Provider Notification Physician   Method of Communication Page   Response Other (Comment)  (Placed on morning round list be answering service)   Notification Time 0228   Shift Event Other (comment)  (New consult. )

## 2021-07-07 NOTE — CARE COORDINATION
CASE MANAGEMENT NOTE:    Admission Date:  7/6/2021 Chandler Brizuela is a 68 y.o.  male    Admitted for : Chest pain [R07.9]    Met with:  Patient    PCP:  Dr. Gabrielle Casey:  [employer]      Is patient alert and oriented at time of discussion:  Yes    Current Residence/ Living Arrangements:  independently at home             Current Services PTA:  No    Does patient go to outpatient dialysis: No  If yes, location and chair time:     Is patient agreeable to VNS: No    Freedom of choice provided:  No    List of [address] provided: No    VNS chosen:  NA    DME:  straight cane, walker and other shower chair    Home Oxygen: No    Nebulizer: No    CPAP/BIPAP: No    Supplier: N/A    Potential Assistance Needed: No    SNF needed: No    Freedom of choice and list provided: No    [facility]       Does Patient want to use MEDS to BEDS? No    Is patient currently receiving oral anticoagulation therapy? No    Is the Patient an [facility] with Readmission Risk Score greater than 14%? No  If yes, pt needs a follow up appointment made within 7 days. Family Members/Caregivers that pt would like involved in their care:    Yes    If yes, list name here:  June    Transportation Provider:  Family             Discharge Plan:  7/7/21 [employer] Pt is from home in a one story home with his wife he uses a cane, walker and shower chair pt denies need for vns plan is to discharge to home with no needs will continue to follow .//tv                   Electronically signed by:  Blaise Suggs RN on 7/7/2021 at 11:12 AM

## 2021-07-08 LAB
EKG ATRIAL RATE: 52 BPM
EKG ATRIAL RATE: 65 BPM
EKG P AXIS: 33 DEGREES
EKG P AXIS: 48 DEGREES
EKG P-R INTERVAL: 182 MS
EKG P-R INTERVAL: 224 MS
EKG Q-T INTERVAL: 376 MS
EKG Q-T INTERVAL: 396 MS
EKG QRS DURATION: 90 MS
EKG QRS DURATION: 92 MS
EKG QTC CALCULATION (BAZETT): 368 MS
EKG QTC CALCULATION (BAZETT): 391 MS
EKG R AXIS: 102 DEGREES
EKG R AXIS: 81 DEGREES
EKG T AXIS: 23 DEGREES
EKG T AXIS: 45 DEGREES
EKG VENTRICULAR RATE: 52 BPM
EKG VENTRICULAR RATE: 65 BPM

## 2021-07-08 PROCEDURE — 93010 ELECTROCARDIOGRAM REPORT: CPT | Performed by: INTERNAL MEDICINE

## 2021-07-08 NOTE — DISCHARGE SUMMARY
2015 Feb;12(2): 143-50       Consultations:    Consults:     Final Specialist Recommendations/Findings:   IP CONSULT TO FAMILY MEDICINE  IP CONSULT TO CARDIOLOGY      The patient was seen and examined on day of discharge and this discharge summary is in conjunction with any daily progress note from day of discharge. Discharge plan:     Disposition: Home    Physician Follow Up:     Johnnie De Oliveira MD  [address] [place] [address], [id number]  [facility] [address]  [phone]    Schedule an appointment as soon as possible for a visit in 1 week           Activity: As tolerated    Discharge Medications:      Medication List      CONTINUE taking these medications    acetaminophen 325 MG tablet  Commonly known as: TYLENOL     dilTIAZem 180 MG extended release capsule  Commonly known as: CARDIZEM CD     doxazosin 2 MG tablet  Commonly known as: CARDURA     Eliquis 5 MG Tabs tablet  Generic drug: apixaban     isosorbide mononitrate 30 MG extended release tablet  Commonly known as: IMDUR  Take 1 tablet by mouth daily     metoprolol tartrate 50 MG tablet  Commonly known as: LOPRESSOR     nitroGLYCERIN 0.4 MG SL tablet  Commonly known as: NITROSTAT  up to max of 3 total doses. If no relief after 1 dose, call 911. omeprazole 20 MG delayed release capsule  Commonly known as: PRILOSEC     pravastatin 40 MG tablet  Commonly known as: PRAVACHOL            Discharge Procedure Orders   NM MYOCARDIAL SPECT REST EXERCISE OR RX   Standing Status: Future Standing Exp. Date: 07/07/22     Order Specific Question Answer Comments   Reason for Exam? Chest pain    Procedure Type Rx        Time Spent on discharge is  35 mins in patient examination, evaluation, counseling as well as medication reconciliation, prescriptions for required medications, discharge plan and follow up. Electronically signed by   Jenny Nelson MD  7/7/2021  8:38 PM      Thank you Dr. Denzel Bumpers for the opportunity to be involved in this patient's care.

## 2021-09-20 ENCOUNTER — APPOINTMENT (OUTPATIENT)
Dept: GENERAL RADIOLOGY | Age: 81
DRG: 303 | End: 2021-09-20
Payer: MEDICARE

## 2021-09-20 ENCOUNTER — HOSPITAL ENCOUNTER (INPATIENT)
Age: 81
LOS: 3 days | Discharge: HOME | DRG: 303 | End: 2021-09-23
Attending: EMERGENCY MEDICINE | Admitting: INTERNAL MEDICINE
Payer: MEDICARE

## 2021-09-20 DIAGNOSIS — R07.9 CHEST PAIN, UNSPECIFIED TYPE: Primary | ICD-10-CM

## 2021-09-20 PROBLEM — I10 ESSENTIAL HYPERTENSION: Status: ACTIVE | Noted: 2021-09-20

## 2021-09-20 LAB
ABSOLUTE BANDS #: 0.05 K/UL (ref 0–1)
ABSOLUTE EOS #: 0 K/UL (ref 0–0.4)
ABSOLUTE IMMATURE GRANULOCYTE: ABNORMAL K/UL (ref 0–0.3)
ABSOLUTE LYMPH #: 0.97 K/UL (ref 1–4.8)
ABSOLUTE MONO #: 0.59 K/UL (ref 0.1–1.3)
ANION GAP SERPL CALCULATED.3IONS-SCNC: 9 MMOL/L (ref 9–17)
BANDS: 1 % (ref 0–10)
BASOPHILS # BLD: 0 % (ref 0–2)
BASOPHILS ABSOLUTE: 0 K/UL (ref 0–0.2)
BNP INTERPRETATION: NORMAL
BUN BLDV-MCNC: 12 MG/DL (ref 8–23)
BUN/CREAT BLD: NORMAL (ref 9–20)
CALCIUM SERPL-MCNC: 9.6 MG/DL (ref 8.6–10.4)
CHLORIDE BLD-SCNC: 107 MMOL/L (ref 98–107)
CO2: 25 MMOL/L (ref 20–31)
CREAT SERPL-MCNC: 0.89 MG/DL (ref 0.7–1.2)
DIFFERENTIAL TYPE: ABNORMAL
EOSINOPHILS RELATIVE PERCENT: 0 % (ref 0–4)
GFR AFRICAN AMERICAN: >60 ML/MIN
GFR NON-AFRICAN AMERICAN: >60 ML/MIN
GFR SERPL CREATININE-BSD FRML MDRD: NORMAL ML/MIN/{1.73_M2}
GFR SERPL CREATININE-BSD FRML MDRD: NORMAL ML/MIN/{1.73_M2}
GLUCOSE BLD-MCNC: 91 MG/DL (ref 70–99)
HCT VFR BLD CALC: 40.4 % (ref 41–53)
HEMOGLOBIN: 13.6 G/DL (ref 13.5–17.5)
IMMATURE GRANULOCYTES: ABNORMAL %
INR BLD: 1.2
LYMPHOCYTES # BLD: 18 % (ref 24–44)
MCH RBC QN AUTO: 30.6 PG (ref 26–34)
MCHC RBC AUTO-ENTMCNC: 33.7 G/DL (ref 31–37)
MCV RBC AUTO: 90.9 FL (ref 80–100)
MONOCYTES # BLD: 11 % (ref 1–7)
MORPHOLOGY: ABNORMAL
MORPHOLOGY: ABNORMAL
NRBC AUTOMATED: ABNORMAL PER 100 WBC
PARTIAL THROMBOPLASTIN TIME: 30.9 SEC (ref 24–36)
PDW BLD-RTO: 13.5 % (ref 11.5–14.9)
PLATELET # BLD: 168 K/UL (ref 150–450)
PLATELET ESTIMATE: ABNORMAL
PMV BLD AUTO: 7.1 FL (ref 6–12)
POTASSIUM SERPL-SCNC: 4.1 MMOL/L (ref 3.7–5.3)
PRO-BNP: 266 PG/ML
PROTHROMBIN TIME: 15 SEC (ref 11.8–14.6)
RBC # BLD: 4.44 M/UL (ref 4.5–5.9)
RBC # BLD: ABNORMAL 10*6/UL
SEG NEUTROPHILS: 70 % (ref 36–66)
SEGMENTED NEUTROPHILS ABSOLUTE COUNT: 3.79 K/UL (ref 1.3–9.1)
SODIUM BLD-SCNC: 141 MMOL/L (ref 135–144)
TROPONIN INTERP: NORMAL
TROPONIN INTERP: NORMAL
TROPONIN T: NORMAL NG/ML
TROPONIN T: NORMAL NG/ML
TROPONIN, HIGH SENSITIVITY: 13 NG/L (ref 0–22)
TROPONIN, HIGH SENSITIVITY: 13 NG/L (ref 0–22)
WBC # BLD: 5.4 K/UL (ref 3.5–11)
WBC # BLD: ABNORMAL 10*3/UL

## 2021-09-20 PROCEDURE — 85730 THROMBOPLASTIN TIME PARTIAL: CPT

## 2021-09-20 PROCEDURE — 80048 BASIC METABOLIC PNL TOTAL CA: CPT

## 2021-09-20 PROCEDURE — 2580000003 HC RX 258: Performed by: INTERNAL MEDICINE

## 2021-09-20 PROCEDURE — 93005 ELECTROCARDIOGRAM TRACING: CPT | Performed by: EMERGENCY MEDICINE

## 2021-09-20 PROCEDURE — 99285 EMERGENCY DEPT VISIT HI MDM: CPT

## 2021-09-20 PROCEDURE — 6370000000 HC RX 637 (ALT 250 FOR IP): Performed by: INTERNAL MEDICINE

## 2021-09-20 PROCEDURE — 71045 X-RAY EXAM CHEST 1 VIEW: CPT

## 2021-09-20 PROCEDURE — 36415 COLL VENOUS BLD VENIPUNCTURE: CPT

## 2021-09-20 PROCEDURE — 84484 ASSAY OF TROPONIN QUANT: CPT

## 2021-09-20 PROCEDURE — 2060000000 HC ICU INTERMEDIATE R&B

## 2021-09-20 PROCEDURE — 83880 ASSAY OF NATRIURETIC PEPTIDE: CPT

## 2021-09-20 PROCEDURE — 85025 COMPLETE CBC W/AUTO DIFF WBC: CPT

## 2021-09-20 PROCEDURE — 85610 PROTHROMBIN TIME: CPT

## 2021-09-20 RX ORDER — SODIUM CHLORIDE 0.9 % (FLUSH) 0.9 %
5-40 SYRINGE (ML) INJECTION EVERY 12 HOURS SCHEDULED
Status: DISCONTINUED | OUTPATIENT
Start: 2021-09-20 | End: 2021-09-23 | Stop reason: HOSPADM

## 2021-09-20 RX ORDER — SODIUM CHLORIDE 9 MG/ML
25 INJECTION, SOLUTION INTRAVENOUS PRN
Status: DISCONTINUED | OUTPATIENT
Start: 2021-09-20 | End: 2021-09-23 | Stop reason: HOSPADM

## 2021-09-20 RX ORDER — DILTIAZEM HYDROCHLORIDE 180 MG/1
180 CAPSULE, COATED, EXTENDED RELEASE ORAL DAILY
Status: DISCONTINUED | OUTPATIENT
Start: 2021-09-21 | End: 2021-09-23 | Stop reason: HOSPADM

## 2021-09-20 RX ORDER — PRAVASTATIN SODIUM 40 MG
40 TABLET ORAL DAILY
Status: DISCONTINUED | OUTPATIENT
Start: 2021-09-21 | End: 2021-09-23 | Stop reason: HOSPADM

## 2021-09-20 RX ORDER — PANTOPRAZOLE SODIUM 40 MG/1
40 TABLET, DELAYED RELEASE ORAL
Status: DISCONTINUED | OUTPATIENT
Start: 2021-09-21 | End: 2021-09-23 | Stop reason: HOSPADM

## 2021-09-20 RX ORDER — METOPROLOL TARTRATE 5 MG/5ML
5 INJECTION INTRAVENOUS EVERY 6 HOURS PRN
Status: DISCONTINUED | OUTPATIENT
Start: 2021-09-20 | End: 2021-09-23 | Stop reason: HOSPADM

## 2021-09-20 RX ORDER — ISOSORBIDE MONONITRATE 30 MG/1
30 TABLET, EXTENDED RELEASE ORAL DAILY
Status: DISCONTINUED | OUTPATIENT
Start: 2021-09-21 | End: 2021-09-23 | Stop reason: HOSPADM

## 2021-09-20 RX ORDER — ACETAMINOPHEN 325 MG/1
650 TABLET ORAL EVERY 4 HOURS PRN
Status: DISCONTINUED | OUTPATIENT
Start: 2021-09-20 | End: 2021-09-23 | Stop reason: HOSPADM

## 2021-09-20 RX ORDER — ONDANSETRON 2 MG/ML
4 INJECTION INTRAMUSCULAR; INTRAVENOUS EVERY 6 HOURS PRN
Status: DISCONTINUED | OUTPATIENT
Start: 2021-09-20 | End: 2021-09-23 | Stop reason: HOSPADM

## 2021-09-20 RX ORDER — ONDANSETRON 4 MG/1
4 TABLET, ORALLY DISINTEGRATING ORAL EVERY 8 HOURS PRN
Status: DISCONTINUED | OUTPATIENT
Start: 2021-09-20 | End: 2021-09-23 | Stop reason: HOSPADM

## 2021-09-20 RX ORDER — SODIUM CHLORIDE 0.9 % (FLUSH) 0.9 %
5-40 SYRINGE (ML) INJECTION PRN
Status: DISCONTINUED | OUTPATIENT
Start: 2021-09-20 | End: 2021-09-23 | Stop reason: HOSPADM

## 2021-09-20 RX ORDER — DOXAZOSIN MESYLATE 1 MG/1
2 TABLET ORAL NIGHTLY
Status: DISCONTINUED | OUTPATIENT
Start: 2021-09-20 | End: 2021-09-23 | Stop reason: HOSPADM

## 2021-09-20 RX ORDER — METOPROLOL TARTRATE 50 MG/1
50 TABLET, FILM COATED ORAL 2 TIMES DAILY
Status: DISCONTINUED | OUTPATIENT
Start: 2021-09-20 | End: 2021-09-23 | Stop reason: HOSPADM

## 2021-09-20 RX ORDER — NITROGLYCERIN 0.4 MG/1
0.4 TABLET SUBLINGUAL EVERY 5 MIN PRN
Status: DISCONTINUED | OUTPATIENT
Start: 2021-09-20 | End: 2021-09-23 | Stop reason: HOSPADM

## 2021-09-20 RX ADMIN — SODIUM CHLORIDE, PRESERVATIVE FREE 10 ML: 5 INJECTION INTRAVENOUS at 22:45

## 2021-09-20 RX ADMIN — METOPROLOL TARTRATE 50 MG: 50 TABLET, FILM COATED ORAL at 22:44

## 2021-09-20 RX ADMIN — APIXABAN 5 MG: 5 TABLET, FILM COATED ORAL at 22:45

## 2021-09-20 RX ADMIN — DOXAZOSIN 2 MG: 1 TABLET ORAL at 22:44

## 2021-09-20 RX ADMIN — ACETAMINOPHEN 650 MG: 325 TABLET ORAL at 23:54

## 2021-09-20 ASSESSMENT — ENCOUNTER SYMPTOMS
ABDOMINAL PAIN: 0
SHORTNESS OF BREATH: 1
EYE PAIN: 0
COLOR CHANGE: 0
BACK PAIN: 0

## 2021-09-20 ASSESSMENT — PAIN SCALES - GENERAL
PAINLEVEL_OUTOF10: 2
PAINLEVEL_OUTOF10: 2

## 2021-09-20 NOTE — ED TRIAGE NOTES
Mode of arrival (squad #, walk in, police, etc) : [facility] complaint(s): Chest pain        Arrival Note (brief scenario, treatment PTA, etc). : Pt arrives to ED c/o chest pain. Patient was out doing yard work when he developed chest pain with shortness of breath. Patient took one of his own nitro at home with some relief. Patient was given 324mg of aspirin by EMS. Patient c/o \"muscle pain\" upon arrival to ED. C= \"Have you ever felt that you should Cut down on your drinking? \"  No  A= \"Have people Annoyed you by criticizing your drinking? \"  No  G= \"Have you ever felt bad or Guilty about your drinking? \"  No  E= \"Have you ever had a drink as an Eye-opener first thing in the morning to steady your nerves or to help a hangover? \"  No      Deferred []      Reason for deferring: N/A    *If yes to two or more: probable alcohol abuse. *

## 2021-09-20 NOTE — PROGRESS NOTES
Medication History completed:    No changes to medication list at this encounter. Medications confirmed with patient. Patient's preferred pharmacy is the [facility].      Thank you,  Chris Kapadia, PharmD, BCPS  [phone]

## 2021-09-20 NOTE — ED PROVIDER NOTES
EMERGENCY DEPARTMENT ENCOUNTER    Pt Name: Pratima Kim  MRN: [id number]  [place] 1940  Date of evaluation: 9/20/21  CHIEF COMPLAINT       Chief Complaint   Patient presents with    Chest Pain     HISTORY OF PRESENT ILLNESS   72-year-old male presents for complaint of chest pain. Patient reports he was outside raking and doing yard work and developed chest pain. States it is located in the center of his chest, describes it as a pressure, denies radiation of pain, states that got better after he took a nitro and rested, states he was feeling mildly nauseous and short of breath when he was having the pain. Patient denies any recent illness or sick contacts, denies any associate abdominal pain, vomiting, diarrhea, fevers, chills or cough. Patient with history of quadruple bypass    The history is provided by the patient. REVIEW OF SYSTEMS     Review of Systems   Constitutional: Negative for chills and fever. HENT: Negative for congestion and ear pain. Eyes: Negative for pain. Respiratory: Positive for shortness of breath. Cardiovascular: Positive for chest pain. Negative for palpitations and leg swelling. Gastrointestinal: Negative for abdominal pain. Genitourinary: Negative for dysuria and flank pain. Musculoskeletal: Negative for back pain. Skin: Negative for color change. Neurological: Negative for numbness and headaches. Psychiatric/Behavioral: Negative for confusion. All other systems reviewed and are negative.     PASTMEDICAL HISTORY     Past Medical History:   Diagnosis Date    Anxiety attack     Arthritis     Atrial fibrillation (HCC)     Atrial flutter (HCC)     Back pain     CAD (coronary artery disease)     Chest pain 04/12/2016    Colon polyps     Constipation     Dementia (HCC) 03/23/2019    Dizziness     GERD (gastroesophageal reflux disease)     Hyperlipidemia     Hypertension     Lower GI bleed     Lung nodule seen on imaging study 02/17/2019  Prediabetes     Rectal bleeding     S/P colonoscopy with polypectomy     Seizure ([facility]     NOTED PER CARE EVERYWHERE- PATIENT DENIES HX OF, STATES HE HAS NEVER BEEN ON SEIZURE MEDICATION    Sleep apnea     not tested, no cpap    SOB (shortness of breath)     Status post coronary artery bypass graft     Wears dentures     TOP     Past Problem List  Patient Active Problem List   Diagnosis Code    Atrial fibrillation ([facility] I48.91    Chest pain R07.9    Rectal bleeding K62.5    Lower GI bleed K92.2    Morbid [facility] E66.01    S/P colonoscopy with polypectomy Z98.890    Elevated INR R79.1    Lung nodule seen on imaging study R91.1    Coronary artery disease involving coronary bypass graft of native heart without angina pectoris I25.810    Dementia ([facility] F03.90    Hyperlipidemia E78.5     SURGICAL HISTORY       Past Surgical History:   Procedure Laterality Date    CARDIAC CATHETERIZATION      CATARACT REMOVAL Bilateral     COLONOSCOPY  07/24/2018    COLONOSCOPY WITH BIOPSY performed by Omer [facility]as MD at [facility]    COLONOSCOPY N/A 4/19/2021    COLONOSCOPY DIAGNOSTIC performed by Betty Eller MD at [facility]  2004    x4 vessel    TOTAL KNEE ARTHROPLASTY Bilateral     VENTRAL HERNIA REPAIR       CURRENT MEDICATIONS       Current Discharge Medication List      CONTINUE these medications which have NOT CHANGED    Details   apixaban (ELIQUIS) 5 MG TABS tablet Take 5 mg by mouth 2 times daily      dilTIAZem (CARDIZEM CD) 180 MG extended release capsule Take 180 mg by mouth daily      doxazosin (CARDURA) 2 MG tablet Take 2 mg by mouth nightly      isosorbide mononitrate (IMDUR) 30 MG extended release tablet Take 1 tablet by mouth daily  Qty: 30 tablet, Refills: 3      nitroGLYCERIN (NITROSTAT) 0.4 MG SL tablet up to max of 3 total doses. If no relief after 1 dose, call 911.   Qty: 25 tablet, Refills: 3      acetaminophen (TYLENOL) 325 MG tablet General: No focal deficit present. Mental Status: He is alert and oriented to person, place, and time. Psychiatric:         Behavior: Behavior normal.         MEDICAL DECISION MAKIN-year-old male presents for complaint of chest pain. Initial exam patient in no acute distress, vitals reviewed patient noted to be mildly bradycardic at 54, old records reviewed patient is not any bradycardic in the past, will check cardiac work-up, patient was given aspirin by EMS, patient took a home dose of nitro, currently chest pain-free    Labs reviewed and unremarkable x-ray negative for acute process    Patient continues to be chest pain-free during the course of the ED stay, pulse improved    Given patient's significant history of coronary artery disease and no recent cardiac work-up will admit for further cardiac evaluation    Discussed with patient, he is agreeable to admission    Spoke with Dr. Robertha Homans who accepts admission. Patient demonstrates understanding and agreement with the plan, was given the opportunity to ask questions, and these questions were answered to the best of the provided information at this time. VS stable for transfer. This dictation was prepared using HESIOD[employer] voice recognition software. CRITICAL CARE:       PROCEDURES:    Procedures    DIAGNOSTIC RESULTS   EKG:All EKG's are interpreted by the Emergency Department Physician who either signs or Co-signs this chart in the absence of a cardiologist.    Bradycardia rate of 45, normal axis, normal intervals, no significant ST segment elevation or depression, no significant T wave changes    RADIOLOGY:All plain film, CT, MRI, and formal ultrasound images (except ED bedside ultrasound) are read by the radiologist, see reports below, unless otherwisenoted in MDM or here. XR CHEST PORTABLE   Final Result   No acute findings. LABS: All lab results were reviewed by myself, and all abnormals are listed below.   Labs Reviewed   CBC WITH AUTO DIFFERENTIAL - Abnormal; Notable for the following components:       Result Value    RBC 4.44 (*)     Hematocrit 40.4 (*)     Seg Neutrophils 70 (*)     Lymphocytes 18 (*)     Monocytes 11 (*)     Absolute Lymph # 0.97 (*)     All other components within normal limits   PROTIME-INR - Abnormal; Notable for the following components:    Protime 15.0 (*)     All other components within normal limits   BASIC METABOLIC PANEL W/ REFLEX TO MG FOR LOW K   TROPONIN   BRAIN NATRIURETIC PEPTIDE   APTT   TROPONIN       EMERGENCY DEPARTMENTCOURSE:         Vitals:    Vitals:    09/20/21 1843 09/20/21 1900 09/20/21 2119 09/20/21 2141   BP: (!) 128/59 (!) 109/59 (!) 159/83 (!) 170/97   Pulse: (!) 48 50 51 54   Resp: 16 16 22 18   Temp: 97.9 °F (36.6 °C) 98.3 °F (36.8 °C) 97.9 °F (36.6 °C) 97.4 °F (36.3 °C)   TempSrc: Oral Oral Oral Oral   SpO2: 97% 97% 97% 97%   Weight:       Height:           The patient was given the following medications while in the emergency department:  Orders Placed This Encounter   Medications    sodium chloride flush 0.9 % injection 5-40 mL    sodium chloride flush 0.9 % injection 5-40 mL    0.9 % sodium chloride infusion    enoxaparin (LOVENOX) injection 40 mg    acetaminophen (TYLENOL) tablet 650 mg    OR Linked Order Group     ondansetron (ZOFRAN-ODT) disintegrating tablet 4 mg     ondansetron (ZOFRAN) injection 4 mg     CONSULTS:  IP CONSULT TO FAMILY MEDICINE    FINAL IMPRESSION      1. Chest pain, unspecified type          DISPOSITION/PLAN   DISPOSITION Admitted 09/20/2021 08:57:25 PM      PATIENT REFERRED TO:  No follow-up provider specified.   DISCHARGE MEDICATIONS:  Current Discharge Medication List        Ana Judd DO  Attending Emergency Physician                  Ana Judd DO  09/20/21 3605

## 2021-09-21 ENCOUNTER — APPOINTMENT (OUTPATIENT)
Dept: NUCLEAR MEDICINE | Age: 81
DRG: 303 | End: 2021-09-21
Payer: MEDICARE

## 2021-09-21 PROBLEM — I25.10 ASCVD (ARTERIOSCLEROTIC CARDIOVASCULAR DISEASE): Status: ACTIVE | Noted: 2021-09-21

## 2021-09-21 LAB
ABSOLUTE EOS #: 0.2 K/UL (ref 0–0.4)
ABSOLUTE IMMATURE GRANULOCYTE: ABNORMAL K/UL (ref 0–0.3)
ABSOLUTE LYMPH #: 1.4 K/UL (ref 1–4.8)
ABSOLUTE MONO #: 0.6 K/UL (ref 0.1–1.3)
ALBUMIN SERPL-MCNC: 3.3 G/DL (ref 3.5–5.2)
ALBUMIN/GLOBULIN RATIO: ABNORMAL (ref 1–2.5)
ALP BLD-CCNC: 73 U/L (ref 40–129)
ALT SERPL-CCNC: 10 U/L (ref 5–41)
ANION GAP SERPL CALCULATED.3IONS-SCNC: 7 MMOL/L (ref 9–17)
AST SERPL-CCNC: 12 U/L
BASOPHILS # BLD: 1 % (ref 0–2)
BASOPHILS ABSOLUTE: 0 K/UL (ref 0–0.2)
BILIRUB SERPL-MCNC: 0.48 MG/DL (ref 0.3–1.2)
BUN BLDV-MCNC: 10 MG/DL (ref 8–23)
BUN/CREAT BLD: ABNORMAL (ref 9–20)
CALCIUM SERPL-MCNC: 9.4 MG/DL (ref 8.6–10.4)
CHLORIDE BLD-SCNC: 110 MMOL/L (ref 98–107)
CO2: 26 MMOL/L (ref 20–31)
CREAT SERPL-MCNC: 0.79 MG/DL (ref 0.7–1.2)
DIFFERENTIAL TYPE: ABNORMAL
EOSINOPHILS RELATIVE PERCENT: 4 % (ref 0–4)
GFR AFRICAN AMERICAN: >60 ML/MIN
GFR NON-AFRICAN AMERICAN: >60 ML/MIN
GFR SERPL CREATININE-BSD FRML MDRD: ABNORMAL ML/MIN/{1.73_M2}
GFR SERPL CREATININE-BSD FRML MDRD: ABNORMAL ML/MIN/{1.73_M2}
GLUCOSE BLD-MCNC: 88 MG/DL (ref 70–99)
HCT VFR BLD CALC: 39.9 % (ref 41–53)
HEMOGLOBIN: 13.3 G/DL (ref 13.5–17.5)
IMMATURE GRANULOCYTES: ABNORMAL %
LV EF: 74 %
LVEF MODALITY: NORMAL
LYMPHOCYTES # BLD: 28 % (ref 24–44)
MCH RBC QN AUTO: 30.1 PG (ref 26–34)
MCHC RBC AUTO-ENTMCNC: 33.2 G/DL (ref 31–37)
MCV RBC AUTO: 90.5 FL (ref 80–100)
MONOCYTES # BLD: 12 % (ref 1–7)
NRBC AUTOMATED: ABNORMAL PER 100 WBC
PDW BLD-RTO: 13.5 % (ref 11.5–14.9)
PLATELET # BLD: 168 K/UL (ref 150–450)
PLATELET ESTIMATE: ABNORMAL
PMV BLD AUTO: 7.5 FL (ref 6–12)
POTASSIUM SERPL-SCNC: 4.1 MMOL/L (ref 3.7–5.3)
RBC # BLD: 4.41 M/UL (ref 4.5–5.9)
RBC # BLD: ABNORMAL 10*6/UL
SEG NEUTROPHILS: 55 % (ref 36–66)
SEGMENTED NEUTROPHILS ABSOLUTE COUNT: 2.7 K/UL (ref 1.3–9.1)
SODIUM BLD-SCNC: 143 MMOL/L (ref 135–144)
TOTAL PROTEIN: 6.1 G/DL (ref 6.4–8.3)
TROPONIN INTERP: NORMAL
TROPONIN T: NORMAL NG/ML
TROPONIN, HIGH SENSITIVITY: 14 NG/L (ref 0–22)
WBC # BLD: 4.9 K/UL (ref 3.5–11)
WBC # BLD: ABNORMAL 10*3/UL

## 2021-09-21 PROCEDURE — 84484 ASSAY OF TROPONIN QUANT: CPT

## 2021-09-21 PROCEDURE — 2060000000 HC ICU INTERMEDIATE R&B

## 2021-09-21 PROCEDURE — 78452 HT MUSCLE IMAGE SPECT MULT: CPT

## 2021-09-21 PROCEDURE — 6370000000 HC RX 637 (ALT 250 FOR IP): Performed by: INTERNAL MEDICINE

## 2021-09-21 PROCEDURE — 36415 COLL VENOUS BLD VENIPUNCTURE: CPT

## 2021-09-21 PROCEDURE — 93017 CV STRESS TEST TRACING ONLY: CPT

## 2021-09-21 PROCEDURE — 3430000000 HC RX DIAGNOSTIC RADIOPHARMACEUTICAL: Performed by: INTERNAL MEDICINE

## 2021-09-21 PROCEDURE — 2580000003 HC RX 258: Performed by: INTERNAL MEDICINE

## 2021-09-21 PROCEDURE — 85025 COMPLETE CBC W/AUTO DIFF WBC: CPT

## 2021-09-21 PROCEDURE — 80053 COMPREHEN METABOLIC PANEL: CPT

## 2021-09-21 PROCEDURE — A9500 TC99M SESTAMIBI: HCPCS | Performed by: INTERNAL MEDICINE

## 2021-09-21 RX ORDER — ALBUTEROL SULFATE 90 UG/1
2 AEROSOL, METERED RESPIRATORY (INHALATION) PRN
Status: ACTIVE | OUTPATIENT
Start: 2021-09-21 | End: 2021-09-21

## 2021-09-21 RX ORDER — SODIUM CHLORIDE 9 MG/ML
500 INJECTION, SOLUTION INTRAVENOUS CONTINUOUS PRN
Status: ACTIVE | OUTPATIENT
Start: 2021-09-21 | End: 2021-09-21

## 2021-09-21 RX ORDER — AMINOPHYLLINE DIHYDRATE 25 MG/ML
50 INJECTION, SOLUTION INTRAVENOUS PRN
Status: CANCELLED | OUTPATIENT
Start: 2021-09-21 | End: 2021-09-21

## 2021-09-21 RX ORDER — ATROPINE SULFATE 0.1 MG/ML
0.5 INJECTION INTRAVENOUS EVERY 5 MIN PRN
Status: ACTIVE | OUTPATIENT
Start: 2021-09-21 | End: 2021-09-21

## 2021-09-21 RX ORDER — ALBUTEROL SULFATE 90 UG/1
2 AEROSOL, METERED RESPIRATORY (INHALATION) PRN
Status: CANCELLED | OUTPATIENT
Start: 2021-09-21 | End: 2021-09-21

## 2021-09-21 RX ORDER — NITROGLYCERIN 0.4 MG/1
0.4 TABLET SUBLINGUAL EVERY 5 MIN PRN
Status: CANCELLED | OUTPATIENT
Start: 2021-09-21 | End: 2021-09-21

## 2021-09-21 RX ORDER — SODIUM CHLORIDE 0.9 % (FLUSH) 0.9 %
10 SYRINGE (ML) INJECTION PRN
Status: DISCONTINUED | OUTPATIENT
Start: 2021-09-21 | End: 2021-09-23 | Stop reason: HOSPADM

## 2021-09-21 RX ORDER — METOPROLOL TARTRATE 5 MG/5ML
5 INJECTION INTRAVENOUS EVERY 5 MIN PRN
Status: ACTIVE | OUTPATIENT
Start: 2021-09-21 | End: 2021-09-21

## 2021-09-21 RX ORDER — SODIUM CHLORIDE 9 MG/ML
500 INJECTION, SOLUTION INTRAVENOUS CONTINUOUS PRN
Status: CANCELLED | OUTPATIENT
Start: 2021-09-21 | End: 2021-09-21

## 2021-09-21 RX ORDER — SODIUM CHLORIDE 0.9 % (FLUSH) 0.9 %
5-40 SYRINGE (ML) INJECTION PRN
Status: ACTIVE | OUTPATIENT
Start: 2021-09-21 | End: 2021-09-21

## 2021-09-21 RX ORDER — ATROPINE SULFATE 0.1 MG/ML
0.5 INJECTION INTRAVENOUS EVERY 5 MIN PRN
Status: CANCELLED | OUTPATIENT
Start: 2021-09-21 | End: 2021-09-21

## 2021-09-21 RX ORDER — METOPROLOL TARTRATE 5 MG/5ML
5 INJECTION INTRAVENOUS EVERY 5 MIN PRN
Status: CANCELLED | OUTPATIENT
Start: 2021-09-21 | End: 2021-09-21

## 2021-09-21 RX ORDER — NITROGLYCERIN 0.4 MG/1
0.4 TABLET SUBLINGUAL EVERY 5 MIN PRN
Status: ACTIVE | OUTPATIENT
Start: 2021-09-21 | End: 2021-09-21

## 2021-09-21 RX ORDER — ASPIRIN 81 MG/1
81 TABLET, CHEWABLE ORAL DAILY
Status: DISCONTINUED | OUTPATIENT
Start: 2021-09-21 | End: 2021-09-23 | Stop reason: HOSPADM

## 2021-09-21 RX ORDER — SODIUM CHLORIDE 0.9 % (FLUSH) 0.9 %
5-40 SYRINGE (ML) INJECTION PRN
Status: CANCELLED | OUTPATIENT
Start: 2021-09-21 | End: 2021-09-21

## 2021-09-21 RX ADMIN — DILTIAZEM HYDROCHLORIDE 180 MG: 180 CAPSULE, COATED, EXTENDED RELEASE ORAL at 12:19

## 2021-09-21 RX ADMIN — TETRAKIS(2-METHOXYISOBUTYLISOCYANIDE)COPPER(I) TETRAFLUOROBORATE 30.6 MILLICURIE: 1 INJECTION, POWDER, LYOPHILIZED, FOR SOLUTION INTRAVENOUS at 13:32

## 2021-09-21 RX ADMIN — APIXABAN 5 MG: 5 TABLET, FILM COATED ORAL at 12:20

## 2021-09-21 RX ADMIN — ASPIRIN 81 MG: 81 TABLET, CHEWABLE ORAL at 12:20

## 2021-09-21 RX ADMIN — PANTOPRAZOLE SODIUM 40 MG: 40 TABLET, DELAYED RELEASE ORAL at 12:20

## 2021-09-21 RX ADMIN — ISOSORBIDE MONONITRATE 30 MG: 30 TABLET, EXTENDED RELEASE ORAL at 12:19

## 2021-09-21 RX ADMIN — SODIUM CHLORIDE, PRESERVATIVE FREE 10 ML: 5 INJECTION INTRAVENOUS at 10:18

## 2021-09-21 RX ADMIN — SODIUM CHLORIDE, PRESERVATIVE FREE 10 ML: 5 INJECTION INTRAVENOUS at 13:33

## 2021-09-21 RX ADMIN — SODIUM CHLORIDE, PRESERVATIVE FREE 10 ML: 5 INJECTION INTRAVENOUS at 12:25

## 2021-09-21 RX ADMIN — SODIUM CHLORIDE, PRESERVATIVE FREE 10 ML: 5 INJECTION INTRAVENOUS at 19:57

## 2021-09-21 RX ADMIN — TETRAKIS(2-METHOXYISOBUTYLISOCYANIDE)COPPER(I) TETRAFLUOROBORATE 12 MILLICURIE: 1 INJECTION, POWDER, LYOPHILIZED, FOR SOLUTION INTRAVENOUS at 10:18

## 2021-09-21 RX ADMIN — DOXAZOSIN 2 MG: 1 TABLET ORAL at 19:57

## 2021-09-21 RX ADMIN — METOPROLOL TARTRATE 50 MG: 50 TABLET, FILM COATED ORAL at 19:57

## 2021-09-21 RX ADMIN — PRAVASTATIN SODIUM 40 MG: 40 TABLET ORAL at 12:20

## 2021-09-21 RX ADMIN — SODIUM CHLORIDE, PRESERVATIVE FREE 10 ML: 5 INJECTION INTRAVENOUS at 09:58

## 2021-09-21 ASSESSMENT — ENCOUNTER SYMPTOMS
COLOR CHANGE: 0
ABDOMINAL DISTENTION: 0
COUGH: 0
BACK PAIN: 0
TROUBLE SWALLOWING: 0
SHORTNESS OF BREATH: 1
BLOOD IN STOOL: 0
ANAL BLEEDING: 0
PHOTOPHOBIA: 0
RECTAL PAIN: 0
CHOKING: 0
VOICE CHANGE: 0
STRIDOR: 0

## 2021-09-21 ASSESSMENT — PAIN SCALES - GENERAL
PAINLEVEL_OUTOF10: 1
PAINLEVEL_OUTOF10: 0

## 2021-09-21 NOTE — CARE COORDINATION
CASE MANAGEMENT NOTE:    Admission Date:  9/20/2021 Brooklyn Vargas is a 68 y.o.  male    Admitted for : Chest pain [R07.9]    Met with:  Patient    PCP:  Gasper Seip                                Insurance:  [address] [place] [address] Dr. Medicare      Is patient alert and oriented at time of discussion:  Yes    Current Residence/ Living Arrangements:  independently at home w/ Wife             Current Services PTA:  No    Does patient go to outpatient dialysis: No  If yes, location and chair time: NA    Is patient agreeable to VNS: No    Freedom of choice provided:  Yes    List of [address] provided: No    VNS chosen:  No, Denies the need    DME:  straight cane, walker and shower chair    Home Oxygen: No    Nebulizer: No    CPAP/BIPAP: No    Supplier: N/A    Potential Assistance Needed: No    SNF needed: No    Freedom of choice and list provided: NA    Pharm[facility]:  [facility]       Does Patient want to use MEDS to BEDS? No    Is patient currently receiving oral anticoagulation therapy? Yes, Eliquis PTA    Is the Patient an [facility] with Readmission Risk Score greater than 14%? No  If yes, pt needs a follow up appointment made within 7 days. Family Members/Caregivers that pt would like involved in their care:    Yes    If yes, list name here:  Wife, Emerald, Son, Floreen Paget    Transportation Provider:  Patient             Discharge Plan:  9/21/21 [employer] Medicare Pt. Lives in 1 story w/ Wife. DME judah Prasad [place]. Denies VNS.  Stress Test, Cardio, Denies needs, will follow//KB                Electronically signed by: Rico Tse RN on 9/21/2021 at 2:23 PM

## 2021-09-21 NOTE — PROGRESS NOTES
Dr. Eugene Cisneros notified of stress test results. Order to keep patient NPO after midnight for possible cath tomorrow. Dr will round on patient in the morning to discuss with patient. Also order to hold evening and morning dose of eliquis.

## 2021-09-21 NOTE — ED NOTES
Report given to Amie Crow RN from ED. Report method BY PHONE   The following was reviewed with receiving RN:   Current vital signs:  BP (!) 159/83   Pulse 51   Temp 97.9 °F (36.6 °C) (Oral)   Resp 22   Ht 5' 6\" (1.676 m)   Wt 190 lb (86.2 kg)   SpO2 97%   BMI 30.67 kg/m²                MEWS Score: 2     Any medication or safety alerts were reviewed. Any pending diagnostics and notifications were also reviewed, as well as any safety concerns or issues, abnormal labs, abnormal imaging, and abnormal assessment findings. Questions were answered. Pt denies chest pain at this time of admit to floor. Transferred in stable condition.           Yas Perales RN  09/20/21 [id number]

## 2021-09-21 NOTE — PROGRESS NOTES
This RN spoke over the phone with Dr. Todd Virk about patient being admitted to unit and for comprehensive orders. See orders.

## 2021-09-21 NOTE — PROCEDURES
[address] [place], [place] [address]                              CARDIAC STRESS TEST    PATIENT NAME: Dennise Mercer                   :        1940  MED REC NO:   [id number]                              ROOM:       2092  ACCOUNT NO:   [de-identified]                           ADMIT DATE: 2021  PROVIDER:     Simran Krause    DATE OF STUDY:  2021    TEST TYPE: CARDIOLYTE TREADMILL STRESS TEST  INDICATION: CHEST PAIN  REFERRING PHYSICIAN: DR. Kaitlynn Hua    100% MAX PREDICTED HEART RATE: 142 BEATS PER MINUTE  85% MAX PREDICTED HEART RATE: 121 BEATS PER MINUTE  RESTING HEART RATE: 72 BEATS PER MINUTE  MAXIMUM HEART RATE ACHIEVED: 110 BEATS PER MINUTE  PERCENTAGE OF AGE PREDICTED MAXIMUM ACHIEVED: 77%  RESTING BLOOD PRESSURE: 144/93  PEAK BLOOD PRESSURE: 168/89  PEAK DOUBLE PRODUCT: 18,480  METS: 7.0    MEDICATION(S) GIVEN: NONE  REASON FOR TERMINATION: DYSPNEA, FATIGUE  TOTAL TIME ON TREADMILL: 5:24 MINUTES    RESTING EKG: ABNORMAL  COMMENTS: NORMAL SINUS RHYTHM, HEART RATE 72 BEATS PER MINUTE, RT. AXIS  STRESS HEART RESPONSE: BLUNTED - DUE TO MEDICATION  BLOOD PRESSURE RESPONSE: APPROPRIATE  STRESS EKGs: NORMAL  CHEST DISCOMFORT: NO PAIN  ISCHEMIC EKG CHANGES: NONE    EKG IMPRESSION: ELECTROCARDIOGRAPHICALLY NEGATIVE TREADMILL STRESS TEST. RADIOISOTOPE RESULTS TO FOLLOW FROM DEPARTMENT OF NUCLEAR MEDICINE. COMMENTS: PT EX 5:30 MINUTES, MAXIMUM HEART RATE 110 BEATS PER MINUTE,  76% MAXIMUM HEART RATE, RARE PREMATURE VENTRICULAR CONTRACTION, LIMITED  STUDY DUE TO SYMPTOMS. PT HAD BEEN ON BETA BLOCKER.     Yaneth Coronado    D: 2021 12:13:59       T: 2021 12:14:50     [id number]  Job#: [id number]     Doc#: Unknown    CC:    (Retain this field even if not dictated or not decipherable)

## 2021-09-21 NOTE — PLAN OF CARE
Problem: Pain:  Goal: Pain level will decrease  Description: Pain level will decrease  Outcome: Ongoing  Goal: Control of acute pain  Description: Control of acute pain  Outcome: Ongoing  Goal: Control of chronic pain  Description: Control of chronic pain  Outcome: Ongoing     Problem: Falls - Risk of:  Goal: Will remain free from falls  Description: Will remain free from falls  Outcome: Ongoing  Note: Patient calls out appropriately. Side rails up x2.   Goal: Absence of physical injury  Description: Absence of physical injury  Outcome: Ongoing

## 2021-09-21 NOTE — CONSULTS
[address],  [facility]  [phone]    HISTORY & PHYSICAL / CONSULT NOTE     Brooklyn Arriaga    PCP:  Kori Bateman    Date of Admission:  9/20/2021  Date of Consultation:  9/21/2021 9:37 AM    Consult for  Chest pain    SUBJECTIVE     History of Present Illness:  Brooklyn Arriaga is a 68 y.o. male  History of coronary disease status post CABG x4 about 16 years ago at [facility] beads, paroxysmal atrial fibrillation/ flutter on home Eliquis, lower GI bleeding 1 year ago, hypertension, hyperlipidemia, lung nodule, former smoker. Patient presented [facility] the ED [facility] complaining of chest pain. Patient has been having on and off chest pain for couple of months, was scheduled to have a Lexiscan nuclear stress test as outpatient although has not gone to yet. Yesterday while working in [employer] he started to feel some chest tightness associated with some shortness of breath and nausea sweats this lasted for about an hour, he took a baby aspirin although did not take sublingual nitroglycerin. He decided to come to the ED for evaluation. Denies any palpitations or dizziness or orthopnea or leg edema. No syncope or presyncope. EKG done on arrival was negative for ischemia. Patient is sinus bradycardia. Two serial high sensitivity troponins were negative. ProBNP was normal.      Patient blood pressure elevated. This morning patient resting complained bed with no chest pain or shortness of breath. Telemetry showing patient normal sinus rhythm with rare PVCs. No significant events noted.     Previous Medical History:    Past Medical History:   Diagnosis Date    Anxiety attack     Arthritis     Atrial fibrillation (HCC)     Atrial flutter (HCC)     Back pain     CAD (coronary artery disease)     Chest pain 04/12/2016    Colon polyps     Constipation     Dementia ([facility] 03/23/2019    Dizziness     GERD (gastroesophageal reflux disease)     Hyperlipidemia     Hypertension     Lower GI bleed     Lung nodule seen on imaging study 02/17/2019    Prediabetes     Rectal bleeding     S/P colonoscopy with polypectomy     Seizure ([facility]     NOTED PER CARE EVERYWHERE- PATIENT DENIES HX OF, STATES HE HAS NEVER BEEN ON SEIZURE MEDICATION    Sleep apnea     not tested, no cpap    SOB (shortness of breath)     Status post coronary artery bypass graft     Wears dentures     TOP       Previous Surgical History:    Past Surgical History:   Procedure Laterality Date    CARDIAC CATHETERIZATION      CATARACT REMOVAL Bilateral     COLONOSCOPY  07/24/2018    COLONOSCOPY WITH BIOPSY performed by Pedro Galan MD at  [facility] [address] COLONOSCOPY N/A 4/19/2021    COLONOSCOPY DIAGNOSTIC performed by Mark Sanders MD at [facility].  2004    x4 vessel    TOTAL KNEE ARTHROPLASTY Bilateral     VENTRAL HERNIA REPAIR         Allergies:     Allergies   Allergen Reactions    Oxycodone Other (See Comments)     Attacking people, black out     Pcn [Penicillins] Other (See Comments)     dizziness    Warfarin And Related      \" I see things\"         Hospital Meds:    Current Facility-Administered Medications   Medication Dose Route Frequency Provider Last Rate Last Admin    sodium chloride flush 0.9 % injection 5-40 mL  5-40 mL IntraVENous PRN Cleveland Quispe MD        0.9 % sodium chloride infusion  500 mL IntraVENous Continuous PRN Cleveland Quispe MD        albuterol sulfate  (90 Base) MCG/ACT inhaler 2 puff  2 puff Inhalation PRAL Quispe MD        atropine injection 0.5 mg  0.5 mg IntraVENous Q5 Min PRN Cleveland Quispe MD        nitroGLYCERIN (NITROSTAT) SL tablet 0.4 mg  0.4 mg SubLINGual Q5 Min PRN Cleveland Quispe MD        metoprolol (LOPRESSOR) injection 5 mg  5 mg IntraVENous Q5 Min PRN Cleveland Quispe MD        sodium chloride flush 0.9 % injection 5-40 mL 5-40 mL IntraVENous 2 times per day Royer Leach MD   10 mL at 09/20/21 2245    sodium chloride flush 0.9 % injection 5-40 mL  5-40 mL IntraVENous PRAL Leach MD        0.9 % sodium chloride infusion  25 mL IntraVENous PRAL Leach MD        acetaminophen (TYLENOL) tablet 650 mg  650 mg Oral Q4H PRN Royer Leach MD   650 mg at 09/20/21 2354    ondansetron (ZOFRAN-ODT) disintegrating tablet 4 mg  4 mg Oral Q8H PRAL Leach MD        Or    ondansetron [facility]) injection 4 mg  4 mg IntraVENous Q6H PRN Royer Leach MD        apixaban [facility]) tablet 5 mg  5 mg Oral BID Royer Leach MD   5 mg at 09/20/21 2245    dilTIAZem (CARDIZEM CD) extended release capsule 180 mg  180 mg Oral Daily Royer Leach MD        doxazosin (CARDURA) tablet 2 mg  2 mg Oral Nightly Royer Leach MD   2 mg at 09/20/21 2244    isosorbide mononitrate (IMDUR) extended release tablet 30 mg  30 mg Oral Daily Royer Leach MD        metoprolol tartrate (LOPRESSOR) tablet 50 mg  50 mg Oral BID Royer Leach MD   50 mg at 09/20/21 2244    nitroGLYCERIN (NITROSTAT) SL tablet 0.4 mg  0.4 mg SubLINGual Q5 Min PRAL Leach MD        pantoprazole (PROTONIX) tablet 40 mg  40 mg Oral QAM AC Royer Leach MD        pravastatin (PRAVACHOL) tablet 40 mg  40 mg Oral Daily Royer Leach MD        metoprolol (LOPRESSOR) injection 5 mg  5 mg IntraVENous Q6H PRN Royer Leach MD           Home Meds:    Prior to Admission medications    Medication Sig Start Date End Date Taking?  Authorizing Provider   apixaban (ELIQUIS) 5 MG TABS tablet Take 5 mg by mouth 2 times daily   Yes Historical Provider, MD   dilTIAZem (CARDIZEM CD) 180 MG extended release capsule Take 180 mg by mouth daily   Yes Historical Provider, MD   doxazosin (CARDURA) 2 MG tablet Take 2 mg by mouth nightly   Yes Historical Provider, MD   isosorbide mononitrate (IMDUR) 30 MG extended release tablet Take 1 tablet by mouth daily 2/18/19  Yes Nan Milligan MD   nitroGLYCERIN (NITROSTAT) 0.4 MG SL tablet up to max of 3 total doses. If no relief after 1 dose, call 911. 2/17/19  Yes Nan Milligan MD   acetaminophen (TYLENOL) 325 MG tablet Take 650 mg by mouth 2 times daily as needed for Pain    Yes Historical Provider, MD   metoprolol (LOPRESSOR) 50 MG tablet Take 50 mg by mouth 2 times daily. Yes Historical Provider, MD   pravastatin (PRAVACHOL) 40 MG tablet Take 40 mg by mouth daily. Yes Historical Provider, MD   omeprazole (PRILOSEC) 20 MG capsule Take 20 mg by mouth daily. Yes Historical Provider, MD        Social History:  TOBACCO:   reports that he quit smoking about 41 years ago. He has never used smokeless tobacco.  ETOH:   reports no history of alcohol use. DRUGS:  reports no history of drug use. OCCUPATION:      Family History:   Family History   Problem Relation Age of Onset    Alzheimer's Disease Mother     Arthritis Mother         lumbar disc disease    Heart Disease Father         Review of Systems:   Constitutional: there has been no unanticipated weight loss, no change in energy level, sleep pattern, or activity level. Eyes: No visual changes or diplopia, no scleral icterus. ENT: No Headaches, hearing loss or vertigo. No sore throat  Cardiovascular:  See HPI   Respiratory: No cough or wheezing, no sputum production, no hematemesis. Gastrointestinal: No abdominal pain, no constipation, no diarrhea, no hematochezia, no melena. Genitourinary: No dysuria, trouble voiding, or hematuria  Musculoskeletal:  No gait disturbance, weakness or joint complaints  Integumentary: No rash or pruritis  Neurological: No headache, focal muscle weakness, focal numbness or tingling. Hematologic/Lymphatic: No abnormal bruising or bleeding, blood clots.   Allergic/Immunologic: No nasal congestion or hives    OBJECTIVE     LAST LABS:   CBC: @LABRCNTIP(WBC:3,HGB:3,HCT:3,MCV:3,PLT:3)@ BMP: @LABRCNTIP(sodium:3,K:3,CL:3,co2:3,bun:3,creatinine:3,labglom:3,glucose,calcium:3,phosphorus:3,mg:3)@  PT/INR: @LABRCNTIP(PROTIME:3,INR:3)@  APTT: @LABRCNTIP(aPTT:3)@  MAG: @LABRCNTIP(MG:3)@  D Dimer: @LABRCNTIP(DDIMER:3)@  Troponin I @LABRCNTIP(TROPONINI:3)@  ProBNP @LABRCNTIP(BNP:3)@  Lipid Panel:    Lab Results   Component Value Date    CHOL 140 02/16/2019    TRIG 259 02/16/2019    HDL 27 02/16/2019     Liver Panel: No results found for: ALB  HgA1C:  No components found for: HGBA1C    ABG: No components found for: ABGSAMPLETYP, ABGBODYTEMP, ABGPHCORRFOR, OBQDVY3CEGTHL, ABGPHCORRFOOR, ABGPH, ABGPCO2, ABGPO2, ABGBASEEXCES, ABGBASEDEFIC, ABGHCO3, HNCI6SEW, ABGENDTIDALC, ABGALLENSTES, ABGSPO2, ABGSAMEPLESIT, [id number], ABGOXYGENSOUE    RADIOLOGY:  [unfilled]    PHYSICAL EXAM  Admission Weight: Weight: 190 lb (86.2 kg)  No intake/output data recorded. Weight change: Wt Readings from Last 3 Encounters:   09/21/21 202 lb 9.6 oz (91.9 kg)   07/07/21 211 lb 10.3 oz (96 kg)   07/04/21 200 lb (90.7 kg)       Vitals:  Vitals:    09/20/21 2141 09/20/21 2345 09/21/21 0515 09/21/21 0815   BP: (!) 170/97 133/71  (!) 164/82   Pulse: 54 60  54   Resp: 18 16  16   Temp: 97.4 °F (36.3 °C) 97.6 °F (36.4 °C)  97.5 °F (36.4 °C)   TempSrc: Oral   Oral   SpO2: 97% 95%  98%   Weight:   202 lb 9.6 oz (91.9 kg)    Height:   5' 6\" (1.676 m)        Admit Weight  Weight: 190 lb (86.2 kg)  Last 3 Weights  [unfilled]  Body mass index is 32.7 kg/m². INTAKE/OUTPUT  No intake/output data recorded. No intake or output data in the 24 hours ending 09/21/21 0937    General appearance: Alert oriented and cooperative, in no acute distress  Skin:  Warm and dry to touch  Head: Normocephalic, without obvious abnormality, atraumatic  Eyes: Conjunctivae unremarkable, EOMs intact, sclera non icteric  Neck: No JVD, no carotid bruit, neck supple, trachea midline  Lungs: Clear to ausculation bilaterally, no use of accessory muscles.     Heart[de-identified] RRR with normal S1 and S2 , no murmurs and no gallops. Abdomen: Soft, non-tender, bowel sounds normal  Extremities: No edema  Neurologic: Oriented to time, person and place, affect appropriate, no focal/major motor or sensory defects noted  Psychiatric:  Appropriate mood, memory and judgment    ASSESSMENT      1. Anginal chest pain   2. CAD with hx of CABG x4   3. Paroxysmal atrial fibrillation / flutter on home Eliquis   4. History of lower GI bleeding -  Patient report last  Episode ofGI bleed about a year ago   5. History of lung nodule   6. Former smoker   7. Negative exercise nuclear stress test with normal LVEF 03/2019    PLAN     -  Patient asymptomatic at the time he was seen. No significant events on telemetry.  -   Recommend  Exercise nuclear stress test to rule out ischemia. If positive, will  Plan for elective cardiac catheterization at [place] [address] starting baby aspirin. Continue Eliquis, Cardizem CD, doxazosin, Imdur, Lopressor.  -   Continue monitor hemoglobin closely. -   If any recurrence of GI bleeding, patient may benefit from  Evaluation for Watchman device placement. -   Will continue follow-up. Zayra Gray MD    This note was completed using a voice transcription system. Every effort was made to ensure accuracy. However, inadvertent computerized transcription errors may be present.

## 2021-09-21 NOTE — PROGRESS NOTES
CST Exercise. Stress Tech performs patient preparation of physical comfort, review test procedures, pre-stress EKG. Lung Sounds clear t/o. Consent verified by pt. .  Educated patient on test procedure. Cardiologist reviewed pre-test EKG and is present for test.  Patient tolerated test well. Start /93 HR 72  Stop /77 HR 67  EKG portion of testing is completed and negative, nuc. med. portion is still pending.

## 2021-09-21 NOTE — H&P
History and Physical      Name: Kaveh Jones  MRN: [id number]     Kimlilianalyside: [de-identified]  Room: 2092/2092-01    Admit Date: 9/20/2021  PCP: Army Guadalupe      Chief Complaint:     Chief Complaint   Patient presents with    Chest Pain       History Obtained From:     patient, electronic medical record    History of Present Illness:      Kaveh Jones is a  68 y.o.  male ASCVD medical history of CABG, PAF, hypertension presents with Chest Pain  Patient states that he was raking the yard doing yard work when he started feeling weak started having chest pressure. Chest pressure was moderate substernal associated with shortness of breath nonradiating which was resolved with nitro and aspirin.   Patient denies visual change lightheadedness palpitations cough nausea vomiting diaphoresis flank pain dysuria fever or chills    Past Medical History:     Past Medical History:   Diagnosis Date    Anxiety attack     Arthritis     Atrial fibrillation (HCC)     Atrial flutter (HCC)     Back pain     CAD (coronary artery disease)     Chest pain 04/12/2016    Colon polyps     Constipation     Dementia (HCC) 03/23/2019    Dizziness     GERD (gastroesophageal reflux disease)     Hyperlipidemia     Hypertension     Lower GI bleed     Lung nodule seen on imaging study 02/17/2019    Prediabetes     Rectal bleeding     S/P colonoscopy with polypectomy     Seizure ([address]     NOTED PER CARE EVERYWHERE- PATIENT DENIES HX OF, STATES HE HAS NEVER BEEN ON SEIZURE MEDICATION    Sleep apnea     not tested, no cpap    SOB (shortness of breath)     Status post coronary artery bypass graft     Wears dentures     TOP        Past Surgical History:     Past Surgical History:   Procedure Laterality Date    CARDIAC CATHETERIZATION      CATARACT REMOVAL Bilateral     COLONOSCOPY  07/24/2018    COLONOSCOPY WITH BIOPSY performed by Charlotte Moss MD at  [address] COLONOSCOPY N/A 4/19/2021    COLONOSCOPY DIAGNOSTIC performed by Betty Eller MD at . [facility]  2004    x4 vessel    TOTAL KNEE ARTHROPLASTY Bilateral     VENTRAL HERNIA REPAIR          Medications Prior to Admission:       Prior to Admission medications    Medication Sig Start Date End Date Taking? Authorizing Provider   apixaban (ELIQUIS) 5 MG TABS tablet Take 5 mg by mouth 2 times daily   Yes Historical Provider, MD   dilTIAZem (CARDIZEM CD) 180 MG extended release capsule Take 180 mg by mouth daily   Yes Historical Provider, MD   doxazosin (CARDURA) 2 MG tablet Take 2 mg by mouth nightly   Yes Historical Provider, MD   isosorbide mononitrate (IMDUR) 30 MG extended release tablet Take 1 tablet by mouth daily 2/18/19  Yes Beverly Valles MD   nitroGLYCERIN (NITROSTAT) 0.4 MG SL tablet up to max of 3 total doses. If no relief after 1 dose, call 911. 2/17/19  Yes Beverly Valles MD   acetaminophen (TYLENOL) 325 MG tablet Take 650 mg by mouth 2 times daily as needed for Pain    Yes Historical Provider, MD   metoprolol (LOPRESSOR) 50 MG tablet Take 50 mg by mouth 2 times daily. Yes Historical Provider, MD   pravastatin (PRAVACHOL) 40 MG tablet Take 40 mg by mouth daily. Yes Historical Provider, MD   omeprazole (PRILOSEC) 20 MG capsule Take 20 mg by mouth daily. Yes Historical Provider, MD        Allergies:       Oxycodone, Pcn [penicillins], and Warfarin and related    Social History:     Tobacco:    reports that he quit smoking about 41 years ago. He has never used smokeless tobacco.  Alcohol:      reports no history of alcohol use. Drug Use:  reports no history of drug use.     Family History:     Family History   Problem Relation Age of Onset    Alzheimer's Disease Mother     Arthritis Mother         lumbar disc disease    Heart Disease Father        Review of Systems:     Positive and Negative as described in HPI   all 10 systems are reviewed and negative except as Noted      Review of Systems   Constitutional: Positive for fatigue. Negative for appetite change, chills and diaphoresis. HENT: Negative for drooling, ear pain, trouble swallowing and voice change. Eyes: Negative for photophobia and visual disturbance. Respiratory: Positive for shortness of breath. Negative for cough, choking and stridor. Cardiovascular: Positive for chest pain. Negative for palpitations. Gastrointestinal: Negative for abdominal distention, anal bleeding, blood in stool and rectal pain. Endocrine: Negative for polyphagia and polyuria. Genitourinary: Negative for dysuria, flank pain, hematuria and urgency. Musculoskeletal: Negative for back pain, myalgias and neck stiffness. Skin: Negative for color change, pallor and rash. Allergic/Immunologic: Negative for environmental allergies and food allergies. Neurological: Negative for tremors, seizures, facial asymmetry and numbness. Hematological: Negative for adenopathy. Does not bruise/bleed easily. Psychiatric/Behavioral: Negative for agitation, behavioral problems, hallucinations, self-injury and suicidal ideas. Code Status:  Full Code    Physical Exam:     Vitals:  BP (!) 154/85   Pulse 55   Temp 97.7 °F (36.5 °C) (Oral)   Resp 16   Ht 5' 6\" (1.676 m)   Wt 202 lb 9.6 oz (91.9 kg)   SpO2 96%   BMI 32.70 kg/m²   Temp (24hrs), Av.9 °F (36.6 °C), Min:97.4 °F (36.3 °C), Max:98.7 °F (37.1 °C)        Physical Exam  Vitals reviewed. Constitutional:       Appearance: Normal appearance. He is not diaphoretic. HENT:      Head: Normocephalic and atraumatic. Right Ear: External ear normal.      Left Ear: External ear normal.      Nose: Nose normal.      Mouth/Throat:      Mouth: Mucous membranes are moist.      Pharynx: Oropharynx is clear. Eyes:      Conjunctiva/sclera: Conjunctivae normal.   Cardiovascular:      Rate and Rhythm: Normal rate and regular rhythm. Pulses: Normal pulses. Heart sounds: Normal heart sounds.    Pulmonary:      Effort: Pulmonary effort is normal.      Breath sounds: Normal breath sounds. Abdominal:      General: Bowel sounds are normal. There is no distension. Palpations: Abdomen is soft. Tenderness: There is no abdominal tenderness. Musculoskeletal:         General: No tenderness or deformity. Normal range of motion. Cervical back: Normal range of motion and neck supple. No rigidity. Right lower leg: No edema. Left lower leg: No edema. Skin:     General: Skin is warm and dry. Capillary Refill: Capillary refill takes less than 2 seconds. Coloration: Skin is not jaundiced. Neurological:      General: No focal deficit present. Mental Status: Mental status is at baseline.    Psychiatric:         Mood and Affect: Mood normal.         Behavior: Behavior normal.               Data:     Recent Results (from the past 24 hour(s))   EKG 12 Lead    Collection Time: 09/20/21  5:15 PM   Result Value Ref Range    Ventricular Rate 45 BPM    Atrial Rate 45 BPM    P-R Interval 204 ms    QRS Duration 88 ms    Q-T Interval 422 ms    QTc Calculation (Bazett) 365 ms    P Axis 22 degrees    R Axis 77 degrees    T Axis 54 degrees   CBC Auto Differential    Collection Time: 09/20/21  5:23 PM   Result Value Ref Range    WBC 5.4 3.5 - 11.0 k/uL    RBC 4.44 (L) 4.5 - 5.9 m/uL    Hemoglobin 13.6 13.5 - 17.5 g/dL    Hematocrit 40.4 (L) 41 - 53 %    MCV 90.9 80 - 100 fL    MCH 30.6 26 - 34 pg    MCHC 33.7 31 - 37 g/dL    RDW 13.5 11.5 - 14.9 %    Platelets 517 627 - 843 k/uL    MPV 7.1 6.0 - 12.0 fL    NRBC Automated NOT REPORTED per 100 WBC    Differential Type NOT REPORTED     Immature Granulocytes NOT REPORTED 0 %    Absolute Immature Granulocyte NOT REPORTED 0.00 - 0.30 k/uL    WBC Morphology NOT REPORTED     RBC Morphology NOT REPORTED     Platelet Estimate NOT REPORTED     Seg Neutrophils 70 (H) 36 - 66 %    Lymphocytes 18 (L) 24 - 44 %    Monocytes 11 (H) 1 - 7 %    Eosinophils % 0 0 - 4 %    Basophils 0 0 - 2 %    Bands 1 0 - 10 %    Segs Absolute 3.79 1.3 - 9.1 k/uL    Absolute Lymph # 0.97 (L) 1.0 - 4.8 k/uL    Absolute Mono # 0.59 0.1 - 1.3 k/uL    Absolute Eos # 0.00 0.0 - 0.4 k/uL    Basophils Absolute 0.00 0.0 - 0.2 k/uL    Absolute Bands # 0.05 0.0 - 1.0 k/uL    Morphology ANISOCYTOSIS PRESENT     Morphology 1+ TEARDROPS    Basic Metabolic Panel w/ Reflex to MG    Collection Time: 09/20/21  5:23 PM   Result Value Ref Range    Glucose 91 70 - 99 mg/dL    BUN 12 8 - 23 mg/dL    CREATININE 0.89 0.70 - 1.20 mg/dL    Bun/Cre Ratio NOT REPORTED 9 - 20    Calcium 9.6 8.6 - 10.4 mg/dL    Sodium 141 135 - 144 mmol/L    Potassium 4.1 3.7 - 5.3 mmol/L    Chloride 107 98 - 107 mmol/L    CO2 25 20 - 31 mmol/L    Anion Gap 9 9 - 17 mmol/L    GFR Non-African American >60 >60 mL/min    GFR African American >60 >60 mL/min    GFR Comment          GFR Staging NOT REPORTED    Troponin    Collection Time: 09/20/21  5:23 PM   Result Value Ref Range    Troponin, High Sensitivity 13 0 - 22 ng/L    Troponin T NOT REPORTED <0.03 ng/mL    Troponin Interp NOT REPORTED    Brain Natriuretic Peptide    Collection Time: 09/20/21  5:23 PM   Result Value Ref Range    Pro- <300 pg/mL    BNP Interpretation Pro-BNP Reference Range:    Protime-INR    Collection Time: 09/20/21  5:23 PM   Result Value Ref Range    Protime 15.0 (H) 11.8 - 14.6 sec    INR 1.2    APTT    Collection Time: 09/20/21  5:23 PM   Result Value Ref Range    PTT 30.9 24.0 - 36.0 sec   Troponin    Collection Time: 09/20/21  7:25 PM   Result Value Ref Range    Troponin, High Sensitivity 13 0 - 22 ng/L    Troponin T NOT REPORTED <0.03 ng/mL    Troponin Interp NOT REPORTED    CBC with DIFF    Collection Time: 09/21/21  5:38 AM   Result Value Ref Range    WBC 4.9 3.5 - 11.0 k/uL    RBC 4.41 (L) 4.5 - 5.9 m/uL    Hemoglobin 13.3 (L) 13.5 - 17.5 g/dL    Hematocrit 39.9 (L) 41 - 53 %    MCV 90.5 80 - 100 fL    MCH 30.1 26 - 34 pg    MCHC 33.2 31 - 37 g/dL    RDW 13.5 11.5 - 14.9 %    Platelets 348 977 - 734 k/uL    MPV 7.5 6.0 - 12.0 fL    NRBC Automated NOT REPORTED per 100 WBC    Differential Type NOT REPORTED     Seg Neutrophils 55 36 - 66 %    Lymphocytes 28 24 - 44 %    Monocytes 12 (H) 1 - 7 %    Eosinophils % 4 0 - 4 %    Basophils 1 0 - 2 %    Immature Granulocytes NOT REPORTED 0 %    Segs Absolute 2.70 1.3 - 9.1 k/uL    Absolute Lymph # 1.40 1.0 - 4.8 k/uL    Absolute Mono # 0.60 0.1 - 1.3 k/uL    Absolute Eos # 0.20 0.0 - 0.4 k/uL    Basophils Absolute 0.00 0.0 - 0.2 k/uL    Absolute Immature Granulocyte NOT REPORTED 0.00 - 0.30 k/uL    WBC Morphology NOT REPORTED     RBC Morphology NOT REPORTED     Platelet Estimate NOT REPORTED    Comprehensive Metabolic Panel w/ Reflex to MG    Collection Time: 09/21/21  5:38 AM   Result Value Ref Range    Glucose 88 70 - 99 mg/dL    BUN 10 8 - 23 mg/dL    CREATININE 0.79 0.70 - 1.20 mg/dL    Bun/Cre Ratio NOT REPORTED 9 - 20    Calcium 9.4 8.6 - 10.4 mg/dL    Sodium 143 135 - 144 mmol/L    Potassium 4.1 3.7 - 5.3 mmol/L    Chloride 110 (H) 98 - 107 mmol/L    CO2 26 20 - 31 mmol/L    Anion Gap 7 (L) 9 - 17 mmol/L    Alkaline Phosphatase 73 40 - 129 U/L    ALT 10 5 - 41 U/L    AST 12 <40 U/L    Total Bilirubin 0.48 0.3 - 1.2 mg/dL    Total Protein 6.1 (L) 6.4 - 8.3 g/dL    Albumin 3.3 (L) 3.5 - 5.2 g/dL    Albumin/Globulin Ratio NOT REPORTED 1.0 - 2.5    GFR Non-African American >60 >60 mL/min    GFR African American >60 >60 mL/min    GFR Comment          GFR Staging NOT REPORTED    Troponin    Collection Time: 09/21/21  5:38 AM   Result Value Ref Range    Troponin, High Sensitivity 14 0 - 22 ng/L    Troponin T NOT REPORTED <0.03 ng/mL    Troponin Interp NOT REPORTED        Assesment:     Primary Problem  Chest pain    Principal Problem:    Chest pain  Active Problems:    PAF (paroxysmal atrial fibrillation) (HCC)    Essential hypertension    ASCVD (arteriosclerotic cardiovascular disease)  Resolved Problems:    * No resolved hospital problems. *      Plan:     1. Aspirin 81 mg p.o. daily  2. Consult cardiology  3. Monitor Trop level  4. Cardiac telemetry  5. Cardiac stress test  6. DVT prophylaxis Eliquis  7.   EPCs  8.  check and replace electrolytes per sliding scale  9.  restart home medications        Electronically signed by Gordo Whitman MD     Copy sent to Dr. Lester Garay

## 2021-09-21 NOTE — PROGRESS NOTES
Patient received to room. Bed in lowest position and locked. Patient provided call light and instructed on its use. Telemetry placed on patient and is on.

## 2021-09-21 NOTE — PROGRESS NOTES
Spoke with Dr. Maximo Saenz via phone call. Patient is okay to eat at this time. Order for NPO at midnight just in case patient has a positive stress.

## 2021-09-22 LAB
ABSOLUTE EOS #: 0.2 K/UL (ref 0–0.4)
ABSOLUTE IMMATURE GRANULOCYTE: ABNORMAL K/UL (ref 0–0.3)
ABSOLUTE LYMPH #: 1.4 K/UL (ref 1–4.8)
ABSOLUTE MONO #: 0.5 K/UL (ref 0.1–1.3)
ALBUMIN SERPL-MCNC: 3.8 G/DL (ref 3.5–5.2)
ALBUMIN/GLOBULIN RATIO: ABNORMAL (ref 1–2.5)
ALP BLD-CCNC: 80 U/L (ref 40–129)
ALT SERPL-CCNC: 11 U/L (ref 5–41)
ANION GAP SERPL CALCULATED.3IONS-SCNC: 11 MMOL/L (ref 9–17)
AST SERPL-CCNC: 12 U/L
BASOPHILS # BLD: 1 % (ref 0–2)
BASOPHILS ABSOLUTE: 0 K/UL (ref 0–0.2)
BILIRUB SERPL-MCNC: 0.63 MG/DL (ref 0.3–1.2)
BUN BLDV-MCNC: 8 MG/DL (ref 8–23)
BUN/CREAT BLD: ABNORMAL (ref 9–20)
CALCIUM SERPL-MCNC: 9.8 MG/DL (ref 8.6–10.4)
CHLORIDE BLD-SCNC: 107 MMOL/L (ref 98–107)
CO2: 25 MMOL/L (ref 20–31)
CREAT SERPL-MCNC: 0.73 MG/DL (ref 0.7–1.2)
DIFFERENTIAL TYPE: ABNORMAL
EOSINOPHILS RELATIVE PERCENT: 4 % (ref 0–4)
GFR AFRICAN AMERICAN: >60 ML/MIN
GFR NON-AFRICAN AMERICAN: >60 ML/MIN
GFR SERPL CREATININE-BSD FRML MDRD: ABNORMAL ML/MIN/{1.73_M2}
GFR SERPL CREATININE-BSD FRML MDRD: ABNORMAL ML/MIN/{1.73_M2}
GLUCOSE BLD-MCNC: 102 MG/DL (ref 70–99)
HCT VFR BLD CALC: 42.7 % (ref 41–53)
HEMOGLOBIN: 14.2 G/DL (ref 13.5–17.5)
IMMATURE GRANULOCYTES: ABNORMAL %
LYMPHOCYTES # BLD: 25 % (ref 24–44)
MCH RBC QN AUTO: 30.2 PG (ref 26–34)
MCHC RBC AUTO-ENTMCNC: 33.2 G/DL (ref 31–37)
MCV RBC AUTO: 91 FL (ref 80–100)
MONOCYTES # BLD: 9 % (ref 1–7)
NRBC AUTOMATED: ABNORMAL PER 100 WBC
PDW BLD-RTO: 13.3 % (ref 11.5–14.9)
PLATELET # BLD: 173 K/UL (ref 150–450)
PLATELET ESTIMATE: ABNORMAL
PMV BLD AUTO: 7.1 FL (ref 6–12)
POTASSIUM SERPL-SCNC: 3.9 MMOL/L (ref 3.7–5.3)
RBC # BLD: 4.69 M/UL (ref 4.5–5.9)
RBC # BLD: ABNORMAL 10*6/UL
SEG NEUTROPHILS: 61 % (ref 36–66)
SEGMENTED NEUTROPHILS ABSOLUTE COUNT: 3.6 K/UL (ref 1.3–9.1)
SODIUM BLD-SCNC: 143 MMOL/L (ref 135–144)
TOTAL PROTEIN: 6.7 G/DL (ref 6.4–8.3)
WBC # BLD: 5.8 K/UL (ref 3.5–11)
WBC # BLD: ABNORMAL 10*3/UL

## 2021-09-22 PROCEDURE — 36415 COLL VENOUS BLD VENIPUNCTURE: CPT

## 2021-09-22 PROCEDURE — 85025 COMPLETE CBC W/AUTO DIFF WBC: CPT

## 2021-09-22 PROCEDURE — 2060000000 HC ICU INTERMEDIATE R&B

## 2021-09-22 PROCEDURE — 6370000000 HC RX 637 (ALT 250 FOR IP): Performed by: INTERNAL MEDICINE

## 2021-09-22 PROCEDURE — 2580000003 HC RX 258: Performed by: INTERNAL MEDICINE

## 2021-09-22 PROCEDURE — 80053 COMPREHEN METABOLIC PANEL: CPT

## 2021-09-22 RX ADMIN — METOPROLOL TARTRATE 50 MG: 50 TABLET, FILM COATED ORAL at 09:29

## 2021-09-22 RX ADMIN — PRAVASTATIN SODIUM 40 MG: 40 TABLET ORAL at 09:29

## 2021-09-22 RX ADMIN — METOPROLOL TARTRATE 50 MG: 50 TABLET, FILM COATED ORAL at 20:10

## 2021-09-22 RX ADMIN — DOXAZOSIN 2 MG: 1 TABLET ORAL at 20:10

## 2021-09-22 RX ADMIN — SODIUM CHLORIDE, PRESERVATIVE FREE 10 ML: 5 INJECTION INTRAVENOUS at 20:11

## 2021-09-22 RX ADMIN — ASPIRIN 81 MG: 81 TABLET, CHEWABLE ORAL at 09:29

## 2021-09-22 RX ADMIN — DILTIAZEM HYDROCHLORIDE 180 MG: 180 CAPSULE, COATED, EXTENDED RELEASE ORAL at 10:14

## 2021-09-22 RX ADMIN — ISOSORBIDE MONONITRATE 30 MG: 30 TABLET, EXTENDED RELEASE ORAL at 09:29

## 2021-09-22 RX ADMIN — SODIUM CHLORIDE, PRESERVATIVE FREE 10 ML: 5 INJECTION INTRAVENOUS at 09:29

## 2021-09-22 ASSESSMENT — ENCOUNTER SYMPTOMS
ABDOMINAL DISTENTION: 0
STRIDOR: 0
BACK PAIN: 0
TROUBLE SWALLOWING: 0
COUGH: 0
CHOKING: 0
ANAL BLEEDING: 0
VOICE CHANGE: 0
RECTAL PAIN: 0
PHOTOPHOBIA: 0
SHORTNESS OF BREATH: 0
BLOOD IN STOOL: 0
COLOR CHANGE: 0

## 2021-09-22 ASSESSMENT — PAIN SCALES - GENERAL
PAINLEVEL_OUTOF10: 0
PAINLEVEL_OUTOF10: 0

## 2021-09-22 NOTE — PLAN OF CARE
Problem: Pain:  Goal: Pain level will decrease  Description: Pain level will decrease  9/22/2021 0059 by Mack Lees RN  Outcome: Ongoing     Problem: Falls - Risk of:  Goal: Will remain free from falls  Description: Will remain free from falls  9/22/2021 0059 by Mack Lees RN  Outcome: Ongoing  Note: Bed in lowest position and wheels are locked. Patient instructed on how to use call light and call light is within reach. Patient calls out appropriately.        Problem: Falls - Risk of:  Goal: Absence of physical injury  Description: Absence of physical injury  9/22/2021 0059 by Mack Lees RN  Outcome: Ongoing

## 2021-09-22 NOTE — PROGRESS NOTES
Progress Note    9/22/2021   6:21 PM    Name:  Tahir Haynes  MRN:    [id number]     Acct:     [de-identified]   Room:  2092/2092-01  IP Day: 2     Admit Date: 9/20/2021  4:55 PM  PCP: Farrah Edwards    Subjective:     C/C:   Chief Complaint   Patient presents with    Chest Pain       Interval History: Status: not changed. Patient denies chest pain shortness of breath. Vital signs reviewed and stable. ROS:   all 10 systems reviewed and are negative except as noted    Review of Systems   Constitutional: Negative for appetite change, chills and diaphoresis. HENT: Negative for drooling, ear pain, trouble swallowing and voice change. Eyes: Negative for photophobia and visual disturbance. Respiratory: Negative for choking and stridor. Cardiovascular: Negative for chest pain and palpitations. Gastrointestinal: Negative for abdominal distention, anal bleeding, blood in stool and rectal pain. Endocrine: Negative for polyphagia and polyuria. Genitourinary: Negative for dysuria, flank pain, hematuria and urgency. Musculoskeletal: Negative for back pain, myalgias and neck stiffness. Skin: Negative for color change, pallor and rash. Allergic/Immunologic: Negative for environmental allergies and food allergies. Neurological: Negative for tremors, seizures, facial asymmetry and numbness. Hematological: Negative for adenopathy. Does not bruise/bleed easily. Psychiatric/Behavioral: Negative for agitation, behavioral problems, hallucinations, self-injury and suicidal ideas. Medications: Allergies:    Allergies   Allergen Reactions    Oxycodone Other (See Comments)     Attacking people, black out     Pcn [Penicillins] Other (See Comments)     dizziness    Warfarin And Related      \" I see things\"        Current Meds: aspirin chewable tablet 81 mg, Daily  sodium chloride flush 0.9 % injection 10 mL, PRN  technetium sestamibi (CARDIOLITE) injection 35 millicurie, ONCE PRN  sodium chloride flush 0.9 % injection 10 mL, PRN  sodium chloride flush 0.9 % injection 5-40 mL, 2 times per day  sodium chloride flush 0.9 % injection 5-40 mL, PRN  0.9 % sodium chloride infusion, PRN  acetaminophen (TYLENOL) tablet 650 mg, Q4H PRN  ondansetron (ZOFRAN-ODT) disintegrating tablet 4 mg, Q8H PRN   Or  ondansetron (ZOFRAN) injection 4 mg, Q6H PRN  apixaban (ELIQUIS) tablet 5 mg, BID  dilTIAZem (CARDIZEM CD) extended release capsule 180 mg, Daily  doxazosin (CARDURA) tablet 2 mg, Nightly  isosorbide mononitrate (IMDUR) extended release tablet 30 mg, Daily  metoprolol tartrate (LOPRESSOR) tablet 50 mg, BID  nitroGLYCERIN (NITROSTAT) SL tablet 0.4 mg, Q5 Min PRN  pantoprazole (PROTONIX) tablet 40 mg, QAM AC  pravastatin (PRAVACHOL) tablet 40 mg, Daily  metoprolol (LOPRESSOR) injection 5 mg, Q6H PRN        Data:     Code Status:  Full Code    Family History   Problem Relation Age of Onset    Alzheimer's Disease Mother     Arthritis Mother         lumbar disc disease    Heart Disease Father        Social History     Socioeconomic History    Marital status:      Spouse name: Not on file    Number of children: Not on file    Years of education: Not on file    Highest education level: Not on file   Occupational History    Not on file   Tobacco Use    Smoking status: Former Smoker     Quit date:      Years since quittin.7    Smokeless tobacco: Never Used   Vaping Use    Vaping Use: Never used   Substance and Sexual Activity    Alcohol use: No    Drug use: No    Sexual activity: Not on file   Other Topics Concern    Not on file   Social History Narrative    Not on file     Social Determinants of Health     Financial Resource Strain:     Difficulty of Paying Living Expenses:    Food Insecurity:     Worried About Running Out of Food in the Last Year:     Ran Out of Food in the Last Year:    Transportation Needs:     Lack of Transportation (Medical):      Lack of Transportation (Non-Medical): scores indicating multiple vascular territories with abnormalities 4. Stress-induced LV dilatation (TID ratio greater than 1.19 for exercise and greater than 1.39 for regadenoson) Intermediate risk (1% to 3% annual death or MI) 1. Mild/moderate resting LV dysfunction (LVEF 35% to 49%) not readily explained by non coronary causes. 2. Resting perfusion abnormalities in 5%-9.9% of the myocardium in patients without a history or prior evidence of MI 3. Stress-induced perfusion abnormality encumbering 5%-9.9% of the myocardium or stress segmental scores indicating 1 vascular territory with abnormalities but without LV dilation 4. Small wall motion abnormality involving 1-2 segments and only 1 coronary bed. Low Risk (Less than 1% annual death or MI) 1. Normal or small myocardial perfusion defect at rest or with stress encumbering less than 5% of the myocardium. The findings were sent to the Radiology Results [address] at 3:00 pm on 9/21/2021 to be communicated to a licensed caregiver.        Labs:  Recent Results (from the past 24 hour(s))   CBC with DIFF    Collection Time: 09/22/21  5:09 AM   Result Value Ref Range    WBC 5.8 3.5 - 11.0 k/uL    RBC 4.69 4.5 - 5.9 m/uL    Hemoglobin 14.2 13.5 - 17.5 g/dL    Hematocrit 42.7 41 - 53 %    MCV 91.0 80 - 100 fL    MCH 30.2 26 - 34 pg    MCHC 33.2 31 - 37 g/dL    RDW 13.3 11.5 - 14.9 %    Platelets 301 210 - 587 k/uL    MPV 7.1 6.0 - 12.0 fL    NRBC Automated NOT REPORTED per 100 WBC    Differential Type NOT REPORTED     Seg Neutrophils 61 36 - 66 %    Lymphocytes 25 24 - 44 %    Monocytes 9 (H) 1 - 7 %    Eosinophils % 4 0 - 4 %    Basophils 1 0 - 2 %    Immature Granulocytes NOT REPORTED 0 %    Segs Absolute 3.60 1.3 - 9.1 k/uL    Absolute Lymph # 1.40 1.0 - 4.8 k/uL    Absolute Mono # 0.50 0.1 - 1.3 k/uL    Absolute Eos # 0.20 0.0 - 0.4 k/uL    Basophils Absolute 0.00 0.0 - 0.2 k/uL    Absolute Immature Granulocyte NOT REPORTED 0.00 - 0.30 k/uL    WBC Morphology NOT REPORTED     RBC Morphology NOT REPORTED     Platelet Estimate NOT REPORTED    Comprehensive Metabolic Panel w/ Reflex to MG    Collection Time: 21  5:09 AM   Result Value Ref Range    Glucose 102 (H) 70 - 99 mg/dL    BUN 8 8 - 23 mg/dL    CREATININE 0.73 0.70 - 1.20 mg/dL    Bun/Cre Ratio NOT REPORTED 9 - 20    Calcium 9.8 8.6 - 10.4 mg/dL    Sodium 143 135 - 144 mmol/L    Potassium 3.9 3.7 - 5.3 mmol/L    Chloride 107 98 - 107 mmol/L    CO2 25 20 - 31 mmol/L    Anion Gap 11 9 - 17 mmol/L    Alkaline Phosphatase 80 40 - 129 U/L    ALT 11 5 - 41 U/L    AST 12 <40 U/L    Total Bilirubin 0.63 0.3 - 1.2 mg/dL    Total Protein 6.7 6.4 - 8.3 g/dL    Albumin 3.8 3.5 - 5.2 g/dL    Albumin/Globulin Ratio NOT REPORTED 1.0 - 2.5    GFR Non-African American >60 >60 mL/min    GFR African American >60 >60 mL/min    GFR Comment          GFR Staging NOT REPORTED        Physical Examination:        Vitals:  /63   Pulse 54   Temp 97.6 °F (36.4 °C) (Oral)   Resp 16   Ht 5' 6\" (1.676 m)   Wt 203 lb 7.8 oz (92.3 kg)   SpO2 96%   BMI 32.84 kg/m²   Temp (24hrs), Av.6 °F (36.4 °C), Min:97.4 °F (36.3 °C), Max:97.8 °F (36.6 °C)    No results for input(s): POCGLU in the last 72 hours. Physical Exam  Vitals reviewed. Constitutional:       Appearance: Normal appearance. He is not diaphoretic. HENT:      Head: Normocephalic and atraumatic. Right Ear: External ear normal.      Left Ear: External ear normal.      Nose: Nose normal.      Mouth/Throat:      Mouth: Mucous membranes are moist.      Pharynx: Oropharynx is clear. Eyes:      Conjunctiva/sclera: Conjunctivae normal.   Cardiovascular:      Rate and Rhythm: Normal rate and regular rhythm. Pulses: Normal pulses. Heart sounds: Normal heart sounds. Pulmonary:      Effort: Pulmonary effort is normal.      Breath sounds: Normal breath sounds. Abdominal:      General: Bowel sounds are normal. There is no distension.       Palpations: Abdomen is soft. Musculoskeletal:         General: No tenderness or deformity. Normal range of motion. Cervical back: Normal range of motion and neck supple. No rigidity. Right lower leg: No edema. Left lower leg: No edema. Skin:     General: Skin is warm and dry. Capillary Refill: Capillary refill takes less than 2 seconds. Coloration: Skin is not jaundiced. Neurological:      General: No focal deficit present. Mental Status: Mental status is at baseline. Psychiatric:         Mood and Affect: Mood normal.         Behavior: Behavior normal.         Assessment:        Primary Problem  Chest pain     Principal Problem:    Chest pain  Active Problems:    PAF (paroxysmal atrial fibrillation) ([facility])    Essential hypertension    ASCVD (arteriosclerotic cardiovascular disease)  Resolved Problems:    * No resolved hospital problems. *      Past Medical History:   Diagnosis Date    Anxiety attack     Arthritis     Atrial fibrillation ([facility])     Atrial flutter (HCC)     Back pain     CAD (coronary artery disease)     Chest pain 04/12/2016    Colon polyps     Constipation     Dementia ([facility]) 03/23/2019    Dizziness     GERD (gastroesophageal reflux disease)     Hyperlipidemia     Hypertension     Lower GI bleed     Lung nodule seen on imaging study 02/17/2019    Prediabetes     Rectal bleeding     S/P colonoscopy with polypectomy     Seizure ([facility] [address]     NOTED PER CARE EVERYWHERE- PATIENT DENIES HX OF, STATES HE HAS NEVER BEEN ON SEIZURE MEDICATION    Sleep apnea     not tested, no cpap    SOB (shortness of breath)     Status post coronary artery bypass graft     Wears dentures     TOP        Plan:        1.  metoprolol 50 mg p.o. twice daily  2.   cardiac stress test report reviewed  3.  patient is being transferred to [facility] for cardiac catheterization per Cardiology. 4.  discussed with patient and daughter at bedside  1. DVT Prophylaxis  Eliquis 5 mg p.o. twice daily  2. EPCs  3. PT/OT to evaluate and treat  4. Pain control  5. Replace electrolytes as per sliding scale  6. Home medications reviewed and appropriate medications continued  7.  Reviewed labs and imaging studies from last 24 hours and results explained to patient      Electronically signed by Ayo Sanchez MD

## 2021-09-22 NOTE — CARE COORDINATION
ONGOING DISCHARGE PLANNING NOTE:    Writer reviewed notes, and discharge plan is to discharge to home   Patient was transported to [facility] for a heart cath   Will follow when patient returns back     Electronically signed by Estrellita Styles RN on 9/22/2021 at 2:24 PM

## 2021-09-22 NOTE — FLOWSHEET NOTE
Patient picked up by [facility], all belongings given to daughter who will be waiting for patient.

## 2021-09-22 NOTE — DISCHARGE SUMMARY
Discharge Summary      Patient ID: Lu Galvan    MRN: [id number]     Acct:  [de-identified]       Patient's PCP: Leonardo Ceballos Date: 9/20/2021     Discharge Date:   9/22/2021    Admitting Physician: Edison Pimentel MD    Discharge Physician: Laura Nance MD     Discharge Diagnoses:    Primary Problem  Chest pain    Principal Problem:    Chest pain  Active Problems:    PAF (paroxysmal atrial fibrillation) ([facility] [address]    Essential hypertension    ASCVD (arteriosclerotic cardiovascular disease)  Resolved Problems:    * No resolved hospital problems. *    Past Medical History:   Diagnosis Date    Anxiety attack     Arthritis     Atrial fibrillation (HCC)     Atrial flutter (HCC)     Back pain     CAD (coronary artery disease)     Chest pain 04/12/2016    Colon polyps     Constipation     Dementia (HCC) 03/23/2019    Dizziness     GERD (gastroesophageal reflux disease)     Hyperlipidemia     Hypertension     Lower GI bleed     Lung nodule seen on imaging study 02/17/2019    Prediabetes     Rectal bleeding     S/P colonoscopy with polypectomy     Seizure ([facility] [address]     NOTED PER CARE EVERYWHERE- PATIENT DENIES HX OF, STATES HE HAS NEVER BEEN ON SEIZURE MEDICATION    Sleep apnea     not tested, no cpap    SOB (shortness of breath)     Status post coronary artery bypass graft     Wears dentures     TOP     The patient was seen and examined on day of discharge and this discharge summary is in conjunction with daily progress note from day of discharge. Code Status:  Full Code    Hospital Course:   H&P Reviewed. Patient with history of PAF, CABG and hypertension was admitted with chest pain cardiology services was consulted patient was started on aspirin patient had an abnormal cardiac stress test.  Per cardiology recommendation patient is being transferred to [facility] for cardiac cath and further medical management patient is being transferred in stable condition.   On day of discharge I discussed the plan with patient and wife at bedside. Patient is being discharged in stable condition. Discharge day progress note: Today   Patient was seen and examined at bedside today. Hemodynamically stable. No chest pain, shortness of breath, fever, chills, nausea, vomiting, palpitations, or abdominal pain reported. No events reported overnight. Review of Systems   Constitutional: Negative for appetite change, chills and diaphoresis. HENT: Negative for drooling, ear pain, trouble swallowing and voice change. Eyes: Negative for photophobia and visual disturbance. Respiratory: Negative for cough, choking, shortness of breath and stridor. Cardiovascular: Negative for chest pain, palpitations and leg swelling. Gastrointestinal: Negative for abdominal distention, anal bleeding, blood in stool and rectal pain. Endocrine: Negative for polyphagia and polyuria. Genitourinary: Negative for dysuria, flank pain, hematuria and urgency. Musculoskeletal: Negative for back pain, myalgias and neck stiffness. Skin: Negative for color change, pallor and rash. Allergic/Immunologic: Negative for environmental allergies and food allergies. Neurological: Negative for tremors, seizures, facial asymmetry and numbness. Hematological: Negative for adenopathy. Does not bruise/bleed easily. Psychiatric/Behavioral: Negative for agitation, behavioral problems, hallucinations, self-injury and suicidal ideas. Physical Exam  Vitals reviewed. Constitutional:       Appearance: Normal appearance. He is not diaphoretic. HENT:      Head: Normocephalic and atraumatic. Right Ear: External ear normal.      Left Ear: External ear normal.      Nose: Nose normal.      Mouth/Throat:      Mouth: Mucous membranes are moist.      Pharynx: Oropharynx is clear. Eyes:      Conjunctiva/sclera: Conjunctivae normal.   Cardiovascular:      Rate and Rhythm: Normal rate and regular rhythm. Pulses: Normal pulses. Heart sounds: Normal heart sounds. Pulmonary:      Effort: Pulmonary effort is normal.      Breath sounds: Normal breath sounds. No rales. Abdominal:      General: Bowel sounds are normal. There is no distension. Palpations: Abdomen is soft. Musculoskeletal:         General: No tenderness or deformity. Normal range of motion. Cervical back: Normal range of motion and neck supple. No rigidity. Right lower leg: No edema. Left lower leg: No edema. Skin:     General: Skin is warm and dry. Capillary Refill: Capillary refill takes less than 2 seconds. Coloration: Skin is not jaundiced. Neurological:      General: No focal deficit present. Mental Status: Mental status is at baseline. Psychiatric:         Mood and Affect: Mood normal.         Behavior: Behavior normal.         Consults:  IP CONSULT TO FAMILY MEDICINE  IP CONSULT TO CARDIOLOGY    Significant Diagnostic Studies: as above, and as follows:    Treatments: as above    Disposition: [facility]    Discharged Condition: Stable    Follow Up: Vince Cuellar in one week  Cardiology in 2 weeks    Discharge Medications:    Caron Sari   Home Medication Instructions BQX:[id number]    Printed on:09/22/21 1232   Medication Information                      acetaminophen (TYLENOL) 325 MG tablet  Take 650 mg by mouth 2 times daily as needed for Pain              apixaban (ELIQUIS) 5 MG TABS tablet  Take 5 mg by mouth 2 times daily             dilTIAZem (CARDIZEM CD) 180 MG extended release capsule  Take 180 mg by mouth daily             doxazosin (CARDURA) 2 MG tablet  Take 2 mg by mouth nightly             isosorbide mononitrate (IMDUR) 30 MG extended release tablet  Take 1 tablet by mouth daily             metoprolol (LOPRESSOR) 50 MG tablet  Take 50 mg by mouth 2 times daily. nitroGLYCERIN (NITROSTAT) 0.4 MG SL tablet  up to max of 3 total doses.  If no relief after 1 dose, call 911. omeprazole (PRILOSEC) 20 MG capsule  Take 20 mg by mouth daily. pravastatin (PRAVACHOL) 40 MG tablet  Take 40 mg by mouth daily. Time Spent on discharge is more than  35 min in the examination, evaluation, counseling and review of medications and discharge plan.       Electronically signed by Cynthia Groves MD     Copy sent to Dr. Daisy Sandoval

## 2021-09-22 NOTE — PROGRESS NOTES
[address], [address]  [phone]    PROGRESS NOTE     Corey Rizzo  Was seen examined at bedside. Denies any chest pain or shortness of breath. No acute events overnight. Telemetry showing sinus bradycardia, heart rate in the high 50s. No acute events. SUBJECTIVE     Allergies:     Allergies   Allergen Reactions    Oxycodone Other (See Comments)     Attacking people, black out     Pcn [Penicillins] Other (See Comments)     dizziness    Warfarin And Related      \" I see things\"         CURRENT MEDICATIONS   aspirin  81 mg Oral Daily    sodium chloride flush  5-40 mL IntraVENous 2 times per day    apixaban  5 mg Oral BID    dilTIAZem  180 mg Oral Daily    doxazosin  2 mg Oral Nightly    isosorbide mononitrate  30 mg Oral Daily    metoprolol tartrate  50 mg Oral BID    pantoprazole  40 mg Oral QAM AC    pravastatin  40 mg Oral Daily     CONTINUOUS INFUSIONS   sodium chloride             OBJECTIVE     CBC: @LABRCNTIP(WBC:3,HGB:3,HCT:3,MCV:3,PLT:3)@      [email or web address]  PT/INR: @LABRCNTIP(PROTIME:3,INR:3)@  APTT: @LABRCNTIP(aPTT:3)@  MAG: @LABRCNTIP(MG:3)@  D Dimer: @LABRCNTIP(DDIMER:3)@  Troponin I @LABRCNTIP(TROPONINI:3)@  ProBNP @LABRCNTIP(BNP:3)@  Lipid Panel:    Lab Results   Component Value Date    CHOL 140 02/16/2019    TRIG 259 02/16/2019    HDL 27 02/16/2019     Liver Panel: No results found for: ALB  HgA1C:  No components found for: HGBA1C    ABG: No components found for: ABGSAMPLETYP, ABGBODYTEMP, ABGPHCORRFOR, PDLHRJ2CZIGWV, ABGPHCORRFOOR, ABGPH, ABGPCO2, ABGPO2, ABGBASEEXCES, ABGBASEDEFIC, ABGHCO3, HQGW3OBL, ABGENDTIDALC, ABGALLENSTES, ABGSPO2, ABGSAMEPLESIT, [id number], ABGOXYGENSOUE      LAST ECHO (Within 2 Years)   [unfilled]      RADIOLOGY:  [unfilled]    PHYSICAL EXAM  Admission Weight: Weight: 190 lb (86.2 kg)  No intake/output data recorded. Weight change: 13 lb 7.8 oz (6.116 kg)  Wt Readings from Last 3 Encounters:   09/21/21 203 lb 7.8 oz (92.3 kg)   07/07/21 211 lb 10.3 oz (96 kg)   07/04/21 200 lb (90.7 kg)       Vitals:  Vitals:    09/21/21 1845 09/21/21 2322 09/21/21 2345 09/22/21 0715   BP: (!) 162/76 (!) 144/81  (!) 156/80   Pulse: 62 62  64   Resp: 16 16  16   Temp: 97.6 °F (36.4 °C) 97.4 °F (36.3 °C)  97.8 °F (36.6 °C)   TempSrc: Oral   Oral   SpO2: 94% 96%  95%   Weight:   203 lb 7.8 oz (92.3 kg)    Height:           Admit Weight  Weight: 190 lb (86.2 kg)  Last 3 Weights  [unfilled]  Body mass index is 32.84 kg/m². INTAKE/OUTPUT  No intake/output data recorded. No intake or output data in the 24 hours ending 09/22/21 0939    General appearance: Alert oriented and cooperative, In no acute distress  Lungs: Clear to ausculation bilaterally, no use of accessory muscles  Heart[de-identified] RRR with normal S1 and S2, no murmurs and no gallops. Abdomen: Soft, non-tender, bowel sounds normal.  Extremities: No edema    ASSESSMENT      1. Angina   2. Abnormal stress test   3. Paroxysmal atrial fibrillation / flutter on home Eliquis   4. History of lower GI bleed -  Patient reports last episode of GI bleed a year ago   5. History of lung nodule   6. Former smoker    PLAN     -  Patient asymptomatic this morning. Had abnormal nuclear perfusion stress test yesterday showing 13% of LV myocardium involved during stress. Hypokinesis in the septum. LVEF estimated 74%. -   Will plan for elective cardiac catheterization at [facility] later today. Keep NPO.  -   Eliquis held last night, continue to hold. -   Continue aspirin, Cardizem CD, doxazosin, Imdur, Lopressor, pravastatin. -   I had communicated with [facility] team, patient expected to arrival and resuming care by [facility]. Jyoti Westbrook MD      This note was completed using a voice transcription system. Every effort was made to ensure accuracy.  However, inadvertent computerized transcription errors may be present.

## 2021-09-23 VITALS
SYSTOLIC BLOOD PRESSURE: 122 MMHG | HEART RATE: 53 BPM | DIASTOLIC BLOOD PRESSURE: 70 MMHG | BODY MASS INDEX: 31.82 KG/M2 | TEMPERATURE: 97.8 F | WEIGHT: 197.97 LBS | OXYGEN SATURATION: 95 % | RESPIRATION RATE: 16 BRPM | HEIGHT: 66 IN

## 2021-09-23 LAB
ABSOLUTE EOS #: 0.2 K/UL (ref 0–0.4)
ABSOLUTE IMMATURE GRANULOCYTE: ABNORMAL K/UL (ref 0–0.3)
ABSOLUTE LYMPH #: 1.1 K/UL (ref 1–4.8)
ABSOLUTE MONO #: 0.6 K/UL (ref 0.1–1.3)
ALBUMIN SERPL-MCNC: 3.5 G/DL (ref 3.5–5.2)
ALBUMIN/GLOBULIN RATIO: ABNORMAL (ref 1–2.5)
ALP BLD-CCNC: 79 U/L (ref 40–129)
ALT SERPL-CCNC: 8 U/L (ref 5–41)
ANION GAP SERPL CALCULATED.3IONS-SCNC: 7 MMOL/L (ref 9–17)
AST SERPL-CCNC: 13 U/L
BASOPHILS # BLD: 1 % (ref 0–2)
BASOPHILS ABSOLUTE: 0 K/UL (ref 0–0.2)
BILIRUB SERPL-MCNC: 0.56 MG/DL (ref 0.3–1.2)
BUN BLDV-MCNC: 10 MG/DL (ref 8–23)
BUN/CREAT BLD: ABNORMAL (ref 9–20)
CALCIUM SERPL-MCNC: 9.6 MG/DL (ref 8.6–10.4)
CHLORIDE BLD-SCNC: 107 MMOL/L (ref 98–107)
CO2: 26 MMOL/L (ref 20–31)
CREAT SERPL-MCNC: 0.78 MG/DL (ref 0.7–1.2)
DIFFERENTIAL TYPE: ABNORMAL
EKG ATRIAL RATE: 45 BPM
EKG P AXIS: 22 DEGREES
EKG P-R INTERVAL: 204 MS
EKG Q-T INTERVAL: 422 MS
EKG QRS DURATION: 88 MS
EKG QTC CALCULATION (BAZETT): 365 MS
EKG R AXIS: 77 DEGREES
EKG T AXIS: 54 DEGREES
EKG VENTRICULAR RATE: 45 BPM
EOSINOPHILS RELATIVE PERCENT: 4 % (ref 0–4)
GFR AFRICAN AMERICAN: >60 ML/MIN
GFR NON-AFRICAN AMERICAN: >60 ML/MIN
GFR SERPL CREATININE-BSD FRML MDRD: ABNORMAL ML/MIN/{1.73_M2}
GFR SERPL CREATININE-BSD FRML MDRD: ABNORMAL ML/MIN/{1.73_M2}
GLUCOSE BLD-MCNC: 98 MG/DL (ref 70–99)
HCT VFR BLD CALC: 40.9 % (ref 41–53)
HEMOGLOBIN: 13.7 G/DL (ref 13.5–17.5)
IMMATURE GRANULOCYTES: ABNORMAL %
LYMPHOCYTES # BLD: 18 % (ref 24–44)
MCH RBC QN AUTO: 30.3 PG (ref 26–34)
MCHC RBC AUTO-ENTMCNC: 33.5 G/DL (ref 31–37)
MCV RBC AUTO: 90.4 FL (ref 80–100)
MONOCYTES # BLD: 10 % (ref 1–7)
NRBC AUTOMATED: ABNORMAL PER 100 WBC
PDW BLD-RTO: 13.4 % (ref 11.5–14.9)
PLATELET # BLD: 170 K/UL (ref 150–450)
PLATELET ESTIMATE: ABNORMAL
PMV BLD AUTO: 7.3 FL (ref 6–12)
POTASSIUM SERPL-SCNC: 3.9 MMOL/L (ref 3.7–5.3)
RBC # BLD: 4.52 M/UL (ref 4.5–5.9)
RBC # BLD: ABNORMAL 10*6/UL
SEG NEUTROPHILS: 67 % (ref 36–66)
SEGMENTED NEUTROPHILS ABSOLUTE COUNT: 3.9 K/UL (ref 1.3–9.1)
SODIUM BLD-SCNC: 140 MMOL/L (ref 135–144)
TOTAL PROTEIN: 6.4 G/DL (ref 6.4–8.3)
WBC # BLD: 5.8 K/UL (ref 3.5–11)
WBC # BLD: ABNORMAL 10*3/UL

## 2021-09-23 PROCEDURE — 80053 COMPREHEN METABOLIC PANEL: CPT

## 2021-09-23 PROCEDURE — 6370000000 HC RX 637 (ALT 250 FOR IP): Performed by: INTERNAL MEDICINE

## 2021-09-23 PROCEDURE — 93010 ELECTROCARDIOGRAM REPORT: CPT | Performed by: INTERNAL MEDICINE

## 2021-09-23 PROCEDURE — 36415 COLL VENOUS BLD VENIPUNCTURE: CPT

## 2021-09-23 PROCEDURE — 2580000003 HC RX 258: Performed by: INTERNAL MEDICINE

## 2021-09-23 PROCEDURE — 85025 COMPLETE CBC W/AUTO DIFF WBC: CPT

## 2021-09-23 RX ORDER — ASPIRIN 81 MG/1
81 TABLET, CHEWABLE ORAL DAILY
Qty: 30 TABLET | Refills: 3 | Status: SHIPPED | OUTPATIENT
Start: 2021-09-24

## 2021-09-23 RX ADMIN — ISOSORBIDE MONONITRATE 30 MG: 30 TABLET, EXTENDED RELEASE ORAL at 08:42

## 2021-09-23 RX ADMIN — DILTIAZEM HYDROCHLORIDE 180 MG: 180 CAPSULE, COATED, EXTENDED RELEASE ORAL at 08:42

## 2021-09-23 RX ADMIN — SODIUM CHLORIDE, PRESERVATIVE FREE 10 ML: 5 INJECTION INTRAVENOUS at 08:44

## 2021-09-23 RX ADMIN — METOPROLOL TARTRATE 50 MG: 50 TABLET, FILM COATED ORAL at 08:42

## 2021-09-23 RX ADMIN — ASPIRIN 81 MG: 81 TABLET, CHEWABLE ORAL at 08:42

## 2021-09-23 RX ADMIN — APIXABAN 5 MG: 5 TABLET, FILM COATED ORAL at 08:42

## 2021-09-23 RX ADMIN — PRAVASTATIN SODIUM 40 MG: 40 TABLET ORAL at 08:42

## 2021-09-23 ASSESSMENT — ENCOUNTER SYMPTOMS
COUGH: 0
BLOOD IN STOOL: 0
SHORTNESS OF BREATH: 0
CHOKING: 0
PHOTOPHOBIA: 0
RECTAL PAIN: 0
COLOR CHANGE: 0
VOICE CHANGE: 0
STRIDOR: 0
ANAL BLEEDING: 0
TROUBLE SWALLOWING: 0
BACK PAIN: 0
ABDOMINAL DISTENTION: 0

## 2021-09-23 NOTE — PLAN OF CARE
Problem: Pain:  Goal: Pain level will decrease  Description: Pain level will decrease  9/23/2021 0335 by Gwendolyn De Jesus RN  Outcome: Ongoing  Note: Pt did not have any complaints of any pain. Problem: Falls - Risk of:  Goal: Will remain free from falls  Description: Will remain free from falls  9/23/2021 0335 by Gwendolyn De Jesus RN  Outcome: Ongoing  Note: Pt free of falls. Call light and bedside table within reach. Bed locked and in lowest position, nonskid socks on feet.       Problem: Falls - Risk of:  Goal: Absence of physical injury  Description: Absence of physical injury  9/23/2021 0335 by Gwendolyn De Jesus RN  Outcome: Ongoing  Note: Pt absent of any physical injury

## 2021-09-23 NOTE — DISCHARGE INSTR - DIET

## 2021-09-23 NOTE — DISCHARGE SUMMARY
Discharge Summary      Patient ID: Tahir Haynes    MRN: [id number]     Acct:  [de-identified]       Patient's PCP: Bobbi Lopez Date: 9/20/2021     Discharge Date:   9/23/2021    Admitting Physician: Tamika Trejo MD    Discharge Physician: Choco Awad MD     Discharge Diagnoses:    Primary Problem  Chest pain    Principal Problem:    Chest pain  Active Problems:    PAF (paroxysmal atrial fibrillation) ([facility] [address]    Essential hypertension    ASCVD (arteriosclerotic cardiovascular disease)  Resolved Problems:    * No resolved hospital problems. *    Past Medical History:   Diagnosis Date    Anxiety attack     Arthritis     Atrial fibrillation (HCC)     Atrial flutter (HCC)     Back pain     CAD (coronary artery disease)     Chest pain 04/12/2016    Colon polyps     Constipation     Dementia (HCC) 03/23/2019    Dizziness     GERD (gastroesophageal reflux disease)     Hyperlipidemia     Hypertension     Lower GI bleed     Lung nodule seen on imaging study 02/17/2019    Prediabetes     Rectal bleeding     S/P colonoscopy with polypectomy     Seizure ([facility] [address]     NOTED PER CARE EVERYWHERE- PATIENT DENIES HX OF, STATES HE HAS NEVER BEEN ON SEIZURE MEDICATION    Sleep apnea     not tested, no cpap    SOB (shortness of breath)     Status post coronary artery bypass graft     Wears dentures     TOP     The patient was seen and examined on day of discharge and this discharge summary is in conjunction with daily progress note from day of discharge. Code Status:  Full Code    Hospital Course:   H&P Reviewed. Patient with history of PAF, CABG and hypertension was admitted with chest pain cardiology services was consulted patient was started on aspirin patient had an abnormal cardiac stress test. Patient was transferred to [facility] for cardiac cath per cardiology recommendation. Cardiology recommended medical management.  Patient symptoms improved during hospital stay patient is being discharged in stable condition.       Discharge day progress note: Today   Patient was seen and examined at bedside today. Hemodynamically stable. No chest pain, shortness of breath, fever, chills, nausea, vomiting, palpitations, or abdominal pain reported. No events reported overnight. Review of Systems   Constitutional: Negative for appetite change, chills and diaphoresis. HENT: Negative for drooling, ear pain, trouble swallowing and voice change. Eyes: Negative for photophobia and visual disturbance. Respiratory: Negative for cough, choking, shortness of breath and stridor. Cardiovascular: Negative for chest pain, palpitations and leg swelling. Gastrointestinal: Negative for abdominal distention, anal bleeding, blood in stool and rectal pain. Endocrine: Negative for polyphagia and polyuria. Genitourinary: Negative for dysuria, flank pain, hematuria and urgency. Musculoskeletal: Negative for back pain, myalgias and neck stiffness. Skin: Negative for color change, pallor and rash. Allergic/Immunologic: Negative for environmental allergies and food allergies. Neurological: Negative for tremors, seizures, facial asymmetry and numbness. Hematological: Negative for adenopathy. Does not bruise/bleed easily. Psychiatric/Behavioral: Negative for agitation, behavioral problems, hallucinations, self-injury and suicidal ideas. Physical Exam  Vitals reviewed. Constitutional:       Appearance: Normal appearance. He is not diaphoretic. HENT:      Head: Normocephalic and atraumatic. Right Ear: External ear normal.      Left Ear: External ear normal.      Nose: Nose normal.      Mouth/Throat:      Mouth: Mucous membranes are moist.      Pharynx: Oropharynx is clear. Eyes:      Conjunctiva/sclera: Conjunctivae normal.   Cardiovascular:      Rate and Rhythm: Normal rate and regular rhythm. Pulses: Normal pulses. Heart sounds: Normal heart sounds.    Pulmonary: Effort: Pulmonary effort is normal.      Breath sounds: Normal breath sounds. No rales. Abdominal:      General: Bowel sounds are normal. There is no distension. Palpations: Abdomen is soft. Musculoskeletal:         General: No tenderness or deformity. Normal range of motion. Cervical back: Normal range of motion and neck supple. No rigidity. Right lower leg: No edema. Left lower leg: No edema. Skin:     General: Skin is warm and dry. Capillary Refill: Capillary refill takes less than 2 seconds. Coloration: Skin is not jaundiced. Comments: Right groin area healing  without any hematoma   Neurological:      General: No focal deficit present. Mental Status: Mental status is at baseline. Psychiatric:         Mood and Affect: Mood normal.         Behavior: Behavior normal.         Consults:  IP CONSULT TO FAMILY MEDICINE  IP CONSULT TO CARDIOLOGY    Significant Diagnostic Studies: as above, and as follows:    Treatments: as above    Disposition: home    Discharged Condition: Stable    Follow Up: Khai Alvarado in one week  Cardiology in 1 week    Discharge Medications:    Adan Bautista   Home Medication Instructions [facility]:[id number]    Printed on:09/23/21 1326   Medication Information                      acetaminophen (TYLENOL) 325 MG tablet  Take 650 mg by mouth 2 times daily as needed for Pain              apixaban (ELIQUIS) 5 MG TABS tablet  Take 5 mg by mouth 2 times daily             aspirin 81 MG chewable tablet  Take 1 tablet by mouth daily             dilTIAZem (CARDIZEM CD) 180 MG extended release capsule  Take 180 mg by mouth daily             doxazosin (CARDURA) 2 MG tablet  Take 2 mg by mouth nightly             isosorbide mononitrate (IMDUR) 30 MG extended release tablet  Take 1 tablet by mouth daily             metoprolol (LOPRESSOR) 50 MG tablet  Take 50 mg by mouth 2 times daily.              nitroGLYCERIN (NITROSTAT) 0.4 MG SL tablet  up to max of 3 total doses. If no relief after 1 dose, call 911. omeprazole (PRILOSEC) 20 MG capsule  Take 20 mg by mouth daily. pravastatin (PRAVACHOL) 40 MG tablet  Take 40 mg by mouth daily. Time Spent on discharge is more than  35 min in the examination, evaluation, counseling and review of medications and discharge plan.       Electronically signed by Nelida Salmeron MD     Copy sent to Dr. Farhan Hahn

## 2021-09-23 NOTE — PROGRESS NOTES
D/c paperwork reviewed with pt. Reviewed f/u appts and meds. Reviewed avoid extra activity until after f/u appt and cleared by dr. Maricel Kumari awaiting pickup at 1/296 by family.

## 2021-09-23 NOTE — FLOWSHEET NOTE
Pt states that he will look for a new place that he does not need to take care the house. PT appreciated prayer. 09/23/21 1301   Encounter Summary   Services provided to: Patient   Referral/Consult From: [facility]   (9/23/21)   Complexity of Encounter Moderate   Length of Encounter 15 minutes   Spiritual Assessment Completed Yes   Spiritual/Jew   Type Spiritual support   Assessment Calm; Approachable; Hopeful;Peaceful   Intervention Active listening;Explored feelings, thoughts, concerns;Prayer;Sustaining presence/ Ministry of presence; Discussed illness/injury and it's impact   Outcome Expressed gratitude;Engaged in conversation;Coping; Hopeful;Receptive;Encouraged

## 2021-09-23 NOTE — PROGRESS NOTES
Physician Progress Note      PATIENT:               Lashaun Enciso  CSN #:                  [id number]  :                       1940  ADMIT DATE:       2021 4:55 PM  [address] [place] DATE:  RESPONDING  PROVIDER #:        Ricki Clark MD          QUERY TEXT:    Pt admitted with chest pain. Noted cardiac catheterization results of   obstructive multivessal CAD on  and angina in notes by ordered cardiology   consultant. If possible, please document in progress notes and discharge   summary:    The medical record reflects the following:  Risk Factors: known CAD with hx of quadruple CABG in , former tobacco   abuse, HTN, HLD and atrial fibrillation  Clinical Indicators: patient developed chest pain while doing yard work,   improved with NTG and rest per ED notes;  - cardiology notes:  anginal   chest pain/angina;   positive nuclear med stress test:  13% of left   ventricular myocardium involved with  stress perfusion defects. Function: Hypokinesis in the septum;   cardiac   catheterization at [facility] results:  multivessel obstructive CAD with patent   grafts  Treatment: cardiology consult, diagnostic testing as above, continued baby   ASA, Eliquis, Cardizem, Cardura, Imdur, Lopressor and Pravachol  Options provided:  -- Chest pain due to CAD with angina confirmed present on admission  -- Other - I will add my own diagnosis  -- Disagree - Not applicable / Not valid  -- Disagree - Clinically unable to determine / Unknown  -- Refer to Clinical Documentation Reviewer    PROVIDER RESPONSE TEXT:    The diagnosis of chest pain due to CAD with angina was confirmed as present on   admission.     Query created by: Pau Yost on 2021 9:49 AM      Electronically signed by:  Ricki Clark MD 2021 10:45 AM

## 2021-09-23 NOTE — PROGRESS NOTES
[address], [address] [facility]  [phone]    PROGRESS NOTE     Susu Alexis   Was seen examined at bedside this morning. Stated that he is feeling well today , the best he felt since admission. Denies any chest pain or shortness of breath. Telemetry reviewed and showed the patient was in sinus bradycardia, heart rate in the high 50s, no significant events overnight. SUBJECTIVE     Allergies:     Allergies   Allergen Reactions    Oxycodone Other (See Comments)     Attacking people, black out     Pcn [Penicillins] Other (See Comments)     dizziness    Warfarin And Related      \" I see things\"         CURRENT MEDICATIONS   aspirin  81 mg Oral Daily    sodium chloride flush  5-40 mL IntraVENous 2 times per day    apixaban  5 mg Oral BID    dilTIAZem  180 mg Oral Daily    doxazosin  2 mg Oral Nightly    isosorbide mononitrate  30 mg Oral Daily    metoprolol tartrate  50 mg Oral BID    pantoprazole  40 mg Oral QAM AC    pravastatin  40 mg Oral Daily     CONTINUOUS INFUSIONS   sodium chloride             OBJECTIVE     CBC: @LABRCNTIP(WBC:3,HGB:3,HCT:3,MCV:3,PLT:3)@      [email or web address]  PT/INR: @LABRCNTIP(PROTIME:3,INR:3)@  APTT: @LABRCNTIP(aPTT:3)@  MAG: @LABRCNTIP(MG:3)@  D Dimer: @LABRCNTIP(DDIMER:3)@  Troponin I @LABRCNTIP(TROPONINI:3)@  ProBNP @LABRCNTIP(BNP:3)@  Lipid Panel:    Lab Results   Component Value Date    CHOL 140 02/16/2019    TRIG 259 02/16/2019    HDL 27 02/16/2019     Liver Panel: No results found for: ALB  HgA1C:  No components found for: HGBA1C    ABG: No components found for: ABGSAMPLETYP, ABGBODYTEMP, ABGPHCORRFOR, SDHFTK5BUVANN, ABGPHCORRFOOR, ABGPH, ABGPCO2, ABGPO2, ABGBASEEXCES, ABGBASEDEFIC, ABGHCO3, GKOM8QNT, ABGENDTIDALC, ABGALLENSTES, ABGSPO2, ABGSAMEPLESIT, [id number], ABGOXYGENSOUE      LAST ECHO (Within 2 Years) [unfilled]      RADIOLOGY:  [unfilled]    PHYSICAL EXAM  Admission Weight: Weight: 190 lb (86.2 kg)  No intake/output data recorded. Weight change: -5 lb 8.2 oz (-2.5 kg)  Wt Readings from Last 3 Encounters:   09/23/21 197 lb 15.6 oz (89.8 kg)   07/07/21 211 lb 10.3 oz (96 kg)   07/04/21 200 lb (90.7 kg)       Vitals:  Vitals:    09/22/21 1745 09/22/21 1915 09/22/21 2010 09/23/21 0000   BP: 117/63 110/63  128/70   Pulse: 54 55 57 51   Resp: 16 16  16   Temp: 97.6 °F (36.4 °C) 97.5 °F (36.4 °C)  98 °F (36.7 °C)   TempSrc: Oral Oral  Oral   SpO2: 96% 95%  95%   Weight:    197 lb 15.6 oz (89.8 kg)   Height:           Admit Weight  Weight: 190 lb (86.2 kg)  Last 3 Weights  [unfilled]  Body mass index is 31.95 kg/m². INTAKE/OUTPUT  No intake/output data recorded. No intake or output data in the 24 hours ending 09/23/21 0800    General appearance: Alert oriented and cooperative, In no acute distress  Lungs: Clear to ausculation bilaterally, no use of accessory muscles  Heart[de-identified] RRR with normal S1 and S2, no murmurs and no gallops. Abdomen: Soft, non-tender, bowel sounds normal.  Extremities: No edema    ASSESSMENT      1. Angina   2. Abnormal stress test   3. CAD with Hx of CABG x4 - [facility] [place] 9/22/21 : severe nultivessel disease of native arteries. Patent LIMA-LAD, SVG-D2, SVG-OM, SVG-PDA. 3. Paroxysmal atrial fibrillation / flutter on home Eliquis   4. History of lower GI bleed -  Patient reports last episode of GI bleed a year ago   5. History of lung nodule   6. Former smoker    PLAN     -    Patient asymptomatic this morning. Euvolemic on exam.  Groin arteriotomy site seems to be healing well. Good distal pulses. Patent grafts on cardiac catheterization done yesterday at [facility].  -   No further cardiac workup. Continue medical management with aggressive risk factor modification.  -   Continue aspirin, statin, Lopressor 50 mg b.i.d., Imdur 30 mg, Cardizem  mg daily,  Doxazosin 2  Mgdaily.   - Eliquis for prevention of thromboembolic events. -   Plan for follow-up as outpatient within a week from discharge. -   Will sign off. Please call with any cardiac questions or concerns. Jyoti Westbrook MD      This note was completed using a voice transcription system. Every effort was made to ensure accuracy. However, inadvertent computerized transcription errors may be present.

## 2022-02-11 ENCOUNTER — HOSPITAL ENCOUNTER (OUTPATIENT)
Age: 82
Discharge: HOME | End: 2022-02-11
Payer: MEDICARE

## 2022-02-11 PROCEDURE — 86787 VARICELLA-ZOSTER ANTIBODY: CPT

## 2022-02-11 PROCEDURE — 36415 COLL VENOUS BLD VENIPUNCTURE: CPT

## 2022-02-14 LAB
VARICELLA-ZOSTER AB, IGM: 0.18 ISR
VZV IGG SER QL IA: 2.7

## 2022-05-08 ENCOUNTER — APPOINTMENT (OUTPATIENT)
Dept: GENERAL RADIOLOGY | Age: 82
End: 2022-05-08
Payer: MEDICARE

## 2022-05-08 ENCOUNTER — HOSPITAL ENCOUNTER (OUTPATIENT)
Age: 82
Setting detail: OBSERVATION
Discharge: HOME | End: 2022-05-09
Attending: STUDENT IN AN ORGANIZED HEALTH CARE EDUCATION/TRAINING PROGRAM | Admitting: INTERNAL MEDICINE
Payer: MEDICARE

## 2022-05-08 DIAGNOSIS — R07.89 OTHER CHEST PAIN: ICD-10-CM

## 2022-05-08 DIAGNOSIS — R00.1 SYMPTOMATIC BRADYCARDIA: Primary | ICD-10-CM

## 2022-05-08 LAB
ABSOLUTE EOS #: 0.2 K/UL (ref 0–0.4)
ABSOLUTE LYMPH #: 1.3 K/UL (ref 1–4.8)
ABSOLUTE MONO #: 0.7 K/UL (ref 0.1–1.3)
ALBUMIN SERPL-MCNC: 3.8 G/DL (ref 3.5–5.2)
ALP BLD-CCNC: 74 U/L (ref 40–129)
ALT SERPL-CCNC: 13 U/L (ref 5–41)
ANION GAP SERPL CALCULATED.3IONS-SCNC: 8 MMOL/L (ref 9–17)
AST SERPL-CCNC: 17 U/L
BASOPHILS # BLD: 1 % (ref 0–2)
BASOPHILS ABSOLUTE: 0 K/UL (ref 0–0.2)
BILIRUB SERPL-MCNC: 0.64 MG/DL (ref 0.3–1.2)
BUN BLDV-MCNC: 12 MG/DL (ref 8–23)
CALCIUM SERPL-MCNC: 9.7 MG/DL (ref 8.6–10.4)
CHLORIDE BLD-SCNC: 106 MMOL/L (ref 98–107)
CO2: 25 MMOL/L (ref 20–31)
CREAT SERPL-MCNC: 0.89 MG/DL (ref 0.7–1.2)
EOSINOPHILS RELATIVE PERCENT: 3 % (ref 0–4)
GFR AFRICAN AMERICAN: >60 ML/MIN
GFR NON-AFRICAN AMERICAN: >60 ML/MIN
GFR SERPL CREATININE-BSD FRML MDRD: ABNORMAL ML/MIN/{1.73_M2}
GLUCOSE BLD-MCNC: 112 MG/DL (ref 70–99)
HCT VFR BLD CALC: 43.6 % (ref 41–53)
HEMOGLOBIN: 14.5 G/DL (ref 13.5–17.5)
LYMPHOCYTES # BLD: 18 % (ref 24–44)
MCH RBC QN AUTO: 29.7 PG (ref 26–34)
MCHC RBC AUTO-ENTMCNC: 33.3 G/DL (ref 31–37)
MCV RBC AUTO: 89.2 FL (ref 80–100)
MONOCYTES # BLD: 9 % (ref 1–7)
PDW BLD-RTO: 13.9 % (ref 11.5–14.9)
PLATELET # BLD: 150 K/UL (ref 150–450)
PMV BLD AUTO: 7.5 FL (ref 6–12)
POTASSIUM SERPL-SCNC: 4.6 MMOL/L (ref 3.7–5.3)
PRO-BNP: 261 PG/ML
RBC # BLD: 4.89 M/UL (ref 4.5–5.9)
SEG NEUTROPHILS: 69 % (ref 36–66)
SEGMENTED NEUTROPHILS ABSOLUTE COUNT: 5.2 K/UL (ref 1.3–9.1)
SODIUM BLD-SCNC: 139 MMOL/L (ref 135–144)
TOTAL PROTEIN: 6.7 G/DL (ref 6.4–8.3)
TROPONIN, HIGH SENSITIVITY: 12 NG/L (ref 0–22)
TROPONIN, HIGH SENSITIVITY: 12 NG/L (ref 0–22)
TROPONIN, HIGH SENSITIVITY: 13 NG/L (ref 0–22)
WBC # BLD: 7.5 K/UL (ref 3.5–11)

## 2022-05-08 PROCEDURE — 93005 ELECTROCARDIOGRAM TRACING: CPT | Performed by: STUDENT IN AN ORGANIZED HEALTH CARE EDUCATION/TRAINING PROGRAM

## 2022-05-08 PROCEDURE — G0378 HOSPITAL OBSERVATION PER HR: HCPCS

## 2022-05-08 PROCEDURE — 85025 COMPLETE CBC W/AUTO DIFF WBC: CPT

## 2022-05-08 PROCEDURE — 96361 HYDRATE IV INFUSION ADD-ON: CPT

## 2022-05-08 PROCEDURE — 2060000000 HC ICU INTERMEDIATE R&B

## 2022-05-08 PROCEDURE — 36415 COLL VENOUS BLD VENIPUNCTURE: CPT

## 2022-05-08 PROCEDURE — 84484 ASSAY OF TROPONIN QUANT: CPT

## 2022-05-08 PROCEDURE — 99285 EMERGENCY DEPT VISIT HI MDM: CPT

## 2022-05-08 PROCEDURE — 80053 COMPREHEN METABOLIC PANEL: CPT

## 2022-05-08 PROCEDURE — 83880 ASSAY OF NATRIURETIC PEPTIDE: CPT

## 2022-05-08 PROCEDURE — 2580000003 HC RX 258: Performed by: STUDENT IN AN ORGANIZED HEALTH CARE EDUCATION/TRAINING PROGRAM

## 2022-05-08 PROCEDURE — 96360 HYDRATION IV INFUSION INIT: CPT

## 2022-05-08 PROCEDURE — 71046 X-RAY EXAM CHEST 2 VIEWS: CPT

## 2022-05-08 RX ORDER — ACETAMINOPHEN 325 MG/1
650 TABLET ORAL EVERY 4 HOURS PRN
Status: DISCONTINUED | OUTPATIENT
Start: 2022-05-08 | End: 2022-05-09 | Stop reason: HOSPADM

## 2022-05-08 RX ORDER — SODIUM CHLORIDE, SODIUM LACTATE, POTASSIUM CHLORIDE, AND CALCIUM CHLORIDE .6; .31; .03; .02 G/100ML; G/100ML; G/100ML; G/100ML
500 INJECTION, SOLUTION INTRAVENOUS ONCE
Status: COMPLETED | OUTPATIENT
Start: 2022-05-08 | End: 2022-05-08

## 2022-05-08 RX ADMIN — SODIUM CHLORIDE, POTASSIUM CHLORIDE, SODIUM LACTATE AND CALCIUM CHLORIDE 500 ML: 600; 310; 30; 20 INJECTION, SOLUTION INTRAVENOUS at 15:29

## 2022-05-08 ASSESSMENT — ENCOUNTER SYMPTOMS
RHINORRHEA: 0
BACK PAIN: 0
DIARRHEA: 0
COUGH: 0
ABDOMINAL PAIN: 0
SHORTNESS OF BREATH: 0
CONSTIPATION: 0
NAUSEA: 1
VOMITING: 0

## 2022-05-08 NOTE — ED NOTES
Mode of arrival (squad #, walk in, police, etc) : Squad        Chief complaint(s): Fatigue, lightheadedness        Arrival Note (brief scenario, treatment PTA, etc). : Pt states he was outside doing some yard work when he bent over and stood back up and got chest pain, lightheaded, and dizzy. Pt states his wife called EMS. Pt states the chest pain subsided before ems got there. Ems gave 4 of 81mg asprin. Pt states in the ER upon arrival he feels fatigued and lightheaded still but denies chest pain. C= \"Have you ever felt that you should Cut down on your drinking? \"  No  A= \"Have people Annoyed you by criticizing your drinking? \"  No  G= \"Have you ever felt bad or Guilty about your drinking? \"  No  E= \"Have you ever had a drink as an Eye-opener first thing in the morning to steady your nerves or to help a hangover? \"  No      Deferred []      Reason for deferring: N/A    *If yes to two or more: probable alcohol abuse. Teena Neff  05/08/22 1824

## 2022-05-08 NOTE — ED PROVIDER NOTES
[facility] ED  Emergency Department Encounter  Emergency Medicine Resident     Pt Name: Lu Galvan  MRN: [id number]  Dominique 1940  Date of evaluation: 5/8/22  PCP:  Michael Marti       Chief Complaint   Patient presents with    Dizziness    Fatigue       HISTORY OFPRESENT ILLNESS  (Location/Symptom, Timing/Onset, Context/Setting, Quality, Duration, Modifying Factors,Severity.)      Lu Galvan is a 80 y.o. male who presents with headedness and chest pain. Patient states he was outside checking his water lines when he bent down and had sudden onset of substernal chest pain associated with nausea and diaphoresis. Has had a CABG in the past, he states this felt similar to previous \"light heart attacks\". He is only on aspirin, no other blood thinners. He did not fall during this event. He sat down and chest pain resolved but he has numbness feeling in his right fingers. He also feels lightheaded and fatigued. He received 324 aspirin in route. Denying chest pain at this time. No abdominal pain, nausea has resolved, no vomiting. No urinary or bowel complaints, but patient follows with his PCP for chronic constipation. No lower extremity swelling. No history of clots. No other complaints this time. PAST MEDICAL / SURGICAL / SOCIAL / FAMILY HISTORY      has a past medical history of Anxiety attack, Arthritis, Atrial fibrillation (HCC), Atrial flutter (HCC), Back pain, CAD (coronary artery disease), Chest pain, Colon polyps, Constipation, Dementia ([address] Dizziness, GERD (gastroesophageal reflux disease), Hyperlipidemia, Hypertension, Lower GI bleed, Lung nodule seen on imaging study, Prediabetes, Rectal bleeding, S/P colonoscopy with polypectomy, Seizure ([address] Sleep apnea, SOB (shortness of breath), Status post coronary artery bypass graft, and Wears dentures.      has a past surgical history that includes Coronary artery bypass graft (2004); Cataract removal (Bilateral); ventral hernia repair; Total knee arthroplasty (Bilateral); Colonoscopy (2018); Cardiac catheterization; and Colonoscopy (N/A, 2021). Social History     Socioeconomic History    Marital status:      Spouse name: Not on file    Number of children: Not on file    Years of education: Not on file    Highest education level: Not on file   Occupational History    Not on file   Tobacco Use    Smoking status: Former Smoker     Quit date: 1980     Years since quittin.3    Smokeless tobacco: Never Used   Vaping Use    Vaping Use: Never used   Substance and Sexual Activity    Alcohol use: No    Drug use: No    Sexual activity: Not on file   Other Topics Concern    Not on file   Social History Narrative    Not on file     Social Determinants of Health     Financial Resource Strain:     Difficulty of Paying Living Expenses: Not on file   Food Insecurity:     Worried About [address] in the Last Year: Not on file    [address] in the Last Year: Not on file   Transportation Needs:     Lack of Transportation (Medical): Not on file    Lack of Transportation (Non-Medical):  Not on file   Physical Activity:     Days of Exercise per Week: Not on file    Minutes of Exercise per Session: Not on file   Stress:     Feeling of Stress : Not on file   Social Connections:     Frequency of Communication with Friends and Family: Not on file    Frequency of Social Gatherings with Friends and Family: Not on file    Attends Christianity Services: Not on file    Active Member of Clubs or Organizations: Not on file    Attends Club or Organization Meetings: Not on file    Marital Status: Not on file   Intimate Partner Violence:     Fear of Current or Ex-Partner: Not on file    Emotionally Abused: Not on file    Physically Abused: Not on file    Sexually Abused: Not on file   Housing Stability:     Unable to Pay for Housing in the Last Year: Not on file  Number of Places Lived in the Last Year: Not on file    Unstable Housing in the Last Year: Not on file       Family History   Problem Relation Age of Onset    Alzheimer's Disease Mother     Arthritis Mother         lumbar disc disease    Heart Disease Father         Allergies:  Oxycodone, Pcn [penicillins], and Warfarin and related    Home Medications:  Prior to Admission medications    Medication Sig Start Date End Date Taking? Authorizing Provider   aspirin 81 MG chewable tablet Take 1 tablet by mouth daily 9/24/21   Yulissa Morales MD   apixaban (ELIQUIS) 5 MG TABS tablet Take 5 mg by mouth 2 times daily    Historical Provider, MD   dilTIAZem (CARDIZEM CD) 180 MG extended release capsule Take 180 mg by mouth daily    Historical Provider, MD   doxazosin (CARDURA) 2 MG tablet Take 2 mg by mouth nightly    Historical Provider, MD   isosorbide mononitrate (IMDUR) 30 MG extended release tablet Take 1 tablet by mouth daily 2/18/19   Altaf Haddad MD   nitroGLYCERIN (NITROSTAT) 0.4 MG SL tablet up to max of 3 total doses. If no relief after 1 dose, call 911. 2/17/19   Altaf Haddad MD   acetaminophen (TYLENOL) 325 MG tablet Take 650 mg by mouth 2 times daily as needed for Pain     Historical Provider, MD   metoprolol (LOPRESSOR) 50 MG tablet Take 50 mg by mouth 2 times daily. Historical Provider, MD   pravastatin (PRAVACHOL) 40 MG tablet Take 40 mg by mouth daily. Historical Provider, MD   omeprazole (PRILOSEC) 20 MG capsule Take 20 mg by mouth daily. Historical Provider, MD       REVIEW OFSYSTEMS    (2-9 systems for level 4, 10 or more for level 5)      Review of Systems   Constitutional: Positive for diaphoresis. Negative for chills and fever. HENT: Negative for congestion and rhinorrhea. Eyes: Negative for visual disturbance. Respiratory: Negative for cough and shortness of breath. Cardiovascular: Positive for chest pain. Gastrointestinal: Positive for nausea.  Negative for abdominal pain, constipation, diarrhea and vomiting. Genitourinary: Negative for dysuria and frequency. Musculoskeletal: Negative for back pain and neck pain. Skin: Negative for rash. Neurological: Negative for weakness, numbness and headaches. PHYSICAL EXAM   (up to 7 for level 4, 8 or more forlevel 5)      INITIAL VITALS:   ED Triage Vitals [05/08/22 1452]   BP Temp Temp Source Pulse Resp SpO2 Height Weight   102/60 98 °F (36.7 °C) Oral (!) 48 12 95 % -- --       Physical Exam  Constitutional:       General: He is not in acute distress. Appearance: Normal appearance. He is not ill-appearing, toxic-appearing or diaphoretic. HENT:      Head: Normocephalic and atraumatic. Mouth/Throat:      Mouth: Mucous membranes are moist.      Pharynx: Oropharynx is clear. Eyes:      Extraocular Movements: Extraocular movements intact. Conjunctiva/sclera: Conjunctivae normal.      Pupils: Pupils are equal, round, and reactive to light. Cardiovascular:      Rate and Rhythm: Regular rhythm. Bradycardia present. Heart sounds: Normal heart sounds. No murmur heard. Pulmonary:      Effort: Pulmonary effort is normal. No respiratory distress. Breath sounds: Normal breath sounds. No wheezing or rhonchi. Abdominal:      Palpations: Abdomen is soft. Tenderness: There is no abdominal tenderness. There is no guarding or rebound. Musculoskeletal:         General: Normal range of motion. Cervical back: Normal range of motion and neck supple. Right lower leg: No edema. Left lower leg: No edema. Skin:     General: Skin is warm and dry. Neurological:      General: No focal deficit present. Mental Status: He is alert and oriented to person, place, and time.          DIFFERENTIAL  DIAGNOSIS     PLAN (LABS / IMAGING / EKG):  Orders Placed This Encounter   Procedures    XR CHEST (2 VW)    CBC with Auto Differential    Comprehensive Metabolic Panel w/ Reflex to MG    Troponin  Brain Natriuretic Peptide    CBC with Auto Differential    Basic Metabolic Panel    Troponin    Diet NPO    Telemetry monitoring - continuous duration    Inpatient consult to Internal Medicine    Inpatient consult to Cardiology    Inpatient consult to Cardiology    EKG 12 Lead    ADMIT TO INPATIENT       MEDICATIONS ORDERED:  Orders Placed This Encounter   Medications    lactated ringers bolus    acetaminophen (TYLENOL) tablet 650 mg       DDX: ACS, symptomatic bradycardia, arrhythmia, viral infection, CHF, aortic aneurysm or dissection, other    Initial MDM/Plan/ED COURSE:    80 y.o. male who presents with chest pain associated with nausea and diaphoresis, now presenting with lightheadedness and tingling in the right fingers. On exam, he is in no acute distress, does appear fatigued. Right noted to be bradycardic in the mid 40s. EKG done and appears to be sinus bradycardia. Heart and lung exam otherwise unremarkable. Abdomen is soft nontender. He does not have any neurologic deficits. Will obtain labs to rule out ACS, infection, electrolyte abnormality or other cause of patient's symptoms. EKG already done. Chest x-ray ordered. Will provide fluids at this time and patient received aspirin in route. Dissipate admission for symptomatic bradycardia. ED Course as of 05/09/22 0021   Sun May 08, 2022   1537 EKG demonstrates sinus bradycardia, first-degree AV block with NC interval 212, QRS 90, QTc 381. Normal axis. Ventricular rate of 46. No acute ST or T wave changes. Interpretation: Sinus bradycardia with first-degree AV block. [JS]   1558 Troponin, High Sensitivity: 13  Near baseline   [JS]   1648 XR CHEST (2 VW)  FINDINGS:  Stable left pericardial fat pad. .The cardiac size is normal. No acute  infiltrates or pleural effusions are seen. Pulmonary vascularity appears  normal. There is mild ectasia of the thoracic aorta. There are degenerative  changes in the spine .  No acute bony abnormalities. The hilar structures are  normal.  Postsurgical changes overlying the mediastinum     IMPRESSION:  No acute cardiopulmonary disease [JS]   1807 Troponin, High Sensitivity: 12 [JS]   1833 Spoke with Dr. Marilyn Garcia. No further recommendations at this time. They will follow [JS]      ED Course User Index  [JS] Yoselinlefty Sosa, DO    :     DIAGNOSTIC RESULTS / Candido Stalling COURSE / MDM     LABS:  Labs Reviewed   CBC WITH AUTO DIFFERENTIAL - Abnormal; Notable for the following components:       Result Value    Seg Neutrophils 69 (*)     Lymphocytes 18 (*)     Monocytes 9 (*)     All other components within normal limits   COMPREHENSIVE METABOLIC PANEL W/ REFLEX TO MG FOR LOW K - Abnormal; Notable for the following components:    Glucose 112 (*)     Anion Gap 8 (*)     All other components within normal limits   TROPONIN   TROPONIN   BRAIN NATRIURETIC PEPTIDE   TROPONIN   CBC WITH AUTO DIFFERENTIAL   BASIC METABOLIC PANEL           XR CHEST (2 VW)    Result Date: 5/8/2022  EXAMINATION: TWO XRAY VIEWS OF THE CHEST 5/8/2022 12:52 pm COMPARISON: 09/20/2021 HISTORY: ORDERING SYSTEM PROVIDED HISTORY: chest pain TECHNOLOGIST PROVIDED HISTORY: chest pain Reason for Exam: Chest pain and burning after exertion. Relevant Medical/Surgical History: Hx of heart problems. FINDINGS: Stable left pericardial fat pad. .The cardiac size is normal. No acute infiltrates or pleural effusions are seen. Pulmonary vascularity appears normal. There is mild ectasia of the thoracic aorta. There are degenerative changes in the spine . No acute bony abnormalities.  The hilar structures are normal.  Postsurgical changes overlying the mediastinum     No acute cardiopulmonary disease         EKG  See above    All EKG's are interpreted by the Emergency Department Physicianwho either signs or Co-signs this chart in the absence of a cardiologist.      PROCEDURES:  None    CONSULTS:  IP CONSULT TO INTERNAL MEDICINE  IP CONSULT TO CARDIOLOGY  IP CONSULT TO CARDIOLOGY    CRITICAL CARE:  Please see attending note    FINAL IMPRESSION      1. Symptomatic bradycardia    2. Other chest pain          DISPOSITION / PLAN     DISPOSITION Admitted 05/08/2022 06:51:30 PM      PATIENT REFERRED TO:  No follow-up provider specified.     DISCHARGE MEDICATIONS:  New Prescriptions    No medications on file       Kenny Subramanian DO  Emergency Medicine Resident    (Please note that portions of this note were completed with a voice recognition program.Efforts were made to edit the dictations but occasionally words are mis-transcribed.)       Kenny Subramanian DO  Resident  05/09/22 0021

## 2022-05-08 NOTE — ED PROVIDER NOTES
EMERGENCY DEPARTMENT ENCOUNTER   ATTENDING ATTESTATION     Pt Name: Corey Rizzo  MRN: [id number]  Armstrongfbriseida 1940  Date of evaluation: 5/8/22       Corey Rizzo is a 80 y.o. male who presents with Dizziness and Fatigue      MDM:   Acute episode of dizziness and lightheadedness and near syncope. Bradycardic rate in the 40s  Patient states that the bradycardia is normal for him however upon chart review is on diltiazem for atrial fibrillation is currently on Eliquis    Vitals:   Vitals:    05/08/22 1700 05/08/22 1815 05/08/22 1900 05/08/22 1930   BP: 128/67 118/68 129/67 123/65   Pulse: (!) 46 (!) 45 (!) 45 (!) 47   Resp: 12 15 15 14   Temp:       TempSrc:       SpO2: 96% 97% 95% 96%       PHYSICAL:   Temp: 98 °F (36.7 °C),  Pulse: (!) 47, Resp: 14, BP: 123/65, SpO2: 96 %  Gen: Non-toxic, Afebrile  Neck: Supple  Cards: Regular rate and rhythm, bradycardic  Pulm: Lung sounds clear to auscultation  Abdomen: Soft, non-tender, non-distended  Skin: warm, dry  Extremities: pulses 2+ radial / dorsalis pedis, no clubbing, cyanosis, edema  Neurologic: CAOX3, no facial asymmetry, no dysarthria or aphasia       Several episodes of dizziness and has slightly improved however is still lightheaded upon standing. Has received fluids. Currently at this time, do not feel comfortable sending patient home given the fact that he is nearly [de-identified]years old and anticoagulated and a heart rate in the 40s. Unknown if patient is actually on diltiazem or not. Spoke with patient's primary care provider and they will admit patient, cardiology consult. Will hold diltiazem    EKG  EKG Interpretation    Interpreted by me    Rhythm: Sinus bradycardia  Rate: Bradycardic, 46  Axis: normal  Ectopy: none  Conduction: normal  ST Segments: no acute change  T Waves: no acute change  Q Waves: none    Clinical Impression: Sinus bradycardia rate of 46. No ST segment changes, no other arrhythmia, no ectopy.   Normal axis, poor R wave progression      All EKG's are interpreted by the Emergency Department Physician whoeither signs or Co-signs this chart in the absence of a cardiologist.    I personally saw and examined the patient. I have reviewed and agree with the resident's findings, including all diagnostic interpretations and treatment plan as written. I was present for the key portions of any procedures performed and the inclusive time noted for any critical care statement. There was a high probability of clinically significant/life threatening deterioration in this patient's condition which required my urgent intervention. Total critical care time was 15 minutes. This excludes any time for separately reportable procedures. ED Course as of 05/08/22 2038   Sun May 08, 2022   1537 EKG demonstrates sinus bradycardia, first-degree AV block with [place] interval 212, QRS 90, QTc 381. Normal axis. Ventricular rate of 46. No acute ST or T wave changes. Interpretation: Sinus bradycardia with first-degree AV block. [JS]   1558 Troponin, High Sensitivity: 13  Near baseline   [JS]   1648 XR CHEST (2 VW)  FINDINGS:  Stable left pericardial fat pad. .The cardiac size is normal. No acute  infiltrates or pleural effusions are seen. Pulmonary vascularity appears  normal. There is mild ectasia of the thoracic aorta. There are degenerative  changes in the spine . No acute bony abnormalities. The hilar structures are  normal.  Postsurgical changes overlying the mediastinum     IMPRESSION:  No acute cardiopulmonary disease [JS]   1807 Troponin, High Sensitivity: 12 [JS]   1833 Spoke with Dr. Esther Zamudio. No further recommendations at this time. They will follow [JS]      ED Course User Index  [JS] Halley Files, DO       The care is provided during an unprecedented national emergency due to the novel coronavirus, COVID 19.   Marilyn Hilton,   Attending Emergency Physician         Marilyn Hilton DO  05/08/22 2038

## 2022-05-09 VITALS
SYSTOLIC BLOOD PRESSURE: 118 MMHG | HEIGHT: 66 IN | TEMPERATURE: 97.5 F | WEIGHT: 199.74 LBS | RESPIRATION RATE: 16 BRPM | HEART RATE: 68 BPM | OXYGEN SATURATION: 96 % | BODY MASS INDEX: 32.1 KG/M2 | DIASTOLIC BLOOD PRESSURE: 79 MMHG

## 2022-05-09 LAB
ABSOLUTE EOS #: 0.2 K/UL (ref 0–0.4)
ABSOLUTE LYMPH #: 1.5 K/UL (ref 1–4.8)
ABSOLUTE MONO #: 0.6 K/UL (ref 0.1–1.3)
ANION GAP SERPL CALCULATED.3IONS-SCNC: 8 MMOL/L (ref 9–17)
BASOPHILS # BLD: 1 % (ref 0–2)
BASOPHILS ABSOLUTE: 0 K/UL (ref 0–0.2)
BUN BLDV-MCNC: 10 MG/DL (ref 8–23)
CALCIUM SERPL-MCNC: 9.7 MG/DL (ref 8.6–10.4)
CHLORIDE BLD-SCNC: 107 MMOL/L (ref 98–107)
CO2: 25 MMOL/L (ref 20–31)
CREAT SERPL-MCNC: 0.73 MG/DL (ref 0.7–1.2)
EKG ATRIAL RATE: 46 BPM
EKG P AXIS: 58 DEGREES
EKG P-R INTERVAL: 212 MS
EKG Q-T INTERVAL: 436 MS
EKG QRS DURATION: 90 MS
EKG QTC CALCULATION (BAZETT): 381 MS
EKG R AXIS: 67 DEGREES
EKG T AXIS: 58 DEGREES
EKG VENTRICULAR RATE: 46 BPM
EOSINOPHILS RELATIVE PERCENT: 4 % (ref 0–4)
GFR AFRICAN AMERICAN: >60 ML/MIN
GFR NON-AFRICAN AMERICAN: >60 ML/MIN
GFR SERPL CREATININE-BSD FRML MDRD: ABNORMAL ML/MIN/{1.73_M2}
GLUCOSE BLD-MCNC: 99 MG/DL (ref 70–99)
HCT VFR BLD CALC: 43.7 % (ref 41–53)
HEMOGLOBIN: 14.4 G/DL (ref 13.5–17.5)
LYMPHOCYTES # BLD: 25 % (ref 24–44)
MCH RBC QN AUTO: 29.7 PG (ref 26–34)
MCHC RBC AUTO-ENTMCNC: 32.9 G/DL (ref 31–37)
MCV RBC AUTO: 90.3 FL (ref 80–100)
MONOCYTES # BLD: 10 % (ref 1–7)
PDW BLD-RTO: 13.9 % (ref 11.5–14.9)
PLATELET # BLD: 141 K/UL (ref 150–450)
PMV BLD AUTO: 7.1 FL (ref 6–12)
POTASSIUM SERPL-SCNC: 4.1 MMOL/L (ref 3.7–5.3)
RBC # BLD: 4.83 M/UL (ref 4.5–5.9)
SEG NEUTROPHILS: 60 % (ref 36–66)
SEGMENTED NEUTROPHILS ABSOLUTE COUNT: 3.6 K/UL (ref 1.3–9.1)
SODIUM BLD-SCNC: 140 MMOL/L (ref 135–144)
TSH SERPL DL<=0.05 MIU/L-ACNC: 2.05 UIU/ML (ref 0.3–5)
WBC # BLD: 6 K/UL (ref 3.5–11)

## 2022-05-09 PROCEDURE — 80048 BASIC METABOLIC PNL TOTAL CA: CPT

## 2022-05-09 PROCEDURE — 36415 COLL VENOUS BLD VENIPUNCTURE: CPT

## 2022-05-09 PROCEDURE — G0378 HOSPITAL OBSERVATION PER HR: HCPCS

## 2022-05-09 PROCEDURE — 84443 ASSAY THYROID STIM HORMONE: CPT

## 2022-05-09 PROCEDURE — 6360000002 HC RX W HCPCS: Performed by: FAMILY MEDICINE

## 2022-05-09 PROCEDURE — 85025 COMPLETE CBC W/AUTO DIFF WBC: CPT

## 2022-05-09 PROCEDURE — 6370000000 HC RX 637 (ALT 250 FOR IP): Performed by: FAMILY MEDICINE

## 2022-05-09 PROCEDURE — 2580000003 HC RX 258: Performed by: STUDENT IN AN ORGANIZED HEALTH CARE EDUCATION/TRAINING PROGRAM

## 2022-05-09 PROCEDURE — 96374 THER/PROPH/DIAG INJ IV PUSH: CPT

## 2022-05-09 RX ORDER — ISOSORBIDE MONONITRATE 60 MG/1
60 TABLET, EXTENDED RELEASE ORAL DAILY
Qty: 30 TABLET | Refills: 3 | Status: ON HOLD | OUTPATIENT
Start: 2022-05-10 | End: 2022-05-12

## 2022-05-09 RX ORDER — ISOSORBIDE MONONITRATE 60 MG/1
60 TABLET, EXTENDED RELEASE ORAL DAILY
Status: DISCONTINUED | OUTPATIENT
Start: 2022-05-10 | End: 2022-05-09 | Stop reason: HOSPADM

## 2022-05-09 RX ORDER — AMLODIPINE BESYLATE 5 MG/1
5 TABLET ORAL DAILY
Qty: 30 TABLET | Refills: 3 | Status: ON HOLD | OUTPATIENT
Start: 2022-05-10 | End: 2022-05-13 | Stop reason: HOSPADM

## 2022-05-09 RX ORDER — DOXAZOSIN MESYLATE 1 MG/1
2 TABLET ORAL NIGHTLY
Status: DISCONTINUED | OUTPATIENT
Start: 2022-05-09 | End: 2022-05-09 | Stop reason: HOSPADM

## 2022-05-09 RX ORDER — AMLODIPINE BESYLATE 5 MG/1
5 TABLET ORAL DAILY
Status: DISCONTINUED | OUTPATIENT
Start: 2022-05-09 | End: 2022-05-09 | Stop reason: HOSPADM

## 2022-05-09 RX ORDER — SODIUM CHLORIDE 9 MG/ML
INJECTION, SOLUTION INTRAVENOUS PRN
Status: DISCONTINUED | OUTPATIENT
Start: 2022-05-09 | End: 2022-05-09 | Stop reason: HOSPADM

## 2022-05-09 RX ORDER — PRAVASTATIN SODIUM 40 MG
40 TABLET ORAL DAILY
Status: DISCONTINUED | OUTPATIENT
Start: 2022-05-09 | End: 2022-05-09 | Stop reason: HOSPADM

## 2022-05-09 RX ORDER — HYDRALAZINE HYDROCHLORIDE 20 MG/ML
10 INJECTION INTRAMUSCULAR; INTRAVENOUS EVERY 6 HOURS PRN
Status: DISCONTINUED | OUTPATIENT
Start: 2022-05-09 | End: 2022-05-09 | Stop reason: HOSPADM

## 2022-05-09 RX ORDER — ENOXAPARIN SODIUM 100 MG/ML
40 INJECTION SUBCUTANEOUS DAILY
Status: DISCONTINUED | OUTPATIENT
Start: 2022-05-09 | End: 2022-05-09

## 2022-05-09 RX ORDER — SODIUM CHLORIDE 0.9 % (FLUSH) 0.9 %
5-40 SYRINGE (ML) INJECTION PRN
Status: DISCONTINUED | OUTPATIENT
Start: 2022-05-09 | End: 2022-05-09 | Stop reason: HOSPADM

## 2022-05-09 RX ORDER — ONDANSETRON 4 MG/1
4 TABLET, ORALLY DISINTEGRATING ORAL EVERY 8 HOURS PRN
Status: DISCONTINUED | OUTPATIENT
Start: 2022-05-09 | End: 2022-05-09 | Stop reason: HOSPADM

## 2022-05-09 RX ORDER — ACETAMINOPHEN 325 MG/1
650 TABLET ORAL EVERY 4 HOURS PRN
Status: DISCONTINUED | OUTPATIENT
Start: 2022-05-09 | End: 2022-05-09 | Stop reason: HOSPADM

## 2022-05-09 RX ORDER — ASPIRIN 81 MG/1
81 TABLET, CHEWABLE ORAL DAILY
Status: DISCONTINUED | OUTPATIENT
Start: 2022-05-09 | End: 2022-05-09 | Stop reason: HOSPADM

## 2022-05-09 RX ORDER — ISOSORBIDE MONONITRATE 30 MG/1
30 TABLET, EXTENDED RELEASE ORAL DAILY
Status: DISCONTINUED | OUTPATIENT
Start: 2022-05-09 | End: 2022-05-09

## 2022-05-09 RX ORDER — PANTOPRAZOLE SODIUM 40 MG/1
40 TABLET, DELAYED RELEASE ORAL
Status: DISCONTINUED | OUTPATIENT
Start: 2022-05-09 | End: 2022-05-09 | Stop reason: HOSPADM

## 2022-05-09 RX ORDER — SODIUM CHLORIDE 0.9 % (FLUSH) 0.9 %
5-40 SYRINGE (ML) INJECTION EVERY 12 HOURS SCHEDULED
Status: DISCONTINUED | OUTPATIENT
Start: 2022-05-09 | End: 2022-05-09 | Stop reason: HOSPADM

## 2022-05-09 RX ORDER — ONDANSETRON 2 MG/ML
4 INJECTION INTRAMUSCULAR; INTRAVENOUS EVERY 6 HOURS PRN
Status: DISCONTINUED | OUTPATIENT
Start: 2022-05-09 | End: 2022-05-09 | Stop reason: HOSPADM

## 2022-05-09 RX ADMIN — ASPIRIN 81 MG: 81 TABLET, CHEWABLE ORAL at 08:36

## 2022-05-09 RX ADMIN — SODIUM CHLORIDE, PRESERVATIVE FREE 10 ML: 5 INJECTION INTRAVENOUS at 08:35

## 2022-05-09 RX ADMIN — AMLODIPINE BESYLATE 5 MG: 5 TABLET ORAL at 12:50

## 2022-05-09 RX ADMIN — PRAVASTATIN SODIUM 40 MG: 40 TABLET ORAL at 08:36

## 2022-05-09 RX ADMIN — PANTOPRAZOLE SODIUM 40 MG: 40 TABLET, DELAYED RELEASE ORAL at 08:36

## 2022-05-09 RX ADMIN — ISOSORBIDE MONONITRATE 30 MG: 30 TABLET, EXTENDED RELEASE ORAL at 08:36

## 2022-05-09 RX ADMIN — HYDRALAZINE HYDROCHLORIDE 10 MG: 20 INJECTION INTRAMUSCULAR; INTRAVENOUS at 08:36

## 2022-05-09 RX ADMIN — APIXABAN 5 MG: 5 TABLET, FILM COATED ORAL at 09:52

## 2022-05-09 ASSESSMENT — ENCOUNTER SYMPTOMS
NAUSEA: 1
STRIDOR: 0
RECTAL PAIN: 0
COUGH: 0
BLOOD IN STOOL: 0
PHOTOPHOBIA: 0
SHORTNESS OF BREATH: 0
CHOKING: 0
BACK PAIN: 0
TROUBLE SWALLOWING: 0
VOICE CHANGE: 0
ABDOMINAL DISTENTION: 0
ANAL BLEEDING: 0
COLOR CHANGE: 0

## 2022-05-09 ASSESSMENT — PAIN SCALES - GENERAL
PAINLEVEL_OUTOF10: 7
PAINLEVEL_OUTOF10: 0

## 2022-05-09 NOTE — ED NOTES
Pt is unsure of home medications names. Pt states to call his wife to ask about home medications. Call attempt made to wife but no answer. Pt states he knows he takes a blood thinner in the evening but is unsure of other medications. Heavenly Kyle NP messaged on [employer] to ask about adding a dose of pt's nighttime blood thinner, Eliquis.      Eileen Reddy RN  05/08/22 LEATHA Haney  05/08/22 [id number]

## 2022-05-09 NOTE — ED NOTES
Pt repositioned with blankets and pillow light turned off for comforter.  Pt states wanting to rest.      Alida Conklin  05/08/22 2053

## 2022-05-09 NOTE — CONSULTS
Cardiology Consult           Date of Admission:  5/8/2022  Date of Consultation:  5/9/2022      PCP:  Kori Bateman      Chief Complaint: Bradycardia    History of Present Illness:  Brooklyn Arriaga is a 80 y.o. male who presents with  With chest pain. States he was outside bent down and had a sudden onset of substernal chest pain that was associated with nausea and diaphoresis. Received aspirin per EMS EN route was chest pain-free once he arrived to the ER. Patient states that chest pain lasted roughly 1 hour. He did not take nitroglycerin at home when chest pain started. He states EMS did give him a nitro and it relieved the pain. PMH:   has a past medical history of Anxiety attack, Arthritis, Atrial fibrillation ([address] Atrial flutter ([address] Back pain, CAD (coronary artery disease), Chest pain, Colon polyps, Constipation, Dementia ([address] Dizziness, GERD (gastroesophageal reflux disease), Hyperlipidemia, Hypertension, Lower GI bleed, Lung nodule seen on imaging study, Prediabetes, Rectal bleeding, S/P colonoscopy with polypectomy, Seizure ([address] Sleep apnea, SOB (shortness of breath), Status post coronary artery bypass graft, and Wears dentures. PSH:   has a past surgical history that includes Coronary artery bypass graft (2004); Cataract removal (Bilateral); ventral hernia repair; Total knee arthroplasty (Bilateral); Colonoscopy (07/24/2018); Cardiac catheterization; and Colonoscopy (N/A, 4/19/2021). Allergies: Allergies   Allergen Reactions    Oxycodone Other (See Comments)     Attacking people, black out     Pcn [Penicillins] Other (See Comments)     dizziness    Warfarin And Related      \" I see things\"         Home Meds:    Prior to Admission medications    Medication Sig Start Date End Date Taking?  Authorizing Provider   aspirin 81 MG chewable tablet Take 1 tablet by mouth daily 9/24/21   Georges Liriano MD   apixaban (ELIQUIS) 5 MG TABS tablet Take 5 mg by mouth 2 times daily    Historical Provider, MD   dilTIAZem (CARDIZEM CD) 180 MG extended release capsule Take 180 mg by mouth daily    Historical Provider, MD   doxazosin (CARDURA) 2 MG tablet Take 2 mg by mouth nightly    Historical Provider, MD   isosorbide mononitrate (IMDUR) 30 MG extended release tablet Take 1 tablet by mouth daily 2/18/19   Ludivina Peña MD   nitroGLYCERIN (NITROSTAT) 0.4 MG SL tablet up to max of 3 total doses. If no relief after 1 dose, call 911. 2/17/19   Ludivina Peña MD   acetaminophen (TYLENOL) 325 MG tablet Take 650 mg by mouth 2 times daily as needed for Pain     Historical Provider, MD   metoprolol (LOPRESSOR) 50 MG tablet Take 50 mg by mouth 2 times daily. Historical Provider, MD   pravastatin (PRAVACHOL) 40 MG tablet Take 40 mg by mouth daily. Historical Provider, MD   omeprazole (PRILOSEC) 20 MG capsule Take 20 mg by mouth daily.     Historical Provider, MD        Hospital Meds:    Current Facility-Administered Medications   Medication Dose Route Frequency Provider Last Rate Last Admin    sodium chloride flush 0.9 % injection 5-40 mL  5-40 mL IntraVENous 2 times per day Fredo Tiburcio, DO   10 mL at 05/09/22 0835    sodium chloride flush 0.9 % injection 5-40 mL  5-40 mL IntraVENous PRN Fredo Dey, DO        0.9 % sodium chloride infusion   IntraVENous PRN Gillian Cornell, DO        acetaminophen (TYLENOL) tablet 650 mg  650 mg Oral Q4H PRN Fredo Dey, DO        ondansetron (ZOFRAN-ODT) disintegrating tablet 4 mg  4 mg Oral Q8H PRN Fredo Dey, DO        Or    ondansetron (ZOFRAN) injection 4 mg  4 mg IntraVENous Q6H PRN Gillian Cornell, DO        apixaban (ELIQUIS) tablet 5 mg  5 mg Oral BID Stephanie Solitario MD        aspirin chewable tablet 81 mg  81 mg Oral Daily Stephanie Solitario MD   81 mg at 05/09/22 0836    doxazosin (CARDURA) tablet 2 mg  2 mg Oral Nightly Stephanie Solitario MD        isosorbide mononitrate (IMDUR) extended release tablet 30 mg  30 mg Oral Daily Sandy Oates MD   30 mg at 05/09/22 0836    pantoprazole (PROTONIX) tablet 40 mg  40 mg Oral QAM AC Sandy Oates MD   40 mg at 05/09/22 0836    pravastatin (PRAVACHOL) tablet 40 mg  40 mg Oral Daily Sandy Oates MD   40 mg at 05/09/22 0836    hydrALAZINE (APRESOLINE) injection 10 mg  10 mg IntraVENous Q6H PRN Sandy Oates MD   10 mg at 05/09/22 0836    acetaminophen (TYLENOL) tablet 650 mg  650 mg Oral Q4H PRN ANUPAMA Jeffries NP           Social History:       TOBACCO:   reports that he quit smoking about 42 years ago. He has never used smokeless tobacco.  ETOH:   reports no history of alcohol use. DRUGS:  reports no history of drug use. OCCUPATION:          Family Histroy:         Problem Relation Age of Onset    Alzheimer's Disease Mother     Arthritis Mother         lumbar disc disease    Heart Disease Father            Review of Systems:   · Constitutional: there has been no unanticipated weight loss. There's been no change in energy level, sleep pattern, or activity level. · Eyes: No visual changes or diplopia. No scleral icterus. · ENT: No Headaches, hearing loss or vertigo. No mouth sores or sore throat. · Cardiovascular: SEE HPI  · Respiratory: No cough or wheezing, no sputum production. No hematemesis. · Gastrointestinal: No abdominal pain, appetite loss, blood in stools. No change in bowel or bladder habits. · Genitourinary: No dysuria, trouble voiding, or hematuria. · Musculoskeletal:  No gait disturbance, weakness or joint complaints. · Integumentary: No rash or pruritis. · Neurological: No headache, diplopia, change in muscle strength, numbness or tingling. No change in gait, balance, coordination, mood, affect, memory, mentation, behavior. · Psychiatric: No anxiety, or depression. · Endocrine: No temperature intolerance. No excessive thirst, fluid intake, or urination. No tremor.   · Hematologic/Lymphatic: No abnormal bruising or bleeding, blood clots or swollen lymph nodes. · Allergic/Immunologic: No nasal congestion or hives. Physical Exam    Vital Signs: BP (!) 213/88   Pulse 54   Temp 97.9 °F (36.6 °C) (Oral)   Resp 16   Ht 5' 6\" (1.676 m)   Wt 199 lb 11.8 oz (90.6 kg)   SpO2 98%   BMI 32.24 kg/m²        Admission Weight: 199 lb 11.8 oz (90.6 kg)     General appearance: Awake, Alert Cooperative    Head: Normocephalic, without obvious abnormality, atraumatic    Eyes: Conjunctivae/corneas clear. PERRL, EOM's intact. Fundi benign    Neck: no adenopathy, no carotid bruit, no JVD, supple, symmetrical, trachea midline and thyroid: not enlarged, symmetric, no tenderness/mass/nodules    Lungs: clear to auscultation bilaterally    Heart: regular rate and rhythm, S1, S2 normal, no murmur, click, rub or gallop    Abdomen: Soft, non-tender. Bowel sounds normal. No masses,  no organomegaly    Extremities: extremities normal, atraumatic, no cyanosis or edema    Skin: Skin color, texture, turgor normal. No rashes or lesions    Neurologic: Grossly normal        MEDICAL DECISION MAKING/TESTING    Cardiac Cath:  Performing [id number] CATH  [address] [facility], [address] [place] [address]       PACS Images [facility])    [facility] images for Cardiac catheterization    Physicians    Panel Physicians Referring Physician   Izabela Norton MD (Primary)        Procedures    CARDIAC CATHETERIZATION   Coronary angiogram and left ventricular gram/pressure + graft/native       Indications    Anginal pain (CMS-HCC) [I20.9 (ICD-10-CM)]       Conclusion    Recommendations:  Medical therapy as needed. Risk factor modification.       Conclusion:  1. Multivessel obstructive coronary disease. 2. Patent lima to the left anterior descending artery, saphenous vein graft to the 2nd diagonal, saphenous vein graft to obtuse marginal, and saphenous vein graft to the posterior descending artery.     3. Normal left ventricular systolic function. EKG:      CXR:       Labs:      CBC:   Recent Labs     05/08/22  1450 05/09/22  0844   WBC 7.5 6.0   HGB 14.5 14.4   HCT 43.6 43.7   MCV 89.2 90.3    141*     BMP:   Recent Labs     05/08/22  1450 05/09/22  0844    140   K 4.6 4.1    107   CO2 25 25   BUN 12 10   CREATININE 0.89 0.73     PT/INR: No results for input(s): PROTIME, INR in the last 72 hours. APTT: No results for input(s): APTT in the last 72 hours. MAG: No results for input(s): MG in the last 72 hours. D Dimer: No results for input(s): DDIMER in the last 72 hours. Troponin T No results for input(s): TROPONINT in the last 72 hours. ProBNP Invalid input(s): PRO-BNP          Diagnosis:      Bradycardia  -avoid AV margarette blocking agents  -was on diltiazem at home, continue to hold  -Patient seen in the cardiology office in February had bradycardia at that time  -patient is asymptomatic  -Lowest heart rate recorded at 52 beats per minute      Chest pain  -troponin negative X2  -EKG with no acute changes  -Last [facility] in 9/2021 with patent bypass grafts      PAF (paroxysmal atrial fibrillation) ([facility] [address]  -SR on exam  -continue eliquis      Dementia ([facility] [address]      Hyperlipidemia      ASCVD (arteriosclerotic cardiovascular disease)  -history of CABG X4  -patent bypass grafts per [facility]      Anxiety    Plan:   avoid AV margarette blocking agents and discontinue Cardizem. Patient is asymptomatic appears to have been  bradycardic  for some time now. Troponins and EKGs negative for ACS. Recent [facility] with patent bypass grafts. Increase isosorbide to 60 mg. No further workup intended from Cardiology.

## 2022-05-09 NOTE — PROGRESS NOTES
Patient arrived per W/C from ED. A&O x4. Telemetry unit applied. Patient oriented to room, Vital Signs taken.     Pt stated he passed to small kidney stones prior to coming to floor

## 2022-05-09 NOTE — DISCHARGE INSTR - COC
Continuity of Care Form    Patient Name: Matty Elizondo   :  1940  MRN:  [id number]    Admit date:  2022  Discharge date:  ***    Code Status Order: Full Code   Advance Directives:      Admitting Physician:  Sera Mathew MD  PCP: Libra White    Discharging Nurse: [facility] Unit/Room#: /-01  Discharging Unit Phone Number: ***    Emergency Contact:   Extended Emergency Contact Information  Primary Emergency Contact: Bambi  Address: [address] [place] [address] Phone: [phone]  Mobile Phone: [phone]  Relation: Spouse   needed? No  Secondary Emergency Contact: Reta Lynn Phone: [phone]  Mobile Phone: [phone]  Relation: Child  Preferred language: English   needed?  No    Past Surgical History:  Past Surgical History:   Procedure Laterality Date    CARDIAC CATHETERIZATION      CATARACT REMOVAL Bilateral     COLONOSCOPY  2018    COLONOSCOPY WITH BIOPSY performed by Musa Ivey MD at [facility]    COLONOSCOPY N/A 2021    COLONOSCOPY DIAGNOSTIC performed by Tamika Cantu MD at [place] [address]  2004    x4 vessel    TOTAL KNEE ARTHROPLASTY Bilateral     VENTRAL HERNIA REPAIR         Immunization History:   Immunization History   Administered Date(s) Administered    Influenza Virus Vaccine 10/01/2018       Active Problems:  Patient Active Problem List   Diagnosis Code    PAF (paroxysmal atrial fibrillation) (HCC) I48.0    Chest pain R07.9    Rectal bleeding K62.5    Lower GI bleed K92.2    Morbid obesity ([facility] [address] E66.01    S/P colonoscopy with polypectomy Z98.890    Elevated INR R79.1    Lung nodule seen on imaging study R91.1    Coronary artery disease involving coronary bypass graft of native heart without angina pectoris I25.810    Dementia (HCC) F03.90    Hyperlipidemia E78.5    Essential hypertension I10    ASCVD (arteriosclerotic cardiovascular disease) I25.10    Bradycardia R00.1       Isolation/Infection:   Isolation            No Isolation          Patient Infection Status       Infection Onset Added Last Indicated Last Indicated By Review Planned Expiration Resolved Resolved By    None active    Resolved    COVID-19 (Rule Out) 04/15/21 04/15/21 04/15/21 COVID-19 (Ordered)   04/15/21 Rule-Out Test Resulted    COVID-19 (Rule Out) 01/04/21 01/05/21 01/04/21 COVID-19 (Ordered)   01/05/21 Rule-Out Test Resulted    COVID-19 (Rule Out) 05/17/20 05/17/20 05/17/20 COVID-19 (Ordered)   05/18/20 Rule-Out Test Resulted            Nurse Assessment:  Last Vital Signs: /79   Pulse 62   Temp 97.5 °F (36.4 °C) (Oral)   Resp 16   Ht 5' 6\" (1.676 m)   Wt 199 lb 11.8 oz (90.6 kg)   SpO2 96%   BMI 32.24 kg/m²     Last documented pain score (0-10 scale): Pain Level: 0  Last Weight:   Wt Readings from Last 1 Encounters:   05/09/22 199 lb 11.8 oz (90.6 kg)     Mental Status:  {IP PT MENTAL STATUS:47378}    IV Access:  { ANABELLA IV ACCESS:089645364}    Nursing Mobility/ADLs:  Walking   {P DME JZJL:288894064}  Transfer  {P DME WXZV:254350990}  Bathing  {P DME JOAR:756890321}  Dressing  {P DME XVFU:875009743}  Toileting  {P DME ZPLP:261751891}  Feeding  {P DME UYCW:950533109}  Med Admin  {P DME VLOU:384257971}  Med Delivery   { ANABELLA MED Delivery:091119642}    Wound Care Documentation and Therapy:  Puncture 09/22/21 Femoral Right (Active)   Number of days: 228        Elimination:  Continence: Bowel: {YES / SD:70113}  Bladder: {YES / HQ:23109}  Urinary Catheter: {Urinary Catheter:267188700}   Colostomy/Ileostomy/Ileal Conduit: {YES / VR:92972}       Date of Last BM: ***    Intake/Output Summary (Last 24 hours) at 5/9/2022 1340  Last data filed at 5/9/2022 0910  Gross per 24 hour   Intake --   Output 200 ml   Net -200 ml     No intake/output data recorded.     Safety Concerns:     Zack Bunn ANABELLA Safety Concerns:239281970}    Impairments/Disabilities:      [address] ANABELLA Impairments/Disabilities:507413785}    Nutrition Therapy:  Current Nutrition Therapy:   [address] ANABELLA Diet List:658802432}    Routes of Feeding: {CHP DME Other Feedings:797556030}  Liquids: {Slp liquid thickness:10936}  Daily Fluid Restriction: {CHP DME Yes amt example:385780588}  Last Modified Barium Swallow with Video (Video Swallowing Test): {Done Not Done OUI}    Treatments at the Time of Hospital Discharge:   Respiratory Treatments: ***  Oxygen Therapy:  {Therapy; copd oxygen:79475}  Ventilator:    { CC Vent JZXS:251036132}    Rehab Therapies: {THERAPEUTIC INTERVENTION:8698780040}  Weight Bearing Status/Restrictions: [address] Bunn  Weight Bearin}  Other Medical Equipment (for information only, NOT a DME order):  {EQUIPMENT:540333911}  Other Treatments: ***    Patient's personal belongings (please select all that are sent with patient):  {[facility] DME Belongings:551946111}    RN SIGNATURE:  {Esignature:299144516}    CASE MANAGEMENT/SOCIAL WORK SECTION    Inpatient Status Date: ***    Readmission Risk Assessment Score:  Readmission Risk              Risk of Unplanned Readmission:  11           Discharging to Facility/ Agency   Name:   Address:  Phone:  Fax:    Dialysis Facility (if applicable)   Name:  Address:  Dialysis Schedule:  Phone:  Fax:    / signature: {Esignature:400895101}    PHYSICIAN SECTION    Prognosis: Fair    Condition at Discharge: Stable    Rehab Potential (if transferring to Rehab): Fair    Recommended Labs or Other Treatments After Discharge:     Physician Certification: I certify the above information and transfer of Georgina Amato  is necessary for the continuing treatment of the diagnosis listed and that he requires Home Care for less 30 days.      Update Admission H&P: No change in H&P    PHYSICIAN SIGNATURE:  Electronically signed by Gordo Whitman MD on 22 at 1:40 PM EDT

## 2022-05-09 NOTE — ED NOTES
Report given to Emili Acuña RN from ED. Report method in person   The following was reviewed with receiving RN:   Current vital signs:  /65   Pulse (!) 46   Temp 98 °F (36.7 °C) (Oral)   Resp 14   SpO2 96%                MEWS Score: 1     Any medication or safety alerts were reviewed. Any pending diagnostics and notifications were also reviewed, as well as any safety concerns or issues, abnormal labs, abnormal imaging, and abnormal assessment findings. Questions were answered.             Lambert Bill  05/08/22 5334

## 2022-05-09 NOTE — H&P
History and Physical      Name: Eleanora Hatchet  MRN: [id number]     Acct: [de-identified]  Room: 2112/2112-01    Admit Date: 5/8/2022  PCP: Ronnell Lua      Chief Complaint:     Chief Complaint   Patient presents with    Dizziness    Fatigue       History Obtained From:     patient, electronic medical record    History of Present Illness:      Eleanora Hatchet is a  80 y.o.  male with ASCVD, Afib presents with dizziness and fatigue. patient states that he was outside his house in the lawn when he bent down and sudden onset of substernal chest pain associated with nausea, lightheadedness and diaphoresis. Chest pain was moderate sharp substernal nonradiating. Patient denies shortness of breath, cough vomiting abdominal pain headache difficulty in speech difficulty swallowing weakness in the extremities fever or chills.     Past Medical History:     Past Medical History:   Diagnosis Date    Anxiety attack     Arthritis     Atrial fibrillation (HCC)     Atrial flutter (HCC)     Back pain     CAD (coronary artery disease)     Chest pain 04/12/2016    Colon polyps     Constipation     Dementia (HCC) 03/23/2019    Dizziness     GERD (gastroesophageal reflux disease)     Hyperlipidemia     Hypertension     Lower GI bleed     Lung nodule seen on imaging study 02/17/2019    Prediabetes     Rectal bleeding     S/P colonoscopy with polypectomy     Seizure ([facility] [address]     NOTED PER CARE EVERYWHERE- PATIENT DENIES HX OF, STATES HE HAS NEVER BEEN ON SEIZURE MEDICATION    Sleep apnea     not tested, no cpap    SOB (shortness of breath)     Status post coronary artery bypass graft     Wears dentures     TOP        Past Surgical History:     Past Surgical History:   Procedure Laterality Date    CARDIAC CATHETERIZATION      CATARACT REMOVAL Bilateral     COLONOSCOPY  07/24/2018    COLONOSCOPY WITH BIOPSY performed by Chantelle Carlson MD at  [address] COLONOSCOPY N/A 4/19/2021    COLONOSCOPY DIAGNOSTIC performed by Pretty Bond MD at [address] GRAFT  2004    x4 vessel    TOTAL KNEE ARTHROPLASTY Bilateral     VENTRAL HERNIA REPAIR          Medications Prior to Admission:       Prior to Admission medications    Medication Sig Start Date End Date Taking? Authorizing Provider   isosorbide mononitrate (IMDUR) 60 MG extended release tablet Take 1 tablet by mouth daily 5/10/22  Yes Darien Garcia MD   amLODIPine (NORVASC) 5 MG tablet Take 1 tablet by mouth daily 5/10/22  Yes Darien Garcia MD   aspirin 81 MG chewable tablet Take 1 tablet by mouth daily 9/24/21   Darien Garcia MD   apixaban (ELIQUIS) 5 MG TABS tablet Take 5 mg by mouth 2 times daily    Historical Provider, MD   doxazosin (CARDURA) 2 MG tablet Take 2 mg by mouth nightly    Historical Provider, MD   nitroGLYCERIN (NITROSTAT) 0.4 MG SL tablet up to max of 3 total doses. If no relief after 1 dose, call 911. 2/17/19   Gloria Marte MD   acetaminophen (TYLENOL) 325 MG tablet Take 650 mg by mouth 2 times daily as needed for Pain     Historical Provider, MD   pravastatin (PRAVACHOL) 40 MG tablet Take 40 mg by mouth daily. Historical Provider, MD   omeprazole (PRILOSEC) 20 MG capsule Take 20 mg by mouth daily. Historical Provider, MD        Allergies:       Oxycodone, Pcn [penicillins], and Warfarin and related    Social History:     Tobacco:    reports that he quit smoking about 42 years ago. He has never used smokeless tobacco.  Alcohol:      reports no history of alcohol use. Drug Use:  reports no history of drug use.     Family History:     Family History   Problem Relation Age of Onset    Alzheimer's Disease Mother     Arthritis Mother         lumbar disc disease    Heart Disease Father        Review of Systems:     Positive and Negative as described in HPI   all 10 systems are reviewed and negative except as Noted      Review of Systems   Constitutional: Negative for appetite change, chills and diaphoresis. HENT: Negative for drooling, ear pain, trouble swallowing and voice change. Eyes: Negative for photophobia and visual disturbance. Respiratory: Negative for cough, choking, shortness of breath and stridor. Cardiovascular: Positive for chest pain. Negative for palpitations. Gastrointestinal: Positive for nausea. Negative for abdominal distention, anal bleeding, blood in stool and rectal pain. Endocrine: Negative for polyphagia and polyuria. Genitourinary: Negative for dysuria, flank pain, hematuria and urgency. Musculoskeletal: Negative for back pain, myalgias and neck stiffness. Skin: Negative for color change, pallor and rash. Allergic/Immunologic: Negative for environmental allergies and food allergies. Neurological: Positive for dizziness. Negative for tremors, seizures, facial asymmetry and numbness. Hematological: Negative for adenopathy. Does not bruise/bleed easily. Psychiatric/Behavioral: Negative for agitation, behavioral problems, hallucinations, self-injury and suicidal ideas. Code Status:  Full Code    Physical Exam:     Vitals:  /79   Pulse 62   Temp 97.5 °F (36.4 °C) (Oral)   Resp 16   Ht 5' 6\" (1.676 m)   Wt 199 lb 11.8 oz (90.6 kg)   SpO2 96%   BMI 32.24 kg/m²   Temp (24hrs), Av.8 °F (36.6 °C), Min:97.5 °F (36.4 °C), Max:98 °F (36.7 °C)        Physical Exam  Vitals reviewed. Constitutional:       Appearance: Normal appearance. He is not diaphoretic. HENT:      Head: Normocephalic and atraumatic. Right Ear: External ear normal.      Left Ear: External ear normal.      Nose: Nose normal.      Mouth/Throat:      Mouth: Mucous membranes are moist.      Pharynx: Oropharynx is clear. Eyes:      Conjunctiva/sclera: Conjunctivae normal.   Cardiovascular:      Rate and Rhythm: Regular rhythm. Bradycardia present. Pulses: Normal pulses. Heart sounds: Normal heart sounds.    Pulmonary:      Effort: Pulmonary effort is normal. Breath sounds: Normal breath sounds. No wheezing or rales. Abdominal:      General: Bowel sounds are normal. There is no distension. Palpations: Abdomen is soft. Tenderness: There is no abdominal tenderness. There is no right CVA tenderness or left CVA tenderness. Musculoskeletal:         General: No tenderness or deformity. Normal range of motion. Cervical back: Normal range of motion and neck supple. No rigidity. Right lower leg: No edema. Left lower leg: No edema. Skin:     General: Skin is warm and dry. Capillary Refill: Capillary refill takes less than 2 seconds. Coloration: Skin is not jaundiced. Neurological:      General: No focal deficit present. Mental Status: Mental status is at baseline.    Psychiatric:         Mood and Affect: Mood normal.         Behavior: Behavior normal.               Data:     Recent Results (from the past 24 hour(s))   CBC with Auto Differential    Collection Time: 05/08/22  2:50 PM   Result Value Ref Range    WBC 7.5 3.5 - 11.0 k/uL    RBC 4.89 4.5 - 5.9 m/uL    Hemoglobin 14.5 13.5 - 17.5 g/dL    Hematocrit 43.6 41 - 53 %    MCV 89.2 80 - 100 fL    MCH 29.7 26 - 34 pg    MCHC 33.3 31 - 37 g/dL    RDW 13.9 11.5 - 14.9 %    Platelets 124 877 - 751 k/uL    MPV 7.5 6.0 - 12.0 fL    Seg Neutrophils 69 (H) 36 - 66 %    Lymphocytes 18 (L) 24 - 44 %    Monocytes 9 (H) 1 - 7 %    Eosinophils % 3 0 - 4 %    Basophils 1 0 - 2 %    Segs Absolute 5.20 1.3 - 9.1 k/uL    Absolute Lymph # 1.30 1.0 - 4.8 k/uL    Absolute Mono # 0.70 0.1 - 1.3 k/uL    Absolute Eos # 0.20 0.0 - 0.4 k/uL    Basophils Absolute 0.00 0.0 - 0.2 k/uL   Comprehensive Metabolic Panel w/ Reflex to MG    Collection Time: 05/08/22  2:50 PM   Result Value Ref Range    Glucose 112 (H) 70 - 99 mg/dL    BUN 12 8 - 23 mg/dL    CREATININE 0.89 0.70 - 1.20 mg/dL    Calcium 9.7 8.6 - 10.4 mg/dL    Sodium 139 135 - 144 mmol/L    Potassium 4.6 3.7 - 5.3 mmol/L    Chloride 106 98 - 107 mmol/L    CO2 25 20 - 31 mmol/L    Anion Gap 8 (L) 9 - 17 mmol/L    Alkaline Phosphatase 74 40 - 129 U/L    ALT 13 5 - 41 U/L    AST 17 <40 U/L    Total Bilirubin 0.64 0.3 - 1.2 mg/dL    Total Protein 6.7 6.4 - 8.3 g/dL    Albumin 3.8 3.5 - 5.2 g/dL    GFR Non-African American >60 >60 mL/min    GFR African American >60 >60 mL/min    GFR Comment         Troponin    Collection Time: 05/08/22  2:50 PM   Result Value Ref Range    Troponin, High Sensitivity 13 0 - 22 ng/L   Brain Natriuretic Peptide    Collection Time: 05/08/22  2:50 PM   Result Value Ref Range    Pro- <300 pg/mL   EKG 12 Lead    Collection Time: 05/08/22  3:12 PM   Result Value Ref Range    Ventricular Rate 46 BPM    Atrial Rate 46 BPM    P-R Interval 212 ms    QRS Duration 90 ms    Q-T Interval 436 ms    QTc Calculation (Bazett) 381 ms    P Axis 58 degrees    R Axis 67 degrees    T Axis 58 degrees   Troponin    Collection Time: 05/08/22  5:24 PM   Result Value Ref Range    Troponin, High Sensitivity 12 0 - 22 ng/L   Troponin    Collection Time: 05/08/22  9:10 PM   Result Value Ref Range    Troponin, High Sensitivity 12 0 - 22 ng/L   CBC with Auto Differential    Collection Time: 05/09/22  8:44 AM   Result Value Ref Range    WBC 6.0 3.5 - 11.0 k/uL    RBC 4.83 4.5 - 5.9 m/uL    Hemoglobin 14.4 13.5 - 17.5 g/dL    Hematocrit 43.7 41 - 53 %    MCV 90.3 80 - 100 fL    MCH 29.7 26 - 34 pg    MCHC 32.9 31 - 37 g/dL    RDW 13.9 11.5 - 14.9 %    Platelets 700 (L) 253 - 450 k/uL    MPV 7.1 6.0 - 12.0 fL    Seg Neutrophils 60 36 - 66 %    Lymphocytes 25 24 - 44 %    Monocytes 10 (H) 1 - 7 %    Eosinophils % 4 0 - 4 %    Basophils 1 0 - 2 %    Segs Absolute 3.60 1.3 - 9.1 k/uL    Absolute Lymph # 1.50 1.0 - 4.8 k/uL    Absolute Mono # 0.60 0.1 - 1.3 k/uL    Absolute Eos # 0.20 0.0 - 0.4 k/uL    Basophils Absolute 0.00 0.0 - 0.2 k/uL   Basic Metabolic Panel    Collection Time: 05/09/22  8:44 AM   Result Value Ref Range    Glucose 99 70 - 99 mg/dL    BUN 10 8 - 23 mg/dL    CREATININE 0.73 0.70 - 1.20 mg/dL    Calcium 9.7 8.6 - 10.4 mg/dL    Sodium 140 135 - 144 mmol/L    Potassium 4.1 3.7 - 5.3 mmol/L    Chloride 107 98 - 107 mmol/L    CO2 25 20 - 31 mmol/L    Anion Gap 8 (L) 9 - 17 mmol/L    GFR Non-African American >60 >60 mL/min    GFR African American >60 >60 mL/min    GFR Comment         TSH with Reflex    Collection Time: 05/09/22  8:44 AM   Result Value Ref Range    TSH 2.05 0.30 - 5.00 uIU/mL       Assesment:     Primary Problem  Bradycardia    Principal Problem:    Bradycardia  Active Problems:    PAF (paroxysmal atrial fibrillation) (HCC)    Dementia (HCC)    Hyperlipidemia    Essential hypertension    ASCVD (arteriosclerotic cardiovascular disease)  Resolved Problems:    * No resolved hospital problems. *      Plan:     1.  hold Cardizem and metoprolol  2. Start Norvasc 5 mg p.o. daily  3.  consult cardiology  4. TSH  5.  cardiac telemetry  6.  12 lead EKG shows sinus bradycardia  7.  hydralazine 10 mg IV every 6 hours as needed with systolic blood pressure more than 160  8. DVT prophylaxis Eliquis  9. EPCs  10.  check and replace electrolytes per sliding scale  11.   restart home medications        Electronically signed by Huber Muhammad MD     Copy sent to Dr. Estephania Garcia

## 2022-05-09 NOTE — ED NOTES
Report given to Jazmin Munguia RN from PCU. Report method by phone   The following was reviewed with receiving RN:   Current vital signs:  BP (!) 165/83   Pulse 51   Temp 98 °F (36.7 °C) (Oral)   Resp 19   SpO2 98%                MEWS Score: 1     Any medication or safety alerts were reviewed. Any pending diagnostics and notifications were also reviewed, as well as any safety concerns or issues, abnormal labs, abnormal imaging, and abnormal assessment findings. Questions were answered.            Isac Allred RN  05/09/22 0499

## 2022-05-09 NOTE — DISCHARGE SUMMARY
Discharge Summary      Patient ID: Chalino Redding    MRN: [id number]     Acct:  [de-identified]       Patient's PCP: Theodore Emanuel Date: 5/8/2022     Discharge Date: 5/9/2022     Admitting Physician: Obed Hickey MD    Discharge Physician: Андрей Silva MD     Discharge Diagnoses:    Primary Problem  Bradycardia    Principal Problem:    Bradycardia  Active Problems:    PAF (paroxysmal atrial fibrillation) ([facility] [address]    Dementia ([facility] [address]    Hyperlipidemia    Essential hypertension    ASCVD (arteriosclerotic cardiovascular disease)  Resolved Problems:    * No resolved hospital problems. *    Past Medical History:   Diagnosis Date    Anxiety attack     Arthritis     Atrial fibrillation (HCC)     Atrial flutter (HCC)     Back pain     CAD (coronary artery disease)     Chest pain 04/12/2016    Colon polyps     Constipation     Dementia (HCC) 03/23/2019    Dizziness     GERD (gastroesophageal reflux disease)     Hyperlipidemia     Hypertension     Lower GI bleed     Lung nodule seen on imaging study 02/17/2019    Prediabetes     Rectal bleeding     S/P colonoscopy with polypectomy     Seizure ([facility] [address]     NOTED PER CARE EVERYWHERE- PATIENT DENIES HX OF, STATES HE HAS NEVER BEEN ON SEIZURE MEDICATION    Sleep apnea     not tested, no cpap    SOB (shortness of breath)     Status post coronary artery bypass graft     Wears dentures     TOP     The patient was seen and examined on day of discharge and this discharge summary is in conjunction with daily progress note from day of discharge. Code Status:  Full Code    Hospital Course:   H&P Reviewed. Patient with a medical history of hypertension, PAF on Eliquis was admitted with symptomatic bradycardia. Repeat EKG showed sinus bradycardia. Metoprolol and Cardizem was continued during hospital stay. Cardiology managed the patient conservatively. On day of discharge patient heart rate was in 50s and patient was asymptomatic.   Patient is being discharged in stable condition. Discharge day progress note: Today   Patient was seen and examined at bedside today. Hemodynamically stable. No chest pain, shortness of breath, fever, chills, nausea, vomiting, palpitations, or abdominal pain reported. No events reported overnight. Review of Systems    Physical Exam    Consults:  IP CONSULT TO INTERNAL MEDICINE  IP CONSULT TO CARDIOLOGY    Significant Diagnostic Studies: as above, and as follows:    Treatments: as above    Disposition: home    Discharged Condition: Stable     Follow Up: Estephania Garcia in one week  Cardiology in 4 weeks        Discharge Medications:      Medication List      START taking these medications    amLODIPine 5 MG tablet  Commonly known as: NORVASC  Take 1 tablet by mouth daily  Start taking on: May 10, 2022        CHANGE how you take these medications    isosorbide mononitrate 60 MG extended release tablet  Commonly known as: IMDUR  Take 1 tablet by mouth daily  Start taking on: May 10, 2022  What changed:   · medication strength  · how much to take        CONTINUE taking these medications    acetaminophen 325 MG tablet  Commonly known as: TYLENOL     aspirin 81 MG chewable tablet  Take 1 tablet by mouth daily     doxazosin 2 MG tablet  Commonly known as: CARDURA     Eliquis 5 MG Tabs tablet  Generic drug: apixaban     nitroGLYCERIN 0.4 MG SL tablet  Commonly known as: NITROSTAT  up to max of 3 total doses. If no relief after 1 dose, call 911.      omeprazole 20 MG delayed release capsule  Commonly known as: PRILOSEC     pravastatin 40 MG tablet  Commonly known as: PRAVACHOL        STOP taking these medications    dilTIAZem 180 MG extended release capsule  Commonly known as: CARDIZEM CD     metoprolol tartrate 50 MG tablet  Commonly known as: LOPRESSOR           Where to Get Your Medications      You can get these medications from any pharmacy    Bring a paper prescription for each of these medications  · amLODIPine 5 MG tablet  · isosorbide mononitrate 60 MG extended release tablet                  Time Spent on discharge is more than  35 min in the examination, evaluation, counseling and review of medications and discharge plan.       Electronically signed by Huber Muhammad MD     Copy sent to Dr. Estephania Garcia

## 2022-05-12 ENCOUNTER — APPOINTMENT (OUTPATIENT)
Dept: GENERAL RADIOLOGY | Age: 82
DRG: 310 | End: 2022-05-12
Payer: MEDICARE

## 2022-05-12 ENCOUNTER — APPOINTMENT (OUTPATIENT)
Dept: CT IMAGING | Age: 82
DRG: 310 | End: 2022-05-12
Payer: MEDICARE

## 2022-05-12 ENCOUNTER — APPOINTMENT (OUTPATIENT)
Dept: NON INVASIVE DIAGNOSTICS | Age: 82
DRG: 310 | End: 2022-05-12
Payer: MEDICARE

## 2022-05-12 ENCOUNTER — HOSPITAL ENCOUNTER (INPATIENT)
Age: 82
LOS: 1 days | Discharge: HOME | DRG: 310 | End: 2022-05-13
Attending: EMERGENCY MEDICINE | Admitting: FAMILY MEDICINE
Payer: MEDICARE

## 2022-05-12 DIAGNOSIS — I48.91 ATRIAL FIBRILLATION WITH RVR (HCC): Primary | ICD-10-CM

## 2022-05-12 PROBLEM — F02.80 LATE ONSET ALZHEIMER'S DEMENTIA WITHOUT BEHAVIORAL DISTURBANCE (HCC): Status: ACTIVE | Noted: 2022-05-12

## 2022-05-12 PROBLEM — G30.1 LATE ONSET ALZHEIMER'S DEMENTIA WITHOUT BEHAVIORAL DISTURBANCE (HCC): Status: ACTIVE | Noted: 2022-05-12

## 2022-05-12 LAB
ABSOLUTE EOS #: 0.2 K/UL (ref 0–0.4)
ABSOLUTE LYMPH #: 1.3 K/UL (ref 1–4.8)
ABSOLUTE MONO #: 0.5 K/UL (ref 0.1–1.3)
ALBUMIN SERPL-MCNC: 3.6 G/DL (ref 3.5–5.2)
ALP BLD-CCNC: 73 U/L (ref 40–129)
ALT SERPL-CCNC: 12 U/L (ref 5–41)
ANION GAP SERPL CALCULATED.3IONS-SCNC: 12 MMOL/L (ref 9–17)
AST SERPL-CCNC: 31 U/L
BASOPHILS # BLD: 1 % (ref 0–2)
BASOPHILS ABSOLUTE: 0.1 K/UL (ref 0–0.2)
BILIRUB SERPL-MCNC: 0.64 MG/DL (ref 0.3–1.2)
BUN BLDV-MCNC: 12 MG/DL (ref 8–23)
CALCIUM SERPL-MCNC: 9.7 MG/DL (ref 8.6–10.4)
CHLORIDE BLD-SCNC: 105 MMOL/L (ref 98–107)
CO2: 22 MMOL/L (ref 20–31)
CREAT SERPL-MCNC: 0.89 MG/DL (ref 0.7–1.2)
EKG ATRIAL RATE: 108 BPM
EKG Q-T INTERVAL: 346 MS
EKG QRS DURATION: 98 MS
EKG QTC CALCULATION (BAZETT): 441 MS
EKG R AXIS: 20 DEGREES
EKG T AXIS: -14 DEGREES
EKG VENTRICULAR RATE: 98 BPM
EOSINOPHILS RELATIVE PERCENT: 3 % (ref 0–4)
GFR AFRICAN AMERICAN: >60 ML/MIN
GFR NON-AFRICAN AMERICAN: >60 ML/MIN
GFR SERPL CREATININE-BSD FRML MDRD: ABNORMAL ML/MIN/{1.73_M2}
GLUCOSE BLD-MCNC: 105 MG/DL (ref 70–99)
HCT VFR BLD CALC: 42.4 % (ref 41–53)
HEMOGLOBIN: 14.2 G/DL (ref 13.5–17.5)
INR BLD: 1.4
LV EF: 55 %
LVEF MODALITY: NORMAL
LYMPHOCYTES # BLD: 23 % (ref 24–44)
MAGNESIUM: 1.9 MG/DL (ref 1.6–2.6)
MCH RBC QN AUTO: 30.1 PG (ref 26–34)
MCHC RBC AUTO-ENTMCNC: 33.5 G/DL (ref 31–37)
MCV RBC AUTO: 89.8 FL (ref 80–100)
MONOCYTES # BLD: 9 % (ref 1–7)
PARTIAL THROMBOPLASTIN TIME: 34.3 SEC (ref 24–36)
PDW BLD-RTO: 13.8 % (ref 11.5–14.9)
PLATELET # BLD: 142 K/UL (ref 150–450)
PMV BLD AUTO: 7.4 FL (ref 6–12)
POTASSIUM SERPL-SCNC: 3.3 MMOL/L (ref 3.7–5.3)
PROTHROMBIN TIME: 16.9 SEC (ref 11.8–14.6)
RBC # BLD: 4.72 M/UL (ref 4.5–5.9)
SEDIMENTATION RATE, ERYTHROCYTE: 7 MM/HR (ref 0–20)
SEG NEUTROPHILS: 64 % (ref 36–66)
SEGMENTED NEUTROPHILS ABSOLUTE COUNT: 3.8 K/UL (ref 1.3–9.1)
SODIUM BLD-SCNC: 139 MMOL/L (ref 135–144)
TOTAL PROTEIN: 6.4 G/DL (ref 6.4–8.3)
TROPONIN, HIGH SENSITIVITY: 31 NG/L (ref 0–22)
TROPONIN, HIGH SENSITIVITY: 34 NG/L (ref 0–22)
TROPONIN, HIGH SENSITIVITY: 38 NG/L (ref 0–22)
TSH SERPL DL<=0.05 MIU/L-ACNC: 3.66 UIU/ML (ref 0.3–5)
WBC # BLD: 5.9 K/UL (ref 3.5–11)

## 2022-05-12 PROCEDURE — 2580000003 HC RX 258: Performed by: EMERGENCY MEDICINE

## 2022-05-12 PROCEDURE — 93005 ELECTROCARDIOGRAM TRACING: CPT | Performed by: EMERGENCY MEDICINE

## 2022-05-12 PROCEDURE — 84484 ASSAY OF TROPONIN QUANT: CPT

## 2022-05-12 PROCEDURE — 99222 1ST HOSP IP/OBS MODERATE 55: CPT | Performed by: PSYCHIATRY & NEUROLOGY

## 2022-05-12 PROCEDURE — 97530 THERAPEUTIC ACTIVITIES: CPT

## 2022-05-12 PROCEDURE — 97165 OT EVAL LOW COMPLEX 30 MIN: CPT

## 2022-05-12 PROCEDURE — 70450 CT HEAD/BRAIN W/O DYE: CPT

## 2022-05-12 PROCEDURE — 83735 ASSAY OF MAGNESIUM: CPT

## 2022-05-12 PROCEDURE — 97161 PT EVAL LOW COMPLEX 20 MIN: CPT

## 2022-05-12 PROCEDURE — 86780 TREPONEMA PALLIDUM: CPT

## 2022-05-12 PROCEDURE — 2580000003 HC RX 258: Performed by: FAMILY MEDICINE

## 2022-05-12 PROCEDURE — 85610 PROTHROMBIN TIME: CPT

## 2022-05-12 PROCEDURE — 6370000000 HC RX 637 (ALT 250 FOR IP): Performed by: FAMILY MEDICINE

## 2022-05-12 PROCEDURE — 85025 COMPLETE CBC W/AUTO DIFF WBC: CPT

## 2022-05-12 PROCEDURE — 2060000000 HC ICU INTERMEDIATE R&B

## 2022-05-12 PROCEDURE — 96374 THER/PROPH/DIAG INJ IV PUSH: CPT

## 2022-05-12 PROCEDURE — 36415 COLL VENOUS BLD VENIPUNCTURE: CPT

## 2022-05-12 PROCEDURE — 84443 ASSAY THYROID STIM HORMONE: CPT

## 2022-05-12 PROCEDURE — 80053 COMPREHEN METABOLIC PANEL: CPT

## 2022-05-12 PROCEDURE — 82607 VITAMIN B-12: CPT

## 2022-05-12 PROCEDURE — 85652 RBC SED RATE AUTOMATED: CPT

## 2022-05-12 PROCEDURE — 6370000000 HC RX 637 (ALT 250 FOR IP): Performed by: NURSE PRACTITIONER

## 2022-05-12 PROCEDURE — 71045 X-RAY EXAM CHEST 1 VIEW: CPT

## 2022-05-12 PROCEDURE — 99285 EMERGENCY DEPT VISIT HI MDM: CPT

## 2022-05-12 PROCEDURE — 73502 X-RAY EXAM HIP UNI 2-3 VIEWS: CPT

## 2022-05-12 PROCEDURE — 93010 ELECTROCARDIOGRAM REPORT: CPT | Performed by: INTERNAL MEDICINE

## 2022-05-12 PROCEDURE — 97116 GAIT TRAINING THERAPY: CPT

## 2022-05-12 PROCEDURE — 82746 ASSAY OF FOLIC ACID SERUM: CPT

## 2022-05-12 PROCEDURE — 6370000000 HC RX 637 (ALT 250 FOR IP): Performed by: EMERGENCY MEDICINE

## 2022-05-12 PROCEDURE — 93306 TTE W/DOPPLER COMPLETE: CPT

## 2022-05-12 PROCEDURE — 85730 THROMBOPLASTIN TIME PARTIAL: CPT

## 2022-05-12 PROCEDURE — 2500000003 HC RX 250 WO HCPCS: Performed by: EMERGENCY MEDICINE

## 2022-05-12 RX ORDER — ISOSORBIDE MONONITRATE 30 MG/1
30 TABLET, EXTENDED RELEASE ORAL DAILY
Status: ON HOLD | COMMUNITY
End: 2022-05-13 | Stop reason: HOSPADM

## 2022-05-12 RX ORDER — POTASSIUM CHLORIDE 20 MEQ/1
40 TABLET, EXTENDED RELEASE ORAL PRN
Status: DISCONTINUED | OUTPATIENT
Start: 2022-05-12 | End: 2022-05-13 | Stop reason: HOSPADM

## 2022-05-12 RX ORDER — POTASSIUM CHLORIDE 7.45 MG/ML
10 INJECTION INTRAVENOUS PRN
Status: DISCONTINUED | OUTPATIENT
Start: 2022-05-12 | End: 2022-05-13 | Stop reason: HOSPADM

## 2022-05-12 RX ORDER — DILTIAZEM HYDROCHLORIDE 120 MG/1
120 CAPSULE, COATED, EXTENDED RELEASE ORAL DAILY
Status: DISCONTINUED | OUTPATIENT
Start: 2022-05-12 | End: 2022-05-13 | Stop reason: HOSPADM

## 2022-05-12 RX ORDER — PANTOPRAZOLE SODIUM 40 MG/1
40 TABLET, DELAYED RELEASE ORAL
Status: DISCONTINUED | OUTPATIENT
Start: 2022-05-12 | End: 2022-05-13 | Stop reason: HOSPADM

## 2022-05-12 RX ORDER — ONDANSETRON 2 MG/ML
4 INJECTION INTRAMUSCULAR; INTRAVENOUS EVERY 6 HOURS PRN
Status: DISCONTINUED | OUTPATIENT
Start: 2022-05-12 | End: 2022-05-12 | Stop reason: SDUPTHER

## 2022-05-12 RX ORDER — ONDANSETRON 2 MG/ML
4 INJECTION INTRAMUSCULAR; INTRAVENOUS EVERY 6 HOURS PRN
Status: DISCONTINUED | OUTPATIENT
Start: 2022-05-12 | End: 2022-05-13 | Stop reason: HOSPADM

## 2022-05-12 RX ORDER — ACETAMINOPHEN 325 MG/1
650 TABLET ORAL EVERY 4 HOURS PRN
Status: DISCONTINUED | OUTPATIENT
Start: 2022-05-12 | End: 2022-05-12 | Stop reason: SDUPTHER

## 2022-05-12 RX ORDER — SODIUM CHLORIDE 0.9 % (FLUSH) 0.9 %
5-40 SYRINGE (ML) INJECTION PRN
Status: DISCONTINUED | OUTPATIENT
Start: 2022-05-12 | End: 2022-05-13 | Stop reason: HOSPADM

## 2022-05-12 RX ORDER — SODIUM CHLORIDE 9 MG/ML
INJECTION, SOLUTION INTRAVENOUS PRN
Status: DISCONTINUED | OUTPATIENT
Start: 2022-05-12 | End: 2022-05-13 | Stop reason: HOSPADM

## 2022-05-12 RX ORDER — POTASSIUM CHLORIDE 20 MEQ/1
40 TABLET, EXTENDED RELEASE ORAL ONCE
Status: COMPLETED | OUTPATIENT
Start: 2022-05-12 | End: 2022-05-12

## 2022-05-12 RX ORDER — SODIUM CHLORIDE 9 MG/ML
INJECTION, SOLUTION INTRAVENOUS PRN
Status: DISCONTINUED | OUTPATIENT
Start: 2022-05-12 | End: 2022-05-12 | Stop reason: SDUPTHER

## 2022-05-12 RX ORDER — SODIUM CHLORIDE 0.9 % (FLUSH) 0.9 %
5-40 SYRINGE (ML) INJECTION EVERY 12 HOURS SCHEDULED
Status: DISCONTINUED | OUTPATIENT
Start: 2022-05-12 | End: 2022-05-13 | Stop reason: HOSPADM

## 2022-05-12 RX ORDER — ONDANSETRON 4 MG/1
4 TABLET, ORALLY DISINTEGRATING ORAL EVERY 8 HOURS PRN
Status: DISCONTINUED | OUTPATIENT
Start: 2022-05-12 | End: 2022-05-13 | Stop reason: HOSPADM

## 2022-05-12 RX ORDER — ASPIRIN 81 MG/1
81 TABLET, CHEWABLE ORAL DAILY
Status: DISCONTINUED | OUTPATIENT
Start: 2022-05-12 | End: 2022-05-13 | Stop reason: HOSPADM

## 2022-05-12 RX ORDER — SODIUM CHLORIDE 0.9 % (FLUSH) 0.9 %
10 SYRINGE (ML) INJECTION PRN
Status: DISCONTINUED | OUTPATIENT
Start: 2022-05-12 | End: 2022-05-12 | Stop reason: SDUPTHER

## 2022-05-12 RX ORDER — ACETAMINOPHEN 650 MG/1
650 SUPPOSITORY RECTAL EVERY 6 HOURS PRN
Status: DISCONTINUED | OUTPATIENT
Start: 2022-05-12 | End: 2022-05-13 | Stop reason: HOSPADM

## 2022-05-12 RX ORDER — SODIUM CHLORIDE 9 MG/ML
1000 INJECTION, SOLUTION INTRAVENOUS CONTINUOUS
Status: DISCONTINUED | OUTPATIENT
Start: 2022-05-12 | End: 2022-05-13 | Stop reason: HOSPADM

## 2022-05-12 RX ORDER — ISOSORBIDE MONONITRATE 60 MG/1
60 TABLET, EXTENDED RELEASE ORAL DAILY
Status: DISCONTINUED | OUTPATIENT
Start: 2022-05-12 | End: 2022-05-13 | Stop reason: HOSPADM

## 2022-05-12 RX ORDER — ONDANSETRON 4 MG/1
4 TABLET, ORALLY DISINTEGRATING ORAL EVERY 8 HOURS PRN
Status: DISCONTINUED | OUTPATIENT
Start: 2022-05-12 | End: 2022-05-12 | Stop reason: SDUPTHER

## 2022-05-12 RX ORDER — MAGNESIUM SULFATE 1 G/100ML
1000 INJECTION INTRAVENOUS PRN
Status: DISCONTINUED | OUTPATIENT
Start: 2022-05-12 | End: 2022-05-13 | Stop reason: HOSPADM

## 2022-05-12 RX ORDER — DILTIAZEM HYDROCHLORIDE 5 MG/ML
10 INJECTION INTRAVENOUS ONCE
Status: COMPLETED | OUTPATIENT
Start: 2022-05-12 | End: 2022-05-12

## 2022-05-12 RX ORDER — ACETAMINOPHEN 325 MG/1
650 TABLET ORAL EVERY 6 HOURS PRN
Status: DISCONTINUED | OUTPATIENT
Start: 2022-05-12 | End: 2022-05-13 | Stop reason: HOSPADM

## 2022-05-12 RX ORDER — SODIUM CHLORIDE 0.9 % (FLUSH) 0.9 %
10 SYRINGE (ML) INJECTION EVERY 12 HOURS SCHEDULED
Status: DISCONTINUED | OUTPATIENT
Start: 2022-05-12 | End: 2022-05-13 | Stop reason: HOSPADM

## 2022-05-12 RX ORDER — DOXAZOSIN MESYLATE 1 MG/1
2 TABLET ORAL NIGHTLY
Status: DISCONTINUED | OUTPATIENT
Start: 2022-05-12 | End: 2022-05-13 | Stop reason: HOSPADM

## 2022-05-12 RX ORDER — PRAVASTATIN SODIUM 40 MG
40 TABLET ORAL DAILY
Status: DISCONTINUED | OUTPATIENT
Start: 2022-05-12 | End: 2022-05-13 | Stop reason: HOSPADM

## 2022-05-12 RX ADMIN — SODIUM CHLORIDE, PRESERVATIVE FREE 10 ML: 5 INJECTION INTRAVENOUS at 21:07

## 2022-05-12 RX ADMIN — Medication 2.5 MG/HR: at 04:42

## 2022-05-12 RX ADMIN — PANTOPRAZOLE SODIUM 40 MG: 40 TABLET, DELAYED RELEASE ORAL at 10:18

## 2022-05-12 RX ADMIN — PRAVASTATIN SODIUM 40 MG: 40 TABLET ORAL at 10:13

## 2022-05-12 RX ADMIN — POTASSIUM CHLORIDE 40 MEQ: 20 TABLET, EXTENDED RELEASE ORAL at 06:34

## 2022-05-12 RX ADMIN — ASPIRIN 81 MG: 81 TABLET, CHEWABLE ORAL at 10:13

## 2022-05-12 RX ADMIN — ISOSORBIDE MONONITRATE 60 MG: 60 TABLET, EXTENDED RELEASE ORAL at 10:13

## 2022-05-12 RX ADMIN — APIXABAN 5 MG: 5 TABLET, FILM COATED ORAL at 21:07

## 2022-05-12 RX ADMIN — DOXAZOSIN 2 MG: 1 TABLET ORAL at 21:07

## 2022-05-12 RX ADMIN — METOPROLOL TARTRATE 25 MG: 25 TABLET, FILM COATED ORAL at 10:18

## 2022-05-12 RX ADMIN — METOPROLOL TARTRATE 25 MG: 25 TABLET, FILM COATED ORAL at 21:07

## 2022-05-12 RX ADMIN — DILTIAZEM HYDROCHLORIDE 120 MG: 120 CAPSULE, COATED, EXTENDED RELEASE ORAL at 10:13

## 2022-05-12 RX ADMIN — SODIUM CHLORIDE 1000 ML: 9 INJECTION, SOLUTION INTRAVENOUS at 04:20

## 2022-05-12 RX ADMIN — DILTIAZEM HYDROCHLORIDE 10 MG: 5 INJECTION INTRAVENOUS at 04:42

## 2022-05-12 RX ADMIN — APIXABAN 5 MG: 5 TABLET, FILM COATED ORAL at 10:13

## 2022-05-12 ASSESSMENT — ENCOUNTER SYMPTOMS
EYE PAIN: 0
SHORTNESS OF BREATH: 1
TROUBLE SWALLOWING: 0
VOICE CHANGE: 0
STRIDOR: 0
BLOOD IN STOOL: 0
SHORTNESS OF BREATH: 0
BACK PAIN: 0
COLOR CHANGE: 0
RECTAL PAIN: 0
CHOKING: 0
ANAL BLEEDING: 0
ABDOMINAL PAIN: 0
ABDOMINAL DISTENTION: 0
PHOTOPHOBIA: 0

## 2022-05-12 ASSESSMENT — PAIN - FUNCTIONAL ASSESSMENT: PAIN_FUNCTIONAL_ASSESSMENT: 0-10

## 2022-05-12 ASSESSMENT — PAIN DESCRIPTION - LOCATION: LOCATION: CHEST

## 2022-05-12 NOTE — ED NOTES
Patient states he is unsure if he too double doses of his home medications last night. According to his daughter Dinorah Watkins patient's pill box is missing doses from Thursday night (as in tonight). Unsure what patient takes nightly.       America Butterfield RN  05/12/22 [id number]

## 2022-05-12 NOTE — ED PROVIDER NOTES
EMERGENCY DEPARTMENT ENCOUNTER    Pt Name: Lu Galvan  MRN: [id number]  Mylesgfbriseida 1940  Date of evaluation: 5/12/22  CHIEF COMPLAINT       Chief Complaint   Patient presents with    Chest Pain     HISTORY OF PRESENT ILLNESS   62-year-old male presents for complaint of chest pain and reported palpitations. Patient reports started earlier this evening, described as an aching pain, states he felt his heart is going fast, denies radiation of pain denies anything making it better or worse patient with a known history of A. fib, reports she is feeling little short of breath. He reports syncopal episode as well. The history is provided by the patient and the EMS personnel. REVIEW OF SYSTEMS     Review of Systems   Constitutional: Negative for chills and fever. HENT: Negative for congestion and ear pain. Eyes: Negative for pain. Respiratory: Negative for shortness of breath. Cardiovascular: Positive for chest pain and palpitations. Negative for leg swelling. Gastrointestinal: Negative for abdominal pain. Genitourinary: Negative for dysuria and flank pain. Musculoskeletal: Negative for back pain. Skin: Negative for color change. Neurological: Negative for numbness and headaches. Psychiatric/Behavioral: Negative for confusion. All other systems reviewed and are negative.     PASTMEDICAL HISTORY     Past Medical History:   Diagnosis Date    Anxiety attack     Arthritis     Atrial fibrillation (HCC)     Atrial flutter (HCC)     Back pain     CAD (coronary artery disease)     Chest pain 04/12/2016    Colon polyps     Constipation     Dementia (HCC) 03/23/2019    Dizziness     GERD (gastroesophageal reflux disease)     Hyperlipidemia     Hypertension     Lower GI bleed     Lung nodule seen on imaging study 02/17/2019    Prediabetes     Rectal bleeding     S/P colonoscopy with polypectomy     Seizure ([facility]     NOTED PER CARE EVERYWHERE- PATIENT DENIES HX OF, STATES HE HAS NEVER BEEN ON SEIZURE MEDICATION    Sleep apnea     not tested, no cpap    SOB (shortness of breath)     Status post coronary artery bypass graft     Wears dentures     TOP     Past Problem List  Patient Active Problem List   Diagnosis Code    PAF (paroxysmal atrial fibrillation) ([facility] I48.0    Chest pain R07.9    Rectal bleeding K62.5    Lower GI bleed K92.2    Morbid obesity ([facility] E66.01    S/P colonoscopy with polypectomy Z98.890    Elevated INR R79.1    Lung nodule seen on imaging study R91.1    Coronary artery disease involving coronary bypass graft of native heart without angina pectoris I25.810    Dementia ([facility] F03.90    Hyperlipidemia E78.5    Essential hypertension I10    ASCVD (arteriosclerotic cardiovascular disease) I25.10    Bradycardia R00.1    Atrial fibrillation with RVR ([facility] I48.91     SURGICAL HISTORY       Past Surgical History:   Procedure Laterality Date    CARDIAC CATHETERIZATION      CATARACT REMOVAL Bilateral     COLONOSCOPY  07/24/2018    COLONOSCOPY WITH BIOPSY performed by Roque Ordaz MD at [facility]    COLONOSCOPY N/A 4/19/2021    COLONOSCOPY DIAGNOSTIC performed by Mikhail Chapman MD at [facility]  2004    x4 vessel    TOTAL KNEE ARTHROPLASTY Bilateral     VENTRAL HERNIA REPAIR       CURRENT MEDICATIONS       Previous Medications    ACETAMINOPHEN (TYLENOL) 325 MG TABLET    Take 650 mg by mouth 2 times daily as needed for Pain     AMLODIPINE (NORVASC) 5 MG TABLET    Take 1 tablet by mouth daily    APIXABAN (ELIQUIS) 5 MG TABS TABLET    Take 5 mg by mouth 2 times daily    ASPIRIN 81 MG CHEWABLE TABLET    Take 1 tablet by mouth daily    DOXAZOSIN (CARDURA) 2 MG TABLET    Take 2 mg by mouth nightly    ISOSORBIDE MONONITRATE (IMDUR) 60 MG EXTENDED RELEASE TABLET    Take 1 tablet by mouth daily    NITROGLYCERIN (NITROSTAT) 0.4 MG SL TABLET    up to max of 3 total doses. If no relief after 1 dose, call 911. OMEPRAZOLE (PRILOSEC) 20 MG CAPSULE    Take 20 mg by mouth daily. PRAVASTATIN (PRAVACHOL) 40 MG TABLET    Take 40 mg by mouth daily. ALLERGIES     is allergic to oxycodone, pcn [penicillins], and warfarin and related. FAMILY HISTORY     He indicated that the status of his mother is unknown. He indicated that the status of his father is unknown. SOCIAL HISTORY       Social History     Tobacco Use    Smoking status: Former Smoker     Quit date:      Years since quittin.3    Smokeless tobacco: Never Used   Vaping Use    Vaping Use: Never used   Substance Use Topics    Alcohol use: No    Drug use: No     PHYSICAL EXAM     INITIAL VITALS: /72   Pulse 103   Temp 97.9 °F (36.6 °C) (Oral)   Resp 22   Ht 5' 6\" (1.676 m)   Wt 200 lb (90.7 kg)   SpO2 97%   BMI 32.28 kg/m²    Physical Exam  Vitals and nursing note reviewed. Constitutional:       General: He is not in acute distress. Appearance: Normal appearance. He is not ill-appearing. HENT:      Head: Normocephalic and atraumatic. Right Ear: External ear normal.      Left Ear: External ear normal.      Nose: Nose normal.      Mouth/Throat:      Mouth: Mucous membranes are moist.   Eyes:      Extraocular Movements: Extraocular movements intact. Pupils: Pupils are equal, round, and reactive to light. Cardiovascular:      Rate and Rhythm: Tachycardia present. Rhythm irregularly irregular. Pulses: Normal pulses. Radial pulses are 2+ on the right side and 2+ on the left side. Dorsalis pedis pulses are 2+ on the right side and 2+ on the left side. Heart sounds: Normal heart sounds. Pulmonary:      Effort: Pulmonary effort is normal.      Breath sounds: Normal breath sounds. Abdominal:      General: Abdomen is flat. Palpations: Abdomen is soft. Tenderness: There is no abdominal tenderness. Musculoskeletal:         General: No tenderness. Normal range of motion.       Cervical back: Neck supple. Skin:     General: Skin is warm and dry. Capillary Refill: Capillary refill takes less than 2 seconds. Neurological:      General: No focal deficit present. Mental Status: He is alert and oriented to person, place, and time. Cranial Nerves: Cranial nerves are intact. Sensory: Sensation is intact. Motor: Motor function is intact. Psychiatric:         Behavior: Behavior normal.         Thought Content: Thought content does not include homicidal or suicidal ideation. MEDICAL DECISION MAKIN-year-old male presents for chest pain and palpitations. On initial exam patient in no acute distress initial heart rate noted to be between the 130s and 140s, looks consistent with A. fib, patient with known history of A. fib, will check labs, will start patient on Cardizem        Labs reviewed patient was noted to have initial troponin of 38, potassium 3.3, replaced, suspect that the troponin elevation is related to his increased rate at this time, will continue to trend, heart rate improved after dosing of Cardizem, will continue on infusion    Will admit for further heart rate control and monitoring    Discussed results with patient and family, discussed need for admission, both are agreeable    Spoke with Dr. Rubio Cope who accepts admission. Patient demonstrates understanding and agreement with the plan, was given the opportunity to ask questions, and these questions were answered to the best of the provided information at this time. VS stable for transfer. This dictation was prepared using [employer] voice recognition software.            CRITICAL CARE: 33 min      PROCEDURES:    Procedures    DIAGNOSTIC RESULTS   EKG:All EKG's are interpreted by the Emergency Department Physician who either signs or Co-signs this chart in the absence of a cardiologist.    A. fib with RVR, rate of 148, no ST segment elevation or depression, nonspecific T wave changes    RADIOLOGY:All plain film, CT, MRI, and formal ultrasound images (except ED bedside ultrasound) are read by the radiologist, see reports below, unless otherwisenoted in MDM or here. XR CHEST PORTABLE   Final Result   No radiographic evidence of acute cardiopulmonary disease. LABS: All lab results were reviewed by myself, and all abnormals are listed below. Labs Reviewed   CBC WITH AUTO DIFFERENTIAL - Abnormal; Notable for the following components:       Result Value    Platelets 726 (*)     Lymphocytes 23 (*)     Monocytes 9 (*)     All other components within normal limits   COMPREHENSIVE METABOLIC PANEL W/ REFLEX TO MG FOR LOW K - Abnormal; Notable for the following components:    Glucose 105 (*)     Potassium 3.3 (*)     All other components within normal limits   TROPONIN - Abnormal; Notable for the following components:    Troponin, High Sensitivity 38 (*)     All other components within normal limits   PROTIME-INR - Abnormal; Notable for the following components:    Protime 16.9 (*)     All other components within normal limits   APTT   TSH WITH REFLEX   MAGNESIUM   TROPONIN       EMERGENCY DEPARTMENTCOURSE:         Vitals:    Vitals:    05/12/22 0405 05/12/22 0430 05/12/22 0457 05/12/22 0530   BP:  110/73 124/72    Pulse: 136 121  103   Resp:  22     Temp: 97.9 °F (36.6 °C)      TempSrc: Oral      SpO2: 94% 91%  97%   Weight:       Height:           The patient was given the following medications while in the emergency department:  Orders Placed This Encounter   Medications    dilTIAZem injection 10 mg    DISCONTD: dilTIAZem 125 mg in dextrose 5 % 125 mL infusion     Order Specific Question:   Titrate Infusion? Answer:   Yes     Order Specific Question:   Initial Infusion Dose: Answer:   2.5 mg/hr     Order Specific Question:   Goal of Therapy is:      Answer:   HR less than 120 bpm     Order Specific Question:   Contact Provider if:     Answer:   SBP less than 90 mmHg     Order Specific Question:   Contact Provider if:     Answer:   HR less than 60 bpm     Order Specific Question:   HOLD for     Answer:   SBP less than 90 mmHg     Order Specific Question:   HOLD for     Answer:   HR less than 60 bpm    0.9 % sodium chloride infusion    dilTIAZem HCl in sodium chloride 125 mg in 125 mL infusion ([id number])     Order Specific Question:   Titrate Infusion? Answer:   Yes     Order Specific Question:   Initial Infusion Dose: Answer:   2.5 mg/hr     Order Specific Question:   Goal of Therapy is: Answer:   HR less than 120 bpm     Order Specific Question:   Contact Provider if:     Answer:   SBP less than 90 mmHg     Order Specific Question:   Contact Provider if:     Answer:   HR less than 60 bpm     Order Specific Question:   HOLD for     Answer:   SBP less than 90 mmHg     Order Specific Question:   HOLD for     Answer:   HR less than 60 bpm    potassium chloride (KLOR-CON M) extended release tablet 40 mEq     CONSULTS:  IP CONSULT TO FAMILY MEDICINE  IP CONSULT TO CARDIOLOGY    FINAL IMPRESSION      1. Atrial fibrillation with RVR ([facility]          DISPOSITION/PLAN   DISPOSITION Admitted 05/12/2022 06:42:29 AM      PATIENT REFERRED TO:  No follow-up provider specified. DISCHARGE MEDICATIONS:  New Prescriptions    No medications on file     The care is provided during an unprecedented national emergency due to the novel coronavirus, COVID 19.   DO Jazmín Lincoln DO  05/12/22 0868

## 2022-05-12 NOTE — FLOWSHEET NOTE
Patient was very pleasant and talked about living his life helping others and appreciating his life. 05/12/22 1824   Encounter Summary   Encounter Overview/Reason  Initial Encounter   Service Provided For: Patient   Referral/Consult From: Armin   Last Encounter  05/12/22   Complexity of Encounter Low   Encounter    Type Initial Screen/Assessment   Spiritual/Emotional needs   Type Spiritual Support   Assessment/Intervention/Outcome   Assessment Calm   Intervention Active listening;Discussed illness injury and its impact; Discussed meaning/purpose;Explored/Affirmed feelings, thoughts, concerns;Prayer (assurance of)/[place];Sustaining Presence/Ministry of presence   Outcome Coping;Engaged in conversation;Expressed feelings, needs, and concerns;Receptive

## 2022-05-12 NOTE — ED NOTES
Report given to [facility], RN from ED. Report method in person   The following was reviewed with receiving RN:   Current vital signs:  /72   Pulse 103   Temp 97.9 °F (36.6 °C) (Oral)   Resp 22   Ht 5' 6\" (1.676 m)   Wt 200 lb (90.7 kg)   SpO2 97%   BMI 32.28 kg/m²                      Any medication or safety alerts were reviewed. Any pending diagnostics and notifications were also reviewed, as well as any safety concerns or issues, abnormal labs, abnormal imaging, and abnormal assessment findings. Questions were answered.             Aly Hinds RN  05/12/22 5431

## 2022-05-12 NOTE — PROGRESS NOTES
Physical Therapy  Facility/Department: [address] [place] [address]  Physical Therapy Initial Assessment    Name: Pratima Kim  : 1940  MRN: [id number]  Date of Service: 2022    Discharge Recommendations:  Continue to assess pending progress   PT Equipment Recommendations  Equipment Needed: No      Patient Diagnosis(es): The encounter diagnosis was Atrial fibrillation with RVR ([facility] Past Medical History:  has a past medical history of Anxiety attack, Arthritis, Atrial fibrillation ([address] Atrial flutter ([address] Back pain, CAD (coronary artery disease), Chest pain, Colon polyps, Constipation, Dementia ([address] Dizziness, GERD (gastroesophageal reflux disease), Hyperlipidemia, Hypertension, Lower GI bleed, Lung nodule seen on imaging study, Prediabetes, Rectal bleeding, S/P colonoscopy with polypectomy, Seizure ([address] Sleep apnea, SOB (shortness of breath), Status post coronary artery bypass graft, and Wears dentures. Past Surgical History:  has a past surgical history that includes Coronary artery bypass graft (); Cataract removal (Bilateral); ventral hernia repair; Total knee arthroplasty (Bilateral); Colonoscopy (2018); Cardiac catheterization; and Colonoscopy (N/A, 2021). Assessment   Body Structures, Functions, Activity Limitations Requiring Skilled Therapeutic Intervention: Decreased functional mobility ; Decreased strength;Decreased endurance  Assessment: continue per POC to maxmize potential for safe D/C  Treatment Diagnosis: impaired mobility due to heart rate issues  Therapy Prognosis: Excellent  Decision Making: Low Complexity  History: pt admitted w/ A-Fib w/ RVR  Exam: ROM, MMT, balance and mobility assessments  Clinical Presentation: gait 15' without AD w/ supervision for lines, MOD I bed mobility, supervision sit <> stand  Barriers to Learning: none  Requires PT Follow-Up: Yes     Plan   Plan  Plan:  (2-3 treatments/ 3 days)  Safety Devices  Type of Devices: Call light within reach,Gait belt,Left in chair,Nurse notified (nurse Silvia Becker)     Restrictions  Restrictions/Precautions  Restrictions/Precautions: Fall Risk,Up as Tolerated (peripheral IV right antecubital, troponins 38 on 5-)  Required Braces or Orthoses?: Yes (occassional back brace when doing yard work or moving trash cans)  Implants present? : Metal implants (bilateral TKRs)  Required Braces or Orthoses  Spinal: Lumbar Corset     Subjective   Pain: C/O right hip pain 5-6/ 10  General  Patient assessed for rehabilitation services?: Yes  Response To Previous Treatment: Not applicable  Family / Caregiver Present: No  Referring Practitioner: Dr. Janet Purdy  Referral Date : 05/12/22  Diagnosis: A-Fib w/ RVR  Follows Commands: Within Functional Limits  Other (Comment): OK per nurse Mary to proceed w/ PT evaluation  Subjective  Subjective: pt reports that he took an accidental overdose of his pills and took 2 doses accidentally. Pt had C/O \"poundinng in his heart\" on admit. Pt reports that he devloped chest pain about 1 hour prior to admit.          Social/Functional History  Social/Functional History  Lives With: Spouse  Type of Home: House  Home Layout: One level  Home Access: Stairs to enter without rails  Entrance Stairs - Number of Steps: 1 step w/o rail  Entrance Stairs - Rails: None  Bathroom Shower/Tub: Tub/Shower unit,Shower chair with back,Curtain  Bathroom Toilet: Standard (has sink next to it)  [employer]: Grab bars in shower,Shower chair  Bathroom Accessibility: Walker accessible  Home Equipment: Cane,Rollator  Has the patient had two or more falls in the past year or any fall with injury in the past year?: Yes (last week and 8 months ago)  ADL Assistance: Independent  Homemaking Assistance: Needs assistance (spouse does the cooking, spouse ordered groceries and patient picks them up, share laundry and cleaning,pt does the yardwork)  Homemaking Responsibilities: Yes (spouse does the cooking, spouse ordered groceries and patient picks them up, share laundry and cleaning,pt does the yardwork)  Ambulation Assistance: Independent (occassional use of cane when his hip is bothering him)  Transfer Assistance: Independent  Active : Yes  Mode of Transportation: Car,SUV  Occupation: Retired  Type of Occupation: retired  and railroad  IADL Comments: pt sleeps in a flat bed  Additional Comments: states that he monitors his wife and looks out for her, son lives a 1 1/2 away, dtr lives nearby and other dtr lives in [place]. Son works from home and able to come when called.   Vision/Hearing  Vision Exceptions: Wears glasses for reading  Hearing: Within functional limits    Cognition   Orientation  Overall Orientation Status: Within Functional Limits (answered all questions correctly)  Orientation Level: Oriented X4     Objective   Pulse: 94  Heart Rate Source: Monitor  BP: 116/88  BP Location: Left Arm  Patient Position: Semi fowlers  MAP (Calculated): 97.33  Resp: 20  SpO2: 97 %  O2 Device: None (Room air)     Observation/Palpation  Observation: peripheral IV right antecubital  Gross Assessment  Sensation: Impaired (C/O numbness 3 fingers right hand)     AROM RLE (degrees)  RLE AROM: WFL  AROM LLE (degrees)  LLE AROM : WFL  AROM RUE (degrees)  RUE General AROM: see OT for UE assessment  AROM LUE (degrees)  LUE General AROM: see OT for UE assessment  Strength RLE  Comment: grossly 4/5  Strength LLE  Comment: grossly 4/5  Strength RUE  Comment: see OT for UE assessment  Strength LUE  Comment: see OT for UE assessment           Bed mobility  Rolling to Left: Modified independent  Supine to Sit: Modified independent  Scooting: Modified independent  Bed Mobility Comments: dangles at the EOB independently where he donned his shoes  Transfers  Sit to Stand: Supervision  Stand to sit: Supervision  Stand Pivot Transfers: Supervision  Comment: supervision due to lines (BP machine), no device used  Ambulation  Surface: level nataliya  Device: No Device  Assistance: Supervision  Gait Deviations: Increased ANDRA  Distance: 15' around the bed  Comments: pt had continuous cardiac monitor on  Stairs/Curb  Stairs?: No     Balance  Sitting - Static: Good  Sitting - Dynamic: Good  Standing - Static: Good (no device)  Standing - Dynamic: Good;- (no device)           OutComes Score                                                  AM-PAC Score  AM-PAC Inpatient Mobility Raw Score : 18 (05/12/22 1114)  AM-PAC Inpatient T-Scale Score : 43.63 (05/12/22 1114)  Mobility Inpatient CMS 0-100% Score: 46.58 (05/12/22 1114)  Mobility Inpatient CMS G-Code Modifier : CK (05/12/22 1114)          Goals  Short Term Goals  Time Frame for Short term goals: 2-3 treatments/ 3 days  Short term goal 1: pt to demonstrate good technique for LE strengthening exercises and energy conservation.   Short term goal 2: pt to demonstrate independent bed mobility for position change  Short term goal 3: pt to demonstrate independent transfers  Short term goal 4: pt to demonstrate independent ambulation 150-200' for community ambulation  Short term goal 5: pt to demonstrate good ambulatory balance  Additional Goals?: Yes  Short Term Goal 6: pt to demonstrate ability to independently  ascend/ descend 1 step without rail for home entry  Patient Goals   Patient goals : return home w/ spouse       Education  Patient Education  Education Given To: Patient  Education Provided: Plan of Care  Education Method: Demonstration;Verbal  Barriers to Learning: None  Education Outcome: Verbalized understanding;Demonstrated understanding      Therapy Time   Individual Concurrent Group Co-treatment   Time In 1114         Time Out 1137         Minutes 23         Timed Code Treatment Minutes: [address] [facility], PT

## 2022-05-12 NOTE — ED NOTES
Mode of arrival (squad #, walk in, police, etc) : [facility]    Chief complaint(s): chest pain    Arrival Note (brief scenario, treatment PTA, etc). : Pt arrives to ED from home c/o chest pain x 1 hour. EMS crew reports pt appeared to be in SVT's on arrival (170's-180's) and they gave 6 adenosine and 2 versed. On arrival, pt is in a-fib (with a hx of a-fib) and in the 130's-140's. C= \"Have you ever felt that you should Cut down on your drinking? \"  No  A= \"Have people Annoyed you by criticizing your drinking? \"  No  G= \"Have you ever felt bad or Guilty about your drinking? \"  No  E= \"Have you ever had a drink as an Eye-opener first thing in the morning to steady your nerves or to help a hangover? \"  No      Deferred []      Reason for deferring: N/A    *If yes to two or more: probable alcohol abuse. Sly Cantu RN  05/12/22 726 [facility], RN  05/12/22 1813

## 2022-05-12 NOTE — FLOWSHEET NOTE
Patieint admitted to [facility] from ER. VSS as charted. Assessment completed. Discussed POC. Patient oriented to room. Call light placed within reach.

## 2022-05-12 NOTE — PROGRESS NOTES
Reviewed: Yes  Patient assessed for rehabilitation services?: Yes  Family / Caregiver Present: No  Referring Practitioner: Ra Weber MD  Diagnosis: A-fib  Subjective  Subjective: Pt resting in bed upon arrival. Pt was pleasant and agreeable to OT/PT eval  General Comment  Comments: OK per RN Mary for OT/PT eval     Social/Functional History  Social/Functional History  Lives With: Spouse  Type of Home: House  Home Layout: One level  Home Access: Stairs to enter without rails  Entrance Stairs - Number of Steps: 1 step w/o rail  Entrance Stairs - Rails: None  Bathroom Shower/Tub: Tub/Shower unit,Shower chair with back,Curtain  Bathroom Toilet: Standard (has sink next to it)  [employer]: Grab bars in shower,Shower chair  Bathroom Accessibility: Walker accessible  Home Equipment: Cane,Rollator  Has the patient had two or more falls in the past year or any fall with injury in the past year?: Yes (last week and 8 months ago)  ADL Assistance: [address] [place] [address] Avenue: Needs assistance (spouse does the cooking, spouse ordered groceries and patient picks them up, share laundry and cleaning,pt does the [employer])  Homemaking Responsibilities: Yes (spouse does the cooking, spouse ordered groceries and patient picks them up, share laundry and cleaning,pt does the yardwork)  Ambulation Assistance: Independent (occassional use of cane when his hip is bothering him)  Transfer Assistance: Independent  Active : Yes  Mode of Transportation: Car,SUV  Occupation: Retired  Type of Occupation: retired  and railroad  IADL Comments: pt sleeps in a flat bed  Additional Comments: states that he monitors his wife and looks out for her, son lives a 1 1/2 away, dtr lives nearby and other dtr lives in [place]. Son works from home and able to come when called.        Objective   Pulse: 66  Heart Rate Source: Monitor  BP: 103/77  BP Location: Right upper arm  Patient Position: Semi fowlers  MAP (Calculated): 85.67  Resp: 18  SpO2: 95 %  O2 Device: None (Room air)  Vision Exceptions: Wears glasses for reading  Hearing: Within functional limits       Observation/Palpation  Observation: peripheral IV right antecubital  Safety Devices  Type of Devices: Call light within reach;Gait belt;Left in chair;Nurse notified (nurse Mary)           ADL  Feeding: Independent  Grooming: Independent  UE Bathing: Modified independent   LE Bathing: Modified independent   UE Dressing: Modified independent   LE Dressing: Modified independent   Toileting: Modified independent   Additional Comments: ADL scores based on clinical reasoning and skilled observation unless otherwise noted.  Pt able to sancho bilateral shoes while sitting EOB without any difficulty noted  Tone RUE  RUE Tone: Normotonic  Tone LUE  LUE Tone: Normotonic  Coordination  Movements Are Fluid And Coordinated: Yes     Bed mobility  Rolling to Left: Modified independent  Supine to Sit: Modified independent  Scooting: Modified independent  Bed Mobility Comments: dangles at the EOB independently where he donned his shoes  Transfers  Sit to stand: Supervision  Stand to sit: Supervision  Transfer Comments: Supervision for line mangement only              Sensation  Overall Sensation Status: Impaired (D1-D3 R hand)         Education Given To: Patient  Education Provided: Role of Therapy;Plan of Care  LUE AROM (degrees)  LUE AROM : WFL  Left Hand AROM (degrees)  Left Hand AROM: WFL  RUE AROM (degrees)  RUE AROM : WFL  Right Hand AROM (degrees)  Right Hand AROM: WFL  LUE Strength  Gross LUE Strength: WFL  L Hand General: 4/5  RUE Strength  Gross RUE Strength: WFL  R Hand General: 4/5                  Goals  Short Term Goals  Time Frame for Short term goals: D/C OT       Therapy Time   Individual Concurrent Group Co-treatment   Time In 1114         Time Out 1137         Minutes 23         Timed Code Treatment Minutes: 8 Minutes       Estela Forte OT

## 2022-05-12 NOTE — H&P
History and Physical      Name: Nicole Saldana  MRN: [id number]     [place]: [de-identified]  Room: 2093/2093-01    Admit Date: 5/12/2022  PCP: Toño Pugh      Chief Complaint:     Chief Complaint   Patient presents with    Chest Pain       History Obtained From:     patient, electronic medical record    History of Present Illness:      Nicole Saldana is a  80 y.o.  male  With PAF on Eliquis recently discontinued metoprolol and Cardizem due to bradycardia presents with Chest Pain   patient presented to the ER with EMS due to chest pain palpitations and shortness of breath. Patient states that earlier this morning he started x-rays he left-sided chest pain, moderate, pressure-like nonradiating associated with palpitations and shortness of breath.  Patient denies lightheadedness cough diaphoresis nausea vomiting abdominal pain difficulty in speech difficulty swallowing extremity weakness fever or chills    Past Medical History:     Past Medical History:   Diagnosis Date    Anxiety attack     Arthritis     Atrial fibrillation (HCC)     Atrial flutter (HCC)     Back pain     CAD (coronary artery disease)     Chest pain 04/12/2016    Colon polyps     Constipation     Dementia ([facility] 03/23/2019    Dizziness     GERD (gastroesophageal reflux disease)     Hyperlipidemia     Hypertension     Lower GI bleed     Lung nodule seen on imaging study 02/17/2019    Prediabetes     Rectal bleeding     S/P colonoscopy with polypectomy     Seizure ([facility] [address]     NOTED PER CARE EVERYWHERE- PATIENT DENIES HX OF, STATES HE HAS NEVER BEEN ON SEIZURE MEDICATION    Sleep apnea     not tested, no cpap    SOB (shortness of breath)     Status post coronary artery bypass graft     Wears dentures     TOP        Past Surgical History:     Past Surgical History:   Procedure Laterality Date    CARDIAC CATHETERIZATION      CATARACT REMOVAL Bilateral     COLONOSCOPY  07/24/2018    COLONOSCOPY WITH BIOPSY performed by [facility] Yonatanget MD [facility] at [facility]    COLONOSCOPY N/A 4/19/2021    COLONOSCOPY DIAGNOSTIC performed by Gina Slater MD at [address] [facility]  2004    x4 vessel    TOTAL KNEE ARTHROPLASTY Bilateral     VENTRAL HERNIA REPAIR          Medications Prior to Admission:       Prior to Admission medications    Medication Sig Start Date End Date Taking? Authorizing Provider   isosorbide mononitrate (IMDUR) 30 MG extended release tablet Take 30 mg by mouth daily   Yes Historical Provider, MD   amLODIPine (NORVASC) 5 MG tablet Take 1 tablet by mouth daily 5/10/22   Magaly Thayer MD   aspirin 81 MG chewable tablet Take 1 tablet by mouth daily 9/24/21   Magaly Thayer MD   apixaban (ELIQUIS) 5 MG TABS tablet Take 5 mg by mouth 2 times daily    Historical Provider, MD   doxazosin (CARDURA) 2 MG tablet Take 2 mg by mouth nightly    Historical Provider, MD   nitroGLYCERIN (NITROSTAT) 0.4 MG SL tablet up to max of 3 total doses. If no relief after 1 dose, call 911. 2/17/19   Samson Mejía MD   acetaminophen (TYLENOL) 325 MG tablet Take 650 mg by mouth 2 times daily as needed for Pain     Historical Provider, MD   pravastatin (PRAVACHOL) 40 MG tablet Take 40 mg by mouth daily. Historical Provider, MD   omeprazole (PRILOSEC) 20 MG capsule Take 20 mg by mouth daily. Historical Provider, MD        Allergies:       Oxycodone, Pcn [penicillins], and Warfarin and related    Social History:     Tobacco:    reports that he quit smoking about 42 years ago. He has never used smokeless tobacco.  Alcohol:      reports no history of alcohol use. Drug Use:  reports no history of drug use.     Family History:     Family History   Problem Relation Age of Onset    Alzheimer's Disease Mother     Arthritis Mother         lumbar disc disease    Heart Disease Father        Review of Systems:     Positive and Negative as described in HPI   all 10 systems are reviewed and negative except as Noted      Review of Systems   Constitutional: Negative for appetite change, chills and diaphoresis. HENT: Negative for drooling, ear pain, trouble swallowing and voice change. Eyes: Negative for photophobia and visual disturbance. Respiratory: Positive for shortness of breath. Negative for choking and stridor. Cardiovascular: Positive for chest pain and palpitations. Gastrointestinal: Negative for abdominal distention, anal bleeding, blood in stool and rectal pain. Endocrine: Negative for polyphagia and polyuria. Genitourinary: Negative for dysuria, flank pain, hematuria and urgency. Musculoskeletal: Positive for arthralgias (Right hip pain). Negative for back pain, myalgias and neck stiffness. Skin: Negative for color change, pallor and rash. Allergic/Immunologic: Negative for environmental allergies and food allergies. Neurological: Negative for tremors, seizures, facial asymmetry and numbness. Hematological: Negative for adenopathy. Does not bruise/bleed easily. Psychiatric/Behavioral: Positive for confusion. Negative for agitation, behavioral problems, hallucinations, self-injury and suicidal ideas. Code Status:  Full Code    Physical Exam:     Vitals:  /77   Pulse 66   Temp 97.9 °F (36.6 °C) (Oral)   Resp 18   Ht 5' 6\" (1.676 m)   Wt 214 lb 4.6 oz (97.2 kg)   SpO2 95%   BMI 34.59 kg/m²   Temp (24hrs), Av.8 °F (36.6 °C), Min:97.7 °F (36.5 °C), Max:97.9 °F (36.6 °C)        Physical Exam  Vitals reviewed. Constitutional:       Appearance: Normal appearance. He is not diaphoretic. HENT:      Head: Normocephalic and atraumatic. Right Ear: External ear normal.      Left Ear: External ear normal.      Nose: Nose normal.      Mouth/Throat:      Mouth: Mucous membranes are moist.      Pharynx: Oropharynx is clear. Eyes:      Conjunctiva/sclera: Conjunctivae normal.   Cardiovascular:      Rate and Rhythm: Tachycardia present. Rhythm irregular. Pulses: Normal pulses. mg/dL    BUN 12 8 - 23 mg/dL    CREATININE 0.89 0.70 - 1.20 mg/dL    Calcium 9.7 8.6 - 10.4 mg/dL    Sodium 139 135 - 144 mmol/L    Potassium 3.3 (L) 3.7 - 5.3 mmol/L    Chloride 105 98 - 107 mmol/L    CO2 22 20 - 31 mmol/L    Anion Gap 12 9 - 17 mmol/L    Alkaline Phosphatase 73 40 - 129 U/L    ALT 12 5 - 41 U/L    AST 31 <40 U/L    Total Bilirubin 0.64 0.3 - 1.2 mg/dL    Total Protein 6.4 6.4 - 8.3 g/dL    Albumin 3.6 3.5 - 5.2 g/dL    GFR Non-African American >60 >60 mL/min    GFR African American >60 >60 mL/min    GFR Comment         Troponin    Collection Time: 05/12/22  4:20 AM   Result Value Ref Range    Troponin, High Sensitivity 38 (H) 0 - 22 ng/L   Protime-INR    Collection Time: 05/12/22  4:20 AM   Result Value Ref Range    Protime 16.9 (H) 11.8 - 14.6 sec    INR 1.4    APTT    Collection Time: 05/12/22  4:20 AM   Result Value Ref Range    PTT 34.3 24.0 - 36.0 sec   TSH w/reflex to FT4    Collection Time: 05/12/22  4:20 AM   Result Value Ref Range    TSH 3.66 0.30 - 5.00 uIU/mL   Magnesium    Collection Time: 05/12/22  4:20 AM   Result Value Ref Range    Magnesium 1.9 1.6 - 2.6 mg/dL   EKG 12 Lead    Collection Time: 05/12/22  4:50 AM   Result Value Ref Range    Ventricular Rate 98 BPM    Atrial Rate 108 BPM    QRS Duration 98 ms    Q-T Interval 346 ms    QTc Calculation (Bazett) 441 ms    R Axis 20 degrees    T Axis -14 degrees   Troponin    Collection Time: 05/12/22  7:35 AM   Result Value Ref Range    Troponin, High Sensitivity 34 (H) 0 - 22 ng/L       Assesment:     Primary Problem  Atrial fibrillation with RVR (HCC)    Principal Problem:    Atrial fibrillation with RVR (HCC)  Active Problems:    Hyperlipidemia    Essential hypertension    ASCVD (arteriosclerotic cardiovascular disease)  Resolved Problems:    * No resolved hospital problems. *      Plan:     1. IV Cardizem drip  2.  cardiac telemetry  3.   2D echo shows an ejection fraction of 55%  4. TSH within normal limits  5.   DVT prophylaxis on Eliquis  6.  consult cardiology  7. CBC, CMP  8. Consult neurology for cognitive impairment  9. Right hip x-ray  10.   EPCs  11.  check and replace electrolytes per sliding scale  12.  restart home medications        Electronically signed by Noe Evans MD     Copy sent to Dr. Bryson Pérez

## 2022-05-12 NOTE — ED NOTES
Report given to LEATHA Tay from U. Report method by phone   The following was reviewed with receiving RN:   Current vital signs:  /77   Pulse 107   Temp 97.8 °F (36.6 °C)   Resp 19   Ht 5' 6\" (1.676 m)   Wt 200 lb (90.7 kg)   SpO2 96%   BMI 32.28 kg/m²                MEWS Score: 2     Any medication or safety alerts were reviewed. Any pending diagnostics and notifications were also reviewed, as well as any safety concerns or issues, abnormal labs, abnormal imaging, and abnormal assessment findings. Questions were answered.             Annita Lopez RN  05/12/22 3488

## 2022-05-12 NOTE — CONSULTS
Cardiology Consult           Date of Admission:  5/12/2022  Date of Consultation:  5/12/2022      PCP:  Denzel Bumpers      Chief Complaint: Atrial fibrillation    History of Present Illness:  Ian Coello is a 80 y.o. male who presents with  Chest pain. He states he got distracted while he was taking his medications. And ended up taking double doses. He was in RVR when he arrived to the ER he was given adenosine x1. He stated he could feel his heart racing. Once heart rate settled down chest pain resolved. 79-year-old male with history of CAD s/p CABG x4 (16 years ago at [facility]), paroxysmal atrial fibrillation/flutter, dizziness, hypertension, hyperlipidemia, lower GI bleed, lung nodule, anxiety, TIP. PMH:   has a past medical history of Anxiety attack, Arthritis, Atrial fibrillation ([address] Atrial flutter ([address] Back pain, CAD (coronary artery disease), Chest pain, Colon polyps, Constipation, Dementia ([address] Dizziness, GERD (gastroesophageal reflux disease), Hyperlipidemia, Hypertension, Lower GI bleed, Lung nodule seen on imaging study, Prediabetes, Rectal bleeding, S/P colonoscopy with polypectomy, Seizure ([address] Sleep apnea, SOB (shortness of breath), Status post coronary artery bypass graft, and Wears dentures. PSH:   has a past surgical history that includes Coronary artery bypass graft (2004); Cataract removal (Bilateral); ventral hernia repair; Total knee arthroplasty (Bilateral); Colonoscopy (07/24/2018); Cardiac catheterization; and Colonoscopy (N/A, 4/19/2021). Allergies: Allergies   Allergen Reactions    Oxycodone Other (See Comments)     Attacking people, black out     Pcn [Penicillins] Other (See Comments)     dizziness    Warfarin And Related      \" I see things\"         Home Meds:    Prior to Admission medications    Medication Sig Start Date End Date Taking?  Authorizing Provider   isosorbide mononitrate (IMDUR) 30 MG extended release tablet Take 30 mg by mouth daily   Yes Historical Provider, MD   amLODIPine (NORVASC) 5 MG tablet Take 1 tablet by mouth daily 5/10/22   Selene Montero MD   aspirin 81 MG chewable tablet Take 1 tablet by mouth daily 9/24/21   Selene Montero MD   apixaban (ELIQUIS) 5 MG TABS tablet Take 5 mg by mouth 2 times daily    Historical Provider, MD   doxazosin (CARDURA) 2 MG tablet Take 2 mg by mouth nightly    Historical Provider, MD   nitroGLYCERIN (NITROSTAT) 0.4 MG SL tablet up to max of 3 total doses. If no relief after 1 dose, call 911. 2/17/19   Yesenia Montenegro MD   acetaminophen (TYLENOL) 325 MG tablet Take 650 mg by mouth 2 times daily as needed for Pain     Historical Provider, MD   pravastatin (PRAVACHOL) 40 MG tablet Take 40 mg by mouth daily. Historical Provider, MD   omeprazole (PRILOSEC) 20 MG capsule Take 20 mg by mouth daily.     Historical Provider, MD        [facility] Meds:    Current Facility-Administered Medications   Medication Dose Route Frequency Provider Last Rate Last Admin    0.9 % sodium chloride infusion  1,000 mL IntraVENous Continuous Summer Henriquez DO   Stopped at 05/12/22 5209    dilTIAZem HCl in sodium chloride 125 mg in 125 mL infusion ([id number])  2.5-15 mg/hr IntraVENous Continuous Summer Henriquez DO 2.5 mL/hr at 05/12/22 0442 2.5 mg/hr at 05/12/22 0442    sodium chloride flush 0.9 % injection 5-40 mL  5-40 mL IntraVENous 2 times per day Selene Montero MD        sodium chloride flush 0.9 % injection 5-40 mL  5-40 mL IntraVENous PRN Selene Montero MD        apixaban (ELIQUIS) tablet 5 mg  5 mg Oral BID Selene Montero MD        aspirin chewable tablet 81 mg  81 mg Oral Daily Selene Montero MD        doxazosin (CARDURA) tablet 2 mg  2 mg Oral Nightly Selene Montero MD        isosorbide mononitrate (IMDUR) extended release tablet 60 mg  60 mg Oral Daily Selene Montero MD        pantoprazole (PROTONIX) tablet 40 mg  40 mg Oral QAM AC Selene Montero MD        pravastatin (PRAVACHOL) tablet 40 mg  40 mg Oral Daily Selene Montero MD        sodium chloride flush 0.9 % injection 10 mL  10 mL IntraVENous 2 times per day Selene Montero MD        0.9 % sodium chloride infusion   IntraVENous PRN Selene Montero MD        potassium chloride (KLOR-CON M) extended release tablet 40 mEq  40 mEq Oral PRN Selene Montero MD        Or    potassium bicarb-citric acid (EFFER-K) effervescent tablet 40 mEq  40 mEq Oral PRN Selene Montero MD        Or    potassium chloride 10 mEq/100 mL IVPB (Peripheral Line)  10 mEq IntraVENous PRN Selene Montero MD        magnesium sulfate 1000 mg in dextrose 5% 100 mL IVPB  1,000 mg IntraVENous PRN Selene Montero MD        ondansetron (ZOFRAN-ODT) disintegrating tablet 4 mg  4 mg Oral Q8H PRN Selene Montero MD        Or    ondansetron (ZOFRAN) injection 4 mg  4 mg IntraVENous Q6H PRN Selene Montero MD        magnesium hydroxide (MILK OF MAGNESIA) 400 MG/5ML suspension 30 mL  30 mL Oral Daily PRN Selene Montero MD        acetaminophen (TYLENOL) tablet 650 mg  650 mg Oral Q6H PRN Selene Montero MD        Or   Haro acetaminophen (TYLENOL) suppository 650 mg  650 mg Rectal Q6H PRN Selene Montero MD        perflutren lipid microspheres (DEFINITY) injection 1.65 mg  1.5 mL IntraVENous ONCE PRN Eric Cota MD           Social History:       TOBACCO:   reports that he quit smoking about 42 years ago. He has never used smokeless tobacco.  ETOH:   reports no history of alcohol use. DRUGS:  reports no history of drug use. OCCUPATION:          Family Histroy:         Problem Relation Age of Onset    Alzheimer's Disease Mother     Arthritis Mother         lumbar disc disease    Heart Disease Father            Review of Systems:   · Constitutional: there has been no unanticipated weight loss. There's been no change in energy level, sleep pattern, or activity level. · Eyes: No visual changes or diplopia.  No scleral icterus. · ENT: No Headaches, hearing loss or vertigo. No mouth sores or sore throat. · Cardiovascular:   See HPI  · Respiratory: No cough or wheezing, no sputum production. No hematemesis. · Gastrointestinal: No abdominal pain, appetite loss, blood in stools. No change in bowel or bladder habits. · Genitourinary: No dysuria, trouble voiding, or hematuria. · Musculoskeletal:  No gait disturbance, weakness or joint complaints. · Integumentary: No rash or pruritis. · Neurological: No headache, diplopia, change in muscle strength, numbness or tingling. No change in gait, balance, coordination, mood, affect, memory, mentation, behavior. · Psychiatric: No anxiety, or depression. · Endocrine: No temperature intolerance. No excessive thirst, fluid intake, or urination. No tremor. · Hematologic/Lymphatic: No abnormal bruising or bleeding, blood clots or swollen lymph nodes. · Allergic/Immunologic: No nasal congestion or hives. Physical Exam    Vital Signs: /88   Pulse 108   Temp 97.7 °F (36.5 °C) (Oral)   Resp 20   Ht 5' 6\" (1.676 m)   Wt 214 lb 4.6 oz (97.2 kg)   SpO2 97%   BMI 34.59 kg/m²        Admission Weight: 200 lb (90.7 kg)     General appearance: Awake, Alert Cooperative    Head: Normocephalic, without obvious abnormality, atraumatic    Eyes: Conjunctivae/corneas clear. PERRL, EOM's intact. Fundi benign    Neck: no adenopathy, no carotid bruit, no JVD, supple, symmetrical, trachea midline and thyroid: not enlarged, symmetric, no tenderness/mass/nodules    Lungs: clear to auscultation bilaterally    Heart:  Irregularly irregular, S1, S2 normal, no murmur, click, rub or gallop    Abdomen: Soft, non-tender.  Bowel sounds normal. No masses,  no organomegaly    Extremities: extremities normal, atraumatic, no cyanosis or edema    Skin: Skin color, texture, turgor normal. No rashes or lesions    Neurologic: Grossly normal        MEDICAL DECISION MAKING/TESTING    Cardiac Cath:  Alex Pugh Catalina Queen  Cardiac catheterization  Order# [id number]  Reading physician: Lyly Sethi MD Ordering physician: Lyly Sethi MD Study date: 9/22/21           Performing [id number] [place] Oni CATH  [address] [place], [address] [place] [address]       PACS Images Jefrey Bence)    [facility] images for Cardiac catheterization    Physicians    Panel Physicians Referring Physician   Lyly Sethi MD (Primary)        Procedures    CARDIAC CATHETERIZATION   Coronary angiogram and left ventricular gram/pressure + graft/native       Indications    Anginal pain (CMS-HCC) [I20.9 (ICD-10-CM)]       Conclusion    Recommendations:  Medical therapy as needed. Risk factor modification.       Conclusion:  1. Multivessel obstructive coronary disease. 2. Patent lima to the left anterior descending artery, saphenous vein graft to the 2nd diagonal, saphenous vein graft to obtuse marginal, and saphenous vein graft to the posterior descending artery. 3. Normal left ventricular systolic function. Labs:      CBC:   Recent Labs     05/12/22 0420   WBC 5.9   HGB 14.2   HCT 42.4   MCV 89.8   *     BMP:   Recent Labs     05/12/22 0420      K 3.3*      CO2 22   BUN 12   CREATININE 0.89     PT/INR:   Recent Labs     05/12/22 0420   PROTIME 16.9*   INR 1.4     APTT:   Recent Labs     05/12/22 0420   APTT 34.3     MAG:   Recent Labs     05/12/22 0420   MG 1.9     D Dimer: No results for input(s): DDIMER in the last 72 hours. Troponin T No results for input(s): TROPONINT in the last 72 hours.   ProBNP Invalid input(s): PRO-BNP        Atrial fibrillation with RVR ([facility]  -patient has chronic atrial fibrillation  - was unsure of what meds he took at home, states he may have not taken them or take an extra  - Heart rate in the 170s on admission  -rate has been better controlled  - stop diltiazem drip start p.o. diltiazem and p.o. metoprolol  -continue Eliquis as at home Hyperlipidemia  -continue statin therapy      Essential hypertension  -blood pressures are stable      ASCVD (arteriosclerotic cardiovascular disease)  -history of CABG x4   -patient had C 09/2021 with 4 patent bypass grafts   -continue beta-blocker and statin as well as isosorbide    Plan:     will work on rate control with p.o. medications. Continue Eliquis as at home. Slightly elevated troponin in setting of RVR. Recent C with bypass graftings patent x4     stable from a cardiac standpoint.

## 2022-05-12 NOTE — ACP (ADVANCE CARE PLANNING)
Advance Care Planning     Advance Care Planning Activator (Inpatient)  Conversation Note      Date of ACP Conversation: 5/12/2022     Conversation Conducted with: Patient with Decision Making Capacity    ACP Activator: Jeannine Gutierrez RN        Health Care Decision Maker:     Current Designated Health Care Decision Maker:     Primary Decision Maker: Jaskaran Blue - [phone]  Click here to complete [place] including section of the Healthcare Decision Maker Relationship (ie \"Primary\")  Today we documented Decision Maker(s) consistent with ACP documents on file. Care Preferences    Ventilation: \"If you were in your present state of health and suddenly became very ill and were unable to breathe on your own, what would your preference be about the use of a ventilator (breathing machine) if it were available to you? \"      Would the patient desire the use of ventilator (breathing machine)?: yes    \"If your health worsens and it becomes clear that your chance of recovery is unlikely, what would your preference be about the use of a ventilator (breathing machine) if it were available to you? \"     Would the patient desire the use of ventilator (breathing machine)?: No      Resuscitation  \"CPR works best to restart the heart when there is a sudden event, like a heart attack, in someone who is otherwise healthy. Unfortunately, CPR does not typically restart the heart for people who have serious health conditions or who are very sick. \"    \"In the event your heart stopped as a result of an underlying serious health condition, would you want attempts to be made to restart your heart (answer \"yes\" for attempt to resuscitate) or would you prefer a natural death (answer \"no\" for do not attempt to resuscitate)? \" yes       [] Yes   [] No   Educated Patient / Marlon Public regarding differences between Advance Directives and portable DNR orders.     Length of ACP Conversation in minutes: Conversation Outcomes:  [] ACP discussion completed  [] Existing advance directive reviewed with patient; no changes to patient's previously recorded wishes  [] New Advance Directive completed  [] Portable Do Not Rescitate prepared for Provider review and signature  [] POLST/POST/MOLST/MOST prepared for Provider review and signature      Follow-up plan:    [] Schedule follow-up conversation to continue planning  [] Referred individual to Provider for additional questions/concerns   [] Advised patient/agent/surrogate to review completed ACP document and update if needed with changes in condition, patient preferences or care setting    [] This note routed to one or more involved healthcare providers

## 2022-05-12 NOTE — CARE COORDINATION
CASE MANAGEMENT NOTE:    Admission Date:  5/12/2022 Matty Elizondo is a 80 y.o.  male    Admitted for : Atrial fibrillation with RVR ([facility] [address] [I48.91]    Met with:  Patient    PCP:  Hayde Coelho                                Insurance:  [address] [place] [address]      Is patient alert and oriented at time of discussion:  Yes    Current Residence/ Living Arrangements:  independently at home             Current Services PTA:  No    Does patient go to outpatient dialysis: No  If yes, location and chair time:     Is patient agreeable to VNS: Yes    Freedom of choice provided:  Yes    List of [address] provided: No    VNS chosen:  Patient has had services in the past, would like the same company he will reach out to wife to find out company    DME:  kyrie Gardner [place]< GB    Home Oxygen: No    Nebulizer: No    CPAP/BIPAP: No    Supplier: N/A    Potential Assistance Needed: yes    SNF needed: No    Freedom of choice and list provided: NA    [facility]       Is patient currently receiving oral anticoagulation therapy? Yes    Is the Patient an [facility] with Readmission Risk Score greater than 14%? No  If yes, pt needs a follow up appointment made within 7 days. Family Members/Caregivers that pt would like involved in their care:    Yes    If yes, list name here: Wife Emerald    Transportation Provider:  Patient             Discharge Plan:  05/12. [employer]. Pt from home with wife Emerald, independent and drives. DME: walker, cane, GB, [place]. Wants VNS reaching out to wife to find out what company. On Eliquis PTA. Echo pending. Cardio consulted. PT/OT on board.  Will follow. //SS              Electronically signed by: Yanet Barrios RN on 5/12/2022 at 10:21 AM

## 2022-05-12 NOTE — PLAN OF CARE
Problem: Discharge Planning  Goal: Discharge to home or other facility with appropriate resources  Outcome: Progressing  Flowsheets (Taken 5/12/2022 9272)  Discharge to home or other facility with appropriate resources: Identify barriers to discharge with patient and caregiver     Problem: Pain  Goal: Verbalizes/displays adequate comfort level or baseline comfort level  Outcome: Progressing

## 2022-05-12 NOTE — CONSULTS
81 yo gentlemen with possible cognitive impairment. He is admitted with chest pain , dyspnea along with palpitations  He has history of atrial fibrillation on eliquis 5 mg po bid having had recently discontinuation of cardizem and metoprolol do to bradycardia . Chart does describe he has history of dementia with possible prior seizure . Cardiac 2 D echo normal LVF EF > 55 % . Mild TR . Moderate dilation of left atrium. He denies focal motor ,sensory , bulbar or visual complaint . Significant medications eliquis 5 mg po bid , aspirin 81 mg po qd , pravachol 40 mg po qd . Testing Cardiac 2 D echo normal LVF EF > 55 % . Mild TR .  Moderate dilation of left atrium      Past Medical History:   Diagnosis Date    Anxiety attack     Arthritis     Atrial fibrillation (HCC)     Atrial flutter (HCC)     Back pain     CAD (coronary artery disease)     Chest pain 04/12/2016    Colon polyps     Constipation     Dementia (HCC) 03/23/2019    Dizziness     GERD (gastroesophageal reflux disease)     Hyperlipidemia     Hypertension     Lower GI bleed     Lung nodule seen on imaging study 02/17/2019    Prediabetes     Rectal bleeding     S/P colonoscopy with polypectomy     [facility]     NOTED PER CARE EVERYWHERE- PATIENT DENIES HX OF, STATES HE HAS NEVER BEEN ON SEIZURE MEDICATION    Sleep apnea     not tested, no cpap    SOB (shortness of breath)     Status post coronary artery bypass graft     Wears dentures     TOP       Past Surgical History:   Procedure Laterality Date    CARDIAC CATHETERIZATION      CATARACT REMOVAL Bilateral     COLONOSCOPY  07/24/2018    COLONOSCOPY WITH BIOPSY performed by John Riggs MD at  [place] [facility] COLONOSCOPY N/A 4/19/2021    COLONOSCOPY DIAGNOSTIC performed by Jace Powell MD at [facility].  2004    x4 vessel    TOTAL KNEE ARTHROPLASTY Bilateral     VENTRAL HERNIA REPAIR         Family History   Problem Relation Age of Onset    Alzheimer's Disease Mother     Arthritis Mother         lumbar disc disease    Heart Disease Father        Social History     Socioeconomic History    Marital status:      Spouse name: None    Number of children: None    Years of education: None    Highest education level: None   Occupational History    None   Tobacco Use    Smoking status: Former Smoker     Quit date:      Years since quittin.3    Smokeless tobacco: Never Used   Vaping Use    Vaping Use: Never used   Substance and Sexual Activity    Alcohol use: No    Drug use: No    Sexual activity: None   Other Topics Concern    None   Social History Narrative    None     Social Determinants of Health     Financial Resource Strain:     Difficulty of Paying Living Expenses: Not on file   Food Insecurity:     Worried About Running Out of Food in the Last Year: Not on file    Smiley of Food in the Last Year: Not on file   Transportation Needs:     Lack of Transportation (Medical): Not on file    Lack of Transportation (Non-Medical):  Not on file   Physical Activity:     Days of Exercise per Week: Not on file    Minutes of Exercise per Session: Not on file   Stress:     Feeling of Stress : Not on file   Social Connections:     Frequency of Communication with Friends and Family: Not on file    Frequency of Social Gatherings with Friends and Family: Not on file    Attends Rastafari Services: Not on file    Active Member of [address] [place] [address] or Organizations: Not on file    Attends Club or Organization Meetings: Not on file    Marital Status: Not on file   Intimate Partner Violence:     Fear of Current or Ex-Partner: Not on file    Emotionally Abused: Not on file    Physically Abused: Not on file    Sexually Abused: Not on file   Housing Stability:     Unable to Pay for Housing in the Last Year: Not on file    Number of [place] in the Last Year: Not on file    Unstable Housing in the Last Year: Not on file Current Facility-Administered Medications   Medication Dose Route Frequency Provider Last Rate Last Admin    0.9 % sodium chloride infusion  1,000 mL IntraVENous Continuous Grace Henriquez DO   Stopped at 05/12/22 4233    sodium chloride flush 0.9 % injection 5-40 mL  5-40 mL IntraVENous 2 times per day Mason Jackman MD        sodium chloride flush 0.9 % injection 5-40 mL  5-40 mL IntraVENous PRN Mason Jackman MD        apixaban (ELIQUIS) tablet 5 mg  5 mg Oral BID Mason Jackman MD   5 mg at 05/12/22 1013    aspirin chewable tablet 81 mg  81 mg Oral Daily Mason Jackman MD   81 mg at 05/12/22 1013    doxazosin (CARDURA) tablet 2 mg  2 mg Oral Nightly Mason Jackman MD        isosorbide mononitrate (IMDUR) extended release tablet 60 mg  60 mg Oral Daily Mason Jackman MD   60 mg at 05/12/22 1013    pantoprazole (PROTONIX) tablet 40 mg  40 mg Oral QAM AC Mason Jackman MD   40 mg at 05/12/22 1018    pravastatin (PRAVACHOL) tablet 40 mg  40 mg Oral Daily Mason Jackman MD   40 mg at 05/12/22 1013    sodium chloride flush 0.9 % injection 10 mL  10 mL IntraVENous 2 times per day Mason Jackman MD        0.9 % sodium chloride infusion   IntraVENous PRAL Jackman MD        potassium chloride (KLOR-CON M) extended release tablet 40 mEq  40 mEq Oral PRAL Jackman MD        Or    potassium bicarb-citric acid (EFFER-K) effervescent tablet 40 mEq  40 mEq Oral PRAL Jackman MD        Or    potassium chloride 10 mEq/100 mL IVPB (Peripheral Line)  10 mEq IntraVENous PRAL Jackman MD        magnesium sulfate 1000 mg in dextrose 5% 100 mL IVPB  1,000 mg IntraVENous PRAL Jackman MD        ondansetron (ZOFRAN-ODT) disintegrating tablet 4 mg  4 mg Oral Q8H PRAL Jackman MD        Or    ondansetron (ZOFRAN) injection 4 mg  4 mg IntraVENous Q6H PRN Mason Jackman MD        magnesium hydroxide (MILK OF MAGNESIA) 400 MG/5ML suspension 30 mL  30 mL Oral Daily PRN Garret Beckwith MD        acetaminophen (TYLENOL) tablet 650 mg  650 mg Oral Q6H PRN Garret Beckwith MD        Or   [facility] acetaminophen (TYLENOL) suppository 650 mg  650 mg Rectal Q6H PRN Garret Beckwith MD        perflutren lipid microspheres (DEFINITY) injection 1.65 mg  1.5 mL IntraVENous ONCE PRN Daniella Clarke MD        dilTIAZem (CARDIZEM CD) extended release capsule 120 mg  120 mg Oral Daily Lisa Ned, APRN - CNP   120 mg at 05/12/22 1013    metoprolol tartrate (LOPRESSOR) tablet 25 mg  25 mg Oral BID Lisa Jareth, APRN - CNP   25 mg at 05/12/22 1018       Allergies   Allergen Reactions    Oxycodone Other (See Comments)     Attacking people, black out     Pcn [Penicillins] Other (See Comments)     dizziness    Warfarin And Related      \" I see things\"        ROS:   Constitutional                  Negative for fever and chills   HEENT                            Negative for ear discharge, ear pain, nosebleed  Eyes                                Negative for photophobia, pain and discharge  Respiratory                      Negative for hemoptysis and sputum  Cardiovascular                Negative for orthopnea, claudication and PND  Gastrointestinal               Negative for abdominal pain, diarrhea, blood in stool  Musculoskeletal               Negative for joint pain, negative for myalgia  Skin                                 Negative for rash or itching  Endo/heme/allergies       Negative for polydipsia, environmental allergy  Psychiatric                       Negative for suicidal ideation. Patient is not anxious    Vitals:    05/12/22 1430   BP: 103/77   Pulse: 66   Resp: 18   Temp: 97.9 °F (36.6 °C)   SpO2: 95%     Admission weight: 200 lb (90.7 kg)    Neurological Examination  Constitutional .General exam well groomed   Head/ Ears /Nose/Throat/external ear . Normal exam  Neck and thyroid . Normal size. No bruits  Respiratory . Breathsounds clear bilaterally  Cardiovascular: Auscultation of heart with regular rate and rhythm   Musculoskeletal. Muscle bulk and tone normal                                                           Muscle strength 5/5 strength throughout                                                                                No dysmetria or dysdiadokinesis  No tremor   Normal fine motor  Gait normal   Orientation Alert and oriented x 2 . May 5, 2022 . Able to spell WORLD forwards not backwards . Serial 7 unable . Short term memory 0 words out of 3 in one minute   Attention and concentration normal  Language process and speech normal . No aphasia   Cranial nerve 2 normal acuety and visual fields  Cranial nerve 3, 4 and 6 . Extraocular muscles are intact . Pupils are equal and reactive   Cranial nerve 5 . Normal strength of masseter and temporalis . Intact corneal reflex. Normal facial sensation  Cranial nerve 7 normal exam   Cranial nerve 8. Grossly intact hearing   Cranial nerve 9 and 10. Symmetric palate elevation   Cranial nerve 11 , 5 out of 5 strength   Cranial Nerve 12 midline tongue . No atrophy  Sensation . Normal pinprick and light touch   Deep Tendon Reflexes normal  Plantar response flexor bilaterally    Assessment :    Dementia Alzheimer's type . Atrial fibrillation . Chest pain     Plan:    Head CT , EEG . M69 , folic acid , treponemal Ab , ESR .  PT/OT

## 2022-05-13 ENCOUNTER — HOSPITAL ENCOUNTER (EMERGENCY)
Age: 82
Discharge: HOME | End: 2022-05-14
Attending: EMERGENCY MEDICINE
Payer: MEDICARE

## 2022-05-13 VITALS
SYSTOLIC BLOOD PRESSURE: 154 MMHG | DIASTOLIC BLOOD PRESSURE: 83 MMHG | TEMPERATURE: 98 F | WEIGHT: 194 LBS | OXYGEN SATURATION: 97 % | HEIGHT: 66 IN | BODY MASS INDEX: 31.18 KG/M2 | RESPIRATION RATE: 17 BRPM | HEART RATE: 75 BPM

## 2022-05-13 VITALS
RESPIRATION RATE: 18 BRPM | SYSTOLIC BLOOD PRESSURE: 127 MMHG | TEMPERATURE: 98 F | DIASTOLIC BLOOD PRESSURE: 72 MMHG | BODY MASS INDEX: 33.41 KG/M2 | HEART RATE: 69 BPM | HEIGHT: 66 IN | OXYGEN SATURATION: 96 % | WEIGHT: 207.89 LBS

## 2022-05-13 DIAGNOSIS — T50.901A ACCIDENTAL MEDICATION ERROR, INITIAL ENCOUNTER: Primary | ICD-10-CM

## 2022-05-13 LAB
ABSOLUTE EOS #: 0.3 K/UL (ref 0–0.4)
ABSOLUTE LYMPH #: 1.6 K/UL (ref 1–4.8)
ABSOLUTE MONO #: 0.7 K/UL (ref 0.1–1.3)
ALBUMIN SERPL-MCNC: 3.4 G/DL (ref 3.5–5.2)
ALP BLD-CCNC: 64 U/L (ref 40–129)
ALT SERPL-CCNC: 11 U/L (ref 5–41)
ANION GAP SERPL CALCULATED.3IONS-SCNC: 11 MMOL/L (ref 9–17)
AST SERPL-CCNC: 24 U/L
BASOPHILS # BLD: 1 % (ref 0–2)
BASOPHILS ABSOLUTE: 0 K/UL (ref 0–0.2)
BILIRUB SERPL-MCNC: 0.54 MG/DL (ref 0.3–1.2)
BUN BLDV-MCNC: 12 MG/DL (ref 8–23)
CALCIUM SERPL-MCNC: 9.6 MG/DL (ref 8.6–10.4)
CHLORIDE BLD-SCNC: 108 MMOL/L (ref 98–107)
CO2: 24 MMOL/L (ref 20–31)
CREAT SERPL-MCNC: 0.81 MG/DL (ref 0.7–1.2)
EOSINOPHILS RELATIVE PERCENT: 5 % (ref 0–4)
FOLATE: 10.8 NG/ML
GFR AFRICAN AMERICAN: >60 ML/MIN
GFR NON-AFRICAN AMERICAN: >60 ML/MIN
GFR SERPL CREATININE-BSD FRML MDRD: ABNORMAL ML/MIN/{1.73_M2}
GLUCOSE BLD-MCNC: 83 MG/DL (ref 70–99)
HCT VFR BLD CALC: 39.1 % (ref 41–53)
HEMOGLOBIN: 13.1 G/DL (ref 13.5–17.5)
LYMPHOCYTES # BLD: 29 % (ref 24–44)
MCH RBC QN AUTO: 29.9 PG (ref 26–34)
MCHC RBC AUTO-ENTMCNC: 33.5 G/DL (ref 31–37)
MCV RBC AUTO: 89.4 FL (ref 80–100)
MONOCYTES # BLD: 12 % (ref 1–7)
PDW BLD-RTO: 13.8 % (ref 11.5–14.9)
PLATELET # BLD: 141 K/UL (ref 150–450)
PMV BLD AUTO: 7.6 FL (ref 6–12)
POTASSIUM SERPL-SCNC: 4.1 MMOL/L (ref 3.7–5.3)
RBC # BLD: 4.38 M/UL (ref 4.5–5.9)
SEG NEUTROPHILS: 53 % (ref 36–66)
SEGMENTED NEUTROPHILS ABSOLUTE COUNT: 3 K/UL (ref 1.3–9.1)
SODIUM BLD-SCNC: 143 MMOL/L (ref 135–144)
T. PALLIDUM, IGG: NONREACTIVE
TOTAL PROTEIN: 6.1 G/DL (ref 6.4–8.3)
VITAMIN B-12: 200 PG/ML (ref 232–1245)
WBC # BLD: 5.5 K/UL (ref 3.5–11)

## 2022-05-13 PROCEDURE — 6370000000 HC RX 637 (ALT 250 FOR IP): Performed by: NURSE PRACTITIONER

## 2022-05-13 PROCEDURE — 99282 EMERGENCY DEPT VISIT SF MDM: CPT

## 2022-05-13 PROCEDURE — 95816 EEG AWAKE AND DROWSY: CPT

## 2022-05-13 PROCEDURE — 36415 COLL VENOUS BLD VENIPUNCTURE: CPT

## 2022-05-13 PROCEDURE — 6370000000 HC RX 637 (ALT 250 FOR IP): Performed by: FAMILY MEDICINE

## 2022-05-13 PROCEDURE — 80053 COMPREHEN METABOLIC PANEL: CPT

## 2022-05-13 PROCEDURE — 99283 EMERGENCY DEPT VISIT LOW MDM: CPT

## 2022-05-13 PROCEDURE — 85025 COMPLETE CBC W/AUTO DIFF WBC: CPT

## 2022-05-13 PROCEDURE — 2580000003 HC RX 258: Performed by: FAMILY MEDICINE

## 2022-05-13 RX ORDER — DILTIAZEM HYDROCHLORIDE 120 MG/1
120 CAPSULE, COATED, EXTENDED RELEASE ORAL DAILY
Qty: 30 CAPSULE | Refills: 3 | Status: SHIPPED | OUTPATIENT
Start: 2022-05-14

## 2022-05-13 RX ORDER — ISOSORBIDE MONONITRATE 60 MG/1
60 TABLET, EXTENDED RELEASE ORAL DAILY
Qty: 30 TABLET | Refills: 3
Start: 2022-05-13

## 2022-05-13 RX ADMIN — ISOSORBIDE MONONITRATE 60 MG: 60 TABLET, EXTENDED RELEASE ORAL at 11:16

## 2022-05-13 RX ADMIN — SODIUM CHLORIDE, PRESERVATIVE FREE 10 ML: 5 INJECTION INTRAVENOUS at 11:20

## 2022-05-13 RX ADMIN — DILTIAZEM HYDROCHLORIDE 120 MG: 120 CAPSULE, COATED, EXTENDED RELEASE ORAL at 11:16

## 2022-05-13 RX ADMIN — APIXABAN 5 MG: 5 TABLET, FILM COATED ORAL at 11:17

## 2022-05-13 RX ADMIN — PANTOPRAZOLE SODIUM 40 MG: 40 TABLET, DELAYED RELEASE ORAL at 05:59

## 2022-05-13 RX ADMIN — ASPIRIN 81 MG: 81 TABLET, CHEWABLE ORAL at 11:17

## 2022-05-13 RX ADMIN — PRAVASTATIN SODIUM 40 MG: 40 TABLET ORAL at 11:17

## 2022-05-13 RX ADMIN — METOPROLOL TARTRATE 25 MG: 25 TABLET, FILM COATED ORAL at 11:16

## 2022-05-13 ASSESSMENT — ENCOUNTER SYMPTOMS
PHOTOPHOBIA: 0
TROUBLE SWALLOWING: 0
BACK PAIN: 0
COUGH: 0
COLOR CHANGE: 0
SHORTNESS OF BREATH: 0
ABDOMINAL DISTENTION: 0
ANAL BLEEDING: 0
RECTAL PAIN: 0
VOICE CHANGE: 0
BLOOD IN STOOL: 0
STRIDOR: 0
CHOKING: 0

## 2022-05-13 ASSESSMENT — LIFESTYLE VARIABLES: HOW OFTEN DO YOU HAVE A DRINK CONTAINING ALCOHOL: NEVER

## 2022-05-13 ASSESSMENT — PAIN - FUNCTIONAL ASSESSMENT: PAIN_FUNCTIONAL_ASSESSMENT: NONE - DENIES PAIN

## 2022-05-13 NOTE — DISCHARGE SUMMARY
Discharge Summary      Patient ID: Kaveh Jones    MRN: [id number]     Acct:  [de-identified]       Patient's PCP: Vitaly Blanco Date: 5/12/2022     Discharge Date:   5/13/2022      Admitting Physician: Samina Julien MD    Discharge Physician: Yani Garzon MD     Discharge Diagnoses:    Primary Problem  Atrial fibrillation with RVR ([facility] [address]    Principal Problem:    Atrial fibrillation with RVR ([facility] [address]  Active Problems:    Late onset Alzheimer's dementia without behavioral disturbance ([facility] [address]    Hyperlipidemia    Essential hypertension    ASCVD (arteriosclerotic cardiovascular disease)  Resolved Problems:    * No resolved hospital problems. *    Past Medical History:   Diagnosis Date    Anxiety attack     Arthritis     Atrial fibrillation (HCC)     Atrial flutter (HCC)     Back pain     CAD (coronary artery disease)     Chest pain 04/12/2016    Colon polyps     Constipation     Dementia (HCC) 03/23/2019    Dizziness     GERD (gastroesophageal reflux disease)     Hyperlipidemia     Hypertension     Lower GI bleed     Lung nodule seen on imaging study 02/17/2019    Prediabetes     Rectal bleeding     S/P colonoscopy with polypectomy     Seizure ([facility]     NOTED PER CARE EVERYWHERE- PATIENT DENIES HX OF, STATES HE HAS NEVER BEEN ON SEIZURE MEDICATION    Sleep apnea     not tested, no cpap    SOB (shortness of breath)     Status post coronary artery bypass graft     Wears dentures     TOP     The patient was seen and examined on day of discharge and this discharge summary is in conjunction with daily progress note from day of discharge. Code Status:  Full Code    Hospital Course:   H&P Reviewed. Patient with atrial fibrillation on Eliquis was admitted with A. fib RVR. Patient was started on IV Cardizem drip. 2D echo report showed an ejection fraction of 55%. Patient was switched to metoprolol and p.o. Cardizem per cardiology.   Per family request neurology was consulted for cognitive impairment. Patient was diagnosed with dementia per neurology. Patient is being discharged in stable condition. Discharge day progress note: Today   Patient was seen and examined at bedside today. Hemodynamically stable. No chest pain, shortness of breath, fever, chills, nausea, vomiting, palpitations, or abdominal pain reported. No events reported overnight. Review of Systems   Constitutional: Negative for appetite change, chills and diaphoresis. HENT: Negative for drooling, ear pain, trouble swallowing and voice change. Eyes: Negative for photophobia and visual disturbance. Respiratory: Negative for cough, choking, shortness of breath and stridor. Cardiovascular: Negative for chest pain, palpitations and leg swelling. Gastrointestinal: Negative for abdominal distention, anal bleeding, blood in stool and rectal pain. Endocrine: Negative for polyphagia and polyuria. Genitourinary: Negative for dysuria, flank pain, hematuria and urgency. Musculoskeletal: Negative for back pain, myalgias and neck stiffness. Skin: Negative for color change, pallor and rash. Allergic/Immunologic: Negative for environmental allergies and food allergies. Neurological: Negative for tremors, seizures, facial asymmetry and numbness. Hematological: Negative for adenopathy. Does not bruise/bleed easily. Psychiatric/Behavioral: Negative for agitation, behavioral problems, confusion, hallucinations, self-injury and suicidal ideas. Physical Exam  Vitals reviewed. Constitutional:       Appearance: Normal appearance. He is not diaphoretic. HENT:      Head: Normocephalic and atraumatic. Right Ear: External ear normal.      Left Ear: External ear normal.      Nose: Nose normal.      Mouth/Throat:      Mouth: Mucous membranes are moist.      Pharynx: Oropharynx is clear.    Eyes:      Conjunctiva/sclera: Conjunctivae normal.   Cardiovascular:      Rate and Rhythm: Normal rate and regular rhythm. Pulses: Normal pulses. Heart sounds: Normal heart sounds. Pulmonary:      Effort: Pulmonary effort is normal.      Breath sounds: Normal breath sounds. No wheezing or rales. Abdominal:      General: Bowel sounds are normal. There is no distension. Palpations: Abdomen is soft. Tenderness: There is no abdominal tenderness. There is no right CVA tenderness or left CVA tenderness. Musculoskeletal:         General: No tenderness or deformity. Normal range of motion. Cervical back: Normal range of motion and neck supple. No rigidity. Right lower leg: No edema. Left lower leg: No edema. Skin:     General: Skin is warm and dry. Capillary Refill: Capillary refill takes less than 2 seconds. Coloration: Skin is not jaundiced. Neurological:      Mental Status: Mental status is at baseline. Comments: Oriented to self and place   Psychiatric:         Mood and Affect: Mood normal.         Behavior: Behavior normal.         Consults:  IP CONSULT TO FAMILY MEDICINE  IP CONSULT TO CARDIOLOGY  IP CONSULT TO NEUROLOGY    Significant Diagnostic Studies: as above, and as follows:    Treatments: as above    Disposition: home care    Discharged Condition: Stable    Follow Up: [facility] in one week  Cardiology in 4 weeks    Discharge Medications:      Medication List      START taking these medications    dilTIAZem 120 MG extended release capsule  Commonly known as: CARDIZEM CD  Take 1 capsule by mouth daily  Start taking on: May 14, 2022     metoprolol tartrate 25 MG tablet  Commonly known as: LOPRESSOR  Take 1 tablet by mouth 2 times daily        CHANGE how you take these medications    isosorbide mononitrate 60 MG extended release tablet  Commonly known as: IMDUR  Take 1 tablet by mouth daily  What changed: Another medication with the same name was removed. Continue taking this medication, and follow the directions you see here.         CONTINUE taking these medications    acetaminophen 325 MG tablet  Commonly known as: TYLENOL     aspirin 81 MG chewable tablet  Take 1 tablet by mouth daily     doxazosin 2 MG tablet  Commonly known as: CARDURA     Eliquis 5 MG Tabs tablet  Generic drug: apixaban     nitroGLYCERIN 0.4 MG SL tablet  Commonly known as: NITROSTAT  up to max of 3 total doses. If no relief after 1 dose, call 911. omeprazole 20 MG delayed release capsule  Commonly known as: PRILOSEC     pravastatin 40 MG tablet  Commonly known as: PRAVACHOL        STOP taking these medications    amLODIPine 5 MG tablet  Commonly known as: NORVASC           Where to Get Your Medications      These medications were sent to [address] , [address]  [address]    Phone: [phone]   · dilTIAZem 120 MG extended release capsule  · metoprolol tartrate 25 MG tablet     Information about where to get these medications is not yet available    Ask your nurse or doctor about these medications  · isosorbide mononitrate 60 MG extended release tablet                  Time Spent on discharge is more than  35 min in the examination, evaluation, counseling and review of medications and discharge plan.       Electronically signed by Dariel Abdul MD     Copy sent to Dr. Jenny Orellana

## 2022-05-13 NOTE — PLAN OF CARE
Problem: Discharge Planning  Goal: Discharge to home or other facility with appropriate resources  5/12/2022 1756 by Dianne Balderrama RN  Outcome: Progressing  Flowsheets (Taken 5/12/2022 0850)  Discharge to home or other facility with appropriate resources: Identify barriers to discharge with patient and caregiver     Problem: Pain  Goal: Verbalizes/displays adequate comfort level or baseline comfort level  5/13/2022 0522 by Lisa Calderon RN  Outcome: Progressing  5/12/2022 1756 by Dianne Balderrama RN  Outcome: Progressing     Problem: Safety - Adult  Goal: Free from fall injury  Outcome: Progressing     Problem: ABCDS Injury Assessment  Goal: Absence of physical injury  Outcome: Progressing

## 2022-05-13 NOTE — CARE COORDINATION
ONGOING DISCHARGE PLAN:    Patient is alert and oriented x4. Spoke with patient regarding discharge plan and patient confirms that plan is still to discharge to home with no needs  Patient refused VNS referral [facility]-   EEG   Possible discharge to home today     Will continue to follow for additional discharge needs.     Electronically signed by Joana Sinclair RN on 5/13/2022 at 2:39 PM

## 2022-05-13 NOTE — PROCEDURES
[address]                 [facility], [address]                          ELECTROENCEPHALOGRAM REPORT    PATIENT NAME: Dylan Abrams                   :        1940  MED REC NO:   [id number]                              ROOM:       2093  ACCOUNT NO:   [de-identified]                           ADMIT DATE: 2022  PROVIDER:     Deepthi Jaramillo MD    DATE OF EE2022    ATTENDING OF RECORD:  Cecilia Armenta MD    REASON FOR STUDY:  This is a 26-year-old gentleman with memory loss,  suspected dementia. MEDICATIONS:  Include Eliquis, aspirin, Cardura, Protonix, Pravachol,  K-Janel, Cardizem, Lopressor. EEG FINDINGS:  This is a 20-channel EEG and one EKG channel recording  performed on a patient described to be awake, drowsy and asleep. The  patient shows delayed waking rhythms. Background activity consists of  poorly regulated 9 Hz activity in the 40-60 microvolt range more  prominent over the posterior head areas showing some reactivity to eye  opening and closing. Over the anterior head regions, there are 15-20 Hz  activity in the 20-30 microvolt range. The record is prominent for mild  degree of medium amplitude 4-7 Hz activity seen throughout all head  areas during waking state. With drowsiness and sleep, there is further  intrusion of slower frequencies in the theta and lesser degree in the  delta band accompanied by vertex wave activity and sleep spindles. This  record is not lateralized or epileptiform. Hyperventilation is not  performed. Photic stimulation shows no change in the record. IMPRESSION:  This EEG shows the presence of mild diffuse slowing. This  EEG is concordant with diffuse encephalopathy. No lateralized or  epileptiform disturbance is seen.         Buzz Lane MD    D: 2022 15:08:10       T: 2022 15:11:36     [id number]  Job#: [id number]     Doc#: [id number]    CC:

## 2022-05-13 NOTE — DISCHARGE INSTR - COC
Continuity of Care Form    Patient Name: Mumtaz Segal   :  1940  MRN:  [id number]    Admit date:  2022  Discharge date:  ***    Code Status Order: Full Code   Advance Directives:      Admitting Physician:  Yulissa Morales MD  PCP: Dino Dash    Discharging Nurse: [facility] Unit/Room#: 2093/2093-01  Discharging Unit Phone Number: ***    Emergency Contact:   Extended Emergency Contact Information  Primary Emergency Contact:   Address: [address] [place] [address] Phone: [phone]  Mobile Phone: [phone]  Relation: Spouse   needed? No  Secondary Emergency Contact: Reta Lynn Phone: [phone]  Mobile Phone: [phone]  Relation: Child  Preferred language: English   needed?  No    Past Surgical History:  Past Surgical History:   Procedure Laterality Date    CARDIAC CATHETERIZATION      CATARACT REMOVAL Bilateral     COLONOSCOPY  2018    COLONOSCOPY WITH BIOPSY performed by Pedro Galan MD at [facility]    COLONOSCOPY N/A 2021    COLONOSCOPY DIAGNOSTIC performed by Mark Sanders MD at [place] [address]  2004    x4 vessel    TOTAL KNEE ARTHROPLASTY Bilateral     VENTRAL HERNIA REPAIR         Immunization History:   Immunization History   Administered Date(s) Administered    Influenza Virus Vaccine 10/01/2018       Active Problems:  Patient Active Problem List   Diagnosis Code    PAF (paroxysmal atrial fibrillation) ([facility]) I48.0    Chest pain R07.9    Rectal bleeding K62.5    Lower GI bleed K92.2    Morbid obesity ([address] E66.01    S/P colonoscopy with polypectomy Z98.890    Elevated INR R79.1    Lung nodule seen on imaging study R91.1    Coronary artery disease involving coronary bypass graft of native heart without angina pectoris I25.810    Dementia (HCC) F03.90    Hyperlipidemia E78.5    Essential hypertension I10    ASCVD (arteriosclerotic cardiovascular disease) I25.10    Bradycardia R00.1    Atrial fibrillation with RVR ([facility]) I48.91    Late onset Alzheimer's dementia without behavioral disturbance ([facility]) G30.1, F02.80       Isolation/Infection:   Isolation            No Isolation          Patient Infection Status       Infection Onset Added Last Indicated Last Indicated By Review Planned Expiration Resolved Resolved By    None active    Resolved    COVID-19 (Rule Out) 04/15/21 04/15/21 04/15/21 COVID-19 (Ordered)   04/15/21 Rule-Out Test Resulted    COVID-19 (Rule Out) 01/04/21 01/05/21 01/04/21 COVID-19 (Ordered)   01/05/21 Rule-Out Test Resulted    COVID-19 (Rule Out) 05/17/20 05/17/20 05/17/20 COVID-19 (Ordered)   05/18/20 Rule-Out Test Resulted            Nurse Assessment:  Last Vital Signs: BP (!) 141/76   Pulse 54   Temp 97.9 °F (36.6 °C) (Oral)   Resp 18   Ht 5' 6\" (1.676 m)   Wt 207 lb 14.3 oz (94.3 kg)   SpO2 95%   BMI 33.55 kg/m²     Last documented pain score (0-10 scale):    Last Weight:   Wt Readings from Last 1 Encounters:   05/12/22 207 lb 14.3 oz (94.3 kg)     Mental Status:  {IP PT MENTAL STATUS:39776}    IV Access:  { ANABELLA IV ACCESS:550120662}    Nursing Mobility/ADLs:  Walking   {[facility] DME DODS:991356476}  Transfer  {[facility] DME CETB:368433334}  Bathing  {[facility] DME XSTC:057193742}  Dressing  {[facility] DME BLGM:643258853}  Toileting  {[facility] DME UZJR:232155450}  Feeding  {[facility] DME YQYM:401365831}  Med Admin  {[facility] DME ZMSQ:557139570}  Med Delivery   { ANABELLA MED Delivery:112531841}    Wound Care Documentation and Therapy:  Puncture 09/22/21 Femoral Right (Active)   Number of days: 232        Elimination:  Continence:    Bowel: {YES / GO:11866}  Bladder: {YES / OT:46158}  Urinary Catheter: {Urinary Catheter:289046663}   Colostomy/Ileostomy/Ileal Conduit: {YES / EP:52237}       Date of Last BM: ***    Intake/Output Summary (Last 24 hours) at 5/13/2022 1118  Last data filed at 5/13/2022 1110  Gross per 24 hour   Intake 730 ml   Output 300 ml   Net 430 ml     I/O last 3 completed shifts: In: 4549 [P.O.:740; I.V.:1000]  Out: 100 [Urine:100]    Safety Concerns:     [address] trustedsafe Safety Concerns:448700811}    Impairments/Disabilities:      [address] ANABELLA Impairments/Disabilities:010735146}    Nutrition Therapy:  Current Nutrition Therapy:   [address] ANABELLA Diet List:117958580}    Routes of Feeding: {CHP DME Other Feedings:054712644}  Liquids: {Slp liquid thickness:38710}  Daily Fluid Restriction: {CHP DME Yes amt example:533730935}  Last Modified Barium Swallow with Video (Video Swallowing Test): {Done Not Done NMID:568933630}    Treatments at the Time of Hospital Discharge:   Respiratory Treatments: ***  Oxygen Therapy:  {Therapy; copd oxygen:93754}  Ventilator:    {MH CC Vent KPTJ:143945663}    Rehab Therapies: {THERAPEUTIC INTERVENTION:7855327980}  Weight Bearing Status/Restrictions: [facility] Weight Bearin}  Other Medical Equipment (for information only, NOT a DME order):  {EQUIPMENT:710083109}  Other Treatments: ***    Patient's personal belongings (please select all that are sent with patient):  {[facility] DME Belongings:420868772}    RN SIGNATURE:  {Esignature:254295423}    CASE MANAGEMENT/SOCIAL WORK SECTION    Inpatient Status Date: ***    Readmission Risk Assessment Score:  Readmission Risk              Risk of Unplanned Readmission:  14           Discharging to Facility/ Agency   Name:   Address:  Phone:  Fax:    Dialysis Facility (if applicable)   Name:  Address:  Dialysis Schedule:  Phone:  Fax:    / signature: {Esignature:467788529}    PHYSICIAN SECTION    Prognosis: Fair    Condition at Discharge: Stable    Rehab Potential (if transferring to Rehab): Fair    Recommended Labs or Other Treatments After Discharge:     Physician Certification: I certify the above information and transfer of Brooklyn Arriaga  is necessary for the continuing treatment of the diagnosis listed and that he requires Home Care for less 30 days.      Update Admission H&P: No change in H&P    PHYSICIAN SIGNATURE:  Electronically signed by Olimpia Perales MD on 5/13/22 at 11:18 AM EDT

## 2022-05-14 ASSESSMENT — ENCOUNTER SYMPTOMS
DIARRHEA: 0
CHEST TIGHTNESS: 0
EYES NEGATIVE: 1
APNEA: 0
NAUSEA: 0
COUGH: 0
FACIAL SWELLING: 0
VOMITING: 0
VOICE CHANGE: 0

## 2022-05-14 NOTE — ED TRIAGE NOTES
Pt states he was dc'd from the hospital today. He states he was inpatient for taking the wrong meds at home. Pt was sent home with a pill organizer but accidentally took morning again tonight tonight. Pt has pills with him. He states one is eliquis but isn't sure what the others are. RN was able to identify what pt took other than one.    List includes:  Eliquis 5mg  Omeprazole  Aspirin enteric coated 81mg  Isosorbide Extended release 60mg  Cardizem 120mg

## 2022-05-14 NOTE — ED PROVIDER NOTES
EMERGENCY DEPARTMENT ENCOUNTER   ATTENDING ATTESTATION     Pt Name: Danyel Hawkins  MRN: [id number]  [place] 1940  Date of evaluation: 5/14/22       Danyel Hawkins is a 80 y.o. male who presents with Drug Overdose      MDM: 43-year-old male presents for medication ingestion. Patient reports that he takes medication in the morning and at night, reports that he took his nighttime medication at his daytime dose, denies any extra ingestion, denies any suicidal or homicidal ideation, denies complaints at this time    On initial exam patient in no acute distress vitals are stable, patient was counseled on medication safety, no significant extra ingestion, patient only took meds out of the wrong time slot. He has no complaints at this time, and is stable for discharge home    Patient/Guardian was informed of their diagnosis and told to follow up with PCP in 1-3 days. Patient demonstrates understanding and agreement with the plan. They were given the opportunity to ask questions and those questions were answered to the best of our ability with the available information. Patient/Guardian told to return to the ED for any new, worsening, changing or persistent symptoms. This dictation was prepared using [employer] [facility] voice recognition software. Vitals:   Vitals:    05/13/22 2223   BP: (!) 154/83   Pulse: 75   Resp: 17   Temp: 98 °F (36.7 °C)   TempSrc: Tympanic   SpO2: 97%   Weight: 194 lb (88 kg)   Height: 5' 6\" (1.676 m)         I personally saw and examined the patient. I have reviewed and agree with the resident's findings, including all diagnostic interpretations and treatment plan as written. I was present for the key portions of any procedures performed and the inclusive time noted for any critical care statement. The care is provided during an unprecedented national emergency due to the novel coronavirus, COVID 19.   [address], DO  Attending Emergency Physician          [place], [place]  05/14/22 4516

## 2022-05-14 NOTE — ED NOTES
RN to bedside with discharge paperwork. Pt left department prior to receiving d/c instructions.       Raysa Rubi RN  05/14/22 9239

## 2022-05-14 NOTE — ED PROVIDER NOTES
[address] ED  Emergency Department Encounter  Emergency Medicine Resident     Pt Name: Linda Luis  HFK:[id number]  [place] 1940  Date of evaluation: 5/14/22  PCP:  Michael 459       Chief Complaint   Patient presents with    Drug Overdose       HISTORY OF PRESENT ILLNESS  (Location/Symptom, Timing/Onset, Context/Setting, Quality, Duration, ModifyingFactors, Severity.)      Linda Luis is a 80 y.o. male comes to the emerge apartment for discussion about his medications. Patient accidentally took the wrong medication today. Patient has a pillbox where he has a night and day differentiation however patient accidentally took the nighttime drugs when he was supposed to take daytime. Patient wanted to make sure that he did not overdose on anything and that he was then to be safe to be able to start medications again tomorrow. Patient has no other active complaints of any kind. PAST MEDICAL / SURGICAL / SOCIAL /FAMILY HISTORY      has a past medical history of Anxiety attack, Arthritis, Atrial fibrillation (HCC), Atrial flutter (HCC), Back pain, CAD (coronary artery disease), Chest pain, Colon polyps, Constipation, Dementia (HCC), Dizziness, GERD (gastroesophageal reflux disease), Hyperlipidemia, Hypertension, Lower GI bleed, Lung nodule seen on imaging study, Prediabetes, Rectal bleeding, S/P colonoscopy with polypectomy, Seizure ([address] Sleep apnea, SOB (shortness of breath), Status post coronary artery bypass graft, and Wears dentures. No other pertinent PMH on review with patient/guardian. has a past surgical history that includes Coronary artery bypass graft (2004); Cataract removal (Bilateral); ventral hernia repair; Total knee arthroplasty (Bilateral); Colonoscopy (07/24/2018); Cardiac catheterization; and Colonoscopy (N/A, 4/19/2021). No other pertinent PSH on review with patient/guardian.   Social History     Socioeconomic History    Marital status:      Spouse name: Not on file    Number of children: Not on file    Years of education: Not on file    Highest education level: Not on file   Occupational History    Not on file   Tobacco Use    Smoking status: Former Smoker     Quit date:      Years since quittin.3    Smokeless tobacco: Never Used   Vaping Use    Vaping Use: Never used   Substance and Sexual Activity    Alcohol use: No    Drug use: No    Sexual activity: Not on file   Other Topics Concern    Not on file   Social History Narrative    Not on file     Social Determinants of Health     Financial Resource Strain:     Difficulty of Paying Living Expenses: Not on file   Food Insecurity:     Worried About [address] in the Last Year: Not on file    [address] [place] in the Last Year: Not on file   Transportation Needs:     Lack of Transportation (Medical): Not on file    Lack of Transportation (Non-Medical): Not on file   Physical Activity:     Days of Exercise per Week: Not on file    Minutes of Exercise per Session: Not on file   Stress:     Feeling of Stress : Not on file   Social Connections:     Frequency of Communication with Friends and Family: Not on file    Frequency of Social Gatherings with Friends and Family: Not on file    Attends [employer] Services: Not on file    Active Member of [address] [place] [address] Diversion or Organizations: Not on file    Attends Club or Organization Meetings: Not on file    Marital Status: Not on file   Intimate Partner Violence:     Fear of Current or Ex-Partner: Not on file    Emotionally Abused: Not on file    Physically Abused: Not on file    Sexually Abused: Not on file   Housing Stability:     Unable to Pay for Housing in the Last Year: Not on file    Number of [place] in the Last Year: Not on file    Unstable Housing in the Last Year: Not on file       I counseled the patient against using tobacco products.     Family History   Problem Relation Age of Onset    Alzheimer's Disease Mother     Arthritis Mother         lumbar disc disease    Heart Disease Father      No other pertinent FamHx on review with patient/guardian. Allergies:  Oxycodone, Pcn [penicillins], and Warfarin and related    Home Medications:  Prior to Admission medications    Medication Sig Start Date End Date Taking? Authorizing Provider   isosorbide mononitrate (IMDUR) 60 MG extended release tablet Take 1 tablet by mouth daily 5/13/22   Colletta Balo, MD   metoprolol tartrate (LOPRESSOR) 25 MG tablet Take 1 tablet by mouth 2 times daily 5/13/22   Colletta Balo, MD   dilTIAZem (CARDIZEM CD) 120 MG extended release capsule Take 1 capsule by mouth daily 5/14/22   Colletta Balo, MD   aspirin 81 MG chewable tablet Take 1 tablet by mouth daily 9/24/21   Colletta Balo, MD   apixaban (ELIQUIS) 5 MG TABS tablet Take 5 mg by mouth 2 times daily    Historical Provider, MD   doxazosin (CARDURA) 2 MG tablet Take 2 mg by mouth nightly    Historical Provider, MD   nitroGLYCERIN (NITROSTAT) 0.4 MG SL tablet up to max of 3 total doses. If no relief after 1 dose, call 911. 2/17/19   Lakshmi Olivares MD   acetaminophen (TYLENOL) 325 MG tablet Take 650 mg by mouth 2 times daily as needed for Pain     Historical Provider, MD   pravastatin (PRAVACHOL) 40 MG tablet Take 40 mg by mouth daily. Historical Provider, MD   omeprazole (PRILOSEC) 20 MG capsule Take 20 mg by mouth daily. Historical Provider, MD       REVIEW OF SYSTEMS    (2-9 systems for level 4, 10 ormore for level 5)      Review of Systems   Constitutional: Negative for activity change and appetite change. HENT: Negative for facial swelling and voice change. Eyes: Negative. Respiratory: Negative for apnea, cough and chest tightness. Cardiovascular: Negative for chest pain. Gastrointestinal: Negative for diarrhea, nausea and vomiting. Skin: Negative. Neurological: Negative for dizziness, seizures and headaches.    Psychiatric/Behavioral: Negative for agitation. PHYSICAL EXAM   (up to 7 for level 4, 8 or more for level 5)      INITIAL VITALS:   BP (!) 154/83   Pulse 75   Temp 98 °F (36.7 °C) (Tympanic)   Resp 17   Ht 5' 6\" (1.676 m)   Wt 194 lb (88 kg)   SpO2 97%   BMI 31.31 kg/m²     Physical Exam  Constitutional:       Appearance: Normal appearance. HENT:      Head: Normocephalic and atraumatic. Nose: Nose normal.      Mouth/Throat:      Mouth: Mucous membranes are moist.      Pharynx: Oropharynx is clear. Eyes:      Extraocular Movements: Extraocular movements intact. Conjunctiva/sclera: Conjunctivae normal.      Pupils: Pupils are equal, round, and reactive to light. Cardiovascular:      Rate and Rhythm: Normal rate and regular rhythm. Pulses: Normal pulses. Heart sounds: Normal heart sounds. Musculoskeletal:      Cervical back: Normal range of motion and neck supple. Skin:     General: Skin is warm and dry. Capillary Refill: Capillary refill takes less than 2 seconds. Neurological:      General: No focal deficit present. Mental Status: He is alert. Mental status is at baseline. Psychiatric:         Mood and Affect: Mood normal.         Thought Content: Thought content normal.         DIFFERENTIAL  DIAGNOSIS     DDX: Accidental medication usage    PLAN (LABS / IMAGING / EKG):  No orders of the defined types were placed in this encounter. MEDICATIONS ORDERED:  No orders of the defined types were placed in this encounter. DIAGNOSTIC RESULTS / EMERGENCY DEPARTMENT COURSE / MDM     LABS:  No results found for this visit on 05/13/22. IMPRESSION/MDM/ED COURSE:  80 y.o. male presented with an accidental medication usage. Reviewing the patient's list of medications with him against his chart there are no medications that are of any concern that they were doubled up on. Patient may restart his medications as prescribed.   Patient was informed of this and grateful for the evaluation. Patient/Guardian requesting discharge. Patient/Guardian was given written and verbal instructions prior to discharge. Patient/Guardian understood and agreed. Patient/Guardian had no further questions. RADIOLOGY:  No orders to display         EKG  None    All EKG's are interpreted by the Emergency Department Physician who either signs or Co-signs this chart in the absence of a cardiologist.      PROCEDURES:  None    CONSULTS:  None        FINAL IMPRESSION      1.  Accidental medication error, initial encounter          DISPOSITION / PLAN       DISPOSITION Decision To Discharge 05/14/2022 12:10:18 AM        PATIENT REFERREDTO:  Kori Bateman  [address] [place] [address] [address] [address]    Call       [address] ED  [facility] [address]  [address] [place] [address]  [phone]  Go to   As needed      DISCHARGE MEDICATIONS:  New Prescriptions    No medications on file       Albina Leyden MD  PGY 2  Resident Physician Emergency Medicine  05/14/22 12:15 AM        (Please note that portions of this note were completed with a voice recognition program.Efforts were made to edit the dictations but occasionally words are mis-transcribed.)        Albina Leyden, MD  Resident  05/14/22 9096

## 2022-05-15 PROCEDURE — 95819 EEG AWAKE AND ASLEEP: CPT | Performed by: PSYCHIATRY & NEUROLOGY

## 2022-05-17 LAB
EKG ATRIAL RATE: 170 BPM
EKG Q-T INTERVAL: 312 MS
EKG QRS DURATION: 96 MS
EKG QTC CALCULATION (BAZETT): 489 MS
EKG R AXIS: 83 DEGREES
EKG T AXIS: -34 DEGREES
EKG VENTRICULAR RATE: 148 BPM

## 2022-05-17 PROCEDURE — 93010 ELECTROCARDIOGRAM REPORT: CPT | Performed by: INTERNAL MEDICINE

## 2022-09-11 ENCOUNTER — APPOINTMENT (OUTPATIENT)
Dept: GENERAL RADIOLOGY | Age: 82
End: 2022-09-11
Payer: MEDICARE

## 2022-09-11 ENCOUNTER — HOSPITAL ENCOUNTER (EMERGENCY)
Age: 82
Discharge: HOME | End: 2022-09-11
Attending: EMERGENCY MEDICINE
Payer: MEDICARE

## 2022-09-11 VITALS
BODY MASS INDEX: 29.73 KG/M2 | OXYGEN SATURATION: 97 % | SYSTOLIC BLOOD PRESSURE: 143 MMHG | DIASTOLIC BLOOD PRESSURE: 65 MMHG | HEART RATE: 45 BPM | HEIGHT: 66 IN | TEMPERATURE: 97.5 F | WEIGHT: 185 LBS | RESPIRATION RATE: 12 BRPM

## 2022-09-11 DIAGNOSIS — R55 SYNCOPE AND COLLAPSE: ICD-10-CM

## 2022-09-11 DIAGNOSIS — K59.00 CONSTIPATION, UNSPECIFIED CONSTIPATION TYPE: Primary | ICD-10-CM

## 2022-09-11 LAB
ABSOLUTE EOS #: 0.2 K/UL (ref 0–0.4)
ABSOLUTE LYMPH #: 1.2 K/UL (ref 1–4.8)
ABSOLUTE MONO #: 0.6 K/UL (ref 0.1–1.3)
ANION GAP SERPL CALCULATED.3IONS-SCNC: 9 MMOL/L (ref 9–17)
BASOPHILS # BLD: 1 % (ref 0–2)
BASOPHILS ABSOLUTE: 0.1 K/UL (ref 0–0.2)
BUN BLDV-MCNC: 16 MG/DL (ref 8–23)
CALCIUM SERPL-MCNC: 9.6 MG/DL (ref 8.6–10.4)
CHLORIDE BLD-SCNC: 109 MMOL/L (ref 98–107)
CO2: 24 MMOL/L (ref 20–31)
CREAT SERPL-MCNC: 0.85 MG/DL (ref 0.7–1.2)
EOSINOPHILS RELATIVE PERCENT: 3 % (ref 0–4)
GFR AFRICAN AMERICAN: >60 ML/MIN
GFR NON-AFRICAN AMERICAN: >60 ML/MIN
GFR SERPL CREATININE-BSD FRML MDRD: ABNORMAL ML/MIN/{1.73_M2}
GLUCOSE BLD-MCNC: 102 MG/DL (ref 70–99)
HCT VFR BLD CALC: 41 % (ref 41–53)
HEMOGLOBIN: 13.7 G/DL (ref 13.5–17.5)
LYMPHOCYTES # BLD: 17 % (ref 24–44)
MCH RBC QN AUTO: 30.1 PG (ref 26–34)
MCHC RBC AUTO-ENTMCNC: 33.5 G/DL (ref 31–37)
MCV RBC AUTO: 89.7 FL (ref 80–100)
MONOCYTES # BLD: 8 % (ref 1–7)
MYOGLOBIN: 36 NG/ML (ref 28–72)
MYOGLOBIN: 38 NG/ML (ref 28–72)
PDW BLD-RTO: 13.8 % (ref 11.5–14.9)
PLATELET # BLD: 154 K/UL (ref 150–450)
PMV BLD AUTO: 7.4 FL (ref 6–12)
POTASSIUM SERPL-SCNC: 4.1 MMOL/L (ref 3.7–5.3)
RBC # BLD: 4.57 M/UL (ref 4.5–5.9)
SEG NEUTROPHILS: 71 % (ref 36–66)
SEGMENTED NEUTROPHILS ABSOLUTE COUNT: 5.3 K/UL (ref 1.3–9.1)
SODIUM BLD-SCNC: 142 MMOL/L (ref 135–144)
THYROXINE, FREE: 1.13 NG/DL (ref 0.93–1.7)
TROPONIN, HIGH SENSITIVITY: 12 NG/L (ref 0–22)
TROPONIN, HIGH SENSITIVITY: 13 NG/L (ref 0–22)
TSH SERPL DL<=0.05 MIU/L-ACNC: 3.69 UIU/ML (ref 0.3–5)
WBC # BLD: 7.5 K/UL (ref 3.5–11)

## 2022-09-11 PROCEDURE — 36415 COLL VENOUS BLD VENIPUNCTURE: CPT

## 2022-09-11 PROCEDURE — 71045 X-RAY EXAM CHEST 1 VIEW: CPT

## 2022-09-11 PROCEDURE — 6370000000 HC RX 637 (ALT 250 FOR IP): Performed by: EMERGENCY MEDICINE

## 2022-09-11 PROCEDURE — 84439 ASSAY OF FREE THYROXINE: CPT

## 2022-09-11 PROCEDURE — 93005 ELECTROCARDIOGRAM TRACING: CPT | Performed by: EMERGENCY MEDICINE

## 2022-09-11 PROCEDURE — 80048 BASIC METABOLIC PNL TOTAL CA: CPT

## 2022-09-11 PROCEDURE — 74018 RADEX ABDOMEN 1 VIEW: CPT

## 2022-09-11 PROCEDURE — 2580000003 HC RX 258: Performed by: EMERGENCY MEDICINE

## 2022-09-11 PROCEDURE — 83874 ASSAY OF MYOGLOBIN: CPT

## 2022-09-11 PROCEDURE — 84443 ASSAY THYROID STIM HORMONE: CPT

## 2022-09-11 PROCEDURE — 99285 EMERGENCY DEPT VISIT HI MDM: CPT

## 2022-09-11 PROCEDURE — 84484 ASSAY OF TROPONIN QUANT: CPT

## 2022-09-11 PROCEDURE — 85025 COMPLETE CBC W/AUTO DIFF WBC: CPT

## 2022-09-11 RX ORDER — 0.9 % SODIUM CHLORIDE 0.9 %
1000 INTRAVENOUS SOLUTION INTRAVENOUS ONCE
Status: COMPLETED | OUTPATIENT
Start: 2022-09-11 | End: 2022-09-11

## 2022-09-11 RX ORDER — DOCUSATE SODIUM 100 MG/1
100 CAPSULE, LIQUID FILLED ORAL 2 TIMES DAILY
Qty: 30 CAPSULE | Refills: 0 | Status: SHIPPED | OUTPATIENT
Start: 2022-09-11

## 2022-09-11 RX ORDER — LIDOCAINE HYDROCHLORIDE 20 MG/ML
JELLY TOPICAL ONCE
Status: COMPLETED | OUTPATIENT
Start: 2022-09-11 | End: 2022-09-11

## 2022-09-11 RX ADMIN — SODIUM CHLORIDE 1000 ML: 9 INJECTION, SOLUTION INTRAVENOUS at 14:24

## 2022-09-11 RX ADMIN — LIDOCAINE HYDROCHLORIDE: 20 JELLY TOPICAL at 15:04

## 2022-09-11 ASSESSMENT — PAIN DESCRIPTION - LOCATION: LOCATION: RECTUM

## 2022-09-11 ASSESSMENT — ENCOUNTER SYMPTOMS
EYE REDNESS: 0
COLOR CHANGE: 0
VOMITING: 0
EYE DISCHARGE: 0
WHEEZING: 0
RHINORRHEA: 0
SHORTNESS OF BREATH: 0
FACIAL SWELLING: 0
BLOOD IN STOOL: 0
EYE PAIN: 0
DIARRHEA: 0
SINUS PRESSURE: 0
COUGH: 0
CHEST TIGHTNESS: 0
BACK PAIN: 0
TROUBLE SWALLOWING: 0
CONSTIPATION: 1
NAUSEA: 0
SORE THROAT: 0
ABDOMINAL PAIN: 0

## 2022-09-11 ASSESSMENT — PAIN DESCRIPTION - PAIN TYPE: TYPE: ACUTE PAIN

## 2022-09-11 ASSESSMENT — LIFESTYLE VARIABLES
HOW MANY STANDARD DRINKS CONTAINING ALCOHOL DO YOU HAVE ON A TYPICAL DAY: PATIENT DOES NOT DRINK
HOW OFTEN DO YOU HAVE A DRINK CONTAINING ALCOHOL: NEVER

## 2022-09-11 ASSESSMENT — PAIN DESCRIPTION - DESCRIPTORS: DESCRIPTORS: SORE

## 2022-09-11 ASSESSMENT — PAIN SCALES - GENERAL: PAINLEVEL_OUTOF10: 8

## 2022-09-11 ASSESSMENT — PAIN - FUNCTIONAL ASSESSMENT: PAIN_FUNCTIONAL_ASSESSMENT: 0-10

## 2022-09-11 NOTE — ED TRIAGE NOTES
Mode of arrival (squad #, walk in, police, etc) : Squad        Chief complaint(s): Bradycardia        Arrival Note (brief scenario, treatment PTA, etc). : Patient to ED after presenting an episode of weakness posterior to attempting to have a bowel movement at home. States he hasn't been able to pass stool for about a week, took two laxatives today, and while on the toilet \"trying to force it\" he started feeling dizzy and had to crawl down to the floor and call for help. Manifests no LOC or trauma. C= \"Have you ever felt that you should Cut down on your drinking? \"  No  A= \"Have people Annoyed you by criticizing your drinking? \"  No  G= \"Have you ever felt bad or Guilty about your drinking? \"  No  E= \"Have you ever had a drink as an Eye-opener first thing in the morning to steady your nerves or to help a hangover? \"  No      Deferred []      Reason for deferring: N/A    *If yes to two or more: probable alcohol abuse. *

## 2022-09-11 NOTE — ED PROVIDER NOTES
[address]  eMERGENCY dEPARTMENT eNCOUnter      Pt Name: Judge Addison  MRN: [id number]  [place] 1940  Date of evaluation: 9/11/22      CHIEF COMPLAINT       Chief Complaint   Patient presents with    Bradycardia         HISTORY OF PRESENT ILLNESS    Judge Addison is a 80 y.o. male who presents complaining of syncope. Patient states that he has been having constipation for the last week with no bowel movements. Patient states he took a laxative and before he had a chance to get onto the bathroom he states he started feeling extremely lightheaded and dizzy. Patient states that he got down on the ground and then passed out. Patient denies a fall. Patient states that he just felt weak before hand but no chest pain or palpitations. Patient has been having abdominal pain associated with his constipation. No vomiting. REVIEW OF SYSTEMS       Review of Systems   Constitutional:  Positive for fatigue. Negative for activity change, appetite change, chills, diaphoresis and fever. HENT:  Negative for congestion, ear pain, facial swelling, nosebleeds, rhinorrhea, sinus pressure, sore throat and trouble swallowing. Eyes:  Negative for pain, discharge and redness. Respiratory:  Negative for cough, chest tightness, shortness of breath and wheezing. Cardiovascular:  Negative for chest pain, palpitations and leg swelling. Gastrointestinal:  Positive for constipation. Negative for abdominal pain, blood in stool, diarrhea, nausea and vomiting. Genitourinary:  Negative for difficulty urinating, dysuria, flank pain, frequency, genital sores and hematuria. Musculoskeletal:  Negative for arthralgias, back pain, gait problem, joint swelling, myalgias and neck pain. Skin:  Negative for color change, pallor, rash and wound. Neurological:  Positive for dizziness, syncope and light-headedness. Negative for tremors, seizures, speech difficulty, weakness, numbness and headaches. Psychiatric/Behavioral:  Negative for confusion, decreased concentration, hallucinations, self-injury, sleep disturbance and suicidal ideas.       PAST MEDICAL HISTORY     Past Medical History:   Diagnosis Date    Anxiety attack     Arthritis     Atrial fibrillation [facility])     Atrial flutter (HCC)     Back pain     CAD (coronary artery disease)     Chest pain 04/12/2016    Colon polyps     Constipation     Dementia ([facility] [address] 03/23/2019    Dizziness     GERD (gastroesophageal reflux disease)     Hyperlipidemia     Hypertension     Lower GI bleed     Lung nodule seen on imaging study 02/17/2019    Prediabetes     Rectal bleeding     S/P colonoscopy with polypectomy     Seizure ([facility] [address]     NOTED PER CARE EVERYWHERE- PATIENT DENIES HX OF, STATES HE HAS NEVER BEEN ON SEIZURE MEDICATION    Sleep apnea     not tested, no cpap    SOB (shortness of breath)     Status post coronary artery bypass graft     Wears dentures     TOP       SURGICAL HISTORY       Past Surgical History:   Procedure Laterality Date    CARDIAC CATHETERIZATION      CATARACT REMOVAL Bilateral     COLONOSCOPY  07/24/2018    COLONOSCOPY WITH BIOPSY performed by Cordelia Reeder MD at [facility]    COLONOSCOPY N/A 4/19/2021    COLONOSCOPY DIAGNOSTIC performed by Sam Hendricks MD at [place] [address]  2004    x4 vessel    TOTAL KNEE ARTHROPLASTY Bilateral     VENTRAL HERNIA REPAIR         CURRENT MEDICATIONS       Previous Medications    ACETAMINOPHEN (TYLENOL) 325 MG TABLET    Take 650 mg by mouth 2 times daily as needed for Pain     APIXABAN (ELIQUIS) 5 MG TABS TABLET    Take 5 mg by mouth 2 times daily    ASPIRIN 81 MG CHEWABLE TABLET    Take 1 tablet by mouth daily    DILTIAZEM (CARDIZEM CD) 120 MG EXTENDED RELEASE CAPSULE    Take 1 capsule by mouth daily    DOXAZOSIN (CARDURA) 2 MG TABLET    Take 2 mg by mouth nightly    ISOSORBIDE MONONITRATE (IMDUR) 60 MG EXTENDED RELEASE TABLET    Take 1 tablet by mouth daily    METOPROLOL TARTRATE (LOPRESSOR) 25 MG TABLET    Take 1 tablet by mouth 2 times daily    NITROGLYCERIN (NITROSTAT) 0.4 MG SL TABLET    up to max of 3 total doses. If no relief after 1 dose, call 911. OMEPRAZOLE (PRILOSEC) 20 MG CAPSULE    Take 20 mg by mouth daily. PRAVASTATIN (PRAVACHOL) 40 MG TABLET    Take 40 mg by mouth daily. ALLERGIES     is allergic to oxycodone, pcn [penicillins], and warfarin and related. FAMILY HISTORY     [unfilled]     SOCIAL HISTORY      reports that he quit smoking about 42 years ago. He has never used smokeless tobacco. He reports that he does not drink alcohol and does not use drugs. PHYSICAL EXAM     INITIAL VITALS: BP (!) 143/65   Pulse (!) 45   Temp 97.5 °F (36.4 °C) (Oral)   Resp 12   Ht 5' 6\" (1.676 m)   Wt 185 lb (83.9 kg)   SpO2 97%   BMI 29.86 kg/m²      Physical Exam  Vitals and nursing note reviewed. Constitutional:       General: He is not in acute distress. Appearance: He is well-developed. He is not diaphoretic. HENT:      Head: Normocephalic and atraumatic. Eyes:      General: No scleral icterus. Right eye: No discharge. Left eye: No discharge. Conjunctiva/sclera: Conjunctivae normal.      Pupils: Pupils are equal, round, and reactive to light. Cardiovascular:      Rate and Rhythm: Regular rhythm. Bradycardia present. Heart sounds: Normal heart sounds. No murmur heard. No friction rub. No gallop. Pulmonary:      Effort: Pulmonary effort is normal. No respiratory distress. Breath sounds: Normal breath sounds. No wheezing or rales. Chest:      Chest wall: No tenderness. Abdominal:      General: Bowel sounds are normal. There is no distension. Palpations: Abdomen is soft. There is no mass. Tenderness: There is no abdominal tenderness. There is no guarding or rebound. Musculoskeletal:         General: No tenderness. Normal range of motion. Skin:     General: Skin is warm and dry.       Coloration: Skin is not pale. Findings: No erythema or rash. Neurological:      Mental Status: He is alert and oriented to person, place, and time. Cranial Nerves: No cranial nerve deficit. Sensory: No sensory deficit. Motor: No abnormal muscle tone. Coordination: Coordination normal.      Deep Tendon Reflexes: Reflexes normal.   Psychiatric:         Behavior: Behavior normal.         Thought Content: Thought content normal.         Judgment: Judgment normal.       MEDICAL DECISION MAKING:     Patient is pretty bradycardic but blood pressure seems to be okay not sure if when he was standing up his blood pressure just dropped real bad and that is why he was feeling this way. This could have been response to the laxative though he was not having bowel movements. We will go ahead and do a full cardiac work-up. DIAGNOSTIC RESULTS     EKG: All EKG's are interpreted by the Emergency Department Physician who either signs or Co-signs this chart in the absence of a cardiologist.    EKG Interpretation    Interpreted by emergency department physician    Rhythm: sinus bradycardia  Rate: bradycardia  Axis: normal  Ectopy: none  Conduction: normal  ST Segments: normal  T Waves: normal  Q Waves: none    Clinical Impression: EKG: sinus bradycardia. Crow Tripp MD      RADIOLOGY:All plain film, CT, MRI, and formal ultrasound images (except ED bedside ultrasound)are read by the radiologist and interpretations are directly viewed by the emergency physician. XR ABDOMEN (KUB) (SINGLE AP VIEW)    Result Date: 9/11/2022  EXAMINATION: ONE SUPINE XRAY VIEW(S) OF THE ABDOMEN 9/11/2022 2:01 pm COMPARISON: None. HISTORY: ORDERING SYSTEM PROVIDED HISTORY: Constipation TECHNOLOGIST PROVIDED HISTORY: Constipation Reason for Exam: Constipation FINDINGS: There is a nonobstructive bowel gas pattern. There is no free air. There are no suspicious calcifications. There is no acute osseous abnormality.  There is degenerative change of the SI joints and hip joints. The surrounding soft tissues are unremarkable. No acute abdominal abnormality. XR CHEST PORTABLE    Result Date: 9/11/2022  EXAMINATION: ONE XRAY VIEW OF THE CHEST 9/11/2022 2:01 pm COMPARISON: Chest radiograph performed 05/12/2022. HISTORY: ORDERING SYSTEM PROVIDED HISTORY: Chest Pain TECHNOLOGIST PROVIDED HISTORY: Chest Pain Reason for Exam: chest pain, bradycardia FINDINGS: There is a left basilar infiltrate. There is no pneumothorax. The heart is prominent. The upper abdomen unremarkable. The extrathoracic soft tissues are unremarkable. Left basilar infiltrate concerning for pneumonia. LABS: All lab results were reviewed bysergei, and all abnormals are listed below.   Labs Reviewed   BASIC METABOLIC PANEL - Abnormal; Notable for the following components:       Result Value    Glucose 102 (*)     Chloride 109 (*)     All other components within normal limits   CBC WITH AUTO DIFFERENTIAL - Abnormal; Notable for the following components:    Seg Neutrophils 71 (*)     Lymphocytes 17 (*)     Monocytes 8 (*)     All other components within normal limits   TROP/MYOGLOBIN   TROP/MYOGLOBIN   TSH   T4, FREE   URINALYSIS WITH REFLEX TO CULTURE         EMERGENCY DEPARTMENT COURSE:   Vitals:    Vitals:    09/11/22 1333 09/11/22 1528   BP: 113/60 (!) 143/65   Pulse: (!) 45 (!) 45   Resp: 15 12   Temp: 97.5 °F (36.4 °C)    TempSrc: Oral    SpO2: 93% 97%   Weight: 185 lb (83.9 kg)    Height: 5' 6\" (1.676 m)        The patient was given the following medications while in the emergency department:     Orders Placed This Encounter   Medications    0.9 % sodium chloride bolus    lidocaine (XYLOCAINE) 2 % uro-jet    docusate sodium (COLACE) 100 MG capsule     Sig: Take 1 capsule by mouth 2 times daily     Dispense:  30 capsule     Refill:  0       -------------------------  5:09 PM EDT  After the disimpaction the patient is feeling much better at this time really did not have much more stool but states he does not really feel like he has to go anymore. Patient from a syncopal standpoint everything looking good sounds like he just had a vagal syncope and I feel comfortable discharging him and he feels comfortable going home as does the family. CRITICAL CARE:   None    CONSULTS:  None    PROCEDURES:  Digital disimpaction done by myself with no complications but good results. FINAL IMPRESSION      1. Constipation, unspecified constipation type    2.  Syncope and collapse          DISPOSITION/PLAN   DISPOSITION Decision To Discharge 09/11/2022 05:08:56 PM      PATIENT REFERRED TO:  Robbie Mccloud MD  [address] [address]    In 3 days      [address]  [facility] [address]  [phone]    If symptoms worsen    DISCHARGE MEDICATIONS:  New Prescriptions    DOCUSATE SODIUM (COLACE) 100 MG CAPSULE    Take 1 capsule by mouth 2 times daily       (Please note that portions of this note were completed with a voice recognition program.  Efforts were made to edit the dictations but occasionally words are mis-transcribed.)    Salvatore Campbell MD  Attending Danielle Cabello MD  09/11/22 9870

## 2022-09-11 NOTE — ED NOTES
Patient refers feeling weak and short of breath, desaturates to 88%. NC started on 2L.       Artem Ward RN  09/11/22 8441

## 2022-09-11 NOTE — ED NOTES
Patient refers feeling much better, does not refer any need to defecate.      Artem Ward RN  09/11/22 9615

## 2022-09-12 LAB
EKG ATRIAL RATE: 43 BPM
EKG P AXIS: 8 DEGREES
EKG P-R INTERVAL: 202 MS
EKG Q-T INTERVAL: 438 MS
EKG QRS DURATION: 88 MS
EKG QTC CALCULATION (BAZETT): 370 MS
EKG R AXIS: 71 DEGREES
EKG T AXIS: 50 DEGREES
EKG VENTRICULAR RATE: 43 BPM

## 2022-09-12 PROCEDURE — 93010 ELECTROCARDIOGRAM REPORT: CPT | Performed by: INTERNAL MEDICINE

## 2023-11-08 NOTE — ED NOTES
Bed: 09  Expected date:   Expected time:   Means of arrival: [facility]  Comments:     Kade Reyes RN  07/04/21 6241
Call bell

## 2024-01-03 PROCEDURE — 99285 EMERGENCY DEPT VISIT HI MDM: CPT

## 2024-01-04 ENCOUNTER — APPOINTMENT (OUTPATIENT)
Dept: CT IMAGING | Age: 84
End: 2024-01-04
Payer: MEDICARE

## 2024-01-04 ENCOUNTER — HOSPITAL ENCOUNTER (INPATIENT)
Age: 84
LOS: 2 days | Discharge: HOME | End: 2024-01-06
Attending: EMERGENCY MEDICINE | Admitting: INTERNAL MEDICINE
Payer: MEDICARE

## 2024-01-04 DIAGNOSIS — K52.9 COLITIS: Primary | ICD-10-CM

## 2024-01-04 DIAGNOSIS — K62.5 RECTAL BLEEDING: ICD-10-CM

## 2024-01-04 LAB
ABO + RH BLD: NORMAL
ALBUMIN SERPL-MCNC: 3.7 G/DL (ref 3.5–5.2)
ALP SERPL-CCNC: 73 U/L (ref 40–129)
ALT SERPL-CCNC: 9 U/L (ref 5–41)
ANION GAP SERPL CALCULATED.3IONS-SCNC: 11 MMOL/L (ref 9–17)
ARM BAND NUMBER: NORMAL
AST SERPL-CCNC: 15 U/L
BACTERIA URNS QL MICRO: ABNORMAL
BASOPHILS # BLD: 0 K/UL (ref 0–0.2)
BASOPHILS NFR BLD: 0 % (ref 0–2)
BILIRUB SERPL-MCNC: 1 MG/DL (ref 0.3–1.2)
BILIRUB UR QL STRIP: NEGATIVE
BLOOD BANK SAMPLE EXPIRATION: NORMAL
BLOOD GROUP ANTIBODIES SERPL: NEGATIVE
BUN SERPL-MCNC: 18 MG/DL (ref 8–23)
CALCIUM SERPL-MCNC: 9.8 MG/DL (ref 8.6–10.4)
CASTS #/AREA URNS LPF: ABNORMAL /LPF
CHLORIDE SERPL-SCNC: 106 MMOL/L (ref 98–107)
CLARITY UR: CLEAR
CO2 SERPL-SCNC: 24 MMOL/L (ref 20–31)
COLOR UR: YELLOW
CREAT SERPL-MCNC: 0.8 MG/DL (ref 0.7–1.2)
EOSINOPHIL # BLD: 0.15 K/UL (ref 0–0.4)
EOSINOPHILS RELATIVE PERCENT: 1 % (ref 0–4)
EPI CELLS #/AREA URNS HPF: ABNORMAL /HPF
ERYTHROCYTE [DISTWIDTH] IN BLOOD BY AUTOMATED COUNT: 14 % (ref 11.5–14.9)
GFR SERPL CREATININE-BSD FRML MDRD: >60 ML/MIN/1.73M2
GLUCOSE SERPL-MCNC: 138 MG/DL (ref 70–99)
GLUCOSE UR STRIP-MCNC: NEGATIVE MG/DL
HCT VFR BLD AUTO: 46.7 % (ref 41–53)
HGB BLD-MCNC: 15.9 G/DL (ref 13.5–17.5)
HGB UR QL STRIP.AUTO: NEGATIVE
INR PPP: 1.3
KETONES UR STRIP-MCNC: ABNORMAL MG/DL
LEUKOCYTE ESTERASE UR QL STRIP: ABNORMAL
LYMPHOCYTES NFR BLD: 1.31 K/UL (ref 1–4.8)
LYMPHOCYTES RELATIVE PERCENT: 9 % (ref 24–44)
MCH RBC QN AUTO: 31.1 PG (ref 26–34)
MCHC RBC AUTO-ENTMCNC: 34.1 G/DL (ref 31–37)
MCV RBC AUTO: 91.2 FL (ref 80–100)
MONOCYTES NFR BLD: 0.44 K/UL (ref 0.1–1.3)
MONOCYTES NFR BLD: 3 % (ref 1–7)
MORPHOLOGY: NORMAL
NEUTROPHILS NFR BLD: 87 % (ref 36–66)
NEUTS SEG NFR BLD: 12.7 K/UL (ref 1.3–9.1)
NITRITE UR QL STRIP: NEGATIVE
PARTIAL THROMBOPLASTIN TIME: 29 SEC (ref 24–36)
PH UR STRIP: 5 [PH] (ref 5–8)
PLATELET # BLD AUTO: 174 K/UL (ref 150–450)
PMV BLD AUTO: 7.5 FL (ref 6–12)
POTASSIUM SERPL-SCNC: 3.9 MMOL/L (ref 3.7–5.3)
PROT SERPL-MCNC: 6.8 G/DL (ref 6.4–8.3)
PROT UR STRIP-MCNC: NEGATIVE MG/DL
PROTHROMBIN TIME: 16.5 SEC (ref 11.8–14.6)
RBC # BLD AUTO: 5.12 M/UL (ref 4.5–5.9)
RBC #/AREA URNS HPF: ABNORMAL /HPF
SODIUM SERPL-SCNC: 141 MMOL/L (ref 135–144)
SP GR UR STRIP: 1.08 (ref 1–1.03)
UROBILINOGEN UR STRIP-ACNC: NORMAL EU/DL (ref 0–1)
WBC #/AREA URNS HPF: ABNORMAL /HPF
WBC OTHER # BLD: 14.6 K/UL (ref 3.5–11)

## 2024-01-04 PROCEDURE — 93005 ELECTROCARDIOGRAM TRACING: CPT | Performed by: EMERGENCY MEDICINE

## 2024-01-04 PROCEDURE — 74174 CTA ABD&PLVS W/CONTRAST: CPT

## 2024-01-04 PROCEDURE — 6370000000 HC RX 637 (ALT 250 FOR IP): Performed by: NURSE PRACTITIONER

## 2024-01-04 PROCEDURE — 1200000000 HC SEMI PRIVATE

## 2024-01-04 PROCEDURE — 85610 PROTHROMBIN TIME: CPT

## 2024-01-04 PROCEDURE — 85730 THROMBOPLASTIN TIME PARTIAL: CPT

## 2024-01-04 PROCEDURE — 2580000003 HC RX 258: Performed by: EMERGENCY MEDICINE

## 2024-01-04 PROCEDURE — 80053 COMPREHEN METABOLIC PANEL: CPT

## 2024-01-04 PROCEDURE — 6370000000 HC RX 637 (ALT 250 FOR IP): Performed by: INTERNAL MEDICINE

## 2024-01-04 PROCEDURE — 86901 BLOOD TYPING SEROLOGIC RH(D): CPT

## 2024-01-04 PROCEDURE — 85025 COMPLETE CBC W/AUTO DIFF WBC: CPT

## 2024-01-04 PROCEDURE — 2580000003 HC RX 258: Performed by: NURSE PRACTITIONER

## 2024-01-04 PROCEDURE — 6360000004 HC RX CONTRAST MEDICATION: Performed by: EMERGENCY MEDICINE

## 2024-01-04 PROCEDURE — 6360000002 HC RX W HCPCS: Performed by: NURSE PRACTITIONER

## 2024-01-04 PROCEDURE — 99223 1ST HOSP IP/OBS HIGH 75: CPT | Performed by: INTERNAL MEDICINE

## 2024-01-04 PROCEDURE — 6360000002 HC RX W HCPCS: Performed by: EMERGENCY MEDICINE

## 2024-01-04 PROCEDURE — 6370000000 HC RX 637 (ALT 250 FOR IP)

## 2024-01-04 PROCEDURE — 36415 COLL VENOUS BLD VENIPUNCTURE: CPT

## 2024-01-04 PROCEDURE — 86850 RBC ANTIBODY SCREEN: CPT

## 2024-01-04 PROCEDURE — 81001 URINALYSIS AUTO W/SCOPE: CPT

## 2024-01-04 PROCEDURE — 86900 BLOOD TYPING SEROLOGIC ABO: CPT

## 2024-01-04 RX ORDER — SODIUM CHLORIDE 9 MG/ML
INJECTION, SOLUTION INTRAVENOUS CONTINUOUS
Status: ACTIVE | OUTPATIENT
Start: 2024-01-04 | End: 2024-01-06

## 2024-01-04 RX ORDER — TAMSULOSIN HYDROCHLORIDE 0.4 MG/1
CAPSULE ORAL
Status: COMPLETED
Start: 2024-01-04 | End: 2024-01-04

## 2024-01-04 RX ORDER — POTASSIUM CHLORIDE 20 MEQ/1
40 TABLET, EXTENDED RELEASE ORAL PRN
Status: DISCONTINUED | OUTPATIENT
Start: 2024-01-04 | End: 2024-01-06 | Stop reason: HOSPADM

## 2024-01-04 RX ORDER — DILTIAZEM HYDROCHLORIDE 120 MG/1
120 CAPSULE, COATED, EXTENDED RELEASE ORAL DAILY
Status: DISCONTINUED | OUTPATIENT
Start: 2024-01-04 | End: 2024-01-06 | Stop reason: HOSPADM

## 2024-01-04 RX ORDER — SODIUM CHLORIDE 9 MG/ML
INJECTION, SOLUTION INTRAVENOUS PRN
Status: DISCONTINUED | OUTPATIENT
Start: 2024-01-04 | End: 2024-01-06 | Stop reason: HOSPADM

## 2024-01-04 RX ORDER — SODIUM CHLORIDE 0.9 % (FLUSH) 0.9 %
5-40 SYRINGE (ML) INJECTION EVERY 12 HOURS SCHEDULED
Status: DISCONTINUED | OUTPATIENT
Start: 2024-01-04 | End: 2024-01-06 | Stop reason: HOSPADM

## 2024-01-04 RX ORDER — METRONIDAZOLE 500 MG/100ML
500 INJECTION, SOLUTION INTRAVENOUS ONCE
Status: COMPLETED | OUTPATIENT
Start: 2024-01-04 | End: 2024-01-04

## 2024-01-04 RX ORDER — SODIUM CHLORIDE 0.9 % (FLUSH) 0.9 %
10 SYRINGE (ML) INJECTION PRN
Status: DISCONTINUED | OUTPATIENT
Start: 2024-01-04 | End: 2024-01-06 | Stop reason: HOSPADM

## 2024-01-04 RX ORDER — ACETAMINOPHEN 325 MG/1
650 TABLET ORAL EVERY 6 HOURS PRN
Status: DISCONTINUED | OUTPATIENT
Start: 2024-01-04 | End: 2024-01-06 | Stop reason: HOSPADM

## 2024-01-04 RX ORDER — MAGNESIUM SULFATE HEPTAHYDRATE 40 MG/ML
2000 INJECTION, SOLUTION INTRAVENOUS PRN
Status: DISCONTINUED | OUTPATIENT
Start: 2024-01-04 | End: 2024-01-06 | Stop reason: HOSPADM

## 2024-01-04 RX ORDER — DOXAZOSIN MESYLATE 4 MG/1
2 TABLET ORAL NIGHTLY
Status: DISCONTINUED | OUTPATIENT
Start: 2024-01-04 | End: 2024-01-06 | Stop reason: HOSPADM

## 2024-01-04 RX ORDER — PRAVASTATIN SODIUM 20 MG
20 TABLET ORAL DAILY
Status: DISCONTINUED | OUTPATIENT
Start: 2024-01-04 | End: 2024-01-06 | Stop reason: HOSPADM

## 2024-01-04 RX ORDER — ONDANSETRON 4 MG/1
4 TABLET, ORALLY DISINTEGRATING ORAL EVERY 8 HOURS PRN
Status: DISCONTINUED | OUTPATIENT
Start: 2024-01-04 | End: 2024-01-06 | Stop reason: HOSPADM

## 2024-01-04 RX ORDER — 0.9 % SODIUM CHLORIDE 0.9 %
100 INTRAVENOUS SOLUTION INTRAVENOUS ONCE
Status: COMPLETED | OUTPATIENT
Start: 2024-01-04 | End: 2024-01-04

## 2024-01-04 RX ORDER — TAMSULOSIN HYDROCHLORIDE 0.4 MG/1
0.4 CAPSULE ORAL ONCE
Status: COMPLETED | OUTPATIENT
Start: 2024-01-04 | End: 2024-01-04

## 2024-01-04 RX ORDER — ACETAMINOPHEN 650 MG/1
650 SUPPOSITORY RECTAL EVERY 6 HOURS PRN
Status: DISCONTINUED | OUTPATIENT
Start: 2024-01-04 | End: 2024-01-06 | Stop reason: HOSPADM

## 2024-01-04 RX ORDER — ISOSORBIDE MONONITRATE 60 MG/1
60 TABLET, EXTENDED RELEASE ORAL DAILY
Status: DISCONTINUED | OUTPATIENT
Start: 2024-01-04 | End: 2024-01-06 | Stop reason: HOSPADM

## 2024-01-04 RX ORDER — POTASSIUM CHLORIDE 7.45 MG/ML
10 INJECTION INTRAVENOUS PRN
Status: DISCONTINUED | OUTPATIENT
Start: 2024-01-04 | End: 2024-01-06 | Stop reason: HOSPADM

## 2024-01-04 RX ORDER — 0.9 % SODIUM CHLORIDE 0.9 %
500 INTRAVENOUS SOLUTION INTRAVENOUS ONCE
Status: COMPLETED | OUTPATIENT
Start: 2024-01-04 | End: 2024-01-04

## 2024-01-04 RX ORDER — ONDANSETRON 2 MG/ML
4 INJECTION INTRAMUSCULAR; INTRAVENOUS EVERY 6 HOURS PRN
Status: DISCONTINUED | OUTPATIENT
Start: 2024-01-04 | End: 2024-01-06 | Stop reason: HOSPADM

## 2024-01-04 RX ORDER — METRONIDAZOLE 500 MG/100ML
500 INJECTION, SOLUTION INTRAVENOUS EVERY 8 HOURS
Status: DISCONTINUED | OUTPATIENT
Start: 2024-01-04 | End: 2024-01-06 | Stop reason: HOSPADM

## 2024-01-04 RX ORDER — PANTOPRAZOLE SODIUM 40 MG/1
40 TABLET, DELAYED RELEASE ORAL
Status: DISCONTINUED | OUTPATIENT
Start: 2024-01-05 | End: 2024-01-06 | Stop reason: HOSPADM

## 2024-01-04 RX ADMIN — METOPROLOL TARTRATE 12.5 MG: 25 TABLET, FILM COATED ORAL at 11:40

## 2024-01-04 RX ADMIN — SODIUM CHLORIDE 500 ML: 0.9 INJECTION, SOLUTION INTRAVENOUS at 02:35

## 2024-01-04 RX ADMIN — METRONIDAZOLE 500 MG: 500 INJECTION, SOLUTION INTRAVENOUS at 23:03

## 2024-01-04 RX ADMIN — SODIUM CHLORIDE: 9 INJECTION, SOLUTION INTRAVENOUS at 09:33

## 2024-01-04 RX ADMIN — METOPROLOL TARTRATE 12.5 MG: 25 TABLET, FILM COATED ORAL at 21:18

## 2024-01-04 RX ADMIN — TAMSULOSIN HYDROCHLORIDE 0.4 MG: 0.4 CAPSULE ORAL at 17:17

## 2024-01-04 RX ADMIN — DILTIAZEM HYDROCHLORIDE 120 MG: 120 CAPSULE, COATED, EXTENDED RELEASE ORAL at 11:40

## 2024-01-04 RX ADMIN — DOXAZOSIN 2 MG: 4 TABLET ORAL at 21:18

## 2024-01-04 RX ADMIN — APIXABAN 5 MG: 5 TABLET, FILM COATED ORAL at 11:40

## 2024-01-04 RX ADMIN — SODIUM CHLORIDE, PRESERVATIVE FREE 10 ML: 5 INJECTION INTRAVENOUS at 03:47

## 2024-01-04 RX ADMIN — METRONIDAZOLE 500 MG: 500 INJECTION, SOLUTION INTRAVENOUS at 07:16

## 2024-01-04 RX ADMIN — METRONIDAZOLE 500 MG: 500 INJECTION, SOLUTION INTRAVENOUS at 15:28

## 2024-01-04 RX ADMIN — MAGNESIUM HYDROXIDE 30 ML: 400 SUSPENSION ORAL at 13:53

## 2024-01-04 RX ADMIN — ONDANSETRON 4 MG: 2 INJECTION INTRAMUSCULAR; INTRAVENOUS at 11:51

## 2024-01-04 RX ADMIN — ACETAMINOPHEN 650 MG: 325 TABLET ORAL at 11:51

## 2024-01-04 RX ADMIN — ISOSORBIDE MONONITRATE 60 MG: 60 TABLET, EXTENDED RELEASE ORAL at 11:40

## 2024-01-04 RX ADMIN — APIXABAN 5 MG: 5 TABLET, FILM COATED ORAL at 21:18

## 2024-01-04 RX ADMIN — IOPAMIDOL 100 ML: 755 INJECTION, SOLUTION INTRAVENOUS at 03:47

## 2024-01-04 RX ADMIN — SODIUM CHLORIDE 100 ML: 9 INJECTION, SOLUTION INTRAVENOUS at 03:47

## 2024-01-04 RX ADMIN — SODIUM CHLORIDE: 9 INJECTION, SOLUTION INTRAVENOUS at 12:02

## 2024-01-04 RX ADMIN — CEFTRIAXONE SODIUM 2000 MG: 2 INJECTION, POWDER, FOR SOLUTION INTRAMUSCULAR; INTRAVENOUS at 06:09

## 2024-01-04 ASSESSMENT — ENCOUNTER SYMPTOMS
CONSTIPATION: 0
COUGH: 0
SHORTNESS OF BREATH: 0
DIARRHEA: 0
NAUSEA: 0
BLOOD IN STOOL: 1
ABDOMINAL PAIN: 1
VOMITING: 0

## 2024-01-04 NOTE — H&P
34.1 31 - 37 g/dL    RDW 14.0 11.5 - 14.9 %    Platelets 174 150 - 450 k/uL    MPV 7.5 6.0 - 12.0 fL    Neutrophils % 87 (H) 36 - 66 %    Lymphocytes % 9 (L) 24 - 44 %    Monocytes % 3 1 - 7 %    Eosinophils % 1 0 - 4 %    Basophils % 0 0 - 2 %    Neutrophils Absolute 12.70 (H) 1.3 - 9.1 k/uL    Lymphocytes Absolute 1.31 1.0 - 4.8 k/uL    Monocytes Absolute 0.44 0.1 - 1.3 k/uL    Eosinophils Absolute 0.15 0.0 - 0.4 k/uL    Basophils Absolute 0.00 0.0 - 0.2 k/uL    Morphology Normal    Comprehensive Metabolic Panel    Collection Time: 01/04/24  2:29 AM   Result Value Ref Range    Sodium 141 135 - 144 mmol/L    Potassium 3.9 3.7 - 5.3 mmol/L    Chloride 106 98 - 107 mmol/L    CO2 24 20 - 31 mmol/L    Anion Gap 11 9 - 17 mmol/L    Glucose 138 (H) 70 - 99 mg/dL    BUN 18 8 - 23 mg/dL    Creatinine 0.8 0.7 - 1.2 mg/dL    Est, Glom Filt Rate >60 >60 mL/min/1.73m2    Calcium 9.8 8.6 - 10.4 mg/dL    Total Protein 6.8 6.4 - 8.3 g/dL    Albumin 3.7 3.5 - 5.2 g/dL    Total Bilirubin 1.0 0.3 - 1.2 mg/dL    Alkaline Phosphatase 73 40 - 129 U/L    ALT 9 5 - 41 U/L    AST 15 <40 U/L   TYPE AND SCREEN    Collection Time: 01/04/24  2:29 AM   Result Value Ref Range    Blood Bank Sample Expiration 01/07/2024,2359     Arm Band Number [id number]     ABO/Rh AB POSITIVE     Antibody Screen NEGATIVE    Protime-INR    Collection Time: 01/04/24  2:29 AM   Result Value Ref Range    Protime 16.5 (H) 11.8 - 14.6 sec    INR 1.3    APTT    Collection Time: 01/04/24  2:29 AM   Result Value Ref Range    PTT 29.0 24.0 - 36.0 sec   EKG 12 Lead    Collection Time: 01/04/24  2:30 AM   Result Value Ref Range    Ventricular Rate 104 BPM    Atrial Rate 340 BPM    QRS Duration 96 ms    Q-T Interval 340 ms    QTc Calculation (Bazett) 447 ms    R Axis 89 degrees    T Axis 0 degrees   Urinalysis with Reflex to Culture    Collection Time: 01/04/24  4:57 AM    Specimen: Urine   Result Value Ref Range    Color, UA Yellow Yellow    Turbidity UA Clear Clear     above, severity of signs and symptoms as outlined, requirement for current medical therapies and most importantly because of direct risk to patient if care not provided in a hospital setting.  Expected length of stay > 48 hours.    Aftab Khan MD  1/4/2024  10:10 AM    Copy sent to Dewey Marin MD    Please note that this chart was generated using voice recognition Dragon dictation software.  Although every effort was made to ensure the accuracy of this automated transcription, some errors in transcription may have occurred.

## 2024-01-04 NOTE — ED TRIAGE NOTES
Mode of arrival (squad #, walk in, police, etc) : walk-in        Chief complaint(s): rectal bleeding        Arrival Note (brief scenario, treatment PTA, etc).: Pt reports rectal bleeding that started today.         C= \"Have you ever felt that you should Cut down on your drinking?\"  No  A= \"Have people Annoyed you by criticizing your drinking?\"  No  G= \"Have you ever felt bad or Guilty about your drinking?\"  No  E= \"Have you ever had a drink as an Eye-opener first thing in the morning to steady your nerves or to help a hangover?\"  No      Deferred []      Reason for deferring: N/A    *If yes to two or more: probable alcohol abuse.*

## 2024-01-04 NOTE — CARE COORDINATION
Case Management Assessment  Initial Evaluation    Date/Time of Evaluation: 1/4/2024 10:23 AM  Assessment Completed by: Fina Baker RN    If patient is discharged prior to next notation, then this note serves as note for discharge by case management.    Patient Name: Nile Lacy                   YOB: 1940  Diagnosis: Colitis [K52.9]  Rectal bleeding [K62.5]                   Date / Time: 1/4/2024 12:12 AM    Patient Admission Status: Inpatient   Readmission Risk (Low < 19, Mod (19-27), High > 27): Readmission Risk Score: 7    Current PCP: Dewey Vera MD  PCP verified by CM? Yes    Chart Reviewed: Yes      History Provided by: Patient  Patient Orientation: Alert and Oriented    Patient Cognition: Alert    Hospitalization in the last 30 days (Readmission):  No    If yes, Readmission Assessment in  Navigator will be completed.    Advance Directives:      Code Status: Full Code   Patient's Primary Decision Maker is: Legal Next of Kin    Primary Decision Maker: Oj Lacy - Child - [phone]    Primary Decision Maker: Maru Mendez - Child - [phone]    Primary Decision Maker: Ryanne Russell - Child - [phone]    Discharge Planning:    Patient lives with: Alone Type of Home: House  Primary Care Giver: Self  Patient Support Systems include: Children, Family Members, Friends/Neighbors   Current Financial resources: Medicare,  (VA)  Current community resources: Other (Comment) (VA)  Current services prior to admission: [facility]            Current DME:              Type of Home Care services:  None    ADLS  Prior functional level: Independent in ADLs/IADLs  Current functional level: Independent in ADLs/IADLs    PT AM-PAC:   /24  OT AM-PAC:   /24    Family can provide assistance at DC: No  Would you like Case Management to discuss the discharge plan with any other family members/significant others, and if so, who? No  Plans to Return to Present Housing: Yes  Other Identified  Issues/Barriers to RETURNING to current housing: No barriers   Potential Assistance needed at discharge: N/A            Potential DME:  none  Patient expects to discharge to: House  Plan for transportation at discharge: Self    Financial    Payor: [employer] MEDICARE / Plan: [employer] MEDICARE-ADVANTAGE PPO / Product Type: Medicare /     Does insurance require precert for SNF: Yes    Potential assistance Purchasing Medications: No  Meds-to-Beds request:  no      [facility] - [place], [place] - [address] - P [phone] - F [phone]  [address] [facility] [address]  Phone: [phone] Fax: [phone]      Notes:    Factors facilitating achievement of predicted outcomes: Family support, Motivated, Cooperative, Pleasant, and Good insight into deficits    Barriers to discharge: no barriers    Additional Case Management Notes: 1/4/2024 [employer] Medicare from 1 story home independent alone; DME walker cane;VNS denies; Eliquis-PTA; has VA benefits; CT abd-colitis, IV rocephin/flagyl, NPO; home denies needs    The Plan for Transition of Care is related to the following treatment goals of Colitis [K52.9]  Rectal bleeding [K62.5]    IF APPLICABLE: The Patient and/or patient representative Nile and his family were provided with a choice of provider and agrees with the discharge plan. Freedom of choice list with basic dialogue that supports the patient's individualized plan of care/goals and shares the quality data associated with the providers was provided to: Patient        The Patient and/or Patient Representative Agree with the Discharge Plan? Yes    Fina Baker RN  Case Management Department  Ph: [phone] Fax: [phone]

## 2024-01-04 NOTE — ACP (ADVANCE CARE PLANNING)
Advance Care Planning     Advance Care Planning Activator (Inpatient)  Conversation Note      Date of ACP Conversation: 1/4/2024     Conversation Conducted with: Patient with Decision Making Capacity    ACP Activator: Fina Baker RN      Health Care Decision Maker:     Current Designated Health Care Decision Maker:     Primary Decision Maker: Oj Lacy - Child - [phone]    Primary Decision Maker: Maru Mendez - Child - [phone]    Primary Decision Maker: IsiahbillRyanne - Child - [phone]    Today we documented Decision Maker(s) consistent with Legal Next of Kin hierarchy.    Care Preferences    Ventilation:  \"If you were in your present state of health and suddenly became very ill and were unable to breathe on your own, what would your preference be about the use of a ventilator (breathing machine) if it were available to you?\"      Would the patient desire the use of ventilator (breathing machine)?: yes    \"If your health worsens and it becomes clear that your chance of recovery is unlikely, what would your preference be about the use of a ventilator (breathing machine) if it were available to you?\"     Would the patient desire the use of ventilator (breathing machine)?: Yes      Resuscitation  \"CPR works best to restart the heart when there is a sudden event, like a heart attack, in someone who is otherwise healthy. Unfortunately, CPR does not typically restart the heart for people who have serious health conditions or who are very sick.\"    \"In the event your heart stopped as a result of an underlying serious health condition, would you want attempts to be made to restart your heart (answer \"yes\" for attempt to resuscitate) or would you prefer a natural death (answer \"no\" for do not attempt to resuscitate)?\" yes       [] Yes   [] No   Educated Patient / Decision Maker regarding differences between Advance Directives and portable DNR orders.    Length of ACP Conversation in minutes:

## 2024-01-04 NOTE — PROGRESS NOTES
Nile Lacy is a 83 y.o. Non- / non  male who presents with Rectal Bleeding   and is admitted to the hospital for the management of Colitis.    Patient reports that he has been taking laxatives for constipation every 2 to 3 weeks to keep his bowels moving okay.  However, states that recently that laxative has stopped working and he is needed to increase the dose and the frequency of taking the laxative.  Reports that today he did have some bright red rectal bleeding after having a bowel movement.  Patient does report history of hemorrhoids and states that he has had to have them removed in the past.  Additionally, patient reports that he does have some left lower quadrant abdominal tenderness.  Patient is unable to name his home medications; however, he does state that he takes Eliquis and he has a known history of A-fib.      Patient status inpatient in the [facility] Problems             Last Modified POA    * (Principal) Colitis 1/4/2024 Yes       Colitis  -Reports history of constipation, LLQ abdominal pain, and blood in stool  -CT abdomen/pelvis -findings compatible with acute colitis involving left colon  --No evidence of active GI hemorrhage on single arterial phase sequence  --Cholelithiasis  -WBC 14.6    -IV Cipro and Flagyl initiated in ED  --Continue on admission  -NPO for now  -IVF infusing  -pain and nausea control  -Consult general surgery  --check ESR and CRP with am labs    Chronic Anticoagulation  Patient anticoagulated d/t chronic A-fib  -Continue home dose Eliquis  -monitor for signs of bleeding     Attempted to reconcile home medication list; however, patient states he does not know what he takes.  However, he does know that he takes Eliquis which I will continue at this time.  Hemoglobin 15.9 despite small amount of rectal bleeding.    Disposition 3 days      Consultations:   None    Patient is admitted as inpatient status because of co-morbidities listed above,

## 2024-01-04 NOTE — ED PROVIDER NOTES
EMERGENCY DEPARTMENT ENCOUNTER    Pt Name: Nile Lacy  MRN: [id number]  Birthdate 1940  Date of evaluation: 1/4/24  CHIEF COMPLAINT       Chief Complaint   Patient presents with    Rectal Bleeding     HISTORY OF PRESENT ILLNESS   Presenting with chief complaint of bright red blood per rectum that started today.  He is complaining of lower abdominal pain as well.  He says he has not had a colonoscopy in some time.  He denies any pain with defecation.  Denies any diarrhea or constipation.    The history is provided by the patient.           REVIEW OF SYSTEMS     Review of Systems   Constitutional:  Negative for chills and fever.   HENT:  Negative for congestion.    Eyes:  Negative for visual disturbance.   Respiratory:  Negative for cough and shortness of breath.    Cardiovascular:  Negative for chest pain.   Gastrointestinal:  Positive for abdominal pain and blood in stool. Negative for constipation, diarrhea, nausea and vomiting.   Genitourinary:  Negative for flank pain.   Musculoskeletal:  Negative for myalgias.   Neurological:  Negative for dizziness, light-headedness and headaches.   Psychiatric/Behavioral:  Negative for behavioral problems.      PASTMEDICAL HISTORY     Past Medical History:   Diagnosis Date    Anxiety attack     Arthritis     Atrial fibrillation (HCC)     Atrial flutter (HCC)     Back pain     CAD (coronary artery disease)     Chest pain 04/12/2016    Colon polyps     Constipation     Dementia (HCC) 03/23/2019    Dizziness     GERD (gastroesophageal reflux disease)     Hyperlipidemia     Hypertension     Lower GI bleed     Lung nodule seen on imaging study 02/17/2019    Prediabetes     Rectal bleeding     S/P colonoscopy with polypectomy     Seizure (HCC)     NOTED PER CARE EVERYWHERE- PATIENT DENIES HX OF, STATES HE HAS NEVER BEEN ON SEIZURE MEDICATION    Sleep apnea     not tested, no cpap    SOB (shortness of breath)     Status post coronary artery bypass graft     Wears dentures      Tenderness: There is abdominal tenderness in the suprapubic area and left lower quadrant.   Musculoskeletal:         General: Normal range of motion.      Cervical back: Normal range of motion.   Skin:     General: Skin is warm.      Capillary Refill: Capillary refill takes less than 2 seconds.   Neurological:      General: No focal deficit present.      Mental Status: He is alert and oriented to person, place, and time.      Cranial Nerves: No cranial nerve deficit.   Psychiatric:         Behavior: Behavior normal.         MEDICAL DECISION MAKING / ED COURSE:         1)  Number and Complexity of Problems Addressed at this Encounter  Problem List This Visit: Abdominal pain, rectal bleeding    Differential Diagnosis: Lower GI bleed, diverticulitis, colitis    2)  Data Reviewed and Analyzed  (Lab and radiology tests/orders below in next section)    Patient has a stable hemoglobin of 15.9.  His blood pressure is 128/91 and his vital signs are stable.  He has a leukocytosis of 14.6    Imaging that is independently reviewed and interpreted by me as: CTA abdomen/pelvis shows no evidence of active hemorrhaging but he does have acute colitis of the left colon    3)  Treatment and Disposition         Patient was started on Rocephin and Flagyl.  He agrees with admission for further evaluation.  I spoke with Dr. Horton initially and he respectfully declined. Catherine HAHN accepts admission.      CRITICAL CARE:       PROCEDURES:    Procedures      DATA FOR LAB AND RADIOLOGY TESTS ORDERED BELOW ARE REVIEWED BY THE ED CLINICIAN:    RADIOLOGY: All x-rays, CT, MRI, and formal ultrasound images (except ED bedside ultrasound) are read by the radiologist, see reports below, unless otherwise noted in MDM or here.  Reports below are reviewed by myself.  CTA ABDOMEN PELVIS W CONTRAST   Preliminary Result   1. Findings compatible with acute colitis involving the left colon.   2. No evidence of active gastrointestinal hemorrhage on the single

## 2024-01-05 LAB
ANION GAP SERPL CALCULATED.3IONS-SCNC: 9 MMOL/L (ref 9–17)
BASOPHILS # BLD: 0 K/UL (ref 0–0.2)
BASOPHILS NFR BLD: 0 % (ref 0–2)
BUN SERPL-MCNC: 12 MG/DL (ref 8–23)
CALCIUM SERPL-MCNC: 9.1 MG/DL (ref 8.6–10.4)
CHLORIDE SERPL-SCNC: 106 MMOL/L (ref 98–107)
CO2 SERPL-SCNC: 25 MMOL/L (ref 20–31)
CREAT SERPL-MCNC: 0.8 MG/DL (ref 0.7–1.2)
EKG ATRIAL RATE: 340 BPM
EKG Q-T INTERVAL: 340 MS
EKG QRS DURATION: 96 MS
EKG QTC CALCULATION (BAZETT): 447 MS
EKG R AXIS: 89 DEGREES
EKG T AXIS: 0 DEGREES
EKG VENTRICULAR RATE: 104 BPM
EOSINOPHIL # BLD: 0.1 K/UL (ref 0–0.4)
EOSINOPHILS RELATIVE PERCENT: 1 % (ref 0–4)
ERYTHROCYTE [DISTWIDTH] IN BLOOD BY AUTOMATED COUNT: 13.9 % (ref 11.5–14.9)
GFR SERPL CREATININE-BSD FRML MDRD: >60 ML/MIN/1.73M2
GLUCOSE SERPL-MCNC: 139 MG/DL (ref 70–99)
HCT VFR BLD AUTO: 39.9 % (ref 41–53)
HGB BLD-MCNC: 13.4 G/DL (ref 13.5–17.5)
LYMPHOCYTES NFR BLD: 1.5 K/UL (ref 1–4.8)
LYMPHOCYTES RELATIVE PERCENT: 13 % (ref 24–44)
MCH RBC QN AUTO: 30.8 PG (ref 26–34)
MCHC RBC AUTO-ENTMCNC: 33.7 G/DL (ref 31–37)
MCV RBC AUTO: 91.6 FL (ref 80–100)
MONOCYTES NFR BLD: 0.7 K/UL (ref 0.1–1.3)
MONOCYTES NFR BLD: 6 % (ref 1–7)
NEUTROPHILS NFR BLD: 80 % (ref 36–66)
NEUTS SEG NFR BLD: 9.1 K/UL (ref 1.3–9.1)
PLATELET # BLD AUTO: 130 K/UL (ref 150–450)
PMV BLD AUTO: 7.4 FL (ref 6–12)
POTASSIUM SERPL-SCNC: 3.8 MMOL/L (ref 3.7–5.3)
RBC # BLD AUTO: 4.35 M/UL (ref 4.5–5.9)
SODIUM SERPL-SCNC: 140 MMOL/L (ref 135–144)
WBC OTHER # BLD: 11.4 K/UL (ref 3.5–11)

## 2024-01-05 PROCEDURE — 6370000000 HC RX 637 (ALT 250 FOR IP): Performed by: NURSE PRACTITIONER

## 2024-01-05 PROCEDURE — 36415 COLL VENOUS BLD VENIPUNCTURE: CPT

## 2024-01-05 PROCEDURE — 6360000002 HC RX W HCPCS: Performed by: NURSE PRACTITIONER

## 2024-01-05 PROCEDURE — 80048 BASIC METABOLIC PNL TOTAL CA: CPT

## 2024-01-05 PROCEDURE — 99232 SBSQ HOSP IP/OBS MODERATE 35: CPT | Performed by: INTERNAL MEDICINE

## 2024-01-05 PROCEDURE — 6370000000 HC RX 637 (ALT 250 FOR IP): Performed by: INTERNAL MEDICINE

## 2024-01-05 PROCEDURE — 93010 ELECTROCARDIOGRAM REPORT: CPT | Performed by: INTERNAL MEDICINE

## 2024-01-05 PROCEDURE — 51798 US URINE CAPACITY MEASURE: CPT

## 2024-01-05 PROCEDURE — 85025 COMPLETE CBC W/AUTO DIFF WBC: CPT

## 2024-01-05 PROCEDURE — 2580000003 HC RX 258: Performed by: NURSE PRACTITIONER

## 2024-01-05 PROCEDURE — 1200000000 HC SEMI PRIVATE

## 2024-01-05 RX ORDER — METOPROLOL TARTRATE 50 MG/1
50 TABLET, FILM COATED ORAL 2 TIMES DAILY
Status: DISCONTINUED | OUTPATIENT
Start: 2024-01-05 | End: 2024-01-06 | Stop reason: HOSPADM

## 2024-01-05 RX ADMIN — METOPROLOL TARTRATE 12.5 MG: 25 TABLET, FILM COATED ORAL at 08:26

## 2024-01-05 RX ADMIN — DILTIAZEM HYDROCHLORIDE 120 MG: 120 CAPSULE, COATED, EXTENDED RELEASE ORAL at 08:26

## 2024-01-05 RX ADMIN — PANTOPRAZOLE SODIUM 40 MG: 40 TABLET, DELAYED RELEASE ORAL at 05:32

## 2024-01-05 RX ADMIN — METRONIDAZOLE 500 MG: 500 INJECTION, SOLUTION INTRAVENOUS at 06:12

## 2024-01-05 RX ADMIN — METRONIDAZOLE 500 MG: 500 INJECTION, SOLUTION INTRAVENOUS at 14:30

## 2024-01-05 RX ADMIN — CEFTRIAXONE SODIUM 1000 MG: 1 INJECTION, POWDER, FOR SOLUTION INTRAMUSCULAR; INTRAVENOUS at 05:32

## 2024-01-05 RX ADMIN — SODIUM CHLORIDE, PRESERVATIVE FREE 10 ML: 5 INJECTION INTRAVENOUS at 08:25

## 2024-01-05 RX ADMIN — DOXAZOSIN 2 MG: 4 TABLET ORAL at 20:43

## 2024-01-05 RX ADMIN — APIXABAN 5 MG: 5 TABLET, FILM COATED ORAL at 08:26

## 2024-01-05 RX ADMIN — ISOSORBIDE MONONITRATE 60 MG: 60 TABLET, EXTENDED RELEASE ORAL at 08:26

## 2024-01-05 RX ADMIN — PRAVASTATIN SODIUM 20 MG: 20 TABLET ORAL at 08:26

## 2024-01-05 RX ADMIN — METRONIDAZOLE 500 MG: 500 INJECTION, SOLUTION INTRAVENOUS at 23:41

## 2024-01-05 RX ADMIN — APIXABAN 5 MG: 5 TABLET, FILM COATED ORAL at 20:43

## 2024-01-05 RX ADMIN — METOPROLOL TARTRATE 50 MG: 50 TABLET ORAL at 20:43

## 2024-01-05 RX ADMIN — SODIUM CHLORIDE: 9 INJECTION, SOLUTION INTRAVENOUS at 19:04

## 2024-01-05 NOTE — PROGRESS NOTES
Updated Dr. Khan in person of hemoglobin from 15.9 to 13.4. Pt was dehydrated and iv fluids were administered. No new orders.

## 2024-01-05 NOTE — PROGRESS NOTES
Physician Progress Note      PATIENT:               AD LAZAR  [facility] #:                  [id number]  :                       1940  ADMIT DATE:       2024 12:12 AM  DISCH DATE:  RESPONDING  PROVIDER #:        Aftab JACKSON MD          QUERY TEXT:    Patient admitted with left colitis, noted to have chronic atrial fibrillation   and is maintained on Eliquis. If possible, please document in progress notes   and discharge summary if you are evaluating and/or treating any of the   following:    The medical record reflects the following:  Risk Factors: HTN, CAD, 83 year-old male  Clinical Indicators: Per H&P \"Atrial fibrillation rate control \", Patient   anticoagulated d/t chronic A-fib  Treatment: Eliquis    Thank you,  Jono RN, CCDS  [email or web address]  Options provided:  -- Secondary hypercoagulable state in a patient with atrial fibrillation  -- Other - I will add my own diagnosis  -- Disagree - Not applicable / Not valid  -- Disagree - Clinically unable to determine / Unknown  -- Refer to Clinical Documentation Reviewer    PROVIDER RESPONSE TEXT:    This patient has secondary hypercoagulable state in a patient with atrial   fibrillation.    Query created by: Jono Diggs on 2024 12:02 PM      Electronically signed by:  Aftab JACKSON MD 2024 5:13 PM

## 2024-01-05 NOTE — PROGRESS NOTES
[facility] Internal Medicine  Loco Barcenas MD; Rajiv Nazario MD; Aftab Khan MD; MD Mai Andersen MD; Arthur Chavira MD    [facility] Internal Medicine   IN-PATIENT SERVICE   [facility]    HISTORY AND PHYSICAL EXAMINATION            Date:   1/5/2024  Patient name:  Nile Lacy  Date of admission:  1/4/2024 12:12 AM  MRN:   [id number]  Account:  [id number]  YOB: 1940  PCP:    Dewey Vera MD  Room:   2072/2072-01  Code Status:    Full Code    Chief Complaint:     Chief Complaint   Patient presents with    Rectal Bleeding   Abdo pain    History Obtained From:     Pt medical record and nursing staff    History of Present Illness:     Nile Lacy is a 83 y.o. Non- / non  male who presents with Rectal Bleeding   and is admitted to the hospital for the management of Colitis.    Nile Lacy is a 83 y.o. Non- / non  male who presents with Rectal Bleeding   and is admitted to the hospital for the management of Colitis.     Patient reports that he has been taking laxatives for constipation every 2 to 3 weeks to keep his bowels moving okay.  However, states that recently that laxative has stopped working and he is needed to increase the dose and the frequency of taking the laxative.  Reports that today he did have some bright red rectal bleeding after having a bowel movement.  Patient does report history of hemorrhoids and states that he has had to have them removed in the past.  Additionally, patient reports that he does have some left lower quadrant abdominal tenderness.  Patient is unable to name his home medications; however, he does state that he takes Eliquis and he has a known history of A-fib.    Past Medical History:     Past Medical History:   Diagnosis Date    Anxiety attack     Arthritis     Atrial fibrillation (HCC)     Atrial flutter (HCC)     Back pain     CAD (coronary artery

## 2024-01-05 NOTE — CARE COORDINATION
ONGOING DISCHARGE PLAN:    Patient is alert and oriented x4.    Spoke with patient regarding discharge plan and patient confirms that plan is still home with no needs.    IV rocephin/flagyl    Full liquid diet    Will continue to follow for additional discharge needs.    If patient is discharged prior to next notation, then this note serves as note for discharge by case management.    Electronically signed by Fina Baker RN on 1/5/2024 at 8:53 AM

## 2024-01-06 VITALS
OXYGEN SATURATION: 94 % | SYSTOLIC BLOOD PRESSURE: 137 MMHG | DIASTOLIC BLOOD PRESSURE: 76 MMHG | BODY MASS INDEX: 31.89 KG/M2 | WEIGHT: 198.41 LBS | HEIGHT: 66 IN | TEMPERATURE: 97.3 F | RESPIRATION RATE: 16 BRPM | HEART RATE: 90 BPM

## 2024-01-06 LAB
ANION GAP SERPL CALCULATED.3IONS-SCNC: 7 MMOL/L (ref 9–17)
BASOPHILS # BLD: 0.1 K/UL (ref 0–0.2)
BASOPHILS NFR BLD: 1 % (ref 0–2)
BUN SERPL-MCNC: 6 MG/DL (ref 8–23)
CALCIUM SERPL-MCNC: 8.9 MG/DL (ref 8.6–10.4)
CHLORIDE SERPL-SCNC: 109 MMOL/L (ref 98–107)
CO2 SERPL-SCNC: 25 MMOL/L (ref 20–31)
CREAT SERPL-MCNC: 0.9 MG/DL (ref 0.7–1.2)
EOSINOPHIL # BLD: 0.3 K/UL (ref 0–0.4)
EOSINOPHILS RELATIVE PERCENT: 3 % (ref 0–4)
ERYTHROCYTE [DISTWIDTH] IN BLOOD BY AUTOMATED COUNT: 14 % (ref 11.5–14.9)
GFR SERPL CREATININE-BSD FRML MDRD: >60 ML/MIN/1.73M2
GLUCOSE BLD-MCNC: 94 MG/DL (ref 75–110)
GLUCOSE SERPL-MCNC: 90 MG/DL (ref 70–99)
HCT VFR BLD AUTO: 39.7 % (ref 41–53)
HGB BLD-MCNC: 13.3 G/DL (ref 13.5–17.5)
LYMPHOCYTES NFR BLD: 1 K/UL (ref 1–4.8)
LYMPHOCYTES RELATIVE PERCENT: 12 % (ref 24–44)
MCH RBC QN AUTO: 30.9 PG (ref 26–34)
MCHC RBC AUTO-ENTMCNC: 33.5 G/DL (ref 31–37)
MCV RBC AUTO: 92.4 FL (ref 80–100)
MONOCYTES NFR BLD: 0.7 K/UL (ref 0.1–1.3)
MONOCYTES NFR BLD: 8 % (ref 1–7)
NEUTROPHILS NFR BLD: 76 % (ref 36–66)
NEUTS SEG NFR BLD: 6.5 K/UL (ref 1.3–9.1)
PLATELET # BLD AUTO: 128 K/UL (ref 150–450)
PMV BLD AUTO: 7.3 FL (ref 6–12)
POTASSIUM SERPL-SCNC: 3.8 MMOL/L (ref 3.7–5.3)
RBC # BLD AUTO: 4.3 M/UL (ref 4.5–5.9)
SODIUM SERPL-SCNC: 141 MMOL/L (ref 135–144)
WBC OTHER # BLD: 8.5 K/UL (ref 3.5–11)

## 2024-01-06 PROCEDURE — 85025 COMPLETE CBC W/AUTO DIFF WBC: CPT

## 2024-01-06 PROCEDURE — 99239 HOSP IP/OBS DSCHRG MGMT >30: CPT | Performed by: INTERNAL MEDICINE

## 2024-01-06 PROCEDURE — 2580000003 HC RX 258: Performed by: NURSE PRACTITIONER

## 2024-01-06 PROCEDURE — 82947 ASSAY GLUCOSE BLOOD QUANT: CPT

## 2024-01-06 PROCEDURE — 6360000002 HC RX W HCPCS: Performed by: NURSE PRACTITIONER

## 2024-01-06 PROCEDURE — 80048 BASIC METABOLIC PNL TOTAL CA: CPT

## 2024-01-06 PROCEDURE — 6370000000 HC RX 637 (ALT 250 FOR IP): Performed by: NURSE PRACTITIONER

## 2024-01-06 PROCEDURE — 36415 COLL VENOUS BLD VENIPUNCTURE: CPT

## 2024-01-06 PROCEDURE — 6370000000 HC RX 637 (ALT 250 FOR IP): Performed by: INTERNAL MEDICINE

## 2024-01-06 RX ADMIN — METRONIDAZOLE 500 MG: 500 INJECTION, SOLUTION INTRAVENOUS at 06:18

## 2024-01-06 RX ADMIN — PANTOPRAZOLE SODIUM 40 MG: 40 TABLET, DELAYED RELEASE ORAL at 05:35

## 2024-01-06 RX ADMIN — ISOSORBIDE MONONITRATE 60 MG: 60 TABLET, EXTENDED RELEASE ORAL at 08:55

## 2024-01-06 RX ADMIN — APIXABAN 5 MG: 5 TABLET, FILM COATED ORAL at 08:55

## 2024-01-06 RX ADMIN — DILTIAZEM HYDROCHLORIDE 120 MG: 120 CAPSULE, COATED, EXTENDED RELEASE ORAL at 08:55

## 2024-01-06 RX ADMIN — CEFTRIAXONE SODIUM 1000 MG: 1 INJECTION, POWDER, FOR SOLUTION INTRAMUSCULAR; INTRAVENOUS at 05:35

## 2024-01-06 RX ADMIN — PRAVASTATIN SODIUM 20 MG: 20 TABLET ORAL at 08:55

## 2024-01-06 RX ADMIN — METOPROLOL TARTRATE 50 MG: 50 TABLET ORAL at 08:55

## 2024-01-06 NOTE — PLAN OF CARE
Problem: Discharge Planning  Goal: Discharge to home or other facility with appropriate resources  1/5/2024 0112 by Yani Knight RN  Outcome: Progressing     Problem: Safety - Adult  Goal: Free from fall injury  1/5/2024 0112 by Yani Knight RN  Outcome: Progressing     Problem: ABCDS Injury Assessment  Goal: Absence of physical injury  1/5/2024 0112 by Yani Knight RN  Outcome: Progressing     Problem: Skin/Tissue Integrity  Goal: Absence of new skin breakdown  Description: 1.  Monitor for areas of redness and/or skin breakdown  2.  Assess vascular access sites hourly  3.  Every 4-6 hours minimum:  Change oxygen saturation probe site  4.  Every 4-6 hours:  If on nasal continuous positive airway pressure, respiratory therapy assess nares and determine need for appliance change or resting period.  1/5/2024 0112 by Yani Knight RN  Outcome: Progressing     
  Problem: Discharge Planning  Goal: Discharge to home or other facility with appropriate resources  1/5/2024 0112 by Yani Knight RN  Outcome: Progressing   Pt to discharge to home once medically cleared.   Problem: Safety - Adult  Goal: Free from fall injury  1/5/2024 0929 by Keiry Ordoñez, RN  Outcome: Progressing   No injury noted this shift.   Problem: ABCDS Injury Assessment  Goal: Absence of physical injury  1/5/2024 0929 by Keiry Ordoñez, RN  Outcome: Progressing   No injury noted this shift.   Problem: Skin/Tissue Integrity  Goal: Absence of new skin breakdown  Description: 1.  Monitor for areas of redness and/or skin breakdown  2.  Assess vascular access sites hourly  3.  Every 4-6 hours minimum:  Change oxygen saturation probe site  4.  Every 4-6 hours:  If on nasal continuous positive airway pressure, respiratory therapy assess nares and determine need for appliance change or resting period.  1/5/2024 0929 by Keiry Ordoñez, RN  Outcome: Progressing   No new skin breakdown noted. Pt continues to be excoriated to scrotum, perineum and buttock. Zinc cream continues.   
  Problem: Discharge Planning  Goal: Discharge to home or other facility with appropriate resources  1/6/2024 1240 by Roseline Mohan, RN  Outcome: Completed     Problem: Safety - Adult  Goal: Free from fall injury  1/6/2024 1240 by Roseline Mohan, RN  Outcome: Completed     Problem: ABCDS Injury Assessment  Goal: Absence of physical injury  1/6/2024 1240 by Roseline Mohan, RN  Outcome: Completed     Problem: Skin/Tissue Integrity  Goal: Absence of new skin breakdown  1/6/2024 1240 by Roseline Mohan, RN  Outcome: Completed     
  Problem: Discharge Planning  Goal: Discharge to home or other facility with appropriate resources  Outcome: Progressing     Problem: Safety - Adult  Goal: Free from fall injury  Outcome: Progressing     Problem: ABCDS Injury Assessment  Goal: Absence of physical injury  Outcome: Progressing     Problem: Skin/Tissue Integrity  Goal: Absence of new skin breakdown  Outcome: Progressing     
  Problem: Discharge Planning  Goal: Discharge to home or other facility with appropriate resources  Outcome: Progressing  Flowsheets (Taken 1/4/2024 9642)  Discharge to home or other facility with appropriate resources:   Identify barriers to discharge with patient and caregiver   Identify discharge learning needs (meds, wound care, etc)   Arrange for needed discharge resources and transportation as appropriate   Refer to discharge planning if patient needs post-hospital services based on physician order or complex needs related to functional status, cognitive ability or social support system     Problem: Safety - Adult  Goal: Free from fall injury  Outcome: Progressing     Problem: ABCDS Injury Assessment  Goal: Absence of physical injury  Outcome: Progressing     Problem: Skin/Tissue Integrity  Goal: Absence of new skin breakdown  Description: 1.  Monitor for areas of redness and/or skin breakdown  2.  Assess vascular access sites hourly  3.  Every 4-6 hours minimum:  Change oxygen saturation probe site  4.  Every 4-6 hours:  If on nasal continuous positive airway pressure, respiratory therapy assess nares and determine need for appliance change or resting period.  Outcome: Progressing     
Patient BIBA: patient reports "Right lower abdominal pain for and few weeks. I saw my own doctor but it is still there." Patient also reports productive cough "last few days." + smoker. Denies fever, chills, nausea , vomiting or diarrhea. Patient with no overt or covert signs of pain: rates pain 10/10

## 2024-01-06 NOTE — PROGRESS NOTES
Patient d/c'd home with his daughter states he understands all d/c orders he was walked down stair to car my [facility] and his gait was steady.

## 2024-01-06 NOTE — CARE COORDINATION
ONGOING DISCHARGE PLAN:    Patient is alert and oriented x4.    Spoke with patient regarding discharge plan and patient confirms that plan is still home with no needs.    IV rocephin/flagyl    Regular diet    Will continue to follow for additional discharge needs.    If patient is discharged prior to next notation, then this note serves as note for discharge by case management.    Electronically signed by Fina Baker RN on 1/6/2024 at 10:12 AM

## 2024-01-06 NOTE — DISCHARGE SUMMARY
the single arterial phase sequence. 3. Cholelithiasis. 4. Small hiatal hernia.         Consultations:    Consults:     Final Specialist Recommendations/Findings:   None      The patient was seen and examined on day of discharge and this discharge summary is in conjunction with any daily progress note from day of discharge.    Discharge plan:     Disposition: Home    Physician Follow Up:   Sal Avendano MD  [address]  [address] [id number] [phone]    Follow up in 1 week(s)         Requiring Further Evaluation/Follow Up POST HOSPITALIZATION/Incidental Findings:    Diet: cardiac diet    Activity: As tolerated    Instructions to Patient:     Discharge Medications:      Medication List        CONTINUE taking these medications      acetaminophen 325 MG tablet  Commonly known as: TYLENOL     aspirin 81 MG chewable tablet  Take 1 tablet by mouth daily     dilTIAZem 120 MG extended release capsule  Commonly known as: CARDIZEM CD  Take 1 capsule by mouth daily     docusate sodium 100 MG capsule  Commonly known as: Colace  Take 1 capsule by mouth 2 times daily     doxazosin 2 MG tablet  Commonly known as: CARDURA     Eliquis 5 MG Tabs tablet  Generic drug: apixaban     isosorbide mononitrate 60 MG extended release tablet  Commonly known as: IMDUR  Take 1 tablet by mouth daily     metoprolol tartrate 25 MG tablet  Commonly known as: LOPRESSOR  Take 0.5 tablets by mouth 2 times daily     nitroGLYCERIN 0.4 MG SL tablet  Commonly known as: NITROSTAT  up to max of 3 total doses. If no relief after 1 dose, call 911.     omeprazole 20 MG delayed release capsule  Commonly known as: PRILOSEC     pravastatin 40 MG tablet  Commonly known as: PRAVACHOL               Where to Get Your Medications        These medications were sent to [facility] - [place], [place] - [address] - P [phone] - F [phone]  [address], [facility] [address]      Phone: [phone]   metoprolol tartrate 25 MG  tablet         Time Spent on discharge is  35 mins in patient examination, evaluation, counseling as well as medication reconciliation, prescriptions for required medications, discharge plan and follow up.    Electronically signed by   Aftab Khan MD  1/6/2024  10:03 AM      Thank you Dewey Marin MD for the opportunity to be involved in this patient's care.

## 2024-01-07 ENCOUNTER — HOSPITAL ENCOUNTER (EMERGENCY)
Age: 84
Discharge: HOME | End: 2024-01-08
Attending: STUDENT IN AN ORGANIZED HEALTH CARE EDUCATION/TRAINING PROGRAM
Payer: MEDICARE

## 2024-01-07 DIAGNOSIS — N30.00 ACUTE CYSTITIS WITHOUT HEMATURIA: Primary | ICD-10-CM

## 2024-01-07 DIAGNOSIS — R39.198 DIFFICULTY URINATING: ICD-10-CM

## 2024-01-07 PROCEDURE — 99283 EMERGENCY DEPT VISIT LOW MDM: CPT

## 2024-01-07 PROCEDURE — 51798 US URINE CAPACITY MEASURE: CPT

## 2024-01-07 PROCEDURE — 51702 INSERT TEMP BLADDER CATH: CPT

## 2024-01-07 RX ORDER — LIDOCAINE HYDROCHLORIDE 20 MG/ML
JELLY TOPICAL ONCE
Status: COMPLETED | OUTPATIENT
Start: 2024-01-08 | End: 2024-01-08

## 2024-01-07 ASSESSMENT — ENCOUNTER SYMPTOMS
VOMITING: 0
RHINORRHEA: 0
NAUSEA: 0
EYE REDNESS: 0
SHORTNESS OF BREATH: 0
SORE THROAT: 0
ABDOMINAL PAIN: 0
DIARRHEA: 0
EYE DISCHARGE: 0
CHEST TIGHTNESS: 0

## 2024-01-07 ASSESSMENT — PAIN - FUNCTIONAL ASSESSMENT: PAIN_FUNCTIONAL_ASSESSMENT: NONE - DENIES PAIN

## 2024-01-08 VITALS
OXYGEN SATURATION: 97 % | TEMPERATURE: 97.6 F | DIASTOLIC BLOOD PRESSURE: 69 MMHG | WEIGHT: 200 LBS | BODY MASS INDEX: 32.14 KG/M2 | HEART RATE: 107 BPM | HEIGHT: 66 IN | RESPIRATION RATE: 16 BRPM | SYSTOLIC BLOOD PRESSURE: 115 MMHG

## 2024-01-08 LAB
ALBUMIN SERPL-MCNC: 3.5 G/DL (ref 3.5–5.2)
ALP SERPL-CCNC: 56 U/L (ref 40–129)
ALT SERPL-CCNC: 17 U/L (ref 5–41)
ANION GAP SERPL CALCULATED.3IONS-SCNC: 9 MMOL/L (ref 9–17)
AST SERPL-CCNC: 31 U/L
BACTERIA URNS QL MICRO: ABNORMAL
BASOPHILS # BLD: 0.1 K/UL (ref 0–0.2)
BASOPHILS NFR BLD: 1 % (ref 0–2)
BILIRUB SERPL-MCNC: 0.4 MG/DL (ref 0.3–1.2)
BILIRUB UR QL STRIP: NEGATIVE
BUN SERPL-MCNC: 11 MG/DL (ref 8–23)
CALCIUM SERPL-MCNC: 9 MG/DL (ref 8.6–10.4)
CASTS #/AREA URNS LPF: ABNORMAL /LPF
CASTS #/AREA URNS LPF: ABNORMAL /LPF
CHLORIDE SERPL-SCNC: 108 MMOL/L (ref 98–107)
CLARITY UR: CLEAR
CO2 SERPL-SCNC: 25 MMOL/L (ref 20–31)
COLOR UR: YELLOW
CREAT SERPL-MCNC: 1 MG/DL (ref 0.7–1.2)
CRYSTALS URNS MICRO: ABNORMAL /HPF
CRYSTALS URNS MICRO: ABNORMAL /HPF
EOSINOPHIL # BLD: 0.3 K/UL (ref 0–0.4)
EOSINOPHILS RELATIVE PERCENT: 5 % (ref 0–4)
EPI CELLS #/AREA URNS HPF: ABNORMAL /HPF
ERYTHROCYTE [DISTWIDTH] IN BLOOD BY AUTOMATED COUNT: 14.1 % (ref 11.5–14.9)
GFR SERPL CREATININE-BSD FRML MDRD: >60 ML/MIN/1.73M2
GLUCOSE SERPL-MCNC: 128 MG/DL (ref 70–99)
GLUCOSE UR STRIP-MCNC: NEGATIVE MG/DL
HCT VFR BLD AUTO: 39.5 % (ref 41–53)
HGB BLD-MCNC: 13.6 G/DL (ref 13.5–17.5)
HGB UR QL STRIP.AUTO: ABNORMAL
KETONES UR STRIP-MCNC: ABNORMAL MG/DL
LEUKOCYTE ESTERASE UR QL STRIP: ABNORMAL
LYMPHOCYTES NFR BLD: 1.3 K/UL (ref 1–4.8)
LYMPHOCYTES RELATIVE PERCENT: 22 % (ref 24–44)
MCH RBC QN AUTO: 31.4 PG (ref 26–34)
MCHC RBC AUTO-ENTMCNC: 34.5 G/DL (ref 31–37)
MCV RBC AUTO: 91.1 FL (ref 80–100)
MONOCYTES NFR BLD: 0.7 K/UL (ref 0.1–1.3)
MONOCYTES NFR BLD: 12 % (ref 1–7)
NEUTROPHILS NFR BLD: 60 % (ref 36–66)
NEUTS SEG NFR BLD: 3.5 K/UL (ref 1.3–9.1)
NITRITE UR QL STRIP: NEGATIVE
PH UR STRIP: 5 [PH] (ref 5–8)
PLATELET # BLD AUTO: 148 K/UL (ref 150–450)
PMV BLD AUTO: 7.6 FL (ref 6–12)
POTASSIUM SERPL-SCNC: 3.4 MMOL/L (ref 3.7–5.3)
PROT SERPL-MCNC: 6.1 G/DL (ref 6.4–8.3)
PROT UR STRIP-MCNC: NEGATIVE MG/DL
RBC # BLD AUTO: 4.33 M/UL (ref 4.5–5.9)
RBC #/AREA URNS HPF: ABNORMAL /HPF
SODIUM SERPL-SCNC: 142 MMOL/L (ref 135–144)
SP GR UR STRIP: 1.02 (ref 1–1.03)
UROBILINOGEN UR STRIP-ACNC: NORMAL EU/DL (ref 0–1)
WBC #/AREA URNS HPF: ABNORMAL /HPF
WBC OTHER # BLD: 5.9 K/UL (ref 3.5–11)

## 2024-01-08 PROCEDURE — 81001 URINALYSIS AUTO W/SCOPE: CPT

## 2024-01-08 PROCEDURE — 85025 COMPLETE CBC W/AUTO DIFF WBC: CPT

## 2024-01-08 PROCEDURE — 36415 COLL VENOUS BLD VENIPUNCTURE: CPT

## 2024-01-08 PROCEDURE — 80053 COMPREHEN METABOLIC PANEL: CPT

## 2024-01-08 PROCEDURE — 6370000000 HC RX 637 (ALT 250 FOR IP): Performed by: STUDENT IN AN ORGANIZED HEALTH CARE EDUCATION/TRAINING PROGRAM

## 2024-01-08 RX ORDER — CEPHALEXIN 250 MG/1
500 CAPSULE ORAL ONCE
Status: COMPLETED | OUTPATIENT
Start: 2024-01-08 | End: 2024-01-08

## 2024-01-08 RX ORDER — CEPHALEXIN 500 MG/1
500 CAPSULE ORAL 3 TIMES DAILY
Qty: 21 CAPSULE | Refills: 0 | Status: SHIPPED | OUTPATIENT
Start: 2024-01-08 | End: 2024-01-15

## 2024-01-08 RX ORDER — NITROFURANTOIN 25; 75 MG/1; MG/1
100 CAPSULE ORAL ONCE
Status: DISCONTINUED | OUTPATIENT
Start: 2024-01-08 | End: 2024-01-08

## 2024-01-08 RX ADMIN — LIDOCAINE HYDROCHLORIDE: 20 JELLY TOPICAL at 00:23

## 2024-01-08 RX ADMIN — CEPHALEXIN 500 MG: 250 CAPSULE ORAL at 01:36

## 2024-01-08 NOTE — ED PROVIDER NOTES
[BARBARA]   0118 Microscopic Urinalysis(!):    WBC, UA 3 to 5(!)   RBC, UA 6 TO 9(!)   Casts UA HYALINE(!)   Casts UA 0 TO 2(!)   Crystals, UA CALCIUM OXALATE(!)   Crystals, UA FEW(!)   Epithelial Cells, UA 3 to 5   Bacteria, UA FEW(!)  UTI [BARBARA]      ED Course User Index  [BARBARA] Kingston López MD       Patient repeat assessment: Patient states he did get relief after the Moreno was placed    He states he just feels like the muscles in his abdomen are bit sore    He actually was just admitted with abdominal pain which is significantly improved since admission and CT with colitis    Disposition discussion with patient/family, Shared Decision Making: Discussed with the patient    We discussed that upon arrival his bladder scan only really had about 100 and to 200 cc    He did not have significant drainage after Moreno catheter    I did offer to discharge with Moreno in place and follow-up with urology or have it removed he prefers to have it removed    We discussed taking his antibiotics as prescribed.  Returning for any worsening of abdominal pain fevers vomiting any inability urinate within 12 hours      This is a 83-year-old male presenting with sensation that he is having difficulty urinating and some suprapubic discomfort    Workup reveals a possible urinary tract infection otherwise has labs    His abdomen is soft without any significant tenderness    Low suspicion for surgical abdomen, perforation, appendicitis, diverticulitis    Suspect UTI or BPH    Will treat with abx, urology and pcp close follow up and return precautions              DATA FOR LAB AND RADIOLOGY TESTS ORDERED BELOW ARE REVIEWED BY THE ED CLINICIAN:    RADIOLOGY: All x-rays, CT, MRI, and formal ultrasound images (except ED bedside ultrasound) are read by the radiologist, see reports below, unless otherwise noted in MDM or here.  Reports below are reviewed by myself.  No orders to display       LABS: Lab orders shown below, the results are reviewed by

## 2024-01-08 NOTE — ED NOTES
Mode of arrival (squad #, walk in, police, etc) : walk in         Chief complaint(s): unable to urinate         Arrival Note (brief scenario, treatment PTA, etc).: pt has only urinated 2x today. Had recent gil insertion/removal        C= \"Have you ever felt that you should Cut down on your drinking?\"  No  A= \"Have people Annoyed you by criticizing your drinking?\"  No  G= \"Have you ever felt bad or Guilty about your drinking?\"  No  E= \"Have you ever had a drink as an Eye-opener first thing in the morning to steady your nerves or to help a hangover?\"  No      Deferred []      Reason for deferring: N/A    *If yes to two or more: probable alcohol abuse.*

## 2024-01-22 ENCOUNTER — OFFICE VISIT (OUTPATIENT)
Dept: UROLOGY | Age: 84
End: 2024-01-22
Payer: MEDICARE

## 2024-01-22 VITALS
SYSTOLIC BLOOD PRESSURE: 124 MMHG | TEMPERATURE: 96.8 F | DIASTOLIC BLOOD PRESSURE: 84 MMHG | HEIGHT: 66 IN | BODY MASS INDEX: 32.14 KG/M2 | WEIGHT: 200 LBS | HEART RATE: 78 BPM

## 2024-01-22 DIAGNOSIS — R33.9 INCOMPLETE BLADDER EMPTYING: ICD-10-CM

## 2024-01-22 DIAGNOSIS — N40.1 BPH WITH OBSTRUCTION/LOWER URINARY TRACT SYMPTOMS: ICD-10-CM

## 2024-01-22 DIAGNOSIS — N13.8 BPH WITH OBSTRUCTION/LOWER URINARY TRACT SYMPTOMS: ICD-10-CM

## 2024-01-22 DIAGNOSIS — R39.198 DIFFICULTY URINATING: Primary | ICD-10-CM

## 2024-01-22 LAB — POST VOID RESIDUAL (PVR): 336 ML

## 2024-01-22 PROCEDURE — 3079F DIAST BP 80-89 MM HG: CPT | Performed by: UROLOGY

## 2024-01-22 PROCEDURE — 51798 US URINE CAPACITY MEASURE: CPT | Performed by: UROLOGY

## 2024-01-22 PROCEDURE — 3074F SYST BP LT 130 MM HG: CPT | Performed by: UROLOGY

## 2024-01-22 PROCEDURE — 1123F ACP DISCUSS/DSCN MKR DOCD: CPT | Performed by: UROLOGY

## 2024-01-22 PROCEDURE — 99204 OFFICE O/P NEW MOD 45 MIN: CPT | Performed by: UROLOGY

## 2024-01-22 RX ORDER — SILODOSIN 8 MG/1
8 CAPSULE ORAL EVERY EVENING
Qty: 30 CAPSULE | Refills: 11 | Status: SHIPPED | OUTPATIENT
Start: 2024-01-22

## 2024-01-22 ASSESSMENT — ENCOUNTER SYMPTOMS
GASTROINTESTINAL NEGATIVE: 1
COUGH: 0
DIARRHEA: 0
ABDOMINAL PAIN: 0
RESPIRATORY NEGATIVE: 1
EYE REDNESS: 0
VOMITING: 0
WHEEZING: 0
NAUSEA: 0
SHORTNESS OF BREATH: 0
EYES NEGATIVE: 1
CONSTIPATION: 0
EYE PAIN: 0
BACK PAIN: 0

## 2024-01-22 NOTE — PROGRESS NOTES
Review of Systems   Constitutional: Negative.  Negative for appetite change, chills and fatigue.   Eyes: Negative.  Negative for pain, redness and visual disturbance.   Respiratory: Negative.  Negative for cough, shortness of breath and wheezing.    Cardiovascular: Negative.  Negative for chest pain and leg swelling.   Gastrointestinal: Negative.  Negative for abdominal pain, constipation, diarrhea, nausea and vomiting.   Genitourinary:  Positive for decreased urine volume, difficulty urinating and urgency. Negative for dysuria, flank pain, frequency and hematuria.   Musculoskeletal: Negative.  Negative for back pain, joint swelling and myalgias.   Skin: Negative.  Negative for rash and wound.   Neurological: Negative.  Negative for dizziness, weakness and numbness.   Hematological: Negative.  Does not bruise/bleed easily.     
tablet Take 1 tablet by mouth nightly      nitroGLYCERIN (NITROSTAT) 0.4 MG SL tablet up to max of 3 total doses. If no relief after 1 dose, call 911. 25 tablet 3    acetaminophen (TYLENOL) 325 MG tablet Take 2 tablets by mouth 2 times daily as needed for Pain      pravastatin (PRAVACHOL) 40 MG tablet Take 0.5 tablets by mouth daily      omeprazole (PRILOSEC) 20 MG capsule Take 1 capsule by mouth daily          Oxycodone, Pcn [penicillins], and Warfarin and related  Social History     Tobacco Use   Smoking Status Former    Current packs/day: 0.00    Types: Cigarettes    Quit date:     Years since quittin.0   Smokeless Tobacco Never      (If patient a smoker, smoking cessation counseling offered)   Social History     Substance and Sexual Activity   Alcohol Use No       REVIEW OF SYSTEMS:  Review of Systems    Physical Exam:    This a 83 y.o. male   Vitals:    24 1422   BP: 124/84   Pulse: 78   Temp: 96.8 °F (36 °C)     Body mass index is 32.28 kg/m².    Physical Exam  Constitutional: Patient in no acute distress.  Neuro: Alert and oriented to person, place and time.  Psych: Mood normal, affect normal  Skin: No rash noted  HEENT: Head: Normocephalic and atraumatic  Conjunctivae and EOM are normal. Pupils are equal, round  Nose: Normal  Right External Ear: Normal; Left External Ear: Normal  Mouth: Mucosa Moist  Neck: Supple  Lungs:Respiratory effort is normal  Cardiovascular: Warm & Pink  Abdomen: Soft, non-tender, non-distendedwith no CVA,  No flank tenderness,  Orhepatosplenomegaly   Lymphatics: No palpable lymphadenopathy.  Bladder non-tender and not distended.  Musculoskeletal: Normal gait and station  Penis normal and circumcised  Urethral meatus normal          Assessment and Plan      1. Difficulty urinating    2. BPH with obstruction/lower urinary tract symptoms    3. Incomplete bladder emptying           Plan:  Cysto for bph  Hold off on Moreno catheter for now as patient is comfortable  May need

## 2024-02-12 RX ORDER — SODIUM CHLORIDE, SODIUM LACTATE, POTASSIUM CHLORIDE, CALCIUM CHLORIDE 600; 310; 30; 20 MG/100ML; MG/100ML; MG/100ML; MG/100ML
INJECTION, SOLUTION INTRAVENOUS CONTINUOUS
Status: CANCELLED | OUTPATIENT
Start: 2024-02-12

## 2024-02-12 NOTE — DISCHARGE INSTRUCTIONS
Pre-operative Instructions    Please arrive at the surgery center by 9:00 AM on 3/6/2024  (or as directed by your surgeon's office). See Directons to Surgery Center below.                FASTING    NOTHING TO EAT OR DRINK AFTER MIDNIGHT the night prior to surgery (This includes gum, candy, mints, chewing tobacco, etc).     (Follow bowel prep instructions if instructed by your surgeon.)                MEDICATIONS    What to STOP: ANY BLOOD THINNING MEDICATION(S) as directed by your surgeon or prescribing physician.  FAILURE TO STOP CERTAIN MEDICATIONS MAY INTERFERE WITH YOUR SCHEDULED SURGERY.      According to the medication list you provided today, PLEASE STOP: ELIQUIS AND ASPIRIN, AS DIRECTED BY YOUR PRESCRIBING PROVIDER    2. What to CONTINUE leading up to your surgery:   Please take all your other daily medications except the medications listed above that you were instructed to hold.    3. What to TAKE MORNING OF SURGERY with SMALL SIP OF WATER: doxazosin (Cardura), diltiazem (Cardizem), isosorbide mononitrate (Imdur), metoprolol (Lopressor), omeprazole (Prilosec)    PLEASE NOTE: ANY \"STOPPED\" MEDICATIONS MAY BE DUE TO CLEANING UP MEDICATION LIST DURING THIS VISIT.                        IF APPLICABLE:  -If you have been given a blood band, you must bring it with you the day of surgery, unclasped.  -Use routine inhalers and bring inhalers the day of surgery.   -Bring C-Pap/Bi-pap with you morning of surgery if planning on staying in the hospital overnight.  -Do not take diabetic medications on the day of surgery.  -Any weekly antidiabetic injections must be stopped one week prior to surgery and plan discussed with prescribing provider.                             OTHER IMPORTANT REMINDERS    1) You may be required to provide a urine sample upon your arrival to the pre-op area, so please take this into consideration.     2) If  NOT planning on staying in the hospital overnight :    A.You will need an adult

## 2024-02-14 ENCOUNTER — TELEPHONE (OUTPATIENT)
Dept: UROLOGY | Age: 84
End: 2024-02-14

## 2024-02-14 NOTE — TELEPHONE ENCOUNTER
Contacted patient to follow up in regards to wanting to cancel Greenlight procedure. Patient states that he is feeling fine at this time and not having any issues, so he would like to cancel. Patient states that he does not wish to schedule an appointment at this time. Patient informed that a note will be put in and he is to contact office if needed. Patient verbalizes understanding and call was ended.     6 month recall placed

## 2024-02-19 ENCOUNTER — HOSPITAL ENCOUNTER (OUTPATIENT)
Dept: PREADMISSION TESTING | Age: 84
Discharge: HOME | End: 2024-02-19

## 2024-03-20 ENCOUNTER — APPOINTMENT (OUTPATIENT)
Dept: CT IMAGING | Age: 84
DRG: 871 | End: 2024-03-20
Payer: MEDICARE

## 2024-03-20 ENCOUNTER — APPOINTMENT (OUTPATIENT)
Dept: GENERAL RADIOLOGY | Age: 84
DRG: 871 | End: 2024-03-20
Payer: MEDICARE

## 2024-03-20 ENCOUNTER — HOSPITAL ENCOUNTER (INPATIENT)
Age: 84
LOS: 9 days | Discharge: NURSING, REHAB OR LONG-TERM CARE | DRG: 871 | End: 2024-03-29
Attending: EMERGENCY MEDICINE | Admitting: INTERNAL MEDICINE
Payer: MEDICARE

## 2024-03-20 DIAGNOSIS — R41.82 ALTERED MENTAL STATUS, UNSPECIFIED ALTERED MENTAL STATUS TYPE: ICD-10-CM

## 2024-03-20 DIAGNOSIS — N30.01 ACUTE CYSTITIS WITH HEMATURIA: Primary | ICD-10-CM

## 2024-03-20 PROBLEM — N39.0 COMPLICATED UTI (URINARY TRACT INFECTION): Status: ACTIVE | Noted: 2024-03-20

## 2024-03-20 LAB
ALBUMIN SERPL-MCNC: 3.9 G/DL (ref 3.5–5.2)
ALP SERPL-CCNC: 70 U/L (ref 40–129)
ALT SERPL-CCNC: 8 U/L (ref 5–41)
ANION GAP SERPL CALCULATED.3IONS-SCNC: 9 MMOL/L (ref 9–17)
AST SERPL-CCNC: 13 U/L
BACTERIA URNS QL MICRO: ABNORMAL
BASOPHILS # BLD: 0 K/UL (ref 0–0.2)
BASOPHILS NFR BLD: 0 % (ref 0–2)
BILIRUB SERPL-MCNC: 0.8 MG/DL (ref 0.3–1.2)
BILIRUB UR QL STRIP: NEGATIVE
BUN SERPL-MCNC: 15 MG/DL (ref 8–23)
CALCIUM SERPL-MCNC: 9.7 MG/DL (ref 8.6–10.4)
CASTS #/AREA URNS LPF: ABNORMAL /LPF
CHLORIDE SERPL-SCNC: 105 MMOL/L (ref 98–107)
CLARITY UR: ABNORMAL
CO2 SERPL-SCNC: 25 MMOL/L (ref 20–31)
COLOR UR: YELLOW
CREAT SERPL-MCNC: 0.9 MG/DL (ref 0.7–1.2)
EOSINOPHIL # BLD: 0.22 K/UL (ref 0–0.4)
EOSINOPHILS RELATIVE PERCENT: 2 % (ref 0–4)
EPI CELLS #/AREA URNS HPF: ABNORMAL /HPF
ERYTHROCYTE [DISTWIDTH] IN BLOOD BY AUTOMATED COUNT: 13.4 % (ref 11.5–14.9)
FLUAV RNA RESP QL NAA+PROBE: NOT DETECTED
FLUBV RNA RESP QL NAA+PROBE: NOT DETECTED
GFR SERPL CREATININE-BSD FRML MDRD: >60 ML/MIN/1.73M2
GLUCOSE SERPL-MCNC: 110 MG/DL (ref 70–99)
GLUCOSE UR STRIP-MCNC: NEGATIVE MG/DL
HCT VFR BLD AUTO: 45.3 % (ref 41–53)
HGB BLD-MCNC: 15.4 G/DL (ref 13.5–17.5)
HGB UR QL STRIP.AUTO: ABNORMAL
INR PPP: 1.1
KETONES UR STRIP-MCNC: ABNORMAL MG/DL
LACTATE BLDV-SCNC: 1.7 MMOL/L (ref 0.5–1.9)
LEUKOCYTE ESTERASE UR QL STRIP: ABNORMAL
LIPASE SERPL-CCNC: 32 U/L (ref 13–60)
LYMPHOCYTES NFR BLD: 0.33 K/UL (ref 1–4.8)
LYMPHOCYTES RELATIVE PERCENT: 3 % (ref 24–44)
MAGNESIUM SERPL-MCNC: 1.8 MG/DL (ref 1.6–2.6)
MCH RBC QN AUTO: 31.4 PG (ref 26–34)
MCHC RBC AUTO-ENTMCNC: 34 G/DL (ref 31–37)
MCV RBC AUTO: 92.3 FL (ref 80–100)
MONOCYTES NFR BLD: 0.22 K/UL (ref 0.1–1.3)
MONOCYTES NFR BLD: 2 % (ref 1–7)
MORPHOLOGY: NORMAL
NEUTROPHILS NFR BLD: 93 % (ref 36–66)
NEUTS SEG NFR BLD: 10.23 K/UL (ref 1.3–9.1)
NITRITE UR QL STRIP: POSITIVE
PH UR STRIP: 6 [PH] (ref 5–8)
PLATELET # BLD AUTO: 137 K/UL (ref 150–450)
PMV BLD AUTO: 7.7 FL (ref 6–12)
POTASSIUM SERPL-SCNC: 4 MMOL/L (ref 3.7–5.3)
PROCALCITONIN SERPL-MCNC: 0.12 NG/ML
PROT SERPL-MCNC: 6.9 G/DL (ref 6.4–8.3)
PROT UR STRIP-MCNC: NEGATIVE MG/DL
PROTHROMBIN TIME: 14.6 SEC (ref 11.8–14.6)
RBC # BLD AUTO: 4.91 M/UL (ref 4.5–5.9)
RBC #/AREA URNS HPF: ABNORMAL /HPF
SARS-COV-2 RNA RESP QL NAA+PROBE: NOT DETECTED
SODIUM SERPL-SCNC: 139 MMOL/L (ref 135–144)
SOURCE: NORMAL
SP GR UR STRIP: 1.02 (ref 1–1.03)
SPECIMEN DESCRIPTION: NORMAL
TROPONIN I SERPL HS-MCNC: 15 NG/L (ref 0–22)
UROBILINOGEN UR STRIP-ACNC: NORMAL EU/DL (ref 0–1)
WBC #/AREA URNS HPF: ABNORMAL /HPF
WBC OTHER # BLD: 11 K/UL (ref 3.5–11)

## 2024-03-20 PROCEDURE — 96374 THER/PROPH/DIAG INJ IV PUSH: CPT

## 2024-03-20 PROCEDURE — 83735 ASSAY OF MAGNESIUM: CPT

## 2024-03-20 PROCEDURE — 85025 COMPLETE CBC W/AUTO DIFF WBC: CPT

## 2024-03-20 PROCEDURE — 6360000002 HC RX W HCPCS: Performed by: EMERGENCY MEDICINE

## 2024-03-20 PROCEDURE — 84145 PROCALCITONIN (PCT): CPT

## 2024-03-20 PROCEDURE — 87205 SMEAR GRAM STAIN: CPT

## 2024-03-20 PROCEDURE — 99285 EMERGENCY DEPT VISIT HI MDM: CPT

## 2024-03-20 PROCEDURE — 83690 ASSAY OF LIPASE: CPT

## 2024-03-20 PROCEDURE — 81001 URINALYSIS AUTO W/SCOPE: CPT

## 2024-03-20 PROCEDURE — 2060000000 HC ICU INTERMEDIATE R&B

## 2024-03-20 PROCEDURE — 83605 ASSAY OF LACTIC ACID: CPT

## 2024-03-20 PROCEDURE — 2580000003 HC RX 258: Performed by: EMERGENCY MEDICINE

## 2024-03-20 PROCEDURE — 87040 BLOOD CULTURE FOR BACTERIA: CPT

## 2024-03-20 PROCEDURE — 87077 CULTURE AEROBIC IDENTIFY: CPT

## 2024-03-20 PROCEDURE — 70450 CT HEAD/BRAIN W/O DYE: CPT

## 2024-03-20 PROCEDURE — 85610 PROTHROMBIN TIME: CPT

## 2024-03-20 PROCEDURE — 80053 COMPREHEN METABOLIC PANEL: CPT

## 2024-03-20 PROCEDURE — 84484 ASSAY OF TROPONIN QUANT: CPT

## 2024-03-20 PROCEDURE — 71045 X-RAY EXAM CHEST 1 VIEW: CPT

## 2024-03-20 PROCEDURE — 36415 COLL VENOUS BLD VENIPUNCTURE: CPT

## 2024-03-20 PROCEDURE — 87186 SC STD MICRODIL/AGAR DIL: CPT

## 2024-03-20 PROCEDURE — 87636 SARSCOV2 & INF A&B AMP PRB: CPT

## 2024-03-20 PROCEDURE — 93005 ELECTROCARDIOGRAM TRACING: CPT | Performed by: EMERGENCY MEDICINE

## 2024-03-20 PROCEDURE — 87154 CUL TYP ID BLD PTHGN 6+ TRGT: CPT

## 2024-03-20 RX ORDER — POTASSIUM CHLORIDE 20 MEQ/1
40 TABLET, EXTENDED RELEASE ORAL PRN
Status: DISCONTINUED | OUTPATIENT
Start: 2024-03-20 | End: 2024-03-29 | Stop reason: HOSPADM

## 2024-03-20 RX ORDER — DOCUSATE SODIUM 100 MG/1
100 CAPSULE, LIQUID FILLED ORAL 2 TIMES DAILY
Status: DISCONTINUED | OUTPATIENT
Start: 2024-03-21 | End: 2024-03-29 | Stop reason: HOSPADM

## 2024-03-20 RX ORDER — PRAVASTATIN SODIUM 20 MG
20 TABLET ORAL DAILY
Status: DISCONTINUED | OUTPATIENT
Start: 2024-03-21 | End: 2024-03-21

## 2024-03-20 RX ORDER — ACETAMINOPHEN 650 MG/1
650 SUPPOSITORY RECTAL EVERY 6 HOURS PRN
Status: DISCONTINUED | OUTPATIENT
Start: 2024-03-20 | End: 2024-03-29 | Stop reason: HOSPADM

## 2024-03-20 RX ORDER — DILTIAZEM HYDROCHLORIDE 120 MG/1
120 CAPSULE, COATED, EXTENDED RELEASE ORAL DAILY
Status: DISCONTINUED | OUTPATIENT
Start: 2024-03-21 | End: 2024-03-22

## 2024-03-20 RX ORDER — SODIUM CHLORIDE 0.9 % (FLUSH) 0.9 %
5-40 SYRINGE (ML) INJECTION EVERY 12 HOURS SCHEDULED
Status: DISCONTINUED | OUTPATIENT
Start: 2024-03-21 | End: 2024-03-29 | Stop reason: HOSPADM

## 2024-03-20 RX ORDER — MAGNESIUM SULFATE HEPTAHYDRATE 40 MG/ML
2000 INJECTION, SOLUTION INTRAVENOUS PRN
Status: DISCONTINUED | OUTPATIENT
Start: 2024-03-20 | End: 2024-03-29 | Stop reason: HOSPADM

## 2024-03-20 RX ORDER — ONDANSETRON 4 MG/1
4 TABLET, ORALLY DISINTEGRATING ORAL EVERY 8 HOURS PRN
Status: DISCONTINUED | OUTPATIENT
Start: 2024-03-20 | End: 2024-03-29 | Stop reason: HOSPADM

## 2024-03-20 RX ORDER — SODIUM CHLORIDE 0.9 % (FLUSH) 0.9 %
5-40 SYRINGE (ML) INJECTION PRN
Status: DISCONTINUED | OUTPATIENT
Start: 2024-03-20 | End: 2024-03-29 | Stop reason: HOSPADM

## 2024-03-20 RX ORDER — ASPIRIN 81 MG/1
81 TABLET, CHEWABLE ORAL DAILY
Status: DISCONTINUED | OUTPATIENT
Start: 2024-03-21 | End: 2024-03-29 | Stop reason: HOSPADM

## 2024-03-20 RX ORDER — SODIUM CHLORIDE 9 MG/ML
INJECTION, SOLUTION INTRAVENOUS CONTINUOUS
Status: ACTIVE | OUTPATIENT
Start: 2024-03-21 | End: 2024-03-23

## 2024-03-20 RX ORDER — ISOSORBIDE MONONITRATE 60 MG/1
60 TABLET, EXTENDED RELEASE ORAL DAILY
Status: DISCONTINUED | OUTPATIENT
Start: 2024-03-21 | End: 2024-03-29 | Stop reason: HOSPADM

## 2024-03-20 RX ORDER — ONDANSETRON 2 MG/ML
4 INJECTION INTRAMUSCULAR; INTRAVENOUS EVERY 6 HOURS PRN
Status: DISCONTINUED | OUTPATIENT
Start: 2024-03-20 | End: 2024-03-29 | Stop reason: HOSPADM

## 2024-03-20 RX ORDER — ACETAMINOPHEN 325 MG/1
650 TABLET ORAL EVERY 6 HOURS PRN
Status: DISCONTINUED | OUTPATIENT
Start: 2024-03-20 | End: 2024-03-29 | Stop reason: HOSPADM

## 2024-03-20 RX ORDER — DOXAZOSIN MESYLATE 1 MG/1
2 TABLET ORAL NIGHTLY
Status: DISCONTINUED | OUTPATIENT
Start: 2024-03-21 | End: 2024-03-29 | Stop reason: HOSPADM

## 2024-03-20 RX ORDER — POLYETHYLENE GLYCOL 3350 17 G/17G
17 POWDER, FOR SOLUTION ORAL DAILY PRN
Status: DISCONTINUED | OUTPATIENT
Start: 2024-03-20 | End: 2024-03-29 | Stop reason: HOSPADM

## 2024-03-20 RX ORDER — POTASSIUM CHLORIDE 7.45 MG/ML
10 INJECTION INTRAVENOUS PRN
Status: DISCONTINUED | OUTPATIENT
Start: 2024-03-20 | End: 2024-03-29 | Stop reason: HOSPADM

## 2024-03-20 RX ORDER — 0.9 % SODIUM CHLORIDE 0.9 %
30 INTRAVENOUS SOLUTION INTRAVENOUS ONCE
Status: COMPLETED | OUTPATIENT
Start: 2024-03-20 | End: 2024-03-20

## 2024-03-20 RX ORDER — SODIUM CHLORIDE 9 MG/ML
INJECTION, SOLUTION INTRAVENOUS PRN
Status: DISCONTINUED | OUTPATIENT
Start: 2024-03-20 | End: 2024-03-29 | Stop reason: HOSPADM

## 2024-03-20 RX ORDER — PANTOPRAZOLE SODIUM 40 MG/1
40 TABLET, DELAYED RELEASE ORAL
Status: DISCONTINUED | OUTPATIENT
Start: 2024-03-21 | End: 2024-03-29 | Stop reason: HOSPADM

## 2024-03-20 RX ADMIN — SODIUM CHLORIDE 1914 ML: 9 INJECTION, SOLUTION INTRAVENOUS at 21:23

## 2024-03-20 RX ADMIN — CEFTRIAXONE SODIUM 1000 MG: 1 INJECTION, POWDER, FOR SOLUTION INTRAMUSCULAR; INTRAVENOUS at 22:24

## 2024-03-20 ASSESSMENT — PAIN - FUNCTIONAL ASSESSMENT: PAIN_FUNCTIONAL_ASSESSMENT: NONE - DENIES PAIN

## 2024-03-21 LAB
ANION GAP SERPL CALCULATED.3IONS-SCNC: 9 MMOL/L (ref 9–17)
BASOPHILS # BLD: 0 K/UL (ref 0–0.2)
BASOPHILS NFR BLD: 0 % (ref 0–2)
BUN SERPL-MCNC: 13 MG/DL (ref 8–23)
CALCIUM SERPL-MCNC: 8.7 MG/DL (ref 8.6–10.4)
CHLORIDE SERPL-SCNC: 107 MMOL/L (ref 98–107)
CO2 SERPL-SCNC: 24 MMOL/L (ref 20–31)
CREAT SERPL-MCNC: 1 MG/DL (ref 0.7–1.2)
EKG ATRIAL RATE: 166 BPM
EKG Q-T INTERVAL: 358 MS
EKG QRS DURATION: 78 MS
EKG QTC CALCULATION (BAZETT): 484 MS
EKG R AXIS: 93 DEGREES
EKG T AXIS: 58 DEGREES
EKG VENTRICULAR RATE: 110 BPM
EOSINOPHIL # BLD: 0 K/UL (ref 0–0.4)
EOSINOPHILS RELATIVE PERCENT: 0 % (ref 0–4)
ERYTHROCYTE [DISTWIDTH] IN BLOOD BY AUTOMATED COUNT: 13.9 % (ref 11.5–14.9)
GFR SERPL CREATININE-BSD FRML MDRD: >60 ML/MIN/1.73M2
GLUCOSE SERPL-MCNC: 105 MG/DL (ref 70–99)
HCT VFR BLD AUTO: 40.7 % (ref 41–53)
HCT VFR BLD AUTO: 41 % (ref 41–53)
HGB BLD-MCNC: 13.3 G/DL (ref 13.5–17.5)
HGB BLD-MCNC: 13.4 G/DL (ref 13.5–17.5)
LYMPHOCYTES NFR BLD: 1.69 K/UL (ref 1–4.8)
LYMPHOCYTES RELATIVE PERCENT: 10 % (ref 24–44)
MAGNESIUM SERPL-MCNC: 1.9 MG/DL (ref 1.6–2.6)
MCH RBC QN AUTO: 30.8 PG (ref 26–34)
MCHC RBC AUTO-ENTMCNC: 32.5 G/DL (ref 31–37)
MCV RBC AUTO: 94.6 FL (ref 80–100)
MONOCYTES NFR BLD: 1.69 K/UL (ref 0.1–1.3)
MONOCYTES NFR BLD: 10 % (ref 1–7)
MORPHOLOGY: NORMAL
NEUTROPHILS NFR BLD: 80 % (ref 36–66)
NEUTS SEG NFR BLD: 13.52 K/UL (ref 1.3–9.1)
PLATELET # BLD AUTO: 126 K/UL (ref 150–450)
PMV BLD AUTO: 8 FL (ref 6–12)
POTASSIUM SERPL-SCNC: 4 MMOL/L (ref 3.7–5.3)
RBC # BLD AUTO: 4.34 M/UL (ref 4.5–5.9)
SODIUM SERPL-SCNC: 140 MMOL/L (ref 135–144)
WBC OTHER # BLD: 16.9 K/UL (ref 3.5–11)

## 2024-03-21 PROCEDURE — 6360000002 HC RX W HCPCS: Performed by: INTERNAL MEDICINE

## 2024-03-21 PROCEDURE — 2060000000 HC ICU INTERMEDIATE R&B

## 2024-03-21 PROCEDURE — 6370000000 HC RX 637 (ALT 250 FOR IP)

## 2024-03-21 PROCEDURE — 93005 ELECTROCARDIOGRAM TRACING: CPT

## 2024-03-21 PROCEDURE — 93010 ELECTROCARDIOGRAM REPORT: CPT | Performed by: INTERNAL MEDICINE

## 2024-03-21 PROCEDURE — 36415 COLL VENOUS BLD VENIPUNCTURE: CPT

## 2024-03-21 PROCEDURE — 6370000000 HC RX 637 (ALT 250 FOR IP): Performed by: NURSE PRACTITIONER

## 2024-03-21 PROCEDURE — 51798 US URINE CAPACITY MEASURE: CPT

## 2024-03-21 PROCEDURE — 2500000003 HC RX 250 WO HCPCS

## 2024-03-21 PROCEDURE — 85014 HEMATOCRIT: CPT

## 2024-03-21 PROCEDURE — 80048 BASIC METABOLIC PNL TOTAL CA: CPT

## 2024-03-21 PROCEDURE — 6360000002 HC RX W HCPCS: Performed by: NURSE PRACTITIONER

## 2024-03-21 PROCEDURE — 99223 1ST HOSP IP/OBS HIGH 75: CPT | Performed by: INTERNAL MEDICINE

## 2024-03-21 PROCEDURE — 83735 ASSAY OF MAGNESIUM: CPT

## 2024-03-21 PROCEDURE — 85025 COMPLETE CBC W/AUTO DIFF WBC: CPT

## 2024-03-21 PROCEDURE — 85018 HEMOGLOBIN: CPT

## 2024-03-21 PROCEDURE — 2580000003 HC RX 258: Performed by: NURSE PRACTITIONER

## 2024-03-21 RX ORDER — LIDOCAINE HYDROCHLORIDE 20 MG/ML
JELLY TOPICAL ONCE
Status: DISCONTINUED | OUTPATIENT
Start: 2024-03-21 | End: 2024-03-29 | Stop reason: HOSPADM

## 2024-03-21 RX ORDER — METOPROLOL TARTRATE 1 MG/ML
5 INJECTION, SOLUTION INTRAVENOUS EVERY 6 HOURS PRN
Status: DISCONTINUED | OUTPATIENT
Start: 2024-03-21 | End: 2024-03-29 | Stop reason: HOSPADM

## 2024-03-21 RX ORDER — MORPHINE SULFATE 2 MG/ML
2 INJECTION, SOLUTION INTRAMUSCULAR; INTRAVENOUS ONCE
Status: COMPLETED | OUTPATIENT
Start: 2024-03-21 | End: 2024-03-21

## 2024-03-21 RX ORDER — PRAVASTATIN SODIUM 20 MG
20 TABLET ORAL NIGHTLY
Status: DISCONTINUED | OUTPATIENT
Start: 2024-03-22 | End: 2024-03-29 | Stop reason: HOSPADM

## 2024-03-21 RX ADMIN — ACETAMINOPHEN 650 MG: 325 TABLET ORAL at 00:59

## 2024-03-21 RX ADMIN — PANTOPRAZOLE SODIUM 40 MG: 40 TABLET, DELAYED RELEASE ORAL at 08:40

## 2024-03-21 RX ADMIN — CEFTRIAXONE SODIUM 2000 MG: 2 INJECTION, POWDER, FOR SOLUTION INTRAMUSCULAR; INTRAVENOUS at 12:41

## 2024-03-21 RX ADMIN — METOPROLOL TARTRATE 12.5 MG: 25 TABLET, FILM COATED ORAL at 08:41

## 2024-03-21 RX ADMIN — DILTIAZEM HYDROCHLORIDE 120 MG: 120 CAPSULE, COATED, EXTENDED RELEASE ORAL at 08:40

## 2024-03-21 RX ADMIN — PRAVASTATIN SODIUM 20 MG: 20 TABLET ORAL at 08:40

## 2024-03-21 RX ADMIN — SODIUM CHLORIDE: 9 INJECTION, SOLUTION INTRAVENOUS at 01:04

## 2024-03-21 RX ADMIN — ASPIRIN 81 MG 81 MG: 81 TABLET ORAL at 12:38

## 2024-03-21 RX ADMIN — METOPROLOL TARTRATE 12.5 MG: 25 TABLET, FILM COATED ORAL at 22:56

## 2024-03-21 RX ADMIN — DOCUSATE SODIUM 100 MG: 100 CAPSULE, LIQUID FILLED ORAL at 20:32

## 2024-03-21 RX ADMIN — MORPHINE SULFATE 2 MG: 2 INJECTION, SOLUTION INTRAMUSCULAR; INTRAVENOUS at 17:46

## 2024-03-21 RX ADMIN — ISOSORBIDE MONONITRATE 60 MG: 60 TABLET, EXTENDED RELEASE ORAL at 08:41

## 2024-03-21 RX ADMIN — DOXAZOSIN 2 MG: 1 TABLET ORAL at 20:31

## 2024-03-21 RX ADMIN — ACETAMINOPHEN 650 MG: 325 TABLET ORAL at 08:41

## 2024-03-21 RX ADMIN — DOCUSATE SODIUM 100 MG: 100 CAPSULE, LIQUID FILLED ORAL at 08:41

## 2024-03-21 RX ADMIN — METOPROLOL TARTRATE 5 MG: 1 INJECTION, SOLUTION INTRAVENOUS at 20:31

## 2024-03-21 RX ADMIN — SODIUM CHLORIDE: 9 INJECTION, SOLUTION INTRAVENOUS at 17:49

## 2024-03-21 ASSESSMENT — PAIN DESCRIPTION - DESCRIPTORS
DESCRIPTORS: ACHING

## 2024-03-21 ASSESSMENT — PAIN DESCRIPTION - LOCATION
LOCATION: HEAD

## 2024-03-21 ASSESSMENT — PAIN SCALES - GENERAL
PAINLEVEL_OUTOF10: 6
PAINLEVEL_OUTOF10: 6
PAINLEVEL_OUTOF10: 2
PAINLEVEL_OUTOF10: 3

## 2024-03-21 ASSESSMENT — PAIN SCALES - WONG BAKER: WONGBAKER_NUMERICALRESPONSE: NO HURT

## 2024-03-21 NOTE — ED NOTES
ED RN attempted to insert gil as pt had 350 cc urine noted on bladder scan. Attempt was made with 18f coude but insertion was unsuccessful. Upon removal of catheter, pt urinated for approx 300 cc of urine. ED physician made aware and decision was made to hold off on gil insertion.

## 2024-03-21 NOTE — CARE COORDINATION
[facility] NOTIFICATION:    [facility] notified of admission.    Notification ID:  [id number]     Electronically signed by Isabel Mesa RN on 3/21/2024 at 3:20 PM

## 2024-03-21 NOTE — H&P
Culture       (NOTE) Direct Gram Stain from bottle result called to and read back by: LEATHA OSMAN AT 1037 ON 3/21/24   CBC with Auto Differential    Collection Time: 03/20/24  9:20 PM   Result Value Ref Range    WBC 11.0 3.5 - 11.0 k/uL    RBC 4.91 4.5 - 5.9 m/uL    Hemoglobin 15.4 13.5 - 17.5 g/dL    Hematocrit 45.3 41 - 53 %    MCV 92.3 80 - 100 fL    MCH 31.4 26 - 34 pg    MCHC 34.0 31 - 37 g/dL    RDW 13.4 11.5 - 14.9 %    Platelets 137 (L) 150 - 450 k/uL    MPV 7.7 6.0 - 12.0 fL    Neutrophils % 93 (H) 36 - 66 %    Lymphocytes % 3 (L) 24 - 44 %    Monocytes % 2 1 - 7 %    Eosinophils % 2 0 - 4 %    Basophils % 0 0 - 2 %    Neutrophils Absolute 10.23 (H) 1.3 - 9.1 k/uL    Lymphocytes Absolute 0.33 (L) 1.0 - 4.8 k/uL    Monocytes Absolute 0.22 0.1 - 1.3 k/uL    Eosinophils Absolute 0.22 0.0 - 0.4 k/uL    Basophils Absolute 0.00 0.0 - 0.2 k/uL    Morphology Normal    CMP    Collection Time: 03/20/24  9:20 PM   Result Value Ref Range    Sodium 139 135 - 144 mmol/L    Potassium 4.0 3.7 - 5.3 mmol/L    Chloride 105 98 - 107 mmol/L    CO2 25 20 - 31 mmol/L    Anion Gap 9 9 - 17 mmol/L    Glucose 110 (H) 70 - 99 mg/dL    BUN 15 8 - 23 mg/dL    Creatinine 0.9 0.7 - 1.2 mg/dL    Est, Glom Filt Rate >60 >60 mL/min/1.73m2    Calcium 9.7 8.6 - 10.4 mg/dL    Total Protein 6.9 6.4 - 8.3 g/dL    Albumin 3.9 3.5 - 5.2 g/dL    Total Bilirubin 0.8 0.3 - 1.2 mg/dL    Alkaline Phosphatase 70 40 - 129 U/L    ALT 8 5 - 41 U/L    AST 13 <40 U/L   Lipase    Collection Time: 03/20/24  9:20 PM   Result Value Ref Range    Lipase 32 13 - 60 U/L   Magnesium    Collection Time: 03/20/24  9:20 PM   Result Value Ref Range    Magnesium 1.8 1.6 - 2.6 mg/dL   Protime-INR    Collection Time: 03/20/24  9:20 PM   Result Value Ref Range    Protime 14.6 11.8 - 14.6 sec    INR 1.1    Troponin    Collection Time: 03/20/24  9:20 PM   Result Value Ref Range    Troponin, High Sensitivity 15 0 - 22 ng/L   Lactate, Sepsis    Collection Time: 03/20/24  9:20

## 2024-03-21 NOTE — ED PROVIDER NOTES
EMERGENCY DEPARTMENT ENCOUNTER    Pt Name: Nile Lacy  MRN: [id number]  Birthdate 1940  Date of evaluation: 3/20/24  CHIEF COMPLAINT       Chief Complaint   Patient presents with    Altered Mental Status     HISTORY OF PRESENT ILLNESS   83-year-old male presents for reported altered mental status.  Family reports that patient has been acting different from his baseline today and altered.  Per squad when they arrived patient was febrile.  Patient reports that he has been feeling little nauseous, he denies any chest pain shortness of breath, abdominal pain, difficulty urinating or pain with urination.    The history is provided by the patient and a relative.           REVIEW OF SYSTEMS     Review of Systems   Constitutional:  Negative for chills and fever.   HENT:  Negative for congestion and ear pain.    Eyes:  Negative for pain.   Respiratory:  Negative for shortness of breath.    Cardiovascular:  Negative for chest pain, palpitations and leg swelling.   Gastrointestinal:  Negative for abdominal pain.   Genitourinary:  Negative for dysuria and flank pain.   Musculoskeletal:  Negative for back pain.   Skin:  Negative for color change.   Neurological:  Negative for numbness and headaches.   Psychiatric/Behavioral:  Negative for confusion.    All other systems reviewed and are negative.    PASTMEDICAL HISTORY     Past Medical History:   Diagnosis Date    Anxiety attack     Arthritis     Atrial fibrillation (HCC)     Atrial flutter (HCC)     Back pain     CAD (coronary artery disease)     Chest pain 04/12/2016    Colon polyps     Constipation     Dementia (HCC) 03/23/2019    Dizziness     GERD (gastroesophageal reflux disease)     Hyperlipidemia     Hypertension     Lower GI bleed     Lung nodule seen on imaging study 02/17/2019    Prediabetes     Rectal bleeding     S/P colonoscopy with polypectomy     Seizure (HCC)     NOTED PER CARE EVERYWHERE- PATIENT DENIES HX OF, STATES HE HAS NEVER BEEN ON SEIZURE

## 2024-03-21 NOTE — CARE COORDINATION
Case Management Assessment  Initial Evaluation    Date/Time of Evaluation: 3/21/2024 3:13 PM  Assessment Completed by: Isabel Mesa RN    If patient is discharged prior to next notation, then this note serves as note for discharge by case management.    Patient Name: Nile Lacy                   YOB: 1940  Diagnosis: Acute cystitis with hematuria [N30.01]  Complicated UTI (urinary tract infection) [N39.0]  Altered mental status, unspecified altered mental status type [R41.82]                   Date / Time: 3/20/2024  8:54 PM    Patient Admission Status: Inpatient   Readmission Risk (Low < 19, Mod (19-27), High > 27): Readmission Risk Score: 16.1    Current PCP: Dewey Vera MD  PCP verified by CM? Yes    Chart Reviewed: Yes      History Provided by: Patient  Patient Orientation: Alert and Oriented    Patient Cognition: Alert    Hospitalization in the last 30 days (Readmission):  No    If yes, Readmission Assessment in  Navigator will be completed.    Advance Directives:      Code Status: Full Code   Patient's Primary Decision Maker is: Legal Next of Kin    Primary Decision Maker: Maru Mendez - Child - [phone]    Primary Decision Maker: Ryanne Russell - Child - [phone]    Primary Decision Maker: BambiOj - Child - [phone]    Discharge Planning:    Patient lives with: Spouse/Significant Other Type of Home: House  Primary Care Giver: Self  Patient Support Systems include: Children, Family Members   Current Financial resources: Medicare, [place] ([place]  Current community resources: None  Current services prior to admission: Durable Medical Equipment            Current DME: Walker[facility]            Type of Home Care services:  PT, OT, Nursing Services    ADLS  Prior functional level: Independent in ADLs/IADLs  Current functional level: Independent in ADLs/IADLs    PT AM-PAC:   /24  OT AM-PAC:   /24    Family can provide assistance at DC: Yes  Would you like Case

## 2024-03-21 NOTE — DISCHARGE INSTR - COC
with [facility] ([facility]) I48.91    Late onset Alzheimer's dementia without behavioral disturbance ([facility]) G30.1, F02.80    Colitis K52.9    Complicated UTI (urinary tract infection) N39.0       Isolation/Infection:   Isolation            No Isolation          Patient Infection Status       None to display                     Nurse Assessment:  Last Vital Signs: /67   Pulse 89   Temp 98.6 °F (37 °C) (Oral)   Resp 18   Ht 1.676 m (5' 6\")   Wt 88 kg (194 lb)   SpO2 95%   BMI 31.31 kg/m²     Last documented pain score (0-10 scale): Pain Level: 2  Last Weight:   Wt Readings from Last 1 Encounters:   03/20/24 88 kg (194 lb)     Mental Status:  oriented and alert, sundowners at night, hx dementia    IV Access:  Right basilic single lumen midline inserted 3/26/24    Nursing Mobility/ADLs:  Walking   Independent  Transfer  Independent  Bathing  Independent  Dressing  Independent  Toileting  Independent  Feeding  Independent  Med Admin  Assisted  Med Delivery   whole    Wound Care Documentation and Therapy:  Puncture 09/22/21 Femoral Right (Active)   Number of days: 910         Safety Concerns:     Sundowners [place] and At Risk for Falls    Impairments/Disabilities:      None    Nutrition Therapy:  Current Nutrition Therapy:   - Oral Diet:  General    Routes of Feeding: Oral  Liquids: No Restrictions  Daily Fluid Restriction: no  Last Modified Barium Swallow with Video (Video Swallowing Test): not done    Treatments at the Time of Hospital Discharge:   Respiratory Treatments: n/a  Oxygen Therapy:  is not on home oxygen therapy.  Ventilator:    - No ventilator support    Rehab Therapies: Physical Therapy and Occupational Therapy  Weight Bearing Status/Restrictions: No weight bearing restrictions  Other Medical Equipment (for information only, NOT a DME order):  n/a  Other Treatments: Skilled Nursing assessment and monitoring. Medication education and monitoring per protocol.   Midline care and flushes per protocol  IV

## 2024-03-21 NOTE — PLAN OF CARE
Problem: Discharge Planning  Goal: Discharge to home or other facility with appropriate resources  3/21/2024 1606 by Bambi Do, RN  Outcome: Progressing  Flowsheets  Taken 3/21/2024 1600  Discharge to home or other facility with appropriate resources:   Identify barriers to discharge with patient and caregiver   Arrange for needed discharge resources and transportation as appropriate   Identify discharge learning needs (meds, wound care, etc)   Refer to discharge planning if patient needs post-hospital services based on physician order or complex needs related to functional status, cognitive ability or social support system  Taken 3/21/2024 0938  Discharge to home or other facility with appropriate resources:   Identify barriers to discharge with patient and caregiver   Arrange for needed discharge resources and transportation as appropriate   Identify discharge learning needs (meds, wound care, etc)   Refer to discharge planning if patient needs post-hospital services based on physician order or complex needs related to functional status, cognitive ability or social support system     Problem: Skin/Tissue Integrity  Goal: Absence of new skin breakdown  Description: 1.  Monitor for areas of redness and/or skin breakdown  2.  Assess vascular access sites hourly  3.  Every 4-6 hours minimum:  Change oxygen saturation probe site  4.  Every 4-6 hours:  If on nasal continuous positive airway pressure, respiratory therapy assess nares and determine need for appliance change or resting period.  3/21/2024 1606 by Bambi Do, RN  Outcome: Progressing     Problem: ABCDS Injury Assessment  Goal: Absence of physical injury  3/21/2024 1606 by Bambi Do, RN  Outcome: Progressing

## 2024-03-22 PROBLEM — N30.01 ACUTE CYSTITIS WITH HEMATURIA: Status: ACTIVE | Noted: 2024-03-22

## 2024-03-22 LAB
ANION GAP SERPL CALCULATED.3IONS-SCNC: 6 MMOL/L (ref 9–17)
BASOPHILS # BLD: 0 K/UL (ref 0–0.2)
BASOPHILS NFR BLD: 0 % (ref 0–2)
BUN SERPL-MCNC: 10 MG/DL (ref 8–23)
CALCIUM SERPL-MCNC: 9.4 MG/DL (ref 8.6–10.4)
CHLORIDE SERPL-SCNC: 110 MMOL/L (ref 98–107)
CO2 SERPL-SCNC: 22 MMOL/L (ref 20–31)
CREAT SERPL-MCNC: 0.8 MG/DL (ref 0.7–1.2)
EOSINOPHIL # BLD: 0.1 K/UL (ref 0–0.4)
EOSINOPHILS RELATIVE PERCENT: 1 % (ref 0–4)
ERYTHROCYTE [DISTWIDTH] IN BLOOD BY AUTOMATED COUNT: 13.6 % (ref 11.5–14.9)
GFR SERPL CREATININE-BSD FRML MDRD: >60 ML/MIN/1.73M2
GLUCOSE SERPL-MCNC: 78 MG/DL (ref 70–99)
HCT VFR BLD AUTO: 41.2 % (ref 41–53)
HGB BLD-MCNC: 13.7 G/DL (ref 13.5–17.5)
LYMPHOCYTES NFR BLD: 1.4 K/UL (ref 1–4.8)
LYMPHOCYTES RELATIVE PERCENT: 10 % (ref 24–44)
MCH RBC QN AUTO: 30.9 PG (ref 26–34)
MCHC RBC AUTO-ENTMCNC: 33.2 G/DL (ref 31–37)
MCV RBC AUTO: 93.2 FL (ref 80–100)
MONOCYTES NFR BLD: 1.1 K/UL (ref 0.1–1.3)
MONOCYTES NFR BLD: 8 % (ref 1–7)
NEUTROPHILS NFR BLD: 81 % (ref 36–66)
NEUTS SEG NFR BLD: 11.8 K/UL (ref 1.3–9.1)
PLATELET # BLD AUTO: 120 K/UL (ref 150–450)
PMV BLD AUTO: 7.9 FL (ref 6–12)
POTASSIUM SERPL-SCNC: 4 MMOL/L (ref 3.7–5.3)
RBC # BLD AUTO: 4.42 M/UL (ref 4.5–5.9)
SODIUM SERPL-SCNC: 138 MMOL/L (ref 135–144)
WBC OTHER # BLD: 14.4 K/UL (ref 3.5–11)

## 2024-03-22 PROCEDURE — 6360000002 HC RX W HCPCS: Performed by: NURSE PRACTITIONER

## 2024-03-22 PROCEDURE — 51798 US URINE CAPACITY MEASURE: CPT

## 2024-03-22 PROCEDURE — 2580000003 HC RX 258: Performed by: NURSE PRACTITIONER

## 2024-03-22 PROCEDURE — 6370000000 HC RX 637 (ALT 250 FOR IP)

## 2024-03-22 PROCEDURE — 6370000000 HC RX 637 (ALT 250 FOR IP): Performed by: NURSE PRACTITIONER

## 2024-03-22 PROCEDURE — 6370000000 HC RX 637 (ALT 250 FOR IP): Performed by: INTERNAL MEDICINE

## 2024-03-22 PROCEDURE — 99223 1ST HOSP IP/OBS HIGH 75: CPT | Performed by: INTERNAL MEDICINE

## 2024-03-22 PROCEDURE — 85025 COMPLETE CBC W/AUTO DIFF WBC: CPT

## 2024-03-22 PROCEDURE — 80048 BASIC METABOLIC PNL TOTAL CA: CPT

## 2024-03-22 PROCEDURE — 87040 BLOOD CULTURE FOR BACTERIA: CPT

## 2024-03-22 PROCEDURE — 36415 COLL VENOUS BLD VENIPUNCTURE: CPT

## 2024-03-22 PROCEDURE — 99233 SBSQ HOSP IP/OBS HIGH 50: CPT | Performed by: INTERNAL MEDICINE

## 2024-03-22 PROCEDURE — 2060000000 HC ICU INTERMEDIATE R&B

## 2024-03-22 RX ORDER — DILTIAZEM HYDROCHLORIDE 180 MG/1
180 CAPSULE, COATED, EXTENDED RELEASE ORAL DAILY
Status: DISCONTINUED | OUTPATIENT
Start: 2024-03-22 | End: 2024-03-22

## 2024-03-22 RX ORDER — DILTIAZEM HYDROCHLORIDE 180 MG/1
180 CAPSULE, COATED, EXTENDED RELEASE ORAL DAILY
Status: DISCONTINUED | OUTPATIENT
Start: 2024-03-23 | End: 2024-03-22

## 2024-03-22 RX ORDER — DILTIAZEM HYDROCHLORIDE 120 MG/1
120 CAPSULE, COATED, EXTENDED RELEASE ORAL DAILY
Status: DISCONTINUED | OUTPATIENT
Start: 2024-03-23 | End: 2024-03-29 | Stop reason: HOSPADM

## 2024-03-22 RX ADMIN — DOCUSATE SODIUM 100 MG: 100 CAPSULE, LIQUID FILLED ORAL at 21:53

## 2024-03-22 RX ADMIN — DOCUSATE SODIUM 100 MG: 100 CAPSULE, LIQUID FILLED ORAL at 11:48

## 2024-03-22 RX ADMIN — METOPROLOL TARTRATE 12.5 MG: 25 TABLET, FILM COATED ORAL at 11:49

## 2024-03-22 RX ADMIN — APIXABAN 5 MG: 5 TABLET, FILM COATED ORAL at 11:49

## 2024-03-22 RX ADMIN — DILTIAZEM HYDROCHLORIDE 180 MG: 180 CAPSULE, COATED, EXTENDED RELEASE ORAL at 11:49

## 2024-03-22 RX ADMIN — ISOSORBIDE MONONITRATE 60 MG: 60 TABLET, EXTENDED RELEASE ORAL at 11:48

## 2024-03-22 RX ADMIN — SODIUM CHLORIDE: 9 INJECTION, SOLUTION INTRAVENOUS at 11:55

## 2024-03-22 RX ADMIN — PANTOPRAZOLE SODIUM 40 MG: 40 TABLET, DELAYED RELEASE ORAL at 05:31

## 2024-03-22 RX ADMIN — PRAVASTATIN SODIUM 20 MG: 20 TABLET ORAL at 21:54

## 2024-03-22 RX ADMIN — CEFTRIAXONE SODIUM 2000 MG: 2 INJECTION, POWDER, FOR SOLUTION INTRAMUSCULAR; INTRAVENOUS at 11:58

## 2024-03-22 RX ADMIN — DOXAZOSIN 2 MG: 1 TABLET ORAL at 21:53

## 2024-03-22 RX ADMIN — APIXABAN 5 MG: 5 TABLET, FILM COATED ORAL at 21:53

## 2024-03-22 RX ADMIN — ASPIRIN 81 MG 81 MG: 81 TABLET ORAL at 11:50

## 2024-03-22 RX ADMIN — METOPROLOL TARTRATE 25 MG: 25 TABLET, FILM COATED ORAL at 21:53

## 2024-03-22 NOTE — PLAN OF CARE
Problem: Discharge Planning  Goal: Discharge to home or other facility with appropriate resources  Outcome: Progressing  Flowsheets (Taken 3/22/2024 1000)  Discharge to home or other facility with appropriate resources:   Identify barriers to discharge with patient and caregiver   Arrange for needed discharge resources and transportation as appropriate   Identify discharge learning needs (meds, wound care, etc)   Refer to discharge planning if patient needs post-hospital services based on physician order or complex needs related to functional status, cognitive ability or social support system     Problem: Skin/Tissue Integrity  Goal: Absence of new skin breakdown  Description: 1.  Monitor for areas of redness and/or skin breakdown  2.  Assess vascular access sites hourly  3.  Every 4-6 hours minimum:  Change oxygen saturation probe site  4.  Every 4-6 hours:  If on nasal continuous positive airway pressure, respiratory therapy assess nares and determine need for appliance change or resting period.  Outcome: Progressing     Problem: ABCDS Injury Assessment  Goal: Absence of physical injury  Outcome: Progressing     Problem: Safety - Adult  Goal: Free from fall injury  Outcome: Progressing

## 2024-03-22 NOTE — PLAN OF CARE
Problem: Discharge Planning  Goal: Discharge to home or other facility with appropriate resources  3/22/2024 0411 by Juan Luis El, RN  Outcome: Progressing     Problem: Skin/Tissue Integrity  Goal: Absence of new skin breakdown  Description: 1.  Monitor for areas of redness and/or skin breakdown  2.  Assess vascular access sites hourly  3.  Every 4-6 hours minimum:  Change oxygen saturation probe site  4.  Every 4-6 hours:  If on nasal continuous positive airway pressure, respiratory therapy assess nares and determine need for appliance change or resting period.  3/22/2024 0411 by Juan Luis El, RN  Outcome: Progressing     Problem: Safety - Adult  Goal: Free from fall injury  Outcome: Progressing

## 2024-03-22 NOTE — CARE COORDINATION
ONGOING DISCHARGE PLAN:    Patient is alert and oriented x4.    Spoke with patient regarding discharge plan and patient confirms that plan is still home with [facility].      2/2 blood cultures positive for E-coli. 2nd set of BC drawn. Referral sent to [facility] to follow for possible IV atb's.    Active order for IV Rocephin.    Will continue to follow for additional discharge needs.    If patient is discharged prior to next notation, then this note serves as note for discharge by case management.    Electronically signed by Isabel Mesa RN on 3/22/2024 at 11:27 AM

## 2024-03-23 ENCOUNTER — APPOINTMENT (OUTPATIENT)
Dept: ULTRASOUND IMAGING | Age: 84
DRG: 871 | End: 2024-03-23
Payer: MEDICARE

## 2024-03-23 PROBLEM — R78.81 E COLI BACTEREMIA: Status: ACTIVE | Noted: 2024-03-23

## 2024-03-23 PROBLEM — B96.20 E COLI BACTEREMIA: Status: ACTIVE | Noted: 2024-03-23

## 2024-03-23 PROBLEM — Z88.0 ALLERGY TO PENICILLIN: Status: ACTIVE | Noted: 2024-03-23

## 2024-03-23 LAB
ANION GAP SERPL CALCULATED.3IONS-SCNC: 8 MMOL/L (ref 9–17)
BASOPHILS # BLD: 0 K/UL (ref 0–0.2)
BASOPHILS NFR BLD: 1 % (ref 0–2)
BUN SERPL-MCNC: 8 MG/DL (ref 8–23)
CALCIUM SERPL-MCNC: 9.3 MG/DL (ref 8.6–10.4)
CHLORIDE SERPL-SCNC: 111 MMOL/L (ref 98–107)
CO2 SERPL-SCNC: 22 MMOL/L (ref 20–31)
CREAT SERPL-MCNC: 0.7 MG/DL (ref 0.7–1.2)
EKG ATRIAL RATE: 117 BPM
EKG Q-T INTERVAL: 306 MS
EKG QRS DURATION: 90 MS
EKG QTC CALCULATION (BAZETT): 423 MS
EKG R AXIS: 89 DEGREES
EKG T AXIS: -30 DEGREES
EKG VENTRICULAR RATE: 115 BPM
EOSINOPHIL # BLD: 0.3 K/UL (ref 0–0.4)
EOSINOPHILS RELATIVE PERCENT: 3 % (ref 0–4)
ERYTHROCYTE [DISTWIDTH] IN BLOOD BY AUTOMATED COUNT: 13.7 % (ref 11.5–14.9)
GFR SERPL CREATININE-BSD FRML MDRD: >60 ML/MIN/1.73M2
GLUCOSE BLD-MCNC: 90 MG/DL (ref 75–110)
GLUCOSE SERPL-MCNC: 115 MG/DL (ref 70–99)
HCT VFR BLD AUTO: 40.1 % (ref 41–53)
HGB BLD-MCNC: 13.5 G/DL (ref 13.5–17.5)
LYMPHOCYTES NFR BLD: 1.1 K/UL (ref 1–4.8)
LYMPHOCYTES RELATIVE PERCENT: 12 % (ref 24–44)
MCH RBC QN AUTO: 31.1 PG (ref 26–34)
MCHC RBC AUTO-ENTMCNC: 33.7 G/DL (ref 31–37)
MCV RBC AUTO: 92.5 FL (ref 80–100)
MICROORGANISM SPEC CULT: ABNORMAL
MONOCYTES NFR BLD: 0.8 K/UL (ref 0.1–1.3)
MONOCYTES NFR BLD: 8 % (ref 1–7)
NEUTROPHILS NFR BLD: 76 % (ref 36–66)
NEUTS SEG NFR BLD: 7.2 K/UL (ref 1.3–9.1)
PLATELET # BLD AUTO: 124 K/UL (ref 150–450)
PMV BLD AUTO: 8.3 FL (ref 6–12)
POTASSIUM SERPL-SCNC: 3.8 MMOL/L (ref 3.7–5.3)
RBC # BLD AUTO: 4.34 M/UL (ref 4.5–5.9)
SERVICE CMNT-IMP: ABNORMAL
SERVICE CMNT-IMP: ABNORMAL
SODIUM SERPL-SCNC: 141 MMOL/L (ref 135–144)
SPECIMEN DESCRIPTION: ABNORMAL
SPECIMEN DESCRIPTION: ABNORMAL
WBC OTHER # BLD: 9.4 K/UL (ref 3.5–11)

## 2024-03-23 PROCEDURE — 6370000000 HC RX 637 (ALT 250 FOR IP): Performed by: INTERNAL MEDICINE

## 2024-03-23 PROCEDURE — 2060000000 HC ICU INTERMEDIATE R&B

## 2024-03-23 PROCEDURE — 2580000003 HC RX 258: Performed by: NURSE PRACTITIONER

## 2024-03-23 PROCEDURE — 76775 US EXAM ABDO BACK WALL LIM: CPT

## 2024-03-23 PROCEDURE — 05HB33Z INSERTION OF INFUSION DEVICE INTO RIGHT BASILIC VEIN, PERCUTANEOUS APPROACH: ICD-10-PCS | Performed by: INTERNAL MEDICINE

## 2024-03-23 PROCEDURE — 85025 COMPLETE CBC W/AUTO DIFF WBC: CPT

## 2024-03-23 PROCEDURE — 36415 COLL VENOUS BLD VENIPUNCTURE: CPT

## 2024-03-23 PROCEDURE — 93010 ELECTROCARDIOGRAM REPORT: CPT | Performed by: INTERNAL MEDICINE

## 2024-03-23 PROCEDURE — 82947 ASSAY GLUCOSE BLOOD QUANT: CPT

## 2024-03-23 PROCEDURE — 6360000002 HC RX W HCPCS: Performed by: NURSE PRACTITIONER

## 2024-03-23 PROCEDURE — 80048 BASIC METABOLIC PNL TOTAL CA: CPT

## 2024-03-23 PROCEDURE — 6370000000 HC RX 637 (ALT 250 FOR IP): Performed by: NURSE PRACTITIONER

## 2024-03-23 PROCEDURE — 99232 SBSQ HOSP IP/OBS MODERATE 35: CPT | Performed by: NURSE PRACTITIONER

## 2024-03-23 PROCEDURE — 99233 SBSQ HOSP IP/OBS HIGH 50: CPT | Performed by: INTERNAL MEDICINE

## 2024-03-23 RX ORDER — METOPROLOL TARTRATE 50 MG/1
50 TABLET, FILM COATED ORAL 2 TIMES DAILY
Status: DISCONTINUED | OUTPATIENT
Start: 2024-03-23 | End: 2024-03-29 | Stop reason: HOSPADM

## 2024-03-23 RX ADMIN — DILTIAZEM HYDROCHLORIDE 120 MG: 120 CAPSULE, COATED, EXTENDED RELEASE ORAL at 08:44

## 2024-03-23 RX ADMIN — ISOSORBIDE MONONITRATE 60 MG: 60 TABLET, EXTENDED RELEASE ORAL at 08:44

## 2024-03-23 RX ADMIN — ASPIRIN 81 MG 81 MG: 81 TABLET ORAL at 08:44

## 2024-03-23 RX ADMIN — PRAVASTATIN SODIUM 20 MG: 20 TABLET ORAL at 20:44

## 2024-03-23 RX ADMIN — CEFTRIAXONE SODIUM 2000 MG: 2 INJECTION, POWDER, FOR SOLUTION INTRAMUSCULAR; INTRAVENOUS at 11:18

## 2024-03-23 RX ADMIN — METOPROLOL TARTRATE 25 MG: 25 TABLET, FILM COATED ORAL at 08:44

## 2024-03-23 RX ADMIN — APIXABAN 5 MG: 5 TABLET, FILM COATED ORAL at 08:44

## 2024-03-23 RX ADMIN — APIXABAN 5 MG: 5 TABLET, FILM COATED ORAL at 20:44

## 2024-03-23 RX ADMIN — DOXAZOSIN 2 MG: 1 TABLET ORAL at 20:44

## 2024-03-23 RX ADMIN — SODIUM CHLORIDE, PRESERVATIVE FREE 10 ML: 5 INJECTION INTRAVENOUS at 08:45

## 2024-03-23 RX ADMIN — DOCUSATE SODIUM 100 MG: 100 CAPSULE, LIQUID FILLED ORAL at 08:44

## 2024-03-23 RX ADMIN — DOCUSATE SODIUM 100 MG: 100 CAPSULE, LIQUID FILLED ORAL at 20:44

## 2024-03-23 RX ADMIN — METOPROLOL TARTRATE 50 MG: 50 TABLET, FILM COATED ORAL at 20:44

## 2024-03-23 RX ADMIN — PANTOPRAZOLE SODIUM 40 MG: 40 TABLET, DELAYED RELEASE ORAL at 07:42

## 2024-03-23 NOTE — CARE COORDINATION
ONGOING DISCHARGE PLAN:    [facility] and [facility] following for discharge plans.     +BC 2 of 22  Repeat BC 3/22 NTD    IV rocephin 2G daily for 14 days total from 1st negative BC. Per ID    Urology-need OP cystoscopy    Will continue to follow for additional discharge needs.    If patient is discharged prior to next notation, then this note serves as note for discharge by case management.    Electronically signed by Fina Baker RN on 3/23/2024 at 5:05 PM

## 2024-03-23 NOTE — PLAN OF CARE
Problem: Discharge Planning  Goal: Discharge to home or other facility with appropriate resources  3/23/2024 0355 by Alice Desouza, RN  Outcome: Progressing     Problem: Skin/Tissue Integrity  Goal: Absence of new skin breakdown  Description: 1.  Monitor for areas of redness and/or skin breakdown  2.  Assess vascular access sites hourly  3.  Every 4-6 hours minimum:  Change oxygen saturation probe site  4.  Every 4-6 hours:  If on nasal continuous positive airway pressure, respiratory therapy assess nares and determine need for appliance change or resting period.  3/23/2024 0355 by Alice Desouza, RN  Outcome: Progressing     Problem: ABCDS Injury Assessment  Goal: Absence of physical injury  3/23/2024 0355 by Alice Desouza, RN  Outcome: Progressing     Problem: Safety - Adult  Goal: Free from fall injury  3/23/2024 0355 by Alice Desouza, RN  Outcome: Progressing

## 2024-03-23 NOTE — PLAN OF CARE
Problem: Safety - Adult  Goal: Free from fall injury  3/23/2024 1746 by Latia Guy, RN  Outcome: Progressing  Note: Patient had tele sitter at start of shift. Redirection was not effective. Bed side  placed. Side rails X2.

## 2024-03-24 PROCEDURE — 97530 THERAPEUTIC ACTIVITIES: CPT

## 2024-03-24 PROCEDURE — 97161 PT EVAL LOW COMPLEX 20 MIN: CPT

## 2024-03-24 PROCEDURE — 99232 SBSQ HOSP IP/OBS MODERATE 35: CPT | Performed by: NURSE PRACTITIONER

## 2024-03-24 PROCEDURE — 99233 SBSQ HOSP IP/OBS HIGH 50: CPT | Performed by: INTERNAL MEDICINE

## 2024-03-24 PROCEDURE — 2580000003 HC RX 258: Performed by: NURSE PRACTITIONER

## 2024-03-24 PROCEDURE — 6360000002 HC RX W HCPCS: Performed by: NURSE PRACTITIONER

## 2024-03-24 PROCEDURE — 2060000000 HC ICU INTERMEDIATE R&B

## 2024-03-24 PROCEDURE — 6370000000 HC RX 637 (ALT 250 FOR IP): Performed by: NURSE PRACTITIONER

## 2024-03-24 PROCEDURE — 6370000000 HC RX 637 (ALT 250 FOR IP): Performed by: INTERNAL MEDICINE

## 2024-03-24 RX ADMIN — PRAVASTATIN SODIUM 20 MG: 20 TABLET ORAL at 21:49

## 2024-03-24 RX ADMIN — APIXABAN 5 MG: 5 TABLET, FILM COATED ORAL at 21:49

## 2024-03-24 RX ADMIN — APIXABAN 5 MG: 5 TABLET, FILM COATED ORAL at 08:45

## 2024-03-24 RX ADMIN — DILTIAZEM HYDROCHLORIDE 120 MG: 120 CAPSULE, COATED, EXTENDED RELEASE ORAL at 08:45

## 2024-03-24 RX ADMIN — CEFTRIAXONE SODIUM 2000 MG: 2 INJECTION, POWDER, FOR SOLUTION INTRAMUSCULAR; INTRAVENOUS at 10:42

## 2024-03-24 RX ADMIN — PANTOPRAZOLE SODIUM 40 MG: 40 TABLET, DELAYED RELEASE ORAL at 08:45

## 2024-03-24 RX ADMIN — POLYETHYLENE GLYCOL 3350 17 G: 17 POWDER, FOR SOLUTION ORAL at 17:15

## 2024-03-24 RX ADMIN — DOCUSATE SODIUM 100 MG: 100 CAPSULE, LIQUID FILLED ORAL at 08:45

## 2024-03-24 RX ADMIN — DOCUSATE SODIUM 100 MG: 100 CAPSULE, LIQUID FILLED ORAL at 21:48

## 2024-03-24 RX ADMIN — METOPROLOL TARTRATE 50 MG: 50 TABLET, FILM COATED ORAL at 21:48

## 2024-03-24 RX ADMIN — ASPIRIN 81 MG 81 MG: 81 TABLET ORAL at 08:45

## 2024-03-24 RX ADMIN — SODIUM CHLORIDE, PRESERVATIVE FREE 10 ML: 5 INJECTION INTRAVENOUS at 08:46

## 2024-03-24 RX ADMIN — ACETAMINOPHEN 650 MG: 325 TABLET ORAL at 21:48

## 2024-03-24 RX ADMIN — METOPROLOL TARTRATE 50 MG: 50 TABLET, FILM COATED ORAL at 08:45

## 2024-03-24 RX ADMIN — DOXAZOSIN 2 MG: 1 TABLET ORAL at 21:49

## 2024-03-24 RX ADMIN — SODIUM CHLORIDE, PRESERVATIVE FREE 10 ML: 5 INJECTION INTRAVENOUS at 21:50

## 2024-03-24 RX ADMIN — ISOSORBIDE MONONITRATE 60 MG: 60 TABLET, EXTENDED RELEASE ORAL at 08:45

## 2024-03-24 ASSESSMENT — PAIN SCALES - GENERAL
PAINLEVEL_OUTOF10: 4
PAINLEVEL_OUTOF10: 0

## 2024-03-24 ASSESSMENT — PAIN DESCRIPTION - LOCATION: LOCATION: HEAD

## 2024-03-24 NOTE — PLAN OF CARE
Problem: Safety - Adult  Goal: Free from fall injury  3/24/2024 1606 by Latia Guy, RN  Note: Sitter removed. Telesitter placed in patient room. Side rails X2. Bed alarm on. Call light within reach.

## 2024-03-24 NOTE — PLAN OF CARE
Problem: Skin/Tissue Integrity  Goal: Absence of new skin breakdown  Description: 1.  Monitor for areas of redness and/or skin breakdown  2.  Assess vascular access sites hourly  3.  Every 4-6 hours minimum:  Change oxygen saturation probe site  4.  Every 4-6 hours:  If on nasal continuous positive airway pressure, respiratory therapy assess nares and determine need for appliance change or resting period.  3/24/2024 0301 by Amparo Harmon, RN  Outcome: Progressing  Note: Patient repositions self and gets out of bed often.  Patient does have a sitter at bedside to maintain patient's safety.  Patient does have adequate oral intake.  No new skin breakdown noted at this time.       Problem: Safety - Adult  Goal: Free from fall injury  3/24/2024 0301 by Amparo Harmon, RN  Note: Bed in lowest and locked position.  2 bedrails used for pt safety.  Bed alarm in use in pt's room.  Patient has a sitter at bedside.  Patient is very impulsive and hard to redirect at times.  Phone and call light within pt's reach.

## 2024-03-25 LAB
ALBUMIN SERPL-MCNC: 3.2 G/DL (ref 3.5–5.2)
ALP SERPL-CCNC: 54 U/L (ref 40–129)
ALT SERPL-CCNC: 7 U/L (ref 5–41)
ANION GAP SERPL CALCULATED.3IONS-SCNC: 8 MMOL/L (ref 9–17)
AST SERPL-CCNC: 12 U/L
BASOPHILS # BLD: 0.1 K/UL (ref 0–0.2)
BASOPHILS NFR BLD: 1 % (ref 0–2)
BILIRUB SERPL-MCNC: 0.3 MG/DL (ref 0.3–1.2)
BUN SERPL-MCNC: 13 MG/DL (ref 8–23)
CALCIUM SERPL-MCNC: 9.3 MG/DL (ref 8.6–10.4)
CHLORIDE SERPL-SCNC: 105 MMOL/L (ref 98–107)
CO2 SERPL-SCNC: 25 MMOL/L (ref 20–31)
CREAT SERPL-MCNC: 0.9 MG/DL (ref 0.7–1.2)
EOSINOPHIL # BLD: 0.4 K/UL (ref 0–0.4)
EOSINOPHILS RELATIVE PERCENT: 7 % (ref 0–4)
ERYTHROCYTE [DISTWIDTH] IN BLOOD BY AUTOMATED COUNT: 13.2 % (ref 11.5–14.9)
GFR SERPL CREATININE-BSD FRML MDRD: >60 ML/MIN/1.73M2
GLUCOSE SERPL-MCNC: 105 MG/DL (ref 70–99)
HCT VFR BLD AUTO: 41.7 % (ref 41–53)
HGB BLD-MCNC: 14.1 G/DL (ref 13.5–17.5)
LYMPHOCYTES NFR BLD: 1.4 K/UL (ref 1–4.8)
LYMPHOCYTES RELATIVE PERCENT: 24 % (ref 24–44)
MCH RBC QN AUTO: 31 PG (ref 26–34)
MCHC RBC AUTO-ENTMCNC: 33.9 G/DL (ref 31–37)
MCV RBC AUTO: 91.5 FL (ref 80–100)
MONOCYTES NFR BLD: 0.7 K/UL (ref 0.1–1.3)
MONOCYTES NFR BLD: 12 % (ref 1–7)
NEUTROPHILS NFR BLD: 56 % (ref 36–66)
NEUTS SEG NFR BLD: 3.4 K/UL (ref 1.3–9.1)
PLATELET # BLD AUTO: 160 K/UL (ref 150–450)
PMV BLD AUTO: 7.4 FL (ref 6–12)
POTASSIUM SERPL-SCNC: 3.9 MMOL/L (ref 3.7–5.3)
PROT SERPL-MCNC: 5.8 G/DL (ref 6.4–8.3)
RBC # BLD AUTO: 4.56 M/UL (ref 4.5–5.9)
SODIUM SERPL-SCNC: 138 MMOL/L (ref 135–144)
WBC OTHER # BLD: 6.1 K/UL (ref 3.5–11)

## 2024-03-25 PROCEDURE — 97110 THERAPEUTIC EXERCISES: CPT

## 2024-03-25 PROCEDURE — 6370000000 HC RX 637 (ALT 250 FOR IP): Performed by: INTERNAL MEDICINE

## 2024-03-25 PROCEDURE — 51798 US URINE CAPACITY MEASURE: CPT

## 2024-03-25 PROCEDURE — 99232 SBSQ HOSP IP/OBS MODERATE 35: CPT | Performed by: INTERNAL MEDICINE

## 2024-03-25 PROCEDURE — 99233 SBSQ HOSP IP/OBS HIGH 50: CPT | Performed by: INTERNAL MEDICINE

## 2024-03-25 PROCEDURE — 85025 COMPLETE CBC W/AUTO DIFF WBC: CPT

## 2024-03-25 PROCEDURE — 2060000000 HC ICU INTERMEDIATE R&B

## 2024-03-25 PROCEDURE — 97116 GAIT TRAINING THERAPY: CPT

## 2024-03-25 PROCEDURE — 2580000003 HC RX 258: Performed by: NURSE PRACTITIONER

## 2024-03-25 PROCEDURE — 6370000000 HC RX 637 (ALT 250 FOR IP): Performed by: NURSE PRACTITIONER

## 2024-03-25 PROCEDURE — 80053 COMPREHEN METABOLIC PANEL: CPT

## 2024-03-25 PROCEDURE — 6360000002 HC RX W HCPCS: Performed by: NURSE PRACTITIONER

## 2024-03-25 PROCEDURE — 97166 OT EVAL MOD COMPLEX 45 MIN: CPT

## 2024-03-25 PROCEDURE — 97535 SELF CARE MNGMENT TRAINING: CPT

## 2024-03-25 PROCEDURE — 36415 COLL VENOUS BLD VENIPUNCTURE: CPT

## 2024-03-25 RX ORDER — CEFTRIAXONE 500 MG/1
2000 INJECTION, POWDER, FOR SOLUTION INTRAMUSCULAR; INTRAVENOUS EVERY 24 HOURS
Qty: 40 EACH | Refills: 0
Start: 2024-03-25 | End: 2024-03-29

## 2024-03-25 RX ORDER — POLYETHYLENE GLYCOL 3350 17 G/17G
17 POWDER, FOR SOLUTION ORAL ONCE
Status: COMPLETED | OUTPATIENT
Start: 2024-03-25 | End: 2024-03-25

## 2024-03-25 RX ORDER — QUETIAPINE FUMARATE 25 MG/1
12.5 TABLET, FILM COATED ORAL NIGHTLY
Status: DISCONTINUED | OUTPATIENT
Start: 2024-03-25 | End: 2024-03-29 | Stop reason: HOSPADM

## 2024-03-25 RX ADMIN — DILTIAZEM HYDROCHLORIDE 120 MG: 120 CAPSULE, COATED, EXTENDED RELEASE ORAL at 09:22

## 2024-03-25 RX ADMIN — METOPROLOL TARTRATE 50 MG: 50 TABLET, FILM COATED ORAL at 21:25

## 2024-03-25 RX ADMIN — SODIUM CHLORIDE, PRESERVATIVE FREE 10 ML: 5 INJECTION INTRAVENOUS at 09:22

## 2024-03-25 RX ADMIN — ASPIRIN 81 MG 81 MG: 81 TABLET ORAL at 09:22

## 2024-03-25 RX ADMIN — CEFTRIAXONE SODIUM 2000 MG: 2 INJECTION, POWDER, FOR SOLUTION INTRAMUSCULAR; INTRAVENOUS at 12:14

## 2024-03-25 RX ADMIN — DOXAZOSIN 2 MG: 1 TABLET ORAL at 21:25

## 2024-03-25 RX ADMIN — POLYETHYLENE GLYCOL 3350 17 G: 17 POWDER, FOR SOLUTION ORAL at 21:25

## 2024-03-25 RX ADMIN — DOCUSATE SODIUM 100 MG: 100 CAPSULE, LIQUID FILLED ORAL at 21:25

## 2024-03-25 RX ADMIN — QUETIAPINE FUMARATE 12.5 MG: 25 TABLET ORAL at 21:24

## 2024-03-25 RX ADMIN — SODIUM CHLORIDE, PRESERVATIVE FREE 10 ML: 5 INJECTION INTRAVENOUS at 21:25

## 2024-03-25 RX ADMIN — ISOSORBIDE MONONITRATE 60 MG: 60 TABLET, EXTENDED RELEASE ORAL at 09:22

## 2024-03-25 RX ADMIN — PRAVASTATIN SODIUM 20 MG: 20 TABLET ORAL at 21:25

## 2024-03-25 RX ADMIN — APIXABAN 5 MG: 5 TABLET, FILM COATED ORAL at 09:22

## 2024-03-25 RX ADMIN — METOPROLOL TARTRATE 50 MG: 50 TABLET, FILM COATED ORAL at 09:22

## 2024-03-25 RX ADMIN — APIXABAN 5 MG: 5 TABLET, FILM COATED ORAL at 21:25

## 2024-03-25 RX ADMIN — DOCUSATE SODIUM 100 MG: 100 CAPSULE, LIQUID FILLED ORAL at 09:22

## 2024-03-25 RX ADMIN — PANTOPRAZOLE SODIUM 40 MG: 40 TABLET, DELAYED RELEASE ORAL at 09:22

## 2024-03-25 SDOH — ECONOMIC STABILITY: TRANSPORTATION INSECURITY

## 2024-03-25 NOTE — CARE COORDINATION
Writer met with pt regarding transportation needs for appointments and to  medications.    Pt states he has no current issues and is driving himself and if there is any issue family is able to transport. Writer placed call to pt daughter Maru, agrees that pt drives himself to appointments but family is available for transport if necessary.  Electronically signed by LORRI Wilkinson on 3/25/2024 at 1:22 PM

## 2024-03-25 NOTE — CARE COORDINATION
DISCHARGE PLANNING NOTE:    Spoke with pt's daughter, Maru at [phone]. She states family has spoken, and they would like patient to go to SNF for IV atb's - IV Rocephin 2gm daily through 4/5/24. [place] of choice offered and Maru states family would like [facility]. Referral faxed. Message sent to PT, Jenny, to ensure that therapy sees patient today as insurance authorization will be needed.    Electronically signed by Isabel Mesa RN on 3/25/2024 at 11:29 AM    Received call from Juliane at [facility] stating they received referral. Since pt has a telesitter for his Sundowner's, she would like their liaison to come do an on-site evaluation. Smitha will be here to eval patient, and Juliane will get back with writer.    Electronically signed by Isabel Mesa RN on 3/25/2024 at 11:54 AM    Spoke with Juliane from [facility], they are unable to accept at this time.  Informed pt's daughter, Maru. She requested that referral be sent to [facility].    Electronically signed by Isabel Mesa RN on 3/25/2024 at 3:38 PM

## 2024-03-25 NOTE — PLAN OF CARE
Problem: Discharge Planning  Goal: Discharge to home or other facility with appropriate resources  Outcome: Progressing  Note: Patient is in need of long term IV antibiotics.  Patient lives at home alone.  A plan of discharge home with [facility] was discussed with family.  Family is unsure of that plan d/t patient lives home alone and can be forgetful.  The possibility of following with the infusion center was discussed.  Discharge planner needs to be updated.       Problem: Skin/Tissue Integrity  Goal: Absence of new skin breakdown  Description: 1.  Monitor for areas of redness and/or skin breakdown  2.  Assess vascular access sites hourly  3.  Every 4-6 hours minimum:  Change oxygen saturation probe site  4.  Every 4-6 hours:  If on nasal continuous positive airway pressure, respiratory therapy assess nares and determine need for appliance change or resting period.  Outcome: Progressing  Note: Patient repositions self.  Patient has adequate oral intake.  No new skin breakdown noted at this time.       Problem: Safety - Adult  Goal: Free from fall injury  3/25/2024 0216 by Amparo Harmon, RN  Outcome: Progressing  Note: Bed in lowest and locked position.  2 bedrails used for pt safety.  Bed alarm in use in pt's room.  Telesitter camera in room to monitor patient's safety.  Hourly rounds done by staff and RN.  Phone and call light within pt's reach.

## 2024-03-25 NOTE — PLAN OF CARE
Patient displayed appropriate behaviors this shift. Following commands and having appropriate conversations. Using call light as needed. Telesitter in room.       Problem: Discharge Planning  Goal: Discharge to home or other facility with appropriate resources  Outcome: Progressing     Problem: Skin/Tissue Integrity  Goal: Absence of new skin breakdown  Description: 1.  Monitor for areas of redness and/or skin breakdown  2.  Assess vascular access sites hourly  3.  Every 4-6 hours minimum:  Change oxygen saturation probe site  4.  Every 4-6 hours:  If on nasal continuous positive airway pressure, respiratory therapy assess nares and determine need for appliance change or resting period.  Outcome: Progressing     Problem: ABCDS Injury Assessment  Goal: Absence of physical injury  Outcome: Progressing     Problem: Safety - Adult  Goal: Free from fall injury  Outcome: Progressing

## 2024-03-26 ENCOUNTER — APPOINTMENT (OUTPATIENT)
Dept: INTERVENTIONAL RADIOLOGY/VASCULAR | Age: 84
DRG: 871 | End: 2024-03-26
Payer: MEDICARE

## 2024-03-26 PROCEDURE — 97116 GAIT TRAINING THERAPY: CPT

## 2024-03-26 PROCEDURE — 99232 SBSQ HOSP IP/OBS MODERATE 35: CPT | Performed by: INTERNAL MEDICINE

## 2024-03-26 PROCEDURE — 2709999900 IR FLUORO GUIDED CVA DEVICE PLMT/REPLACE/REMOVAL

## 2024-03-26 PROCEDURE — 6370000000 HC RX 637 (ALT 250 FOR IP): Performed by: NURSE PRACTITIONER

## 2024-03-26 PROCEDURE — 51798 US URINE CAPACITY MEASURE: CPT

## 2024-03-26 PROCEDURE — 36410 VNPNXR 3YR/> PHY/QHP DX/THER: CPT

## 2024-03-26 PROCEDURE — 76937 US GUIDE VASCULAR ACCESS: CPT

## 2024-03-26 PROCEDURE — 2060000000 HC ICU INTERMEDIATE R&B

## 2024-03-26 PROCEDURE — 97110 THERAPEUTIC EXERCISES: CPT

## 2024-03-26 PROCEDURE — 97530 THERAPEUTIC ACTIVITIES: CPT

## 2024-03-26 PROCEDURE — 6370000000 HC RX 637 (ALT 250 FOR IP): Performed by: INTERNAL MEDICINE

## 2024-03-26 PROCEDURE — 6360000002 HC RX W HCPCS: Performed by: NURSE PRACTITIONER

## 2024-03-26 PROCEDURE — 2580000003 HC RX 258: Performed by: NURSE PRACTITIONER

## 2024-03-26 PROCEDURE — 99233 SBSQ HOSP IP/OBS HIGH 50: CPT | Performed by: INTERNAL MEDICINE

## 2024-03-26 RX ADMIN — APIXABAN 5 MG: 5 TABLET, FILM COATED ORAL at 12:48

## 2024-03-26 RX ADMIN — DOCUSATE SODIUM 100 MG: 100 CAPSULE, LIQUID FILLED ORAL at 21:32

## 2024-03-26 RX ADMIN — ISOSORBIDE MONONITRATE 60 MG: 60 TABLET, EXTENDED RELEASE ORAL at 12:49

## 2024-03-26 RX ADMIN — ASPIRIN 81 MG 81 MG: 81 TABLET ORAL at 12:49

## 2024-03-26 RX ADMIN — PANTOPRAZOLE SODIUM 40 MG: 40 TABLET, DELAYED RELEASE ORAL at 06:19

## 2024-03-26 RX ADMIN — METOPROLOL TARTRATE 50 MG: 50 TABLET, FILM COATED ORAL at 12:49

## 2024-03-26 RX ADMIN — METOPROLOL TARTRATE 50 MG: 50 TABLET, FILM COATED ORAL at 21:30

## 2024-03-26 RX ADMIN — APIXABAN 5 MG: 5 TABLET, FILM COATED ORAL at 21:30

## 2024-03-26 RX ADMIN — DOXAZOSIN 2 MG: 1 TABLET ORAL at 21:30

## 2024-03-26 RX ADMIN — CEFTRIAXONE SODIUM 2000 MG: 2 INJECTION, POWDER, FOR SOLUTION INTRAMUSCULAR; INTRAVENOUS at 12:47

## 2024-03-26 RX ADMIN — SODIUM CHLORIDE, PRESERVATIVE FREE 10 ML: 5 INJECTION INTRAVENOUS at 12:49

## 2024-03-26 RX ADMIN — PRAVASTATIN SODIUM 20 MG: 20 TABLET ORAL at 21:30

## 2024-03-26 RX ADMIN — DOCUSATE SODIUM 100 MG: 100 CAPSULE, LIQUID FILLED ORAL at 12:49

## 2024-03-26 RX ADMIN — POLYETHYLENE GLYCOL 3350 17 G: 17 POWDER, FOR SOLUTION ORAL at 12:54

## 2024-03-26 RX ADMIN — QUETIAPINE FUMARATE 12.5 MG: 25 TABLET ORAL at 21:35

## 2024-03-26 RX ADMIN — DILTIAZEM HYDROCHLORIDE 120 MG: 120 CAPSULE, COATED, EXTENDED RELEASE ORAL at 12:49

## 2024-03-26 RX ADMIN — SODIUM CHLORIDE, PRESERVATIVE FREE 10 ML: 5 INJECTION INTRAVENOUS at 21:35

## 2024-03-26 NOTE — PLAN OF CARE
Problem: Discharge Planning  Goal: Discharge to home or other facility with appropriate resources  3/26/2024 0430 by Jackeline Mccray RN  Outcome: Progressing  Flowsheets (Taken 3/22/2024 1000 by Bambi Do RN)  Discharge to home or other facility with appropriate resources:   Identify barriers to discharge with patient and caregiver   Arrange for needed discharge resources and transportation as appropriate   Identify discharge learning needs (meds, wound care, etc)   Refer to discharge planning if patient needs post-hospital services based on physician order or complex needs related to functional status, cognitive ability or social support system  3/25/2024 1759 by Latia Guy RN  Outcome: Progressing     Problem: Skin/Tissue Integrity  Goal: Absence of new skin breakdown  Description: 1.  Monitor for areas of redness and/or skin breakdown  2.  Assess vascular access sites hourly  3.  Every 4-6 hours minimum:  Change oxygen saturation probe site  4.  Every 4-6 hours:  If on nasal continuous positive airway pressure, respiratory therapy assess nares and determine need for appliance change or resting period.  3/26/2024 0430 by Jackeline Mccray RN  Outcome: Progressing  Note: No new  breakdown  3/25/2024 1759 by Latia Guy RN  Outcome: Progressing     Problem: ABCDS Injury Assessment  Goal: Absence of physical injury  3/26/2024 0430 by Jackeline Mccray RN  Outcome: Progressing  Flowsheets (Taken 3/26/2024 0430)  Absence of Physical Injury: Implement safety measures based on patient assessment  3/25/2024 1759 by Latia Guy RN  Outcome: Progressing     Problem: Safety - Adult  Goal: Free from fall injury  3/26/2024 0430 by Jackeline Mccray RN  Outcome: Progressing  Flowsheets (Taken 3/26/2024 0430)  Free From Fall Injury:   Instruct family/caregiver on patient safety   Based on caregiver fall risk screen, instruct family/caregiver to ask for assistance with transferring infant

## 2024-03-26 NOTE — PLAN OF CARE
Problem: Discharge Planning  Goal: Discharge to home or other facility with appropriate resources  Outcome: Progressing  Flowsheets (Taken 3/22/2024 1000 by Bambi Do, RN)  Discharge to home or other facility with appropriate resources:   Identify barriers to discharge with patient and caregiver   Arrange for needed discharge resources and transportation as appropriate   Identify discharge learning needs (meds, wound care, etc)   Refer to discharge planning if patient needs post-hospital services based on physician order or complex needs related to functional status, cognitive ability or social support system     Problem: Skin/Tissue Integrity  Goal: Absence of new skin breakdown  Description: 1.  Monitor for areas of redness and/or skin breakdown  2.  Assess vascular access sites hourly  3.  Every 4-6 hours minimum:  Change oxygen saturation probe site  4.  Every 4-6 hours:  If on nasal continuous positive airway pressure, respiratory therapy assess nares and determine need for appliance change or resting period.  Outcome: Progressing  Note: No new skin breakdown this shift.      Problem: ABCDS Injury Assessment  Goal: Absence of physical injury  Outcome: Progressing  Flowsheets (Taken 3/26/2024 0430 by Jackeline Mccray, RN)  Absence of Physical Injury: Implement safety measures based on patient assessment  Note: Fall assessment performed and appropriate measures implemented. Room freed from clutter. Bed in lowest position with wheels locked. Call light in place. ID band in place.      Problem: Safety - Adult  Goal: Free from fall injury  Outcome: Progressing  Flowsheets (Taken 3/26/2024 1838)  Free From Fall Injury: Instruct family/caregiver on patient safety  Note: The patient remained free from falls this shift, call light within reach, bed in locked and lowest position.  Side rails up x2.  Continue to monitor closely.

## 2024-03-26 NOTE — CARE COORDINATION
DISCHARGE PLANNING NOTE:    Spoke with Lisandra from [facility] who states pt can be accepted and they have a dementia bed available for patient.  Message left for therapy to obtain updated notes for insurance authorization. Lisandra is aware that pt will need IV Rocephin 2gm daily through 4/5/24.  Lisandra states insurance auth will be started today.    Called pt's daughter, Maru, and notified her of this.    Will continue to follow for additional discharge needs.    Electronically signed by Isabel Mesa RN on 3/26/2024 at 11:12 AM

## 2024-03-27 PROBLEM — F05 DELIRIUM DUE TO ANOTHER MEDICAL CONDITION: Status: ACTIVE | Noted: 2024-03-27

## 2024-03-27 LAB
ANION GAP SERPL CALCULATED.3IONS-SCNC: 12 MMOL/L (ref 9–17)
BASOPHILS # BLD: 0.1 K/UL (ref 0–0.2)
BASOPHILS NFR BLD: 1 % (ref 0–2)
BUN SERPL-MCNC: 16 MG/DL (ref 8–23)
CALCIUM SERPL-MCNC: 9.3 MG/DL (ref 8.6–10.4)
CHLORIDE SERPL-SCNC: 107 MMOL/L (ref 98–107)
CO2 SERPL-SCNC: 22 MMOL/L (ref 20–31)
CREAT SERPL-MCNC: 1 MG/DL (ref 0.7–1.2)
EOSINOPHIL # BLD: 0.4 K/UL (ref 0–0.4)
EOSINOPHILS RELATIVE PERCENT: 6 % (ref 0–4)
ERYTHROCYTE [DISTWIDTH] IN BLOOD BY AUTOMATED COUNT: 13.4 % (ref 11.5–14.9)
GFR SERPL CREATININE-BSD FRML MDRD: 76 ML/MIN/1.73M2
GLUCOSE SERPL-MCNC: 181 MG/DL (ref 70–99)
HCT VFR BLD AUTO: 42.9 % (ref 41–53)
HGB BLD-MCNC: 14.2 G/DL (ref 13.5–17.5)
LYMPHOCYTES NFR BLD: 1.6 K/UL (ref 1–4.8)
LYMPHOCYTES RELATIVE PERCENT: 21 % (ref 24–44)
MCH RBC QN AUTO: 30.5 PG (ref 26–34)
MCHC RBC AUTO-ENTMCNC: 33 G/DL (ref 31–37)
MCV RBC AUTO: 92.4 FL (ref 80–100)
MICROORGANISM SPEC CULT: NORMAL
MICROORGANISM SPEC CULT: NORMAL
MONOCYTES NFR BLD: 0.7 K/UL (ref 0.1–1.3)
MONOCYTES NFR BLD: 9 % (ref 1–7)
NEUTROPHILS NFR BLD: 63 % (ref 36–66)
NEUTS SEG NFR BLD: 4.7 K/UL (ref 1.3–9.1)
PLATELET # BLD AUTO: 174 K/UL (ref 150–450)
PMV BLD AUTO: 7.1 FL (ref 6–12)
POTASSIUM SERPL-SCNC: 3.9 MMOL/L (ref 3.7–5.3)
RBC # BLD AUTO: 4.64 M/UL (ref 4.5–5.9)
SERVICE CMNT-IMP: NORMAL
SERVICE CMNT-IMP: NORMAL
SODIUM SERPL-SCNC: 141 MMOL/L (ref 135–144)
SPECIMEN DESCRIPTION: NORMAL
SPECIMEN DESCRIPTION: NORMAL
WBC OTHER # BLD: 7.4 K/UL (ref 3.5–11)

## 2024-03-27 PROCEDURE — 97530 THERAPEUTIC ACTIVITIES: CPT

## 2024-03-27 PROCEDURE — 97535 SELF CARE MNGMENT TRAINING: CPT

## 2024-03-27 PROCEDURE — 85025 COMPLETE CBC W/AUTO DIFF WBC: CPT

## 2024-03-27 PROCEDURE — 99232 SBSQ HOSP IP/OBS MODERATE 35: CPT | Performed by: INTERNAL MEDICINE

## 2024-03-27 PROCEDURE — 99233 SBSQ HOSP IP/OBS HIGH 50: CPT | Performed by: INTERNAL MEDICINE

## 2024-03-27 PROCEDURE — 36415 COLL VENOUS BLD VENIPUNCTURE: CPT

## 2024-03-27 PROCEDURE — 6370000000 HC RX 637 (ALT 250 FOR IP): Performed by: INTERNAL MEDICINE

## 2024-03-27 PROCEDURE — 6370000000 HC RX 637 (ALT 250 FOR IP): Performed by: NURSE PRACTITIONER

## 2024-03-27 PROCEDURE — 80048 BASIC METABOLIC PNL TOTAL CA: CPT

## 2024-03-27 PROCEDURE — 2060000000 HC ICU INTERMEDIATE R&B

## 2024-03-27 PROCEDURE — 6360000002 HC RX W HCPCS: Performed by: NURSE PRACTITIONER

## 2024-03-27 PROCEDURE — 2580000003 HC RX 258: Performed by: NURSE PRACTITIONER

## 2024-03-27 PROCEDURE — 97116 GAIT TRAINING THERAPY: CPT

## 2024-03-27 PROCEDURE — 97110 THERAPEUTIC EXERCISES: CPT

## 2024-03-27 PROCEDURE — 99221 1ST HOSP IP/OBS SF/LOW 40: CPT

## 2024-03-27 RX ADMIN — SODIUM CHLORIDE, PRESERVATIVE FREE 10 ML: 5 INJECTION INTRAVENOUS at 20:31

## 2024-03-27 RX ADMIN — PANTOPRAZOLE SODIUM 40 MG: 40 TABLET, DELAYED RELEASE ORAL at 05:40

## 2024-03-27 RX ADMIN — CEFTRIAXONE SODIUM 2000 MG: 2 INJECTION, POWDER, FOR SOLUTION INTRAMUSCULAR; INTRAVENOUS at 12:06

## 2024-03-27 RX ADMIN — METOPROLOL TARTRATE 50 MG: 50 TABLET, FILM COATED ORAL at 20:24

## 2024-03-27 RX ADMIN — DILTIAZEM HYDROCHLORIDE 120 MG: 120 CAPSULE, COATED, EXTENDED RELEASE ORAL at 08:19

## 2024-03-27 RX ADMIN — ASPIRIN 81 MG 81 MG: 81 TABLET ORAL at 08:19

## 2024-03-27 RX ADMIN — ISOSORBIDE MONONITRATE 60 MG: 60 TABLET, EXTENDED RELEASE ORAL at 08:19

## 2024-03-27 RX ADMIN — QUETIAPINE FUMARATE 12.5 MG: 25 TABLET ORAL at 20:23

## 2024-03-27 RX ADMIN — DOXAZOSIN 2 MG: 1 TABLET ORAL at 20:23

## 2024-03-27 RX ADMIN — PRAVASTATIN SODIUM 20 MG: 20 TABLET ORAL at 20:23

## 2024-03-27 RX ADMIN — SODIUM CHLORIDE, PRESERVATIVE FREE 10 ML: 5 INJECTION INTRAVENOUS at 08:20

## 2024-03-27 RX ADMIN — APIXABAN 5 MG: 5 TABLET, FILM COATED ORAL at 20:23

## 2024-03-27 RX ADMIN — METOPROLOL TARTRATE 50 MG: 50 TABLET, FILM COATED ORAL at 08:19

## 2024-03-27 RX ADMIN — DOCUSATE SODIUM 100 MG: 100 CAPSULE, LIQUID FILLED ORAL at 08:19

## 2024-03-27 RX ADMIN — APIXABAN 5 MG: 5 TABLET, FILM COATED ORAL at 08:19

## 2024-03-27 RX ADMIN — DOCUSATE SODIUM 100 MG: 100 CAPSULE, LIQUID FILLED ORAL at 20:24

## 2024-03-27 ASSESSMENT — PAIN SCALES - GENERAL: PAINLEVEL_OUTOF10: 8

## 2024-03-27 ASSESSMENT — PAIN DESCRIPTION - LOCATION: LOCATION: HAND

## 2024-03-27 NOTE — CARE COORDINATION
DISCHARGE PLANNING NOTE:    Plan remains for patient to be discharged to [facility]. Insurance authorization started 3/26. Pt needs IV Rocephin 2gm daily through 4/5.    Per notes, telesitter was discontinued 3/26 @ 1315.    Will continue to follow for additional discharge needs.    Electronically signed by Isabel Mesa RN on 3/27/2024 at 9:23 AM    Pt is alert and oriented x4.  Pt is adamant that he is NOT going to [facility]. States his children is going to sell his house if he goes to a nursing home. Explained to patient that he is only going until 4/5 when his IV Rocephin has finished. Pt remains adamant he is not going. Explained that without these IV antibiotics he could die. Pt states \"fine, I'll just die then.\"  Called pt's daughter, Maru, and advised her of this. She states she will call her other sister, and let her know to figure out what they are going to do.    [facility] following and referral was also sent to [facility], however family does not feel comfortable with pt going home with midline in. Pt also has hx dementia, so he is not able to administer the antibiotics himself.  Will wait to hear back from Maru.    Electronically signed by Isabel Mesa RN on 3/27/2024 at 11:20 AM    Spoke with pt's daughter, Maru, again. She would like psych consult as she feels if pt goes home, he will be a harm to himself and others. Notified primary care RN.    Electronically signed by Isabel Mesa RN on 3/27/2024 at 12:36 PM

## 2024-03-27 NOTE — PLAN OF CARE
Problem: Discharge Planning  Goal: Discharge to home or other facility with appropriate resources  3/27/2024 1543 by Verena Dueñas, RN  Outcome: Progressing     Problem: Skin/Tissue Integrity  Goal: Absence of new skin breakdown  Description: 1.  Monitor for areas of redness and/or skin breakdown  2.  Assess vascular access sites hourly  3.  Every 4-6 hours minimum:  Change oxygen saturation probe site  4.  Every 4-6 hours:  If on nasal continuous positive airway pressure, respiratory therapy assess nares and determine need for appliance change or resting period.  3/27/2024 1543 by Verena Dueñas, RN  Outcome: Progressing     Problem: ABCDS Injury Assessment  Goal: Absence of physical injury  3/27/2024 1543 by Verena Dueñas, RN  Outcome: Progressing     Problem: Safety - Adult  Goal: Free from fall injury  3/27/2024 1543 by Verena Dueñas, RN  Outcome: Progressing

## 2024-03-27 NOTE — PLAN OF CARE
Problem: Discharge Planning  Goal: Discharge to home or other facility with appropriate resources  3/27/2024 0607 by Anjelica Jerez, RN  Outcome: Progressing  Flowsheets (Taken 3/26/2024 1938)  Discharge to home or other facility with appropriate resources: Identify barriers to discharge with patient and caregiver     Problem: Skin/Tissue Integrity  Goal: Absence of new skin breakdown  Description: 1.  Monitor for areas of redness and/or skin breakdown  2.  Assess vascular access sites hourly  3.  Every 4-6 hours minimum:  Change oxygen saturation probe site  4.  Every 4-6 hours:  If on nasal continuous positive airway pressure, respiratory therapy assess nares and determine need for appliance change or resting period.  3/27/2024 0607 by Anjelica Jerez, RN  Outcome: Progressing     Problem: ABCDS Injury Assessment  Goal: Absence of physical injury  3/27/2024 0607 by Anjelica Jerez, RN  Outcome: Progressing     Problem: Safety - Adult  Goal: Free from fall injury  3/27/2024 0607 by Anjelica Jerez, RN  Outcome: Progressing   Free from falls throughout shift. Pt is compliant with safety measures in place. Pt seen by care team every hour with purposefully hourly rounding, bed is in the lowest position, call light and personal belongings within reach. Two side rails are up and bed alarm is on. Pt calls out appropriately.

## 2024-03-27 NOTE — CONSULTS
[facility] -   Infectious diseases evaluation  admission date 3/20/2024    reason for consultation:   Bacteremia    Impression :   Current:  E. coli bacteremia/urinary source  Complicated UTI  Urinary retention  Hematuria  A-fib with rapid ventricular response on Eliquis   Coronary artery disease  Hypertension  Hyperlipidemia  Alzheimer's dementia      HENCE:   Continue IV ceftriaxone 2 g daily  Follow cultures and sensitivity, adjust antibiotics as needed  Follow CBC and renal function  Supportive care    Infection Control Recommendations   Antler Precautions      Antimicrobial Stewardship Recommendations   Simplification of therapy  Targeted therapy      History of Present Illness:   Initial history:  Nile Lacy is a 83 y.o.-year-old male presented to hospital with altered mental status.  He is complaining of burning with urination and dysuria, denied abdominal pain, no nausea or vomiting, no fever or chills, no other complaints.  Symptoms acute, moderate, no alleviating or aggravating factors.  He had urinary retention, reported to 400ml residual urine, attempt straight cath at the ER was unsuccessful.  The patient subsequently was able to urinate without gross hematuria.  Urology consulted  CT head showed no acute abnormality  Urinalysis showed too numerous to count WBC, moderate leukocyte esterase.  Urine culture was not sent  Blood culture grew E. coli  Interval changes  3/22/2024   He had an episode of A-fib with rapid medical response overnight, on Cardizem, Eliquis resumed.  Patient Vitals for the past 8 hrs:   BP Temp Temp src Pulse Resp SpO2   03/22/24 1130 134/81 98.4 °F (36.9 °C) Oral (!) 104 18 95 %   03/22/24 0715 (!) 140/77 98.2 °F (36.8 °C) Oral 96 17 96 %           I have personally reviewed the past medical history, past surgical history, medications, social history, and family history, and I haveupdated the database 
  Department of Urology  Urology Consult Note    Patient:  Nile Lacy  MRN: [id number]  YOB: 1940    Reason for Consult:  uti, hematuria  Requesting Physician:  Emily Medina NP    CHIEF COMPLAINT:    Chief Complaint   Patient presents with    Altered Mental Status       History Obtained From:   patient    HISTORY OF PRESENT ILLNESS:    The patient is a 83 y.o. male who presented to the ER with AMS and is admitted for UTI.  He has Alzheimer's and is a poor historian. He is known to Dr. Cuevas and follows for voiding dysfunction. Per chart review, family reported patient was acting different than usual, with fatigue and nausea. Workup in the ER showed UTI on UA. He had fevers overnight and wbc is 16.9 today.  Urine culture pending and started on rocephin. Bladder scan showed 400cc.  He was apparently unable to void and they tried straight cath x2 using coude, without success. Eventually, patient was able to void on his own but subsequently developed gross hematuria with clots, likely from traumatic gil insertion. The severity of hematuria is quite unclear. Nursing staff overnight report bloody urine, dark in appearance with clots. This morning, the patient admits to me that his urine is still cherry red and he has clots. His anticoagulants are on hold. Urine is reported as clear with clots today.    Past Medical History:        Diagnosis Date    Anxiety attack     Arthritis     Atrial fibrillation (HCC)     Atrial flutter (HCC)     Back pain     CAD (coronary artery disease)     Chest pain 04/12/2016    Colon polyps     Constipation     Dementia (HCC) 03/23/2019    Dizziness     GERD (gastroesophageal reflux disease)     Hyperlipidemia     Hypertension     Lower GI bleed     Lung nodule seen on imaging study 02/17/2019    Prediabetes     Rectal bleeding     S/P colonoscopy with polypectomy     Seizure (HCC)     NOTED PER CARE EVERYWHERE- PATIENT DENIES HX OF, STATES HE HAS NEVER BEEN ON SEIZURE 
Date:   3/22/2024  Patient name: Nile Lacy  Date of admission:  3/20/2024  8:54 PM  MRN:   [id number]  YOB: 1940  PCP: Dewey Vera MD    Reason for Admission: Acute cystitis with hematuria [N30.01]  Complicated UTI (urinary tract infection) [N39.0]  Altered mental status, unspecified altered mental status type [R41.82]    Cardiology consult: A-fib with RVR, history of CAD, CABG x 4       Referring physician Dr. Vy Ace    Impression  Admission 3/28/2024 with fever, altered mental status, urinary tract infection, bladder scan showed urine 400 ml.  Blood culture positive for gram-negative lilli E. coli  A-fib with RVR  Coronary artery disease, status post CABG  Cardiac cath 9/22/2021: Multivessel obstructive CAD, patent LIMA to LAD, saphenous venous graft to second diagonal, SVG to obtuse marginal and SVG to posterior descending artery, normal left ventricular systolic function  Ejection fraction more than 55%, normal LV size and wall thickness, RVSP 30-1, 2D echo 5/12/2022  Hypertension  Hyperlipidemia  Alzheimer's disease mild dementia  Cholelithiasis  Small hiatal hernia    Past surgical history  CABG times 4/2004, total knee bilateral arthroplasty and ventral hernia repair, colonoscopies with the biopsy, cataract removal    Drug allergies  Oxycodone causing blackout dizziness, penicillin, warfarin and related    History of present illness    83-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  Patient was brought to emergency department by emergency squad and they found him febrile.  According to the family patient was acting different from baseline he also had some nausea and fatigue.  CT head was unremarkable, urine examination showed too many WBC, positive leukocyte Estrace, procalcitonin 0.12, bladder 
Date    CARDIAC CATHETERIZATION      CATARACT REMOVAL Bilateral     COLONOSCOPY  07/24/2018    COLONOSCOPY WITH BIOPSY performed by Sanchez Villa MD at [facility] Endoscopy    COLONOSCOPY N/A 4/19/2021    COLONOSCOPY DIAGNOSTIC performed by Alfred Berger MD at [facility] ENDO    CORONARY ARTERY BYPASS GRAFT  2004    x4 vessel    TOTAL KNEE ARTHROPLASTY Bilateral     VENTRAL HERNIA REPAIR         Family Medical and Psychiatric History:     Mother had alzheimers         Problem Relation Age of Onset    Alzheimer's Disease Mother     Arthritis Mother         lumbar disc disease    Heart Disease Father        Medications Prior to Admission:   Medications Prior to Admission: metoprolol tartrate (LOPRESSOR) 25 MG tablet, Take 0.5 tablets by mouth 2 times daily  isosorbide mononitrate (IMDUR) 60 MG extended release tablet, Take 1 tablet by mouth daily  apixaban (ELIQUIS) 5 MG TABS tablet, Take 1 tablet by mouth 2 times daily  doxazosin (CARDURA) 2 MG tablet, Take 1 tablet by mouth nightly  pravastatin (PRAVACHOL) 40 MG tablet, Take 0.5 tablets by mouth daily  silodosin (RAPAFLO) 8 MG CAPS, Take 1 capsule by mouth every evening  docusate sodium (COLACE) 100 MG capsule, Take 1 capsule by mouth 2 times daily  dilTIAZem (CARDIZEM CD) 120 MG extended release capsule, Take 1 capsule by mouth daily  aspirin 81 MG chewable tablet, Take 1 tablet by mouth daily  nitroGLYCERIN (NITROSTAT) 0.4 MG SL tablet, up to max of 3 total doses. If no relief after 1 dose, call 911.  acetaminophen (TYLENOL) 325 MG tablet, Take 2 tablets by mouth 2 times daily as needed for Pain  omeprazole (PRILOSEC) 20 MG capsule, Take 1 capsule by mouth daily    Allergies:  Oxycodone, Pcn [penicillins], and Warfarin and related    Lifetime Psychiatric Review of Systems         Obsessions and Compulsions: Denies       Sari or Hypomania: Denies     Hallucinations: Denies     Panic Attacks:  Denies     Delusions:  Denies     Phobias:  Denies     Trauma:

## 2024-03-28 PROCEDURE — 97535 SELF CARE MNGMENT TRAINING: CPT

## 2024-03-28 PROCEDURE — 97530 THERAPEUTIC ACTIVITIES: CPT

## 2024-03-28 PROCEDURE — 6360000002 HC RX W HCPCS: Performed by: INTERNAL MEDICINE

## 2024-03-28 PROCEDURE — 99232 SBSQ HOSP IP/OBS MODERATE 35: CPT | Performed by: INTERNAL MEDICINE

## 2024-03-28 PROCEDURE — 6370000000 HC RX 637 (ALT 250 FOR IP): Performed by: INTERNAL MEDICINE

## 2024-03-28 PROCEDURE — 2580000003 HC RX 258: Performed by: NURSE PRACTITIONER

## 2024-03-28 PROCEDURE — 2060000000 HC ICU INTERMEDIATE R&B

## 2024-03-28 PROCEDURE — 99233 SBSQ HOSP IP/OBS HIGH 50: CPT | Performed by: INTERNAL MEDICINE

## 2024-03-28 PROCEDURE — 2580000003 HC RX 258: Performed by: INTERNAL MEDICINE

## 2024-03-28 PROCEDURE — 6370000000 HC RX 637 (ALT 250 FOR IP): Performed by: NURSE PRACTITIONER

## 2024-03-28 RX ADMIN — METOPROLOL TARTRATE 50 MG: 50 TABLET, FILM COATED ORAL at 10:11

## 2024-03-28 RX ADMIN — SODIUM CHLORIDE, PRESERVATIVE FREE 10 ML: 5 INJECTION INTRAVENOUS at 21:36

## 2024-03-28 RX ADMIN — ISOSORBIDE MONONITRATE 60 MG: 60 TABLET, EXTENDED RELEASE ORAL at 10:11

## 2024-03-28 RX ADMIN — PANTOPRAZOLE SODIUM 40 MG: 40 TABLET, DELAYED RELEASE ORAL at 06:11

## 2024-03-28 RX ADMIN — DOXAZOSIN 2 MG: 1 TABLET ORAL at 21:34

## 2024-03-28 RX ADMIN — APIXABAN 5 MG: 5 TABLET, FILM COATED ORAL at 21:34

## 2024-03-28 RX ADMIN — PRAVASTATIN SODIUM 20 MG: 20 TABLET ORAL at 21:34

## 2024-03-28 RX ADMIN — APIXABAN 5 MG: 5 TABLET, FILM COATED ORAL at 10:11

## 2024-03-28 RX ADMIN — DILTIAZEM HYDROCHLORIDE 120 MG: 120 CAPSULE, COATED, EXTENDED RELEASE ORAL at 10:10

## 2024-03-28 RX ADMIN — METOPROLOL TARTRATE 50 MG: 50 TABLET, FILM COATED ORAL at 21:35

## 2024-03-28 RX ADMIN — CEFTRIAXONE SODIUM 2000 MG: 2 INJECTION, POWDER, FOR SOLUTION INTRAMUSCULAR; INTRAVENOUS at 13:54

## 2024-03-28 RX ADMIN — DOCUSATE SODIUM 100 MG: 100 CAPSULE, LIQUID FILLED ORAL at 10:11

## 2024-03-28 RX ADMIN — SODIUM CHLORIDE, PRESERVATIVE FREE 10 ML: 5 INJECTION INTRAVENOUS at 10:12

## 2024-03-28 RX ADMIN — DOCUSATE SODIUM 100 MG: 100 CAPSULE, LIQUID FILLED ORAL at 21:34

## 2024-03-28 RX ADMIN — QUETIAPINE FUMARATE 12.5 MG: 25 TABLET ORAL at 21:36

## 2024-03-28 RX ADMIN — ASPIRIN 81 MG 81 MG: 81 TABLET ORAL at 10:11

## 2024-03-28 ASSESSMENT — PAIN SCALES - GENERAL: PAINLEVEL_OUTOF10: 0

## 2024-03-28 NOTE — CARE COORDINATION
ONGOING DISCHARGE PLAN:    Patient is alert and oriented x4.    Spoke with patient regarding discharge plan and patient confirms that plan is still SNF - [facility]. Insurance auth was started on 3/26 and is still pending. Pt is agreeable to go to [facility] for IV Rocephin 2gm daily through 4/5. Already has midline.    Will continue to follow for additional discharge needs.    If patient is discharged prior to next notation, then this note serves as note for discharge by case management.    Electronically signed by Isabel Mesa RN on 3/28/2024 at 12:04 PM

## 2024-03-28 NOTE — PLAN OF CARE
Problem: Discharge Planning  Goal: Discharge to home or other facility with appropriate resources  3/28/2024 0440 by Joann Banegas, RN  Outcome: Progressing  Flowsheets (Taken 3/27/2024 2015)  Discharge to home or other facility with appropriate resources:   Identify barriers to discharge with patient and caregiver   Arrange for needed discharge resources and transportation as appropriate   Identify discharge learning needs (meds, wound care, etc)   Refer to discharge planning if patient needs post-hospital services based on physician order or complex needs related to functional status, cognitive ability or social support system     Problem: Skin/Tissue Integrity  Goal: Absence of new skin breakdown  Description: 1.  Monitor for areas of redness and/or skin breakdown  2.  Assess vascular access sites hourly  3.  Every 4-6 hours minimum:  Change oxygen saturation probe site  4.  Every 4-6 hours:  If on nasal continuous positive airway pressure, respiratory therapy assess nares and determine need for appliance change or resting period.  3/28/2024 0440 by Joann Banegas, RN  Outcome: Progressing     Problem: ABCDS Injury Assessment  Goal: Absence of physical injury  3/28/2024 0440 by Joann Banegas, RN  Outcome: Progressing     Problem: Safety - Adult  Goal: Free from fall injury  3/28/2024 0440 by Joann Banegas, RN  Outcome: Progressing     Problem: Pain  Goal: Verbalizes/displays adequate comfort level or baseline comfort level  Outcome: Progressing

## 2024-03-28 NOTE — PLAN OF CARE
Problem: Discharge Planning  Goal: Discharge to home or other facility with appropriate resources  3/28/2024 1430 by Luana Glass RN  Outcome: Progressing     Problem: Skin/Tissue Integrity  Goal: Absence of new skin breakdown  Description: 1.  Monitor for areas of redness and/or skin breakdown  2.  Assess vascular access sites hourly  3.  Every 4-6 hours minimum:  Change oxygen saturation probe site  4.  Every 4-6 hours:  If on nasal continuous positive airway pressure, respiratory therapy assess nares and determine need for appliance change or resting period.  3/28/2024 1430 by Luana Glass, RN  Outcome: Progressing     Problem: ABCDS Injury Assessment  Goal: Absence of physical injury  3/28/2024 1430 by Luana Glass RN  Outcome: Progressing     Problem: Safety - Adult  Goal: Free from fall injury  3/28/2024 1430 by Luana Glass RN  Outcome: Progressing     Problem: Pain  Goal: Verbalizes/displays adequate comfort level or baseline comfort level  3/28/2024 1430 by Luana Glass, RN  Outcome: Progressing

## 2024-03-29 VITALS
DIASTOLIC BLOOD PRESSURE: 70 MMHG | RESPIRATION RATE: 17 BRPM | TEMPERATURE: 98.2 F | OXYGEN SATURATION: 96 % | BODY MASS INDEX: 29.34 KG/M2 | SYSTOLIC BLOOD PRESSURE: 120 MMHG | WEIGHT: 182.54 LBS | HEIGHT: 66 IN | HEART RATE: 69 BPM

## 2024-03-29 PROCEDURE — 97110 THERAPEUTIC EXERCISES: CPT

## 2024-03-29 PROCEDURE — 2580000003 HC RX 258: Performed by: INTERNAL MEDICINE

## 2024-03-29 PROCEDURE — 2580000003 HC RX 258: Performed by: NURSE PRACTITIONER

## 2024-03-29 PROCEDURE — 99239 HOSP IP/OBS DSCHRG MGMT >30: CPT | Performed by: INTERNAL MEDICINE

## 2024-03-29 PROCEDURE — 6360000002 HC RX W HCPCS: Performed by: INTERNAL MEDICINE

## 2024-03-29 PROCEDURE — 97116 GAIT TRAINING THERAPY: CPT

## 2024-03-29 PROCEDURE — 6370000000 HC RX 637 (ALT 250 FOR IP): Performed by: NURSE PRACTITIONER

## 2024-03-29 PROCEDURE — 99232 SBSQ HOSP IP/OBS MODERATE 35: CPT | Performed by: INTERNAL MEDICINE

## 2024-03-29 PROCEDURE — 6370000000 HC RX 637 (ALT 250 FOR IP): Performed by: INTERNAL MEDICINE

## 2024-03-29 RX ORDER — CEFTRIAXONE 500 MG/1
2000 INJECTION, POWDER, FOR SOLUTION INTRAMUSCULAR; INTRAVENOUS EVERY 24 HOURS
Qty: 28 EACH | Refills: 0 | Status: SHIPPED | OUTPATIENT
Start: 2024-03-29 | End: 2024-03-29 | Stop reason: HOSPADM

## 2024-03-29 RX ORDER — METOPROLOL TARTRATE 50 MG/1
50 TABLET, FILM COATED ORAL 2 TIMES DAILY
Qty: 60 TABLET | Refills: 3 | Status: SHIPPED | OUTPATIENT
Start: 2024-03-29

## 2024-03-29 RX ADMIN — ACETAMINOPHEN 650 MG: 325 TABLET ORAL at 08:49

## 2024-03-29 RX ADMIN — ASPIRIN 81 MG 81 MG: 81 TABLET ORAL at 08:36

## 2024-03-29 RX ADMIN — APIXABAN 5 MG: 5 TABLET, FILM COATED ORAL at 08:37

## 2024-03-29 RX ADMIN — DILTIAZEM HYDROCHLORIDE 120 MG: 120 CAPSULE, COATED, EXTENDED RELEASE ORAL at 08:37

## 2024-03-29 RX ADMIN — PANTOPRAZOLE SODIUM 40 MG: 40 TABLET, DELAYED RELEASE ORAL at 05:30

## 2024-03-29 RX ADMIN — CEFTRIAXONE SODIUM 2000 MG: 2 INJECTION, POWDER, FOR SOLUTION INTRAMUSCULAR; INTRAVENOUS at 13:14

## 2024-03-29 RX ADMIN — SODIUM CHLORIDE, PRESERVATIVE FREE 10 ML: 5 INJECTION INTRAVENOUS at 08:37

## 2024-03-29 RX ADMIN — DOCUSATE SODIUM 100 MG: 100 CAPSULE, LIQUID FILLED ORAL at 08:36

## 2024-03-29 RX ADMIN — ISOSORBIDE MONONITRATE 60 MG: 60 TABLET, EXTENDED RELEASE ORAL at 08:37

## 2024-03-29 RX ADMIN — METOPROLOL TARTRATE 50 MG: 50 TABLET, FILM COATED ORAL at 08:36

## 2024-03-29 ASSESSMENT — PAIN DESCRIPTION - DESCRIPTORS
DESCRIPTORS: ACHING
DESCRIPTORS: ACHING

## 2024-03-29 ASSESSMENT — PAIN SCALES - GENERAL
PAINLEVEL_OUTOF10: 0
PAINLEVEL_OUTOF10: 2
PAINLEVEL_OUTOF10: 3
PAINLEVEL_OUTOF10: 0
PAINLEVEL_OUTOF10: 7

## 2024-03-29 ASSESSMENT — PAIN DESCRIPTION - LOCATION
LOCATION: LEG
LOCATION: ANKLE
LOCATION: LEG

## 2024-03-29 ASSESSMENT — PAIN DESCRIPTION - ORIENTATION
ORIENTATION: RIGHT;LEFT
ORIENTATION: RIGHT
ORIENTATION: RIGHT;LEFT

## 2024-03-29 NOTE — CARE COORDINATION
ONGOING DISCHARGE PLAN:     Patient is alert and oriented x4.     Spoke with patient regarding discharge plan and patient confirms that plan is still SNF - [facility]. Insurance auth was started on 3/26 and is still pending. Pt is agreeable to go to [facility] for IV Rocephin 2gm daily through 4/5. Already has midline.     Message left for Lisandra at [facility] to check status of insurance auth.    Will continue to follow for additional discharge needs.     If patient is discharged prior to next notation, then this note serves as note for discharge by case management.     Electronically signed by Isabel Mesa RN on 3/29/2024 at 9:30 AM    Pt's daughters, Maru and Ryanne, here. They have discussed things and would like to take patient home with [facility] for IV Rocephin 2gm daily thru 4/5/24. They are both willing to learn. Faxed order to [facility] along with flush orders. Notified Maria Luz from [facility] of this. Pt can be discharged home today after 1pm dose of IV Rocephin. Family working on arrangements for transporting patient home.  Called [facility] and spoke with PJ. Informed that pt will now be going home with [facility] for at[facility]. Will need start of care tomorrow at 1pm. This should be arranged with pt's daughter, Maru, at [phone].    Notified Lisandra from [facility] that plan is now home.    Also notified Arely in pharmacy so that any new medications can be delivered.    Electronically signed by Isabel Mesa RN on 3/29/2024 at 10:50 AM    Faxed [facility] and d/c med list to [facility].    F/U appt made with PCP Dr. Vera 4/3 @ 1040am.    Electronically signed by Isabel Mesa RN on 3/29/2024 at 11:00 AM

## 2024-03-29 NOTE — CARE COORDINATION
Continuity of Care Form    Contact pt's daughter, Maru, for start of care [phone]    Patient Name: Nile Lacy   :  1940  MRN:  [id number]    Admit date:  3/20/2024  Discharge date:  3/29/24    Code Status Order: Full Code   Advance Directives:     Admitting Physician:  Mai Sahu MD  PCP: Dewey Vera MD    Discharging Nurse: [facility] Unit/Room#:   Discharging Unit Phone Number: [phone]    Emergency Contact:   Extended Emergency Contact Information  Primary Emergency Contact: Maru Mendez  Home Phone: [phone]  Mobile Phone: [phone]  Relation: Child  Secondary Emergency Contact: Ryanne Russell  Home Phone: [phone]  Mobile Phone: [phone]  Relation: Child    Past Surgical History:  Past Surgical History:   Procedure Laterality Date    CARDIAC CATHETERIZATION      CATARACT REMOVAL Bilateral     COLONOSCOPY  2018    COLONOSCOPY WITH BIOPSY performed by Sanchez Villa MD at [facility] Endoscopy    COLONOSCOPY N/A 2021    COLONOSCOPY DIAGNOSTIC performed by Alfred Berger MD at [facility] ENDO    CORONARY ARTERY BYPASS GRAFT  2004    x4 vessel    TOTAL KNEE ARTHROPLASTY Bilateral     VENTRAL HERNIA REPAIR         Immunization History:   Immunization History   Administered Date(s) Administered    Influenza Virus Vaccine 10/01/2018       Active Problems:  Patient Active Problem List   Diagnosis Code    PAF (paroxysmal atrial fibrillation) ([facility]) I48.0    Chest pain R07.9    Rectal bleeding K62.5    Lower GI bleed K92.2    Morbid obesity ([facility]) E66.01    S/P colonoscopy with polypectomy Z98.890    Elevated INR R79.1    Lung nodule seen on imaging study R91.1    Coronary artery disease involving coronary bypass graft of native heart without angina pectoris I25.810    Dementia (HCC) F03.90    Hyperlipidemia E78.5    Essential hypertension I10    ASCVD (arteriosclerotic cardiovascular disease) I25.10    Bradycardia R00.1    Atrial fibrillation

## 2024-03-29 NOTE — PLAN OF CARE
Problem: Skin/Tissue Integrity  Goal: Absence of new skin breakdown  Description: 1.  Monitor for areas of redness and/or skin breakdown  2.  Assess vascular access sites hourly  3.  Every 4-6 hours minimum:  Change oxygen saturation probe site  4.  Every 4-6 hours:  If on nasal continuous positive airway pressure, respiratory therapy assess nares and determine need for appliance change or resting period.  Outcome: Progressing     Problem: Discharge Planning  Goal: Discharge to home or other facility with appropriate resources  Outcome: Progressing  Flowsheets (Taken 3/28/2024 2030)  Discharge to home or other facility with appropriate resources:   Identify barriers to discharge with patient and caregiver   Arrange for needed discharge resources and transportation as appropriate   Identify discharge learning needs (meds, wound care, etc)   Refer to discharge planning if patient needs post-hospital services based on physician order or complex needs related to functional status, cognitive ability or social support system     Problem: ABCDS Injury Assessment  Goal: Absence of physical injury  Outcome: Progressing     Problem: Safety - Adult  Goal: Free from fall injury  Outcome: Progressing     Problem: Pain  Goal: Verbalizes/displays adequate comfort level or baseline comfort level  Outcome: Progressing

## 2024-03-29 NOTE — PROGRESS NOTES
[facility] -   Infectious diseases evaluation  admission date 3/20/2024    reason for consultation:   Bacteremia    Impression :   Current:  E. coli bacteremia/urinary source  Complicated UTI  Urinary retention  Hematuria  A-fib with rapid ventricular response on Eliquis   Coronary artery disease  Hypertension  Hyperlipidemia  Alzheimer's dementia  Allergy to PCN.      HENCE:   Ceftriaxone 2 g IV daily through 4/5/2024  3/22 BC x2 - negative to date.  Midline  3/23 Renal US - No hydronephrosis or intrarenal stones.  No focal lesions.  Bilateral renal cysts   Follow CBC and renal function  Urology following    Infection Control Recommendations   [place] Precautions      Antimicrobial Stewardship Recommendations   Simplification of therapy  Targeted therapy      History of Present Illness:   Initial history:  Nile Lacy is a 83 y.o.-year-old male presented to hospital with altered mental status.  He is complaining of burning with urination and dysuria, denied abdominal pain, no nausea or vomiting, no fever or chills, no other complaints.  Symptoms acute, moderate, no alleviating or aggravating factors.  He had urinary retention, reported to 400ml residual urine, attempt straight cath at the ER was unsuccessful.  The patient subsequently was able to urinate without gross hematuria.  Urology consulted  CT head showed no acute abnormality  Urinalysis showed too numerous to count WBC, moderate leukocyte esterase.  Urine culture was not sent  Blood culture grew E. Coli    Interval changes  3/26/2024   Afebrile, no new events  3/22 Blood Cx x2 - no growth to date  3/23 Renal US - No hydronephrosis or intrarenal stones.  No focal lesions.  Bilateral renal cysts largest on the right 2.5 cm. Largest on the left 1.3 cm     Patient Vitals for the past 8 hrs:   BP Temp Temp src Pulse Resp SpO2 Weight   03/26/24 1427 -- -- -- -- -- -- 82.8 kg (182 lb 8.7 oz)   03/26/24 1405 
          [facility] -   Infectious diseases evaluation  admission date 3/20/2024    reason for consultation:   Bacteremia    Impression :   Current:  E. coli bacteremia/urinary source  Complicated UTI  Urinary retention  Hematuria  A-fib with rapid ventricular response on Eliquis   Coronary artery disease  Hypertension  Hyperlipidemia  Alzheimer's dementia  Allergy to PCN.      HENCE:   Ceftriaxone 2 g IV daily.  Patient will need 14 day course of antibiotics from the first negative blood culture.  3/22 BC x2 - negative to date.  3/23 Renal US - No hydronephrosis or intrarenal stones.  No focal lesions.  Bilateral renal cysts   Follow cultures and sensitivity, adjust antibiotics as needed  Follow CBC and renal function  Supportive care.  Discussed with patient.    Infection Control Recommendations   [place] Precautions      Antimicrobial Stewardship Recommendations   Simplification of therapy  Targeted therapy      History of Present Illness:   Initial history:  Nile Lacy is a 83 y.o.-year-old male presented to hospital with altered mental status.  He is complaining of burning with urination and dysuria, denied abdominal pain, no nausea or vomiting, no fever or chills, no other complaints.  Symptoms acute, moderate, no alleviating or aggravating factors.  He had urinary retention, reported to 400ml residual urine, attempt straight cath at the ER was unsuccessful.  The patient subsequently was able to urinate without gross hematuria.  Urology consulted  CT head showed no acute abnormality  Urinalysis showed too numerous to count WBC, moderate leukocyte esterase.  Urine culture was not sent  Blood culture grew E. Coli    Interval changes  3/24/2024   Afebrile  VS stable, on RA  No acute changes, feeling well. Denies dysuria and flank pain  No leukocytosis  3/22 Blood Cx x2 - no growth to date  3/23 Renal US - No hydronephrosis or intrarenal stones.  No focal lesions.  
          [facility] -   Infectious diseases evaluation  admission date 3/20/2024    reason for consultation:   Bacteremia    Impression :   Current:  E. coli bacteremia/urinary source  Complicated UTI  Urinary retention  Hematuria  A-fib with rapid ventricular response on Eliquis   Coronary artery disease  Hypertension  Hyperlipidemia  Alzheimer's dementia  Allergy to PCN.      HENCE:   Ceftriaxone 2 g IV daily through 4/5/2024  3/22 BC x2 - negative to date.  Midline was placed  3/23 Renal US - No hydronephrosis or intrarenal stones.  No focal lesions.  Bilateral renal cysts   Follow CBC and renal function  Urology following    Infection Control Recommendations   [place] Precautions      Antimicrobial Stewardship Recommendations   Simplification of therapy  Targeted therapy      History of Present Illness:   Initial history:  Nile Lacy is a 83 y.o.-year-old male presented to hospital with altered mental status.  He is complaining of burning with urination and dysuria, denied abdominal pain, no nausea or vomiting, no fever or chills, no other complaints.  Symptoms acute, moderate, no alleviating or aggravating factors.  He had urinary retention, reported to 400ml residual urine, attempt straight cath at the ER was unsuccessful.  The patient subsequently was able to urinate without gross hematuria.  Urology consulted  CT head showed no acute abnormality  Urinalysis showed too numerous to count WBC, moderate leukocyte esterase.  Urine culture was not sent  Blood culture grew E. Coli    Interval changes  3/27/2024   Afebrile, up to the chair, complaining of mild burning with urination, no new complaints  3/22 Blood Cx x2 - no growth to date  3/23 Renal US - No hydronephrosis or intrarenal stones.  No focal lesions.  Bilateral renal cysts largest on the right 2.5 cm. Largest on the left 1.3 cm     Patient Vitals for the past 8 hrs:   BP Temp Temp src Pulse Resp SpO2 
     Nutrition Note    Chart reviewed. Pt is consuming more than 75% of food provided. Nutrition needs met with po intakel    Electronically signed by Sheridan Hardy RD, LD on 3/27/24 at 4:54 PM EDT    Contact: [phone]    
    [facility] Internal Medicine  Loco Barcenas MD; Rajiv Nazario MD; Aftab Khan MD; MD Mai Andersen MD; Arthur Chavira MD; Vy Ace MD      HISTORY AND PHYSICAL EXAMINATION            Date:   3/27/2024  Patient name:  Nile Lacy  MRN:   [id number]  Account:  [id number]  YOB: 1940  PCP:    Dewey Vera MD  Code Status:    Full Code    Chief Complaint:     Chief Complaint   Patient presents with    Altered Mental Status         History Obtained From:     Patient, EMR, nursing staff    HPI     This patient is a 83 y.o. Non- / non  malewho presents with  Nile Lacy is a 83 y.o. Non- / non  male who presents with Altered Mental Status and is admitted to the hospital for the management of Complicated UTI (urinary tract infection). Medical history significant for oxymel A-fib on Eliquis, CAD, HTN, HLD and late onset Alzheimer's dementia without behavioral disturbance.  Patient brought to ED per family for reports of altered mental status.  Family reporting that patient is acting different from baseline, altered in their opinion.  Patient does admit to nausea and fatigue.  CT head negative for acute abnormality.  UA reveals UTI.  WBC 11. ProCal 0.12. History of urine retention, bladder scan found to have 400 mL of urine unable to void.  Attempted straight cath in ED with coudé catheter without success.  After patient able to urinate. Denies fever, chills, chest pain, cough, abdominal pain,vomiting, diarrhea. There are no aggravating or alleviating factors. Symptoms are reported as acute, constant and moderate in severity     Once admitted to Progressive unit patient developed gross hematuria likely secondary to trauma from attempted straight cath. Urology updated and will see in AM. Hgb stable.          Review of Systems:     Denies any shortness of breath or cough  Denies chest pain or palpitations  Denies abdominal pain, 
    [facility] Internal Medicine  Loco Barcenas MD; Rajiv Nazario MD; Aftab Khan MD; MD Mai Andersen MD; Arthur Chavira MD; Vy Ace MD      HISTORY AND PHYSICAL EXAMINATION            Date:   3/23/2024  Patient name:  Nile Lacy  MRN:   [id number]  Account:  [id number]  YOB: 1940  PCP:    Dewey Vera MD  Code Status:    Full Code    Chief Complaint:     Chief Complaint   Patient presents with    Altered Mental Status         History Obtained From:     Patient, EMR, nursing staff    HPI     This patient is a 83 y.o. Non- / non  malewho presents with  Nile Lacy is a 83 y.o. Non- / non  male who presents with Altered Mental Status and is admitted to the hospital for the management of Complicated UTI (urinary tract infection). Medical history significant for oxymel A-fib on Eliquis, CAD, HTN, HLD and late onset Alzheimer's dementia without behavioral disturbance.  Patient brought to ED per family for reports of altered mental status.  Family reporting that patient is acting different from baseline, altered in their opinion.  Patient does admit to nausea and fatigue.  CT head negative for acute abnormality.  UA reveals UTI.  WBC 11. ProCal 0.12. History of urine retention, bladder scan found to have 400 mL of urine unable to void.  Attempted straight cath in ED with coudé catheter without success.  After patient able to urinate. Denies fever, chills, chest pain, cough, abdominal pain,vomiting, diarrhea. There are no aggravating or alleviating factors. Symptoms are reported as acute, constant and moderate in severity     Once admitted to Progressive unit patient developed gross hematuria likely secondary to trauma from attempted straight cath. Urology updated and will see in AM. Hgb stable.          Review of Systems:     Denies any shortness of breath or cough  Denies chest pain or palpitations  Denies abdominal pain, 
    Urology Progress Note    Subjective: No new urologic issues.  Hematuria is resolved.  He is voiding fine.    Patient Vitals for the past 24 hrs:   BP Temp Temp src Pulse Resp SpO2   03/22/24 1545 127/74 98.3 °F (36.8 °C) Oral 90 18 92 %   03/22/24 1130 134/81 98.4 °F (36.9 °C) Oral (!) 104 18 95 %   03/22/24 0715 (!) 140/77 98.2 °F (36.8 °C) Oral 96 17 96 %   03/22/24 0345 125/75 98.3 °F (36.8 °C) Oral 97 18 97 %   03/21/24 2330 135/83 99.1 °F (37.3 °C) Oral (!) 105 16 97 %   03/21/24 2015 127/77 98.1 °F (36.7 °C) Oral (!) 102 16 96 %   03/21/24 1816 -- -- -- -- 16 --   03/21/24 1746 -- -- -- -- 16 --       Intake/Output Summary (Last 24 hours) at 3/22/2024 1648  Last data filed at 3/22/2024 1410  Gross per 24 hour   Intake --   Output 2245 ml   Net -2245 ml       Recent Labs     03/20/24 2120 03/21/24 0202 03/21/24 0203 03/22/24  0519   WBC 11.0  --  16.9* 14.4*   HGB 15.4 13.4* 13.3* 13.7   HCT 45.3 40.7* 41.0 41.2   MCV 92.3  --  94.6 93.2   *  --  126* 120*     Recent Labs     03/20/24 2120 03/21/24 0203 03/22/24  0519    140 138   K 4.0 4.0 4.0    107 110*   CO2 25 24 22   BUN 15 13 10   CREATININE 0.9 1.0 0.8       Recent Labs     03/20/24 2205   COLORU Yellow   PHUR 6.0   WBCUA TOO NUMEROUS TO COUNT*   RBCUA 0 TO 2   BACTERIA MANY*   SPECGRAV 1.021   LEUKOCYTESUR MOD*   UROBILINOGEN Normal   BILIRUBINUR NEGATIVE       Additional Lab/culture results:     Physical Exam: Urine yellow and clear    Interval Imaging Findings: none    Impression:    Patient Active Problem List   Diagnosis    PAF (paroxysmal atrial fibrillation) (HCC)    Chest pain    Rectal bleeding    Lower GI bleed    Morbid obesity (HCC)    S/P colonoscopy with polypectomy    Elevated INR    Lung nodule seen on imaging study    Coronary artery disease involving coronary bypass graft of native heart without angina pectoris    Dementia (HCC)    Hyperlipidemia    Essential hypertension    ASCVD (arteriosclerotic 
   03/21/24 2000   Encounter Summary   Encounter Overview/Reason  Initial Encounter   Service Provided For: Patient   Referral/Consult From: Rounding   Complexity of Encounter Low   Spiritual/Emotional needs   Type Spiritual Support   Assessment/Intervention/Outcome   Assessment Unable to assess  (sleeping)   Intervention Prayer (assurance of)/[place]       
   03/22/24 1434   Encounter Summary   Encounter Overview/Reason  Volunteer Encounter   Service Provided For: Patient and family together   Referral/Consult From: Rounding   Last Encounter  03/22/24   Complexity of Encounter Low   Spiritual/Emotional needs   Type Spiritual Support       
   03/26/24 1520   Encounter Summary   Encounter Overview/Reason  Spiritual/Emotional Needs   Service Provided For: Patient   Referral/Consult From: Armin   Last Encounter  03/26/24   Complexity of Encounter Low   Spiritual/Emotional needs   Type Spiritual Support   Assessment/Intervention/Outcome   Assessment Unable to assess   Intervention Prayer (assurance of)/[place]       
CLINICAL PHARMACY NOTE: MEDS TO BEDS    Total # of Prescriptions Filled: 1   The following medications were delivered to the patient:  Metoprolol Tartrate 50mg    Additional Documentation: delivered to pt belongings closet pt himself signed Daughter Maru paid via phone 3/29/24 11:33am loly Nurse Luana aware leaving [id number] in pt room    10:29am Care Coord Isabel believes pt now going home not SNF 10:41am Nurse Luana said pt definitely going home deliver [id number]      3/29 9:29am Per Care Coord Isabel Mes[place]  note \"Spoke with patient regarding discharge plan and patient confirms that plan is still SNF - [facility].\" Confirmed with Med Surg Care Coord team (unable to reach Progressive Care Coord team) loly  
Date:   3/23/2024  Patient name: Nile Lacy  Date of admission:  3/20/2024  8:54 PM  MRN:   [id number]  YOB: 1940  PCP: Dewey Vera MD    Reason for Admission: Acute cystitis with hematuria [N30.01]  Complicated UTI (urinary tract infection) [N39.0]  Altered mental status, unspecified altered mental status type [R41.82]       Cardiology consult: A-fib with RVR, history of CAD, CABG x 4            Referring physician Dr. Vy Ace     Impression    Admission 3/28/2024 with fever, altered mental status, urinary tract infection, bladder scan showed urine 400 ml.  Blood culture positive for gram-negative lilli E. coli  A-fib with RVR  Coronary artery disease, status post CABG  Cardiac cath 9/22/2021: Multivessel obstructive CAD, patent LIMA to LAD, saphenous venous graft to second diagonal, SVG to obtuse marginal and SVG to posterior descending artery, normal left ventricular systolic function  Ejection fraction more than 55%, normal LV size and wall thickness, RVSP 30-1, 2D echo 5/12/2022  Hypertension  Hyperlipidemia  Alzheimer's disease mild dementia  Cholelithiasis  Small hiatal hernia     Past surgical history  CABG times 4/2004, total knee bilateral arthroplasty and ventral hernia repair, colonoscopies with the biopsy, cataract removal     Drug allergies  Oxycodone causing blackout dizziness, penicillin, warfarin and related    History of present illness     83-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  Patient was brought to emergency department by emergency squad and they found him febrile.  According to the family patient was acting different from baseline he also had some nausea and fatigue.  CT head was unremarkable, urine examination showed too many WBC, positive leukocyte Estrace, procalcitonin 
Date:   3/24/2024  Patient name: Nile Lacy  Date of admission:  3/20/2024  8:54 PM  MRN:   [id number]  YOB: 1940  PCP: Dewey Vera MD    Reason for Admission: Acute cystitis with hematuria [N30.01]  Complicated UTI (urinary tract infection) [N39.0]  Altered mental status, unspecified altered mental status type [R41.82]    Cardiology follow-up: A-fib with RVR, history of CAD, CABG x 4       Referring physician Dr. Vy Ace     Impression     Admission 3/28/2024 with fever, altered mental status, urinary tract infection, bladder scan showed urine 400 ml.  Blood culture positive for gram-negative lilli E. coli  A-fib with RVR  Coronary artery disease, status post CABG  Cardiac cath 9/22/2021: Multivessel obstructive CAD, patent LIMA to LAD, saphenous venous graft to second diagonal, SVG to obtuse marginal and SVG to posterior descending artery, normal left ventricular systolic function  Ejection fraction more than 55%, normal LV size and wall thickness, RVSP 30-1, 2D echo 5/12/2022  Hypertension  Hyperlipidemia  Alzheimer's disease mild dementia  Cholelithiasis  Small hiatal hernia      Past surgical history  CABG times 4/2004, total knee bilateral arthroplasty and ventral hernia repair, colonoscopies with the biopsy, cataract removal     Drug allergies  Oxycodone causing blackout dizziness, penicillin, warfarin and related     History of present illness     83-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  Patient was brought to emergency department by emergency squad and they found him febrile.  According to the family patient was acting different from baseline he also had some nausea and fatigue.  CT head was unremarkable, urine examination showed too many WBC, positive leukocyte Estrace, procalcitonin 
Date:   3/25/2024  Patient name: Nile Lacy  Date of admission:  3/20/2024  8:54 PM  MRN:   [id number]  YOB: 1940  PCP: Dewey Vera MD    Reason for Admission: Acute cystitis with hematuria [N30.01]  Complicated UTI (urinary tract infection) [N39.0]  Altered mental status, unspecified altered mental status type [R41.82]    Cardiology follow-up: A-fib with RVR, history of CAD, CABG x 4        Referring physician Dr. Vy Ace     Impression     Admission 3/28/2024 with fever, altered mental status, urinary tract infection, bladder scan showed urine 400 ml.  Blood culture positive for gram-negative lilli E. coli  A-fib with RVR  Coronary artery disease, status post CABG  Cardiac cath 9/22/2021: Multivessel obstructive CAD, patent LIMA to LAD, saphenous venous graft to second diagonal, SVG to obtuse marginal and SVG to posterior descending artery, normal left ventricular systolic function  Ejection fraction more than 55%, normal LV size and wall thickness, RVSP 30-1, 2D echo 5/12/2022  Hypertension  Hyperlipidemia  Alzheimer's disease mild dementia  Cholelithiasis  Small hiatal hernia    Past surgical history  CABG times 4/2004, total knee bilateral arthroplasty and ventral hernia repair, colonoscopies with the biopsy, cataract removal     Drug allergies  Oxycodone causing blackout dizziness, penicillin, warfarin and related     History of present illness     83-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  Patient was brought to emergency department by emergency squad and they found him febrile.  According to the family patient was acting different from baseline he also had some nausea and fatigue.  CT head was unremarkable, urine examination showed too many WBC, positive leukocyte Estrace, procalcitonin 
Date:   3/28/2024  Patient name: Nile Lacy  Date of admission:  3/20/2024  8:54 PM  MRN:   [id number]  YOB: 1940  PCP: Dewey Vera MD    Reason for Admission: Acute cystitis with hematuria [N30.01]  Complicated UTI (urinary tract infection) [N39.0]  Altered mental status, unspecified altered mental status type [R41.82]    Cardiology follow-up: A-fib with RVR, history of CAD, CABG x 4          Referring physician Dr. Vy Ace     Impression     Admission 3/28/2024 with fever, altered mental status, urinary tract infection, bladder scan showed urine 400 ml.  Blood culture positive for gram-negative lilli E. coli  A-fib with RVR  Coronary artery disease, status post CABG  Cardiac cath 9/22/2021: Multivessel obstructive CAD, patent LIMA to LAD, saphenous venous graft to second diagonal, SVG to obtuse marginal and SVG to posterior descending artery, normal left ventricular systolic function  Ejection fraction more than 55%, normal LV size and wall thickness, RVSP 30-1, 2D echo 5/12/2022  Hypertension  Hyperlipidemia  Alzheimer's disease mild dementia  Cholelithiasis  Small hiatal hernia     Past surgical history  CABG times 4/2004, total knee bilateral arthroplasty and ventral hernia repair, colonoscopies with the biopsy, cataract removal     Drug allergies  Oxycodone causing blackout dizziness, penicillin, warfarin and related       History of present illness     83-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  Patient was brought to emergency department by emergency squad and they found him febrile.  According to the family patient was acting different from baseline he also had some nausea and fatigue.  CT head was unremarkable, urine examination showed too many WBC, positive leukocyte Estrace, 
Date:   3/29/2024  Patient name: Nile Lacy  Date of admission:  3/20/2024  8:54 PM  MRN:   [id number]  YOB: 1940  PCP: Dewey Vera MD    Reason for Admission: Acute cystitis with hematuria [N30.01]  Complicated UTI (urinary tract infection) [N39.0]  Altered mental status, unspecified altered mental status type [R41.82]    Cardiology follow-up: A-fib with RVR, history of CAD, CABG x 4           Referring physician Dr. Vy Ace     Impression     Admission 3/28/2024 with fever, altered mental status, urinary tract infection, bladder scan showed urine 400 ml.  Blood culture positive for gram-negative lilli E. Coli  Renal ultrasound March 23 no hydronephrosis or intrarenal stone  A-fib with RVR  Coronary artery disease, status post CABG  Cardiac cath 9/22/2021: Multivessel obstructive CAD, patent LIMA to LAD, saphenous venous graft to second diagonal, SVG to obtuse marginal and SVG to posterior descending artery, normal left ventricular systolic function  Ejection fraction more than 55%, normal LV size and wall thickness, RVSP 30-1, 2D echo 5/12/2022  Hypertension  Hyperlipidemia  Alzheimer's disease mild dementia  Cholelithiasis  Small hiatal hernia     Past surgical history  CABG times 4/2004, total knee bilateral arthroplasty and ventral hernia repair, colonoscopies with the biopsy, cataract removal     Drug allergies  Oxycodone causing blackout dizziness, penicillin, warfarin and related    History of present illness     83-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  Patient was brought to emergency department by emergency squad and they found him febrile.  According to the family patient was acting different from baseline he also had some nausea and fatigue.  CT head was unremarkable, urine 
Dr. Gilbert (Infectious Diseases) notified of consult.  
Dr. Murray (Cardiology) put on patient list for consult.  
Housekeeping was called around 5:00pm to ask about lost cell phone. They advised that they will keep a look out for it. Information forwarded to night shift RN.  
LEATHA Cárdenas notified that IR is unable to place PICC today due to scheduling conflicts with multiple other inpatient orders with outpatient orders.  
Message from  Isabel: \"Pts daughter wants a psych consult bc they feel he is a harm to himself or to others if he goes home\".    Will advise MD.  
Occupational Therapy  [facility]   Occupational Therapy Evaluation  Date: 3/25/24  Patient Name: Nile Lacy       Room:   MRN: [id number]  Account: [id number]   : 1940  (83 y.o.) Gender: male     Discharge Recommendations:  Further Occupational Therapy is recommended upon facility discharge.    OT Equipment Recommendations  Other: TBD    Referring Practitioner: Mai Sahu MD  Diagnosis: Complicated UTI   Additional Pertinent Hx: Per chart: 83-year-old male presents for reported altered mental status.  Family reports that patient has been acting different from his baseline today and altered.  Per squad when they arrived patient was febrile.  Patient reports that he has been feeling little nauseous, he denies any chest pain shortness of breath, abdominal pain, difficulty urinating or pain with urination.    Treatment Diagnosis: impaired self care status    Past Medical History:  has a past medical history of Anxiety attack, Arthritis, Atrial fibrillation (HCC), Atrial flutter (HCC), Back pain, CAD (coronary artery disease), Chest pain, Colon polyps, Constipation, Dementia (HCC), Dizziness, GERD (gastroesophageal reflux disease), Hyperlipidemia, Hypertension, Lower GI bleed, Lung nodule seen on imaging study, Prediabetes, Rectal bleeding, S/P colonoscopy with polypectomy, Seizure (HCC), Sleep apnea, SOB (shortness of breath), Status post coronary artery bypass graft, and Wears dentures.    Past Surgical History:   has a past surgical history that includes Coronary artery bypass graft (); Cataract removal (Bilateral); ventral hernia repair; Total knee arthroplasty (Bilateral); Colonoscopy (2018); Cardiac catheterization; and Colonoscopy (N/A, 2021).    Restrictions  Restrictions/Precautions  Restrictions/Precautions: Fall Risk, General Precautions, Up as Tolerated  Required Braces or Orthoses?: No  Implants present? : Metal implants (B TKA)  
Occupational Therapy  [facility]   Occupational Therapy Evaluation  Date: 3/28/24  Patient Name: Nile Lacy       Room:   MRN: [id number]  Account: [id number]   : 1940  (83 y.o.) Gender: male     Discharge Recommendations:  Further Occupational Therapy is recommended upon facility discharge.    OT Equipment Recommendations  Other: TBD    Referring Practitioner: Mai Sahu MD  Diagnosis: Complicated UTI   Additional Pertinent Hx: Per chart: 83-year-old male presents for reported altered mental status.  Family reports that patient has been acting different from his baseline today and altered.  Per squad when they arrived patient was febrile.  Patient reports that he has been feeling little nauseous, he denies any chest pain shortness of breath, abdominal pain, difficulty urinating or pain with urination.    Treatment Diagnosis: impaired self care status    Past Medical History:  has a past medical history of Anxiety attack, Arthritis, Atrial fibrillation (HCC), Atrial flutter (HCC), Back pain, CAD (coronary artery disease), Chest pain, Colon polyps, Constipation, Dementia (HCC), Dizziness, GERD (gastroesophageal reflux disease), Hyperlipidemia, Hypertension, Lower GI bleed, Lung nodule seen on imaging study, Prediabetes, Rectal bleeding, S/P colonoscopy with polypectomy, Seizure (HCC), Sleep apnea, SOB (shortness of breath), Status post coronary artery bypass graft, and Wears dentures.    Past Surgical History:   has a past surgical history that includes Coronary artery bypass graft (); Cataract removal (Bilateral); ventral hernia repair; Total knee arthroplasty (Bilateral); Colonoscopy (2018); Cardiac catheterization; and Colonoscopy (N/A, 2021).    Restrictions  Restrictions/Precautions  Restrictions/Precautions: Fall Risk, General Precautions, Up as Tolerated  Required Braces or Orthoses?: No  Implants present? : Metal implants (B TKA)  
Order received for Midline placement. D/t scheduling conflicts, IR will not be able to accommodate patient today. Attempted to update the nurse, nurse unavailable.   
Patient admitted to the unit. Patient was able to urinate on his own. Urine was very dark and bloody. RN notified night call provider Mari HOWARD. Patient later called out to void again. RN assisted patient to the restroom and noticed bright red blood on his bed, gown and dripping onto the floor. Once into the restroom, RN noticed a large amount of blood and clots coming from the patient's penis. RN notified charge nurse who contacted NP, who will come see the patient.   
Patient educated on discharge instructions which include: medication schedule, reasons for new medication and some side effects and need to follow-up. Patient given new prescription during teaching.  Patient verbalizes understanding of discharge and states readiness for discharge.  All belongings were gathered by patient and in patient's possession.  No distress noted at this time.     Current vital signs:  /70   Pulse 69   Temp 98.2 °F (36.8 °C) (Oral)   Resp 17   Ht 1.676 m (5' 6\")   Wt 82.8 kg (182 lb 8.7 oz)   SpO2 96%   BMI 29.46 kg/m²                MEWS Score: 1      
Physical Therapy        Physical Therapy Cancel Note      DATE: 3/28/2024    NAME: Nile Lacy  MRN: [id number]   : 1940      Patient not seen this date for Physical Therapy due to:    Patient Declined: Cx; patient declined therapy services at this time stating \"there's too much going open.\" Plans to reproach if time allows. Will continue to follow for therapy needs.      Electronically signed by Apoorva Brooke PTA on 3/28/2024 at 2:54 PM      
Physical Therapy  Facility/Department: [facility] PROGRESSIVE CARE  Daily Treatment Note  NAME: Nile Lacy  : 1940  MRN: [id number]    Date of Service: 3/26/2024    Discharge Recommendations:  Home with assist PRN, Home with Home health PT        Patient Diagnosis(es): The primary encounter diagnosis was Acute cystitis with hematuria. A diagnosis of Altered mental status, unspecified altered mental status type was also pertinent to this visit.      Activity Tolerance: Patient tolerated treatment well     Plan    Physical Therapy Plan  General Plan: 5-7 times per week  Current Treatment Recommendations: Balance training;Stair training;Gait training;Therapeutic activities     Restrictions  Restrictions/Precautions  Restrictions/Precautions: Fall Risk, General Precautions, Up as Tolerated  Required Braces or Orthoses?: No  Implants present? : Metal implants (B TKA)     Subjective    Pt pleasant and agreeable to PT    Pain: Pt denies    Cognition  Overall Cognitive Status: Exceptions  Arousal/Alertness: Appropriate responses to stimuli  Following Commands: Follows multistep commands with repitition;Follows multistep commands with increased time  Attention Span: Attends with cues to redirect  Memory: Appears intact  Safety Judgement: Decreased awareness of need for assistance;Decreased awareness of need for safety  Problem Solving: Assistance required to correct errors made;Assistance required to identify errors made;Assistance required to implement solutions;Assistance required to generate solutions  Insights: Decreased awareness of deficits  Initiation: Requires cues for some  Sequencing: Requires cues for some  Cognition Comment: Hx dementia     Objective      Bed Mobility Training  Rolling: Independent (right and left)  Supine to Sit: Independent  Sit to Supine: Independent    Balance  Sitting: Intact    Transfer Training  Sit to Stand: Supervision  Stand to Sit: Supervision    Gait Training  Overall Level of 
Physical Therapy  [facility]    Date: 3/29/24  Patient Name: Nile Lacy       Room:   MRN: [id number]   Account: [id number]   : 1940  (83 y.o.) Gender: male     Referring Practitioner: Mai Sahu MD  Diagnosis: Complicated UTI  Past Medical History:  has a past medical history of Anxiety attack, Arthritis, Atrial fibrillation (HCC), Atrial flutter (HCC), Back pain, CAD (coronary artery disease), Chest pain, Colon polyps, Constipation, Dementia (HCC), Dizziness, GERD (gastroesophageal reflux disease), Hyperlipidemia, Hypertension, Lower GI bleed, Lung nodule seen on imaging study, Prediabetes, Rectal bleeding, S/P colonoscopy with polypectomy, Seizure (HCC), Sleep apnea, SOB (shortness of breath), Status post coronary artery bypass graft, and Wears dentures.   Past Surgical History:   has a past surgical history that includes Coronary artery bypass graft (); Cataract removal (Bilateral); ventral hernia repair; Total knee arthroplasty (Bilateral); Colonoscopy (2018); Cardiac catheterization; and Colonoscopy (N/A, 2021).       Overall Orientation Status: Within Functional Limits  Restrictions/Precautions  Restrictions/Precautions: Fall Risk;General Precautions;Up as Tolerated  Required Braces or Orthoses?: No  Implants present? : Metal implants    Subjective: Pt lying in bed upon arrival, agreeable to therapy  Comments: LEATHA Albarado approved therapy       Pain Assessment: 0-10  Pain Level: 7  Pain Location: Ankle  Pain Orientation: Right     Oxygen Therapy  O2 Device: None (Room air)    Bed Mobility  Rolling: Supervision  Supine to Sit: Supervision  Sit to Supine: Unable to assess  Scooting: Supervision  Bed mobility  Scooting: Supervision    Transfers:  Sit to Stand: Supervision  Stand to Sit: Supervision                 Ambulation  Surface: Level tile  Device: No Device  Assistance: Contact guard assistance  Distance: 120ft x2  Comments: standing rest 
Physical Therapy  [facility]    Date: 3/27/24  Patient Name: Nile Lacy       Room:   MRN: [id number]   Account: [id number]   : 1940  (83 y.o.) Gender: male     Referring Practitioner: Mai Sahu MD  Diagnosis: Complicated UTI  Past Medical History:  has a past medical history of Anxiety attack, Arthritis, Atrial fibrillation (HCC), Atrial flutter (HCC), Back pain, CAD (coronary artery disease), Chest pain, Colon polyps, Constipation, Dementia (HCC), Dizziness, GERD (gastroesophageal reflux disease), Hyperlipidemia, Hypertension, Lower GI bleed, Lung nodule seen on imaging study, Prediabetes, Rectal bleeding, S/P colonoscopy with polypectomy, Seizure (HCC), Sleep apnea, SOB (shortness of breath), Status post coronary artery bypass graft, and Wears dentures.   Past Surgical History:   has a past surgical history that includes Coronary artery bypass graft (); Cataract removal (Bilateral); ventral hernia repair; Total knee arthroplasty (Bilateral); Colonoscopy (2018); Cardiac catheterization; and Colonoscopy (N/A, 2021).       Overall Orientation Status: Within Functional Limits  Restrictions/Precautions  Restrictions/Precautions: Fall Risk;General Precautions;Up as Tolerated  Required Braces or Orthoses?: No  Implants present? : Metal implants    Subjective: Pt lying in bed upon arrival, agreeable to therapy  Comments: LEATHA Albarado approved therapy       Pain Assessment: 0-10  Pain Level: 8  Pain Location: Hand    Bed Mobility  Rolling: Supervision  Supine to Sit: Supervision  Sit to Supine: Unable to assess  Scooting: Supervision  Bed mobility  Scooting: Supervision    Transfers:  Sit to Stand: Supervision  Stand to Sit: Supervision                 Ambulation  Surface: Level tile  Device: No Device  Assistance: Contact guard assistance  Distance: 120ft x2  Comments: standing rest break in between  More Ambulation?: No        Stairs/Curb  Stairs?: 
Psych notified of consult.  
Pt continues to be in afib, but more rate controlled. Heart rate 101. DARLEEN Morris NP updated.   
Pt in Afib RVR at start of shift. Day shift RN notified DARLEEN Morris NP. See new orders. Also per DARLEEN Morris NP ok to give scheduled Po lopressor between 2200 and 2300.   
Pt tells writer that he lost his phone and glasses. Said he had it yesterday and thought he took both with him when he went to IR yesterday. Writer called IR to check and neither were located there.   
Received orders for pt to get bladder scan and to give glycolax. Bladder scan order. Active order for gylcolax noted. Writer communicated w/ CTP and bedside RN   
Ryanne -Nile daughter spoke with Rn on unit and does not think pt has capacity to make his decision and would like pt to be re-evaluated again and would also like to speak with psych at that time. Rn reached out to Dr. Ace stating concerns with family. New order to re consult psych.   
Telesitter removed from room. Call light within reach and bed alarm on. Pt is alert and oriented, calling out appropriately.   
[facility]   OCCUPATIONAL THERAPY MISSED TREATMENT NOTE   INPATIENT   Date: 3/29/24  Patient Name: Nile Lacy       Room:   MRN: [id number]   Account #: [id number]    : 1940  (83 y.o.)  Gender: male   Referring Practitioner: Mai Sahu MD  Diagnosis: Complicated UTI             REASON FOR MISSED TREATMENT:  Patient not seen for occupational therapy this date. Writer entered patients room with LEATHA Craft present reviewing discharge information    -   Patient getting ready to discharge home today. 1354    Electronically signed by DILEEP Sharif on 3/29/24 at 2:40 PM EDT   
Urology Progress Note    Subjective: Urine is clear.  Renal US shows simple bilateral renal cysts.  On Rocephin for E coli sepsis, sensitive to Rocephin.    Patient Vitals for the past 24 hrs:   BP Temp Temp src Pulse Resp SpO2   03/23/24 0815 (!) 149/99 98.3 °F (36.8 °C) Oral 99 17 100 %   03/23/24 0330 (!) 147/93 97.5 °F (36.4 °C) Axillary 91 16 96 %   03/22/24 2300 (!) 140/90 98.1 °F (36.7 °C) Oral 83 18 96 %   03/22/24 2153 -- -- -- 90 -- --   03/22/24 2000 134/83 98.1 °F (36.7 °C) Oral 84 18 96 %   03/22/24 1545 127/74 98.3 °F (36.8 °C) Oral 90 18 92 %   03/22/24 1130 134/81 98.4 °F (36.9 °C) Oral (!) 104 18 95 %       Intake/Output Summary (Last 24 hours) at 3/23/2024 1121  Last data filed at 3/22/2024 1707  Gross per 24 hour   Intake --   Output 925 ml   Net -925 ml       Recent Labs     03/21/24  0203 03/22/24  0519 03/23/24  0525   WBC 16.9* 14.4* 9.4   HGB 13.3* 13.7 13.5   HCT 41.0 41.2 40.1*   MCV 94.6 93.2 92.5   * 120* 124*     Recent Labs     03/21/24  0203 03/22/24  0519 03/23/24  0525    138 141   K 4.0 4.0 3.8    110* 111*   CO2 24 22 22   BUN 13 10 8   CREATININE 1.0 0.8 0.7       Recent Labs     03/20/24  2205   COLORU Yellow   PHUR 6.0   WBCUA TOO NUMEROUS TO COUNT*   RBCUA 0 TO 2   BACTERIA MANY*   SPECGRAV 1.021   LEUKOCYTESUR MOD*   UROBILINOGEN Normal   BILIRUBINUR NEGATIVE       Additional Lab/culture results:    Physical Exam: soft, nontender, nondistended, no masses or organomegaly normal circumcised penis    Interval Imaging Findings:    Impression: Gross hematuria, e coli sepsis, bilateral renal cysts    Patient Active Problem List   Diagnosis    PAF (paroxysmal atrial fibrillation) (HCC)    Chest pain    Rectal bleeding    Lower GI bleed    Morbid obesity (HCC)    S/P colonoscopy with polypectomy    Elevated INR    Lung nodule seen on imaging study    Coronary artery disease involving coronary bypass graft of native heart without angina pectoris    Dementia (HCC)    
Writer called daughter Maru to confirm if pt came in with glasses and phone, since both are missing. Pt DID come in with a black flip phone. Pt DID NOT come in with glasses. Phone still remains missing.  
Ambulation?: No        Stairs/Curb  Stairs?: Yes  Stairs  # Steps : 4  Stairs Height: 8\"  Rails: Right ascending  Device: No Device  Assistance: Contact guard assistance  Comment: no LOB noted    EXERCISES    Other exercises?: Yes  Other exercises 1: bed mobility x1  Other exercises 2: seated B LE exercises x20 reps  Other exercises 3: STS x2 from bed           Activity Tolerance: Patient tolerated treatment well  PT Equipment Recommendations  Equipment Needed: No    Current Treatment Recommendations: Balance training, Stair training, Gait training, Therapeutic activities    Conditions Requiring Skilled Therapeutic Intervention  History: UTI; falls  Discharge Recommendations: Home with assist PRN;Home with Home health PT    [facility] Basic Mobility - Inpatient   How much help is needed turning from your back to your side while in a flat bed without using bedrails?: None  How much help is needed moving from lying on your back to sitting on the side of a flat bed without using bedrails?: A Little  How much help is needed moving to and from a bed to a chair?: A Little  How much help is needed standing up from a chair using your arms?: A Little  How much help is needed walking in hospital room?: A Little  How much help is needed climbing 3-5 steps with a railing?: A Little  [facility] Inpatient Mobility Raw Score : 19  AM-PAC Inpatient T-Scale Score : 45.44  Mobility Inpatient CMS 0-100% Score: 41.77  Mobility Inpatient CMS G-Code Modifier : CK     Goals  Short Term Goals  Time Frame for Short Term Goals: 1-2 days  Short Term Goal 1: mod-I bed mobility  Short Term Goal 2: mod-I transfers from various surfaces  Short Term Goal 3: mod-I gait with/without device x 150ft  Short Term Goal 4: pt able to negotiate 1 step without rail mod-I(with/without device) for home entry       03/25/24 1452   PT Individual Minutes   Time In 1338   Time Out 1402   Minutes 24         Electronically signed by Jenny Lucas PTA on 3/25/24 at 2:53 
Oriented to place, person  Mental status:   Head:  normocephalic, atraumatic.  Eye: no icterus, redness, pupils equal and reactive, extraocular eye movements intact, conjunctiva clear  Ear: normal external ear, no discharge, hearing intact  Nose:  no drainage noted  Mouth: mucous membranes moist  Neck: supple, no carotid bruits, thyroid not palpable  Lungs: Bilateral equal air entry, clear to ausculation, no wheezing, rales or rhonchi, normal effort  Cardiovascular: normal rate, regular rhythm, no murmur, gallop, rub.  Abdomen: Soft, nontender, nondistended, normal bowel sounds, no hepatomegaly or splenomegaly  Neurologic: There are no new focal motor or sensory deficits,   Skin: No gross lesions, rashes, bruising or bleeding on exposed skin area  Extremities:  peripheral pulses palpable, no pedal edema or calf pain with palpation  Psych:             Data:     PLabs:    BMP:   Recent Labs     03/27/24  1315      K 3.9   CO2 22   BUN 16   CREATININE 1.0   LABGLOM 76   GLUCOSE 181*               Medications:     Allergies:    Allergies   Allergen Reactions    Oxycodone Other (See Comments)     Attacking people, black out     Pcn [Penicillins] Other (See Comments)     dizziness    Warfarin And Related      \" I see things\"        Current Meds:   Scheduled Meds:    cefTRIAXone (ROCEPHIN) IV  2,000 mg IntraVENous Q24H    QUEtiapine  12.5 mg Oral Nightly    metoprolol tartrate  50 mg Oral BID    dilTIAZem  120 mg Oral Daily    lidocaine   Urethral Once    pravastatin  20 mg Oral Nightly    apixaban  5 mg Oral BID    aspirin  81 mg Oral Daily    doxazosin  2 mg Oral Nightly    isosorbide mononitrate  60 mg Oral Daily    pantoprazole  40 mg Oral QAM AC    docusate sodium  100 mg Oral BID    sodium chloride flush  5-40 mL IntraVENous 2 times per day     Continuous Infusions:    sodium chloride       PRN Meds: metoprolol, sodium chloride flush, sodium chloride, potassium chloride **OR** potassium alternative oral 
Pt is currently limited by decreased strength, endurance, activity tolerance, impaired balance and cognitive barriers which impacts the pt's ability to safely and independently complete self-care and mobility    Balance  Balance  Sitting Balance: Supervision  Standing Balance: Stand by assistance  Standing Balance  Time: ~15mins  Activity: mobility within room and hallway  Comment: No device    Transfers/Mobility  Transfers  Sit to stand: Stand by assistance  Stand to sit: Stand by assistance  Transfer Comments: Initial VC for hand placement with good carryover    Functional Mobility  Functional - Mobility Device: No device  Activity: To/from bathroom (within room and ahllway)  Assist Level: Stand by assistance  Functional Mobility Comments: close SBA for safety. no loss of balance      OT Exercises  Exercise Treatment: OTR facilitated pt's engagement in BUE strengthening exercises to promote improved overall strength and activity tolerance needed for safe completion of ADLs. Pt tolerated well, able to complete 1 set x15 reps each in all available planes. Initial demonstration required with good technique noted. Completed utilizing 2# free weight. RB taken as needed throughout, pt completed exercises seated upright in bedside chair.    Patient Education  Patient Education  Education Given To: Patient  Education Provided: Role of Therapy, Plan of Care, Home Exercise Program, ADL Adaptive Strategies, Transfer Training, Fall Prevention Strategies  Education Provided Comments: OTR provided handout and edu regarding home safety/fall prevention strategies. 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 
alarm in place, Left in bed, Call light within reach, Sitter present, Patient at risk for falls     Restrictions  Restrictions/Precautions  Restrictions/Precautions: Fall Risk, General Precautions     Subjective   Pain: pt denies pain at this time  General  Family / Caregiver Present: No  Follows Commands: Within Functional Limits  Subjective  Subjective: pt pleasant and cooperative         Social/Functional History  Social/Functional History  Lives With: Alone  Type of Home: House  Home Layout: One level  Home Access: Stairs to enter without rails  Entrance Stairs - Number of Steps: 1  Bathroom Shower/Tub: Tub/Shower unit  Bathroom Toilet: Standard  Bathroom Equipment: Grab bars in shower  Home Equipment: Walker, rolling, Cane, Cane, quad  Has the patient had two or more falls in the past year or any fall with injury in the past year?: Yes  Receives Help From: Family, Friend(s)  ADL Assistance: Independent  Homemaking Assistance: Independent  Ambulation Assistance: Independent  Transfer Assistance: Independent  Active : Yes  Occupation: Retired  IADL Comments: sleeps in a flat bed  Vision/Hearing  Vision  Vision: Within Functional Limits  Hearing  Hearing: Within functional limits    Cognition   Orientation  Overall Orientation Status: Within Functional Limits  Orientation Level: Oriented to person;Oriented to place;Oriented to situation (pt knew year but not month)     Objective   O2 Device: None (Room air)       AROM RLE (degrees)  RLE AROM: WNL  AROM LLE (degrees)  LLE AROM : WNL  Strength RLE  Strength RLE: WNL  Strength LLE  Strength LLE: WNL           Bed mobility  Supine to Sit: Supervision  Sit to Supine: Supervision  Scooting: Supervision  Transfers  Sit to Stand: Supervision  Stand to Sit: Supervision  Ambulation  Surface: Level tile  Device: No Device  Assistance: Stand by assistance  Distance: 120ft  Comments: pt had one LOB which he was able to self correct  More Ambulation?: 
  doxazosin (CARDURA) 2 MG tablet Take 1 tablet by mouth nightly   Yes Duc Rodríguez MD   pravastatin (PRAVACHOL) 40 MG tablet Take 0.5 tablets by mouth daily   Yes Duc Rodríguez MD   silodosin (RAPAFLO) 8 MG CAPS Take 1 capsule by mouth every evening 1/22/24   Fer Cuevas MD   docusate sodium (COLACE) 100 MG capsule Take 1 capsule by mouth 2 times daily 9/11/22   Frank Blankenship MD   dilTIAZem (CARDIZEM CD) 120 MG extended release capsule Take 1 capsule by mouth daily 5/14/22   Corie Hines MD   aspirin 81 MG chewable tablet Take 1 tablet by mouth daily 9/24/21   Corie Hines MD   nitroGLYCERIN (NITROSTAT) 0.4 MG SL tablet up to max of 3 total doses. If no relief after 1 dose, call 911. 2/17/19   Jose Luis Land MD   acetaminophen (TYLENOL) 325 MG tablet Take 2 tablets by mouth 2 times daily as needed for Pain    ProviderDuc MD   omeprazole (PRILOSEC) 20 MG capsule Take 1 capsule by mouth daily    ProviderDuc MD        Allergies:     Oxycodone, Pcn [penicillins], and Warfarin and related    Social History:     Tobacco:    reports that he quit smoking about 44 years ago. His smoking use included cigarettes. He has never used smokeless tobacco.  Alcohol:      reports no history of alcohol use.  Drug Use:  reports no history of drug use.    Family History:     Family History   Problem Relation Age of Onset    Alzheimer's Disease Mother     Arthritis Mother         lumbar disc disease    Heart Disease Father            Physical Exam:   BP (!) 142/97   Pulse 62   Temp 97.5 °F (36.4 °C) (Oral)   Resp 18   Ht 1.676 m (5' 6\")   Wt 88 kg (194 lb 0.1 oz)   SpO2 97%   BMI 31.31 kg/m²   Recent Labs     03/23/24 2033   POCGLU 90       General Appearance:  alert, well appearing, and in no acute distress  Mental status: Alert, oriented to place and person but not to time  Head:  normocephalic, atraumatic.  Eye: no icterus, redness, pupils equal and reactive, 
polypectomy     Seizure (HCC)     NOTED PER CARE EVERYWHERE- PATIENT DENIES HX OF, STATES HE HAS NEVER BEEN ON SEIZURE MEDICATION    Sleep apnea     not tested, no cpap    SOB (shortness of breath)     Status post coronary artery bypass graft     Wears dentures     TOP       Past Surgical  History:     Past Surgical History:   Procedure Laterality Date    CARDIAC CATHETERIZATION      CATARACT REMOVAL Bilateral     COLONOSCOPY  2018    COLONOSCOPY WITH BIOPSY performed by Sanchez Villa MD at [facility]    COLONOSCOPY N/A 2021    COLONOSCOPY DIAGNOSTIC performed by Alfred Berger MD at [facility]    CORONARY ARTERY BYPASS GRAFT  2004    x4 vessel    TOTAL KNEE ARTHROPLASTY Bilateral     VENTRAL HERNIA REPAIR         Medications:      cefTRIAXone (ROCEPHIN) IV  2,000 mg IntraVENous Q24H    QUEtiapine  12.5 mg Oral Nightly    metoprolol tartrate  50 mg Oral BID    dilTIAZem  120 mg Oral Daily    lidocaine   Urethral Once    pravastatin  20 mg Oral Nightly    apixaban  5 mg Oral BID    aspirin  81 mg Oral Daily    doxazosin  2 mg Oral Nightly    isosorbide mononitrate  60 mg Oral Daily    pantoprazole  40 mg Oral QAM AC    docusate sodium  100 mg Oral BID    sodium chloride flush  5-40 mL IntraVENous 2 times per day       Social History:     Social History     Socioeconomic History    Marital status: Single     Spouse name: Not on file    Number of children: Not on file    Years of education: Not on file    Highest education level: Not on file   Occupational History    Not on file   Tobacco Use    Smoking status: Former     Current packs/day: 0.00     Types: Cigarettes     Quit date:      Years since quittin.2    Smokeless tobacco: Never   Vaping Use    Vaping Use: Never used   Substance and Sexual Activity    Alcohol use: No    Drug use: No    Sexual activity: Not on file   Other Topics Concern    Not on file   Social History Narrative    Not on file     Social Determinants of Health 
Activity    Alcohol use: No    Drug use: No    Sexual activity: Not on file   Other Topics Concern    Not on file   Social History Narrative    Not on file     Social Determinants of Health     Financial Resource Strain: Not on file   Food Insecurity: No Food Insecurity (3/21/2024)    Hunger Vital Sign     Worried About Running Out of Food in the Last Year: Never true     Ran Out of Food in the Last Year: Never true   Transportation Needs: Unmet Transportation Needs (3/21/2024)    PRAPARE - Transportation     Lack of Transportation (Medical): Yes     Lack of Transportation (Non-Medical): Yes   Physical Activity: Not on file   Stress: Not on file   Social Connections: Not on file   Intimate Partner Violence: Not on file   Housing Stability: Low Risk  (3/21/2024)    Housing Stability Vital Sign     Unable to Pay for Housing in the Last Year: No     Number of Places Lived in the Last Year: 1     Unstable Housing in the Last Year: No       Family History:     Family History   Problem Relation Age of Onset    Alzheimer's Disease Mother     Arthritis Mother         lumbar disc disease    Heart Disease Father       Medical Decision Making:   I have independently reviewed/ordered the following labs:    CBC with Differential:   Recent Labs     03/23/24  0525 03/25/24  0517   WBC 9.4 6.1   HGB 13.5 14.1   HCT 40.1* 41.7   * 160   LYMPHOPCT 12* 24   MONOPCT 8* 12*       BMP:  Recent Labs     03/23/24  0525 03/25/24  0517    138   K 3.8 3.9   * 105   CO2 22 25   BUN 8 13   CREATININE 0.7 0.9       Hepatic Function Panel:   Recent Labs     03/25/24  0517   PROT 5.8*   LABALBU 3.2*   BILITOT 0.3   ALKPHOS 54   ALT 7   AST 12       No results for input(s): \"RPR\" in the last 72 hours.  No results for input(s): \"HIV\" in the last 72 hours.  No results for input(s): \"BC\" in the last 72 hours.  Lab Results   Component Value Date/Time    CREATININE 0.9 03/25/2024 05:17 AM    GLUCOSE 105 03/25/2024 05:17 AM    GLUCOSE 
high-level IADLs, Decreased fine motor control, Decreased coordination  Treatment Diagnosis: impaired self care status  Prognosis: Good  Decision Making: Medium Complexity  Discharge Recommendations: Patient would benefit from continued therapy after discharge  OT Equipment Recommendations  Other: TBD  Safety Devices  Type of Devices: All fall risk precautions in place, Call light within reach, Chair alarm in place, Gait belt, Patient at risk for falls, Left in chair, Nurse notified    AM-PAC Daily Activities Inpatient  AM-PAC Daily Activity - Inpatient   How much help is needed for putting on and taking off regular lower body clothing?: A Little  How much help is needed for bathing (which includes washing, rinsing, drying)?: A Little  How much help is needed for toileting (which includes using toilet, bedpan, or urinal)?: A Little  How much help is needed for putting on and taking off regular upper body clothing?: A Little  How much help is needed for taking care of personal grooming?: A Little  How much help for eating meals?: None  AM-PAC Inpatient Daily Activity Raw Score: 19  AM-PAC Inpatient ADL T-Scale Score : 40.22  ADL Inpatient CMS 0-100% Score: 42.8  ADL Inpatient CMS G-Code Modifier : CK    OT Minutes  OT Individual Minutes  Time In: 1315  Time Out: 1340  Minutes: 25  Time Code Minutes   Timed Code Treatment Minutes: 25 Minutes    Electronically signed by DILEEP Greco on 3/26/24 at 2:03 PM EDT    
packs/day: 0.00     Types: Cigarettes     Quit date: 1980     Years since quittin.2    Smokeless tobacco: Never   Vaping Use    Vaping Use: Never used   Substance and Sexual Activity    Alcohol use: No    Drug use: No    Sexual activity: Not on file   Other Topics Concern    Not on file   Social History Narrative    Not on file     Social Determinants of Health     Financial Resource Strain: Not on file   Food Insecurity: No Food Insecurity (3/21/2024)    Hunger Vital Sign     Worried About Running Out of Food in the Last Year: Never true     Ran Out of Food in the Last Year: Never true   Transportation Needs: Unmet Transportation Needs (3/21/2024)    PRAPARE - Transportation     Lack of Transportation (Medical): Yes     Lack of Transportation (Non-Medical): Yes   Physical Activity: Not on file   Stress: Not on file   Social Connections: Not on file   Intimate Partner Violence: Not on file   Housing Stability: Low Risk  (3/21/2024)    Housing Stability Vital Sign     Unable to Pay for Housing in the Last Year: No     Number of Places Lived in the Last Year: 1     Unstable Housing in the Last Year: No       Family History:     Family History   Problem Relation Age of Onset    Alzheimer's Disease Mother     Arthritis Mother         lumbar disc disease    Heart Disease Father       Medical Decision Making:   I have independently reviewed/ordered the following labs:    CBC with Differential:   Recent Labs     24  0519 24  0525   WBC 14.4* 9.4   HGB 13.7 13.5   HCT 41.2 40.1*   * 124*   LYMPHOPCT 10* 12*   MONOPCT 8* 8*     BMP:  Recent Labs     24  0203 24  0519 24  0525      < >  --  138 141   K 4.0   < >  --  4.0 3.8      < >  --  110* 111*   CO2 25   < >  --  22 22   BUN 15   < >  --  10 8   CREATININE 0.9   < >  --  0.8 0.7   MG 1.8  --  1.9  --   --     < > = values in this interval not displayed.     Hepatic Function Panel: 
Detected Not Detected    INFLUENZA B Not Detected Not Detected   Urinalysis    Collection Time: 03/20/24 10:05 PM   Result Value Ref Range    Color, UA Yellow Yellow    Turbidity UA Cloudy (A) Clear    Glucose, Ur NEGATIVE NEGATIVE mg/dL    Bilirubin Urine NEGATIVE NEGATIVE    Ketones, Urine TRACE (A) NEGATIVE mg/dL    Specific Gravity, UA 1.021 1.000 - 1.030    Urine Hgb TRACE (A) NEGATIVE    pH, UA 6.0 5.0 - 8.0    Protein, UA NEGATIVE NEGATIVE mg/dL    Urobilinogen, Urine Normal 0.0 - 1.0 EU/dL    Nitrite, Urine POSITIVE (A) NEGATIVE    Leukocyte Esterase, Urine MOD (A) NEGATIVE   Microscopic Urinalysis    Collection Time: 03/20/24 10:05 PM   Result Value Ref Range    WBC, UA TOO NUMEROUS TO COUNT (A) 0 TO 5 /HPF    RBC, UA 0 TO 2 0 TO 2 /HPF    Casts UA 0 TO 2 (A) None /LPF    Epithelial Cells UA 0 TO 2 /HPF    Bacteria, UA MANY (A) None   Hemoglobin and Hematocrit    Collection Time: 03/21/24  2:02 AM   Result Value Ref Range    Hemoglobin 13.4 (L) 13.5 - 17.5 g/dL    Hematocrit 40.7 (L) 41 - 53 %   Basic Metabolic Panel w/ Reflex to MG    Collection Time: 03/21/24  2:03 AM   Result Value Ref Range    Sodium 140 135 - 144 mmol/L    Potassium 4.0 3.7 - 5.3 mmol/L    Chloride 107 98 - 107 mmol/L    CO2 24 20 - 31 mmol/L    Anion Gap 9 9 - 17 mmol/L    Glucose 105 (H) 70 - 99 mg/dL    BUN 13 8 - 23 mg/dL    Creatinine 1.0 0.7 - 1.2 mg/dL    Est, Glom Filt Rate >60 >60 mL/min/1.73m2    Calcium 8.7 8.6 - 10.4 mg/dL   CBC with Auto Differential    Collection Time: 03/21/24  2:03 AM   Result Value Ref Range    WBC 16.9 (H) 3.5 - 11.0 k/uL    RBC 4.34 (L) 4.5 - 5.9 m/uL    Hemoglobin 13.3 (L) 13.5 - 17.5 g/dL    Hematocrit 41.0 41 - 53 %    MCV 94.6 80 - 100 fL    MCH 30.8 26 - 34 pg    MCHC 32.5 31 - 37 g/dL    RDW 13.9 11.5 - 14.9 %    Platelets 126 (L) 150 - 450 k/uL    MPV 8.0 6.0 - 12.0 fL    Neutrophils % 80 (H) 36 - 66 %    Lymphocytes % 10 (L) 24 - 44 %    Monocytes % 10 (H) 1 - 7 %    Eosinophils % 0 0 - 4 % 
systolic function, ejection fraction more than 55% 2D echo 2020 no obvious sign of cardiac decompensation  CAD, CABG x 4, patent LIMA to LAD, SVG to diagonal, OM and RCA  Hypertension stable      Patient Active Problem List:     PAF (paroxysmal atrial fibrillation) ([facility])     Chest pain     Rectal bleeding     Lower GI bleed     Morbid obesity ([facility])     S/P colonoscopy with polypectomy     Elevated INR     Lung nodule seen on imaging study     Coronary artery disease involving coronary bypass graft of native heart without angina pectoris     Dementia ([facility])     Hyperlipidemia     Essential hypertension     ASCVD (arteriosclerotic cardiovascular disease)     Bradycardia     Atrial fibrillation with RVR ([facility])     Late onset Alzheimer's dementia without behavioral disturbance ([facility])     Colitis     Complicated UTI (urinary tract infection)     Acute cystitis with hematuria     E coli bacteremia     Allergy to penicillin      Plan of Treatment:   Medications reviewed  1: A-fib, continue Cardizem  mg and increase metoprolol to tartrate to 50 mg twice daily, continue current dose of Eliquis 5 mg twice daily  2: CAD, CABG continue current dose of aspirin 81 mg, Imdur 60 mg, Pravachol 20 mg nightly, beta-blocker  3: UTI, E. coli bacteremia on Rocephin 2000 mg IV every 24  4: BPH on doxazosin 2 mg nightly  5: Gastroesophageal reflux disease on Protonix 40 mg a day  Continue ECG monitor  6: Episodes of confusion started on Seroquel  7: Gives 17 g MiraLAX again today  Waiting for placement     Thank you for letting me to participate in health care of your patient    Electronically signed by Ly Murray MD on 3/26/2024 at 12:20 PM  
the content of the visit, the document can still have some errors including those of syntax and sound a like substitutions which may escape proof reading.  It such instances, actual meaningcan be extrapolated by contextual diversion.    Guzman Gilbert MD  Office: [phone]  Perfect serve / office [phone]        
3/20/2024 9:48 pm COMPARISON: 09/11/2022 HISTORY: ORDERING SYSTEM PROVIDED HISTORY: cough TECHNOLOGIST PROVIDED HISTORY: cough Reason for Exam: Pt. c/o occasional dyspnea and a light brown colored cough with a hx of esophagus surgery. FINDINGS: The heart is mildly enlarged.  There is no focal pulmonary consolidation. There is no pleural effusion or pneumothorax.  Median sternotomy wires are in place, with fracturing of the 2 superior most sternal wires.     No acute cardiopulmonary abnormality is identified.     CT HEAD WO CONTRAST    Result Date: 3/20/2024  EXAMINATION: CT OF THE HEAD WITHOUT CONTRAST  3/20/2024 8:33 pm TECHNIQUE: CT of the head was performed without the administration of intravenous contrast. Automated exposure control, iterative reconstruction, and/or weight based adjustment of the mA/kV was utilized to reduce the radiation dose to as low as reasonably achievable. COMPARISON: CT brain 05/12/2022 HISTORY: ORDERING SYSTEM PROVIDED HISTORY: ams TECHNOLOGIST PROVIDED HISTORY: ams Decision Support Exception - unselect if not a suspected or confirmed emergency medical condition->Emergency Medical Condition (MA) Reason for Exam: ams Additional signs and symptoms: pt states he is c/o dizziness, and headache. states he went to go sit in chair and fell. pt states he did not hit his head FINDINGS: BRAIN/VENTRICLES: There is no acute intracranial hemorrhage, mass effect or midline shift.  No abnormal extra-axial fluid collection.  The gray-white differentiation is maintained without evidence of an acute infarct.  There is mild to moderate diffuse parenchymal atrophy.  There is no evidence of hydrocephalus. Patchy bilateral periventricular and subcortical white matter hypodensities are nonspecific, but compatible with chronic microvascular ischemic change.  There are lacunar infarcts in the bilateral basal ganglia. ORBITS: Prior bilateral cataract surgery. SINUSES: The visualized paranasal sinuses and mastoid 
Eliquis  Currently rate controlled, metoprolol dose was increased yesterday  Seen by urology ultrasound renal did not show any hydronephrosis showed renal cyst  Thrombocytopenia has been stable  , Hematuria has cleared  Hemoglobin stable patient resumed on Eliquis  Continue to monitor mentation, discharge once improved  DVT pplx SCD    3/24  Patient seen and examined  Currently awake alert and oriented not oriented to month oriented to year place  E. coli bacteremia on Rocephin  As per ID patient to continue 2 weeks of IV antibiotics once blood culture negative, repeat blood culture no growth yet  Vitals have been stable  Will order PT OT  Patient tells me that he would like to have home nurse come in  Lab reviewed from yesterday stable  T cardiac better, dose of Lopressor was increased  Thrombocytopenia stable  Discharge once antibiotics arranged      3/25  Patient seen and examined  Patient getting confused at night, likely sundowning  Will start low-dose Seroquel  Has TeleSitter in place  Currently at his baseline mental    On IV Rocephin  As per  patient to come to infusion center for 2-week treatment  Otherwise his vitals have been stable he had episode of A-fib with RVR last night likely secondary to underlying confusion and agitation  Afebrile, vital stable, to follow-up with urology as outpatient      3/26  Patient seen and examined, no overnight issues reported, intermittently getting confused at night sundowning, was currently alert oriented  His vitals have been stable  Heart rate controlled  Labs reviewed satisfactory  Discharge pending placement patient to get midline today for antibiotics  Vy Ace MD  3/26/2024  9:22 AM  
evidenced by respiratory 26 heart rate 118 at presentation, leukocytosis 16.9, lactate 1.7 at presentation, urine and blood cultures sent and in process  Urinary retention secondary to UTI.  Urology has been consulted.  Getting serial bladder scans postvoid  Gram-negative bacteremia from both blood culture bottles continuing on Rocephin  Hematuria secondary to trauma from catheter insertion-Eliquis and aspirin on hold patient not on CBI according to urology recommendations, passing generally clear urine anticipate resumption of Eliquis tomorrow  [facility]  Thrombocytopenia-downtrending platelet count 126 today-will monitor    3/22  Patient seen and examined  Currently oriented to place year and self  Gram-negative bacteremia, blood culture growing E. coli, repeat blood culture ordered  Continues to be on Rocephin  Hematuria has cleared up, get UA   resume Eliquis if okay with urology  Patient currently in A-fib, had episode of rapid ventricular rate overnight with heart rate went to 150  On Cardizem 120 increase the dose to 1 80  Urine culture not drawn  ID and cardiology consulted      3/23  Patient seen and examined patient oriented to year and self  Not oriented to place, at baseline patient is alert oriented, lives alone  Delirium superimposed on dementia secondary to UTI  E. coli bacteremia, on Rocephin pansensitive  No hematuria patient resumed on Eliquis  Currently rate controlled, metoprolol dose was increased yesterday  Seen by urology ultrasound renal did not show any hydronephrosis showed renal cyst  Thrombocytopenia has been stable  , Hematuria has cleared  Hemoglobin stable patient resumed on Eliquis  Continue to monitor mentation, discharge once improved  DVT pplx SCD    3/24  Patient seen and examined  Currently awake alert and oriented not oriented to month oriented to year place  E. coli bacteremia on Rocephin  As per ID patient to continue 2 weeks of IV antibiotics once blood 
cardiac better, dose of Lopressor was increased  Thrombocytopenia stable  Discharge once antibiotics arranged      3/25  Patient seen and examined  Patient getting confused at night, likely sundowning  Will start low-dose Seroquel  Has TeleSitter in place  Currently at his baseline mental    On IV Rocephin  As per  patient to come to infusion center for 2-week treatment  Otherwise his vitals have been stable he had episode of A-fib with RVR last night likely secondary to underlying confusion and agitation  Afebrile, vital stable, to follow-up with urology as outpatient  Vy Ace MD  3/25/2024  11:01 AM

## 2024-04-05 ENCOUNTER — TELEPHONE (OUTPATIENT)
Dept: INFECTIOUS DISEASES | Age: 84
End: 2024-04-05

## 2024-04-05 NOTE — TELEPHONE ENCOUNTER
Home care would like to know if you want the mid line pulled? Today is last dose for IV antibiotics. [phone] is the number to call if after hours, to give orders to maintain line or pull it.

## 2024-04-09 NOTE — TELEPHONE ENCOUNTER
Guzman Gilbert MD  4/9/2024 8:46 AM  Nile Lacy MARIBEL.  Male, 83 y.o., 1940  MRN: [id number]  Home care would like to know if you want the mid line pulled? They will be going out there today around 11 and patients last dose was 4/5/24. Patient has a follow up 4/24/24. Please advise. Thanks Brenda  Read 4/9/2024 10:40 AM  4/9/2024 10:40 AM  Line can be removed

## 2024-04-09 NOTE — TELEPHONE ENCOUNTER
Called [facility] care and let them know it is ok to pull the line.  Tried to notify Maru but had to leave a message to call back

## 2024-04-24 ENCOUNTER — OFFICE VISIT (OUTPATIENT)
Dept: INFECTIOUS DISEASES | Age: 84
End: 2024-04-24
Payer: MEDICARE

## 2024-04-24 VITALS
HEART RATE: 74 BPM | TEMPERATURE: 98 F | RESPIRATION RATE: 17 BRPM | HEIGHT: 66 IN | WEIGHT: 182 LBS | BODY MASS INDEX: 29.25 KG/M2 | DIASTOLIC BLOOD PRESSURE: 76 MMHG | SYSTOLIC BLOOD PRESSURE: 128 MMHG | OXYGEN SATURATION: 97 %

## 2024-04-24 DIAGNOSIS — N39.0 COMPLICATED UTI (URINARY TRACT INFECTION): Primary | ICD-10-CM

## 2024-04-24 PROCEDURE — 3074F SYST BP LT 130 MM HG: CPT | Performed by: INTERNAL MEDICINE

## 2024-04-24 PROCEDURE — 1123F ACP DISCUSS/DSCN MKR DOCD: CPT | Performed by: INTERNAL MEDICINE

## 2024-04-24 PROCEDURE — 3078F DIAST BP <80 MM HG: CPT | Performed by: INTERNAL MEDICINE

## 2024-04-24 PROCEDURE — 99214 OFFICE O/P EST MOD 30 MIN: CPT | Performed by: INTERNAL MEDICINE

## 2024-04-24 NOTE — PROGRESS NOTES
[facility] -   Infectious diseases evaluation  admission date (Not on file)      Impression :   Current:  E. coli bacteremia/urinary source  Complicated UTI  Urinary retention  Benign prostatic hypertrophy  Hematuria resolved  A-fib with rapid ventricular response on Eliquis   Coronary artery disease  Hypertension  Hyperlipidemia  Alzheimer's dementia  Allergy to PCN.      HENCE:   The patient was treated with ceftriaxone through 4/5/2024  3/22 BC x2 - negative to date.  Midline was removed  3/23 Renal US - No hydronephrosis or intrarenal stones.  No focal lesions.  Bilateral renal cysts   Follow-up with urology         History of Present Illness:   Initial history:  Nile Lacy is a 83 y.o.-year-old male was hospitalized recently for altered mental status.   burning with urination and dysuria.  He had urinary retention, reported to 400ml residual urine, attempt straight cath at the ER was unsuccessful.  The patient subsequently was able to urinate without gross hematuria.    CT head showed no acute abnormality  Urinalysis showed too numerous to count WBC, moderate leukocyte esterase.  Urine culture was not sent  Blood culture grew E. Coli that was sensitive to ceftriaxone  The patient was treated with IV ceftriaxone, course completed  3/22 Blood Cx x2 - no growth to date  3/23 Renal US - No hydronephrosis or intrarenal stones.  No focal lesions.  Bilateral renal cysts largest on the right 2.5 cm. Largest on the left 1.3 cm   Interval changes  4/24/2024   He is afebrile today, denied fever or chills, no burning with urination, no dysuria, denied abdominal pain or flank Pain, no other complaints.      Patient Vitals for the past 8 hrs:   BP Temp Pulse Resp SpO2 Height Weight   04/24/24 0852 128/76 98 °F (36.7 °C) 74 17 97 % 1.676 m (5' 6\") 82.6 kg (182 lb)         I have personally reviewed the past medical history, past surgical history, medications, social

## 2024-05-13 ENCOUNTER — TELEPHONE (OUTPATIENT)
Dept: UROLOGY | Age: 84
End: 2024-05-13

## 2024-05-13 NOTE — TELEPHONE ENCOUNTER
Maru left a message on clinic line asking for a call back.  This is in regards to a medication question.

## 2024-05-16 NOTE — TELEPHONE ENCOUNTER
Spoke with Maru, she states that patient is supposed to be on Rapaflo, however[facility]. She is wondering if there is another medication that patient is able to get.   Patient was originally supposed to have greenlight on 3/6, however, he called to cancel due to feeling better with his urination. He does have a 6 month follow up scheduled for September.  Writer will discuss with Dr. Cuevas on 5/20 and call her back. Maru voiced understanding. She will call the office with any questions or concerns.

## 2024-12-03 ENCOUNTER — HOSPITAL ENCOUNTER (OUTPATIENT)
Age: 84
Setting detail: SPECIMEN
Discharge: HOME | End: 2024-12-03

## 2024-12-03 LAB
CREAT UR-MCNC: 96.9 MG/DL (ref 39–259)
MICROALBUMIN UR-MCNC: <12 MG/L (ref 0–20)
MICROALBUMIN/CREAT UR-RTO: NORMAL MCG/MG CREAT (ref 0–17)

## 2025-04-11 ENCOUNTER — TELEPHONE (OUTPATIENT)
Dept: UROLOGY | Age: 85
End: 2025-04-11

## 2025-04-11 NOTE — TELEPHONE ENCOUNTER
Patient called in and left a voicemail asking for a call back. Writer called patient back and could not leave a message do to mailbox being full.

## 2025-04-29 ENCOUNTER — HOSPITAL ENCOUNTER (EMERGENCY)
Age: 85
Discharge: HOME | End: 2025-04-29
Attending: EMERGENCY MEDICINE
Payer: COMMERCIAL

## 2025-04-29 ENCOUNTER — APPOINTMENT (OUTPATIENT)
Dept: GENERAL RADIOLOGY | Age: 85
End: 2025-04-29
Payer: COMMERCIAL

## 2025-04-29 VITALS
TEMPERATURE: 98.6 F | SYSTOLIC BLOOD PRESSURE: 154 MMHG | BODY MASS INDEX: 33.13 KG/M2 | HEART RATE: 93 BPM | HEIGHT: 62 IN | DIASTOLIC BLOOD PRESSURE: 93 MMHG | WEIGHT: 180 LBS | OXYGEN SATURATION: 99 % | RESPIRATION RATE: 18 BRPM

## 2025-04-29 DIAGNOSIS — S51.811A SKIN TEAR OF FOREARM WITHOUT COMPLICATION, RIGHT, INITIAL ENCOUNTER: ICD-10-CM

## 2025-04-29 DIAGNOSIS — M79.601 RIGHT ARM PAIN: Primary | ICD-10-CM

## 2025-04-29 PROCEDURE — 99284 EMERGENCY DEPT VISIT MOD MDM: CPT

## 2025-04-29 PROCEDURE — 73060 X-RAY EXAM OF HUMERUS: CPT

## 2025-04-29 PROCEDURE — 73090 X-RAY EXAM OF FOREARM: CPT

## 2025-04-29 PROCEDURE — 6360000002 HC RX W HCPCS

## 2025-04-29 PROCEDURE — 73110 X-RAY EXAM OF WRIST: CPT

## 2025-04-29 PROCEDURE — 73630 X-RAY EXAM OF FOOT: CPT

## 2025-04-29 PROCEDURE — 90471 IMMUNIZATION ADMIN: CPT

## 2025-04-29 PROCEDURE — 73080 X-RAY EXAM OF ELBOW: CPT

## 2025-04-29 PROCEDURE — 90715 TDAP VACCINE 7 YRS/> IM: CPT

## 2025-04-29 RX ADMIN — TETANUS TOXOID, REDUCED DIPHTHERIA TOXOID AND ACELLULAR PERTUSSIS VACCINE, ADSORBED 0.5 ML: 5; 2.5; 8; 8; 2.5 SUSPENSION INTRAMUSCULAR at 21:46

## 2025-04-29 ASSESSMENT — PAIN - FUNCTIONAL ASSESSMENT: PAIN_FUNCTIONAL_ASSESSMENT: NONE - DENIES PAIN

## 2025-04-29 ASSESSMENT — LIFESTYLE VARIABLES
HOW OFTEN DO YOU HAVE A DRINK CONTAINING ALCOHOL: NEVER
HOW MANY STANDARD DRINKS CONTAINING ALCOHOL DO YOU HAVE ON A TYPICAL DAY: PATIENT DOES NOT DRINK

## 2025-04-30 NOTE — ED PROVIDER NOTES
[facility] EMERGENCY DEPARTMENT  eMERGENCY dEPARTMENT eNCOUnter   Attending Attestation     Pt Name: Nile Lacy  MRN: [id number]  Birthdate 1940  Date of evaluation: 4/29/25    History, EXAM, MDM:    Nile Lacy is a 84 y.o. male who presents with Fall (Denies LOC or hitting head) and Arm Injury (Right)    Traumatic arm pain.  No LOC or head trauma.   Imaging studies show no acute traumatic emergent pathology.  Pt stable for d/c and outpatient follow up with primary care     Vitals:   Vitals:    04/29/25 2053   BP: (!) 154/93   Pulse: 93   Resp: 18   Temp: 98.6 °F (37 °C)   SpO2: 99%   Weight: 81.6 kg (180 lb)   Height: 1.575 m (5' 2\")     I performed a history and physical examination of the patient and discussed management with the resident. I reviewed the resident’s note and agree with the documented findings and plan of care. Any areas of disagreement are noted on the chart. I was personally present for the key portions of any procedures. I have documented in the chart those procedures where I was not present during the key portions. I have personally reviewed all images and agree with the resident's interpretation. I have reviewed the emergency nurses triage note. I agree with the chief complaint, past medical history, past surgical history, allergies, medications, social and family history as documented unless otherwise noted below. Documentation of the HPI, Physical Exam and Medical Decision Making performed by medical students or scribes is based on my personal performance of the HPI, PE and MDM. For Phys Assistant/ Nurse Practitioner cases/documentation I have had a face to face evaluation of this patient and have completed at least one if not all key elements of the E/M (history, physical exam, and MDM). Additional findings are as noted.    Augustus Gomez MD  Attending Emergency  Physician               Augustus Gomez MD  04/30/25 0431    
  Intimate Partner Violence: Not on file   Housing Stability: Unknown (12/3/2024)    Received from [facility]    Housing Stability Vital Sign     Unable to Pay for Housing in the Last Year: No     Number of Times Moved in the Last Year: Not on file     Homeless in the Last Year: No       Family History   Problem Relation Age of Onset    Alzheimer's Disease Mother     Arthritis Mother         lumbar disc disease    Heart Disease Father        Allergies:  Oxycodone, Pcn [penicillins], and Warfarin and related    Home Medications:  Prior to Admission medications    Medication Sig Start Date End Date Taking? Authorizing Provider   metoprolol tartrate (LOPRESSOR) 50 MG tablet Take 1 tablet by mouth 2 times daily 3/29/24   Parker Thomason MD   silodosin (RAPAFLO) 8 MG CAPS Take 1 capsule by mouth every evening 1/22/24   Fer Cuevas MD   docusate sodium (COLACE) 100 MG capsule Take 1 capsule by mouth 2 times daily 9/11/22   Frank Blankenship MD   isosorbide mononitrate (IMDUR) 60 MG extended release tablet Take 1 tablet by mouth daily 5/13/22   Corie Ortiz MD   dilTIAZem (CARDIZEM CD) 120 MG extended release capsule Take 1 capsule by mouth daily 5/14/22   Corie Ortiz MD   aspirin 81 MG chewable tablet Take 1 tablet by mouth daily 9/24/21   Corie Ortiz MD   apixaban (ELIQUIS) 5 MG TABS tablet Take 1 tablet by mouth 2 times daily    ProviderDuc MD   doxazosin (CARDURA) 2 MG tablet Take 1 tablet by mouth nightly    ProviderDuc MD   nitroGLYCERIN (NITROSTAT) 0.4 MG SL tablet up to max of 3 total doses. If no relief after 1 dose, call 911. 2/17/19   Jose Luis Land MD   acetaminophen (TYLENOL) 325 MG tablet Take 2 tablets by mouth 2 times daily as needed for Pain    Duc Rodríguez MD   pravastatin (PRAVACHOL) 40 MG tablet Take 0.5 tablets by mouth daily    Duc Rodríguez MD   omeprazole (PRILOSEC) 20 MG capsule Take 1 capsule by mouth daily

## 2025-04-30 NOTE — DISCHARGE INSTRUCTIONS
You were seen in the ER today for right arm pain following a fall.  Keep skin tears clean and dry, they are not anything that can be repaired with sutures.  No broken bones.  The hematoma on your forearm will reabsorb eventually, apply pressure dressings to aid in this.  Rest, ice, elevate.  May take Tylenol, Motrin at home for pain.  Please follow-up with your primary care doctor within the next few days for reevaluation.  Return to the ER if new or worsening symptoms or any other concerns.

## 2025-04-30 NOTE — ED NOTES
Mode of arrival (squad #, walk in, police, etc) : Walk-in        Chief complaint(s): Fall, right arm injury        Arrival Note (brief scenario, treatment PTA, etc).: Pt arrived to the ED after a fall. Pt states that he was getting out of his car when his foot got caught. Pt states he tried to catch himself but slide his right arm down the way and scrapped it. Pt denies LOC or hitting head.         C= \"Have you ever felt that you should Cut down on your drinking?\"  No  A= \"Have people Annoyed you by criticizing your drinking?\"  No  G= \"Have you ever felt bad or Guilty about your drinking?\"  No  E= \"Have you ever had a drink as an Eye-opener first thing in the morning to steady your nerves or to help a hangover?\"  No      Deferred []      Reason for deferring: N/A    *If yes to two or more: probable alcohol abuse.*

## 2025-04-30 NOTE — ED NOTES
Report given to LEATHA Dias from ED.   Report method in person   The following was reviewed with receiving RN:   Current vital signs:  BP (!) 154/93   Pulse 93   Temp 98.6 °F (37 °C)   Resp 18   Ht 1.575 m (5' 2\")   Wt 81.6 kg (180 lb)   SpO2 99%   BMI 32.92 kg/m²                MEWS Score: 1     Any medication or safety alerts were reviewed. Any pending diagnostics and notifications were also reviewed, as well as any safety concerns or issues, abnormal labs, abnormal imaging, and abnormal assessment findings. Questions were answered.

## 2025-05-12 ENCOUNTER — APPOINTMENT (OUTPATIENT)
Dept: CT IMAGING | Age: 85
DRG: 069 | End: 2025-05-12
Payer: COMMERCIAL

## 2025-05-12 ENCOUNTER — APPOINTMENT (OUTPATIENT)
Dept: GENERAL RADIOLOGY | Age: 85
DRG: 069 | End: 2025-05-12
Payer: COMMERCIAL

## 2025-05-12 ENCOUNTER — HOSPITAL ENCOUNTER (INPATIENT)
Age: 85
LOS: 3 days | Discharge: LEFT BEFORE DISCHARGE | DRG: 069 | End: 2025-05-15
Attending: EMERGENCY MEDICINE | Admitting: INTERNAL MEDICINE
Payer: COMMERCIAL

## 2025-05-12 DIAGNOSIS — R06.02 SHORTNESS OF BREATH: ICD-10-CM

## 2025-05-12 DIAGNOSIS — I48.91 ATRIAL FIBRILLATION, UNSPECIFIED TYPE (HCC): ICD-10-CM

## 2025-05-12 DIAGNOSIS — R55 NEAR SYNCOPE: ICD-10-CM

## 2025-05-12 DIAGNOSIS — G45.9 TIA (TRANSIENT ISCHEMIC ATTACK): Primary | ICD-10-CM

## 2025-05-12 LAB
ALBUMIN SERPL-MCNC: 3.7 G/DL (ref 3.5–5.2)
ALP SERPL-CCNC: 65 U/L (ref 40–129)
ALT SERPL-CCNC: 11 U/L (ref 10–50)
ANION GAP SERPL CALCULATED.3IONS-SCNC: 9 MMOL/L (ref 9–16)
AST SERPL-CCNC: 19 U/L (ref 10–50)
BASOPHILS # BLD: 0.1 K/UL (ref 0–0.2)
BASOPHILS NFR BLD: 1 % (ref 0–2)
BILIRUB SERPL-MCNC: 0.4 MG/DL (ref 0–1.2)
BILIRUB UR QL STRIP: NEGATIVE
BNP SERPL-MCNC: 1166 PG/ML (ref 0–300)
BUN SERPL-MCNC: 15 MG/DL (ref 8–23)
CALCIUM SERPL-MCNC: 9.6 MG/DL (ref 8.6–10.4)
CHLORIDE SERPL-SCNC: 105 MMOL/L (ref 98–107)
CLARITY UR: CLEAR
CO2 SERPL-SCNC: 24 MMOL/L (ref 20–31)
COLOR UR: YELLOW
COMMENT: NORMAL
CREAT SERPL-MCNC: 1.1 MG/DL (ref 0.7–1.2)
EOSINOPHIL # BLD: 0.2 K/UL (ref 0–0.4)
EOSINOPHILS RELATIVE PERCENT: 4 % (ref 0–4)
ERYTHROCYTE [DISTWIDTH] IN BLOOD BY AUTOMATED COUNT: 13.7 % (ref 11.5–14.9)
GFR, ESTIMATED: 67 ML/MIN/1.73M2
GLUCOSE SERPL-MCNC: 94 MG/DL (ref 74–99)
GLUCOSE UR STRIP-MCNC: NEGATIVE MG/DL
HCT VFR BLD AUTO: 41.4 % (ref 41–53)
HGB BLD-MCNC: 14 G/DL (ref 13.5–17.5)
HGB UR QL STRIP.AUTO: NEGATIVE
INR PPP: 1.3
KETONES UR STRIP-MCNC: NEGATIVE MG/DL
LEUKOCYTE ESTERASE UR QL STRIP: NEGATIVE
LYMPHOCYTES NFR BLD: 0.8 K/UL (ref 1–4.8)
LYMPHOCYTES RELATIVE PERCENT: 15 % (ref 24–44)
MAGNESIUM SERPL-MCNC: 2 MG/DL (ref 1.6–2.4)
MCH RBC QN AUTO: 31.2 PG (ref 26–34)
MCHC RBC AUTO-ENTMCNC: 33.8 G/DL (ref 31–37)
MCV RBC AUTO: 92.3 FL (ref 80–100)
MONOCYTES NFR BLD: 0.6 K/UL (ref 0.1–1.3)
MONOCYTES NFR BLD: 11 % (ref 1–7)
NEUTROPHILS NFR BLD: 69 % (ref 36–66)
NEUTS SEG NFR BLD: 3.9 K/UL (ref 1.3–9.1)
NITRITE UR QL STRIP: NEGATIVE
PH UR STRIP: 6.5 [PH] (ref 5–8)
PLATELET # BLD AUTO: 140 K/UL (ref 150–450)
PMV BLD AUTO: 8.2 FL (ref 6–12)
POTASSIUM SERPL-SCNC: 4.6 MMOL/L (ref 3.7–5.3)
PROT SERPL-MCNC: 6.5 G/DL (ref 6.6–8.7)
PROT UR STRIP-MCNC: NEGATIVE MG/DL
PROTHROMBIN TIME: 17.2 SEC (ref 11.8–14.6)
RBC # BLD AUTO: 4.49 M/UL (ref 4.5–5.9)
SODIUM SERPL-SCNC: 138 MMOL/L (ref 136–145)
SP GR UR STRIP: 1.02 (ref 1–1.03)
TROPONIN I SERPL HS-MCNC: 12 NG/L (ref 0–22)
TROPONIN I SERPL HS-MCNC: 14 NG/L (ref 0–22)
UROBILINOGEN UR STRIP-ACNC: NORMAL EU/DL (ref 0–1)
WBC OTHER # BLD: 5.6 K/UL (ref 3.5–11)

## 2025-05-12 PROCEDURE — 70496 CT ANGIOGRAPHY HEAD: CPT

## 2025-05-12 PROCEDURE — 93005 ELECTROCARDIOGRAM TRACING: CPT | Performed by: EMERGENCY MEDICINE

## 2025-05-12 PROCEDURE — 2580000003 HC RX 258: Performed by: EMERGENCY MEDICINE

## 2025-05-12 PROCEDURE — 6370000000 HC RX 637 (ALT 250 FOR IP): Performed by: INTERNAL MEDICINE

## 2025-05-12 PROCEDURE — 83880 ASSAY OF NATRIURETIC PEPTIDE: CPT

## 2025-05-12 PROCEDURE — 99285 EMERGENCY DEPT VISIT HI MDM: CPT

## 2025-05-12 PROCEDURE — 84484 ASSAY OF TROPONIN QUANT: CPT

## 2025-05-12 PROCEDURE — 2060000000 HC ICU INTERMEDIATE R&B

## 2025-05-12 PROCEDURE — 36415 COLL VENOUS BLD VENIPUNCTURE: CPT

## 2025-05-12 PROCEDURE — 2500000003 HC RX 250 WO HCPCS: Performed by: EMERGENCY MEDICINE

## 2025-05-12 PROCEDURE — 70450 CT HEAD/BRAIN W/O DYE: CPT

## 2025-05-12 PROCEDURE — 85025 COMPLETE CBC W/AUTO DIFF WBC: CPT

## 2025-05-12 PROCEDURE — 2500000003 HC RX 250 WO HCPCS: Performed by: INTERNAL MEDICINE

## 2025-05-12 PROCEDURE — 83735 ASSAY OF MAGNESIUM: CPT

## 2025-05-12 PROCEDURE — 6370000000 HC RX 637 (ALT 250 FOR IP): Performed by: EMERGENCY MEDICINE

## 2025-05-12 PROCEDURE — 80053 COMPREHEN METABOLIC PANEL: CPT

## 2025-05-12 PROCEDURE — 6360000004 HC RX CONTRAST MEDICATION: Performed by: EMERGENCY MEDICINE

## 2025-05-12 PROCEDURE — 81003 URINALYSIS AUTO W/O SCOPE: CPT

## 2025-05-12 PROCEDURE — 71045 X-RAY EXAM CHEST 1 VIEW: CPT

## 2025-05-12 PROCEDURE — 85610 PROTHROMBIN TIME: CPT

## 2025-05-12 RX ORDER — DOCUSATE SODIUM 100 MG/1
100 CAPSULE, LIQUID FILLED ORAL 2 TIMES DAILY
Status: DISCONTINUED | OUTPATIENT
Start: 2025-05-12 | End: 2025-05-16 | Stop reason: HOSPADM

## 2025-05-12 RX ORDER — POLYETHYLENE GLYCOL 3350 17 G/17G
17 POWDER, FOR SOLUTION ORAL DAILY PRN
Status: DISCONTINUED | OUTPATIENT
Start: 2025-05-12 | End: 2025-05-16 | Stop reason: HOSPADM

## 2025-05-12 RX ORDER — ACETAMINOPHEN 325 MG/1
650 TABLET ORAL 2 TIMES DAILY PRN
Status: DISCONTINUED | OUTPATIENT
Start: 2025-05-12 | End: 2025-05-16 | Stop reason: HOSPADM

## 2025-05-12 RX ORDER — SODIUM CHLORIDE 0.9 % (FLUSH) 0.9 %
10 SYRINGE (ML) INJECTION PRN
Status: DISCONTINUED | OUTPATIENT
Start: 2025-05-12 | End: 2025-05-16 | Stop reason: HOSPADM

## 2025-05-12 RX ORDER — 0.9 % SODIUM CHLORIDE 0.9 %
100 INTRAVENOUS SOLUTION INTRAVENOUS ONCE
Status: COMPLETED | OUTPATIENT
Start: 2025-05-12 | End: 2025-05-12

## 2025-05-12 RX ORDER — METOPROLOL TARTRATE 50 MG
50 TABLET ORAL 2 TIMES DAILY
Status: DISCONTINUED | OUTPATIENT
Start: 2025-05-12 | End: 2025-05-16 | Stop reason: HOSPADM

## 2025-05-12 RX ORDER — IOPAMIDOL 755 MG/ML
75 INJECTION, SOLUTION INTRAVASCULAR
Status: COMPLETED | OUTPATIENT
Start: 2025-05-12 | End: 2025-05-12

## 2025-05-12 RX ORDER — ACETAMINOPHEN 650 MG/1
650 SUPPOSITORY RECTAL EVERY 6 HOURS PRN
Status: DISCONTINUED | OUTPATIENT
Start: 2025-05-12 | End: 2025-05-16 | Stop reason: HOSPADM

## 2025-05-12 RX ORDER — POTASSIUM CHLORIDE 7.45 MG/ML
10 INJECTION INTRAVENOUS PRN
Status: DISCONTINUED | OUTPATIENT
Start: 2025-05-12 | End: 2025-05-16 | Stop reason: HOSPADM

## 2025-05-12 RX ORDER — MAGNESIUM SULFATE 1 G/100ML
1000 INJECTION INTRAVENOUS PRN
Status: DISCONTINUED | OUTPATIENT
Start: 2025-05-12 | End: 2025-05-16 | Stop reason: HOSPADM

## 2025-05-12 RX ORDER — PANTOPRAZOLE SODIUM 40 MG/1
40 TABLET, DELAYED RELEASE ORAL
Status: DISCONTINUED | OUTPATIENT
Start: 2025-05-13 | End: 2025-05-16 | Stop reason: HOSPADM

## 2025-05-12 RX ORDER — TRAZODONE HYDROCHLORIDE 50 MG/1
50 TABLET ORAL NIGHTLY
COMMUNITY

## 2025-05-12 RX ORDER — ACETAMINOPHEN 325 MG/1
650 TABLET ORAL EVERY 6 HOURS PRN
Status: DISCONTINUED | OUTPATIENT
Start: 2025-05-12 | End: 2025-05-16 | Stop reason: HOSPADM

## 2025-05-12 RX ORDER — ONDANSETRON 2 MG/ML
4 INJECTION INTRAMUSCULAR; INTRAVENOUS EVERY 6 HOURS PRN
Status: DISCONTINUED | OUTPATIENT
Start: 2025-05-12 | End: 2025-05-16 | Stop reason: HOSPADM

## 2025-05-12 RX ORDER — SULFAMETHOXAZOLE AND TRIMETHOPRIM 800; 160 MG/1; MG/1
1 TABLET ORAL 2 TIMES DAILY
COMMUNITY
Start: 2025-05-06 | End: 2025-05-13

## 2025-05-12 RX ORDER — DILTIAZEM HYDROCHLORIDE 120 MG/1
120 CAPSULE, COATED, EXTENDED RELEASE ORAL DAILY
Status: DISCONTINUED | OUTPATIENT
Start: 2025-05-13 | End: 2025-05-16 | Stop reason: HOSPADM

## 2025-05-12 RX ORDER — NITROGLYCERIN 0.4 MG/1
0.4 TABLET SUBLINGUAL EVERY 5 MIN PRN
Status: DISCONTINUED | OUTPATIENT
Start: 2025-05-12 | End: 2025-05-16 | Stop reason: HOSPADM

## 2025-05-12 RX ORDER — DOXAZOSIN 1 MG/1
2 TABLET ORAL NIGHTLY
Status: DISCONTINUED | OUTPATIENT
Start: 2025-05-12 | End: 2025-05-16 | Stop reason: HOSPADM

## 2025-05-12 RX ORDER — ASPIRIN 81 MG/1
324 TABLET, CHEWABLE ORAL ONCE
Status: COMPLETED | OUTPATIENT
Start: 2025-05-12 | End: 2025-05-12

## 2025-05-12 RX ORDER — ISOSORBIDE MONONITRATE 60 MG/1
60 TABLET, EXTENDED RELEASE ORAL DAILY
Status: DISCONTINUED | OUTPATIENT
Start: 2025-05-13 | End: 2025-05-16 | Stop reason: HOSPADM

## 2025-05-12 RX ORDER — SODIUM CHLORIDE 0.9 % (FLUSH) 0.9 %
5-40 SYRINGE (ML) INJECTION EVERY 12 HOURS SCHEDULED
Status: DISCONTINUED | OUTPATIENT
Start: 2025-05-12 | End: 2025-05-16 | Stop reason: HOSPADM

## 2025-05-12 RX ORDER — POTASSIUM CHLORIDE 1500 MG/1
40 TABLET, EXTENDED RELEASE ORAL PRN
Status: DISCONTINUED | OUTPATIENT
Start: 2025-05-12 | End: 2025-05-16 | Stop reason: HOSPADM

## 2025-05-12 RX ORDER — SODIUM CHLORIDE 9 MG/ML
INJECTION, SOLUTION INTRAVENOUS PRN
Status: DISCONTINUED | OUTPATIENT
Start: 2025-05-12 | End: 2025-05-16 | Stop reason: HOSPADM

## 2025-05-12 RX ORDER — ONDANSETRON 4 MG/1
4 TABLET, ORALLY DISINTEGRATING ORAL EVERY 8 HOURS PRN
Status: DISCONTINUED | OUTPATIENT
Start: 2025-05-12 | End: 2025-05-16 | Stop reason: HOSPADM

## 2025-05-12 RX ORDER — PRAVASTATIN SODIUM 20 MG
20 TABLET ORAL NIGHTLY
Status: DISCONTINUED | OUTPATIENT
Start: 2025-05-13 | End: 2025-05-16 | Stop reason: HOSPADM

## 2025-05-12 RX ORDER — ASPIRIN 81 MG/1
81 TABLET, CHEWABLE ORAL DAILY
Status: DISCONTINUED | OUTPATIENT
Start: 2025-05-13 | End: 2025-05-16 | Stop reason: HOSPADM

## 2025-05-12 RX ADMIN — ACETAMINOPHEN 650 MG: 325 TABLET ORAL at 23:09

## 2025-05-12 RX ADMIN — SODIUM CHLORIDE, PRESERVATIVE FREE 10 ML: 5 INJECTION INTRAVENOUS at 21:40

## 2025-05-12 RX ADMIN — ASPIRIN 324 MG: 81 TABLET, CHEWABLE ORAL at 16:35

## 2025-05-12 RX ADMIN — SODIUM CHLORIDE, PRESERVATIVE FREE 10 ML: 5 INJECTION INTRAVENOUS at 15:51

## 2025-05-12 RX ADMIN — IOPAMIDOL 75 ML: 755 INJECTION, SOLUTION INTRAVENOUS at 15:50

## 2025-05-12 RX ADMIN — METOPROLOL TARTRATE 50 MG: 50 TABLET, FILM COATED ORAL at 21:39

## 2025-05-12 RX ADMIN — DOXAZOSIN 2 MG: 1 TABLET ORAL at 21:39

## 2025-05-12 RX ADMIN — APIXABAN 5 MG: 5 TABLET, FILM COATED ORAL at 21:40

## 2025-05-12 RX ADMIN — SODIUM CHLORIDE 100 ML: 9 INJECTION, SOLUTION INTRAVENOUS at 15:51

## 2025-05-12 ASSESSMENT — PAIN SCALES - GENERAL: PAINLEVEL_OUTOF10: 3

## 2025-05-12 NOTE — ED NOTES
Report given to LEATHA Guallpa from ED.   Report method in person   The following was reviewed with receiving RN:   Current vital signs:  /63   Pulse 67   Temp 97.3 °F (36.3 °C) (Oral)   Resp 15   Ht 1.676 m (5' 6\")   Wt 86.2 kg (190 lb)   SpO2 97%   BMI 30.67 kg/m²                MEWS Score: 1     Any medication or safety alerts were reviewed. Any pending diagnostics and notifications were also reviewed, as well as any safety concerns or issues, abnormal labs, abnormal imaging, and abnormal assessment findings. Questions were answered.

## 2025-05-12 NOTE — PROGRESS NOTES
[facility]  Aftab Khan MD; Parker Thomason MD; Arthur Chavira MD;  Vy Ace MD; Olimpia Lucia MD  [facility] Internal Medicine   IN-PATIENT SERVICE  [facility]                 Date:   5/12/2025  Patientname:  Nile Lacy  Date of admission:  5/12/2025  2:09 PM  MRN:   [id number]  Account:  [id number]  YOB: 1940  PCP:    Dewey Vera MD  Room:   2120/2120-01  Code Status:    Full Code      Chief Complaint:     Chief Complaint   Patient presents with    Numbness     Pt reports he was out and about, c/o R hand numbness.  Stopped at a local firestation for help.        History of Present Illness:     Nile Lacy is a 84 y.o. Non- / non  male who presents with Numbness (Pt reports he was out and about, c/o R hand numbness.  Stopped at a local firestation for help.) and is admitted to the hospital for the management of TIA (transient ischemic attack). Medical history significant for HTN, HLD, atrial fibrillation on Eliquis, CAD s/p CABG (2004), dementia and seizure disorder.     Patient arrived to ED per EMS after pulling his vehicle into a fire station and complaining of heaviness, numbness and tingling of bilateral hands. Reports that right hand symptoms are more severe. Also reported lightheadedness and dizziness. States that symptoms were present for 20-30 minutes and all symptoms have since resolved. He is on Eliquis for atrial fibrillation, denies any falls or injuries. Denies headache dizziness or blurred vision. CT head no acute intracranial abnormality. CTA head and neck no significant stenosis or large vessel occlusion. CXR airspace disease could represent atelectasis vs pneumonia. Denies cough or shortness of breath. WBC 5.6, afebrile. EKG atrial fibrillation with no ST or T-wave changes. Troponin 14 repeat 12. BNP 1166. No recent BNP levels to compare. Last Echo in 2022 EF 2022. Denies chest pain. Does have

## 2025-05-12 NOTE — ED PROVIDER NOTES
EMERGENCY DEPARTMENT ENCOUNTER    Pt Name: Nile Lacy  MRN: [id number]  Birthdate 1940  Date of evaluation: 5/12/25  CHIEF COMPLAINT       Chief Complaint   Patient presents with    Numbness     Pt reports he was out and about, c/o R hand numbness.  Stopped at a local firestation for help.      HISTORY OF PRESENT ILLNESS   HPI  He felt heaviness and tingling both hands worse on the right.  Felt lightheaded and dizzy.  This occurred while driving.  It lasted about 20 to 30 minutes.  His symptoms have now resolved.  Denies any chest pain.  He did have some discomfort in his right arm during this.  No other pain anywhere else.  No back pain.  No falls traumas or injuries.  He pulled into the fire station and they brought him here by EMS.  He is here with family.  History of A-fib and coronary disease with CABG.  Takes anticoagulant Eliquis.  Compliant with his meds.      REVIEW OF SYSTEMS     Review of Systems   All other systems reviewed and are negative.    PASTMEDICAL HISTORY     Past Medical History:   Diagnosis Date    Anxiety attack     Arthritis     Atrial fibrillation ([facility])     Atrial flutter ([facility])     Back pain     CAD (coronary artery disease)     Chest pain 04/12/2016    Colon polyps     Constipation     Dementia ([facility]) 03/23/2019    Dizziness     GERD (gastroesophageal reflux disease)     Hyperlipidemia     Hypertension     Lower GI bleed     Lung nodule seen on imaging study 02/17/2019    Prediabetes     Rectal bleeding     S/P colonoscopy with polypectomy     Seizure ([facility])     NOTED PER CARE EVERYWHERE- PATIENT DENIES HX OF, STATES HE HAS NEVER BEEN ON SEIZURE MEDICATION    Sleep apnea     not tested, no cpap    SOB (shortness of breath)     Status post coronary artery bypass graft     Wears dentures     TOP     Past Problem List  Patient Active Problem List   Diagnosis Code    PAF (paroxysmal atrial fibrillation) ([facility]) I48.0    Chest pain R07.9    Rectal bleeding K62.5    Lower GI bleed K92.2

## 2025-05-12 NOTE — PROGRESS NOTES
Pharmacy Medication History Note      List of current medications patient is taking is complete.    Source of information: Sure Scripts, OARRS, Care Everywhere     Changes made to medication list:  Medications removed (include reason, ex. therapy complete or physician discontinued, noncompliance):  None     Medications flagged for provider review:  Silodosin - changed to doxazosin     Medications added/doses adjusted:  Pravastatin changed to 40 mg nightly   Bactrim DS take 1 tablet twice daily for 7 days   Trazodone 50 mg nightly     Other notes (ex. Recent course of antibiotics, Coumadin dosing):  OARRS negative   The patient filled a 7 day course of Bactrim DS on 5/6/25.       Current Home Medication List at Time of Admission:  Prior to Admission medications    Medication Sig   traZODone (DESYREL) 50 MG tablet Take 1 tablet by mouth nightly   sulfamethoxazole-trimethoprim (BACTRIM DS;SEPTRA DS) 800-160 MG per tablet Take 1 tablet by mouth 2 times daily   metoprolol tartrate (LOPRESSOR) 50 MG tablet Take 1 tablet by mouth 2 times daily   silodosin (RAPAFLO) 8 MG CAPS Take 1 capsule by mouth every evening  Patient not taking: Reported on 5/12/2025   docusate sodium (COLACE) 100 MG capsule Take 1 capsule by mouth 2 times daily   isosorbide mononitrate (IMDUR) 60 MG extended release tablet Take 1 tablet by mouth daily   dilTIAZem (CARDIZEM CD) 120 MG extended release capsule Take 1 capsule by mouth daily   aspirin 81 MG chewable tablet Take 1 tablet by mouth daily   apixaban (ELIQUIS) 5 MG TABS tablet Take 1 tablet by mouth 2 times daily   doxazosin (CARDURA) 2 MG tablet Take 1 tablet by mouth nightly   nitroGLYCERIN (NITROSTAT) 0.4 MG SL tablet up to max of 3 total doses. If no relief after 1 dose, call 911.   acetaminophen (TYLENOL) 325 MG tablet Take 2 tablets by mouth 2 times daily as needed for Pain   pravastatin (PRAVACHOL) 40 MG tablet Take 1 tablet by mouth daily   omeprazole (PRILOSEC) 20 MG capsule Take 1  capsule by mouth every evening         Please let me know if you have any questions about this encounter. Thank you!    Electronically signed by Brittany Lacy RPH on 5/12/2025 at 6:03 PM

## 2025-05-13 ENCOUNTER — APPOINTMENT (OUTPATIENT)
Dept: MRI IMAGING | Age: 85
DRG: 069 | End: 2025-05-13
Payer: COMMERCIAL

## 2025-05-13 LAB
ANION GAP SERPL CALCULATED.3IONS-SCNC: 6 MMOL/L (ref 9–16)
BASOPHILS # BLD: 0 K/UL (ref 0–0.2)
BASOPHILS NFR BLD: 1 % (ref 0–2)
BUN SERPL-MCNC: 12 MG/DL (ref 8–23)
CALCIUM SERPL-MCNC: 9.6 MG/DL (ref 8.6–10.4)
CHLORIDE SERPL-SCNC: 108 MMOL/L (ref 98–107)
CHOLEST SERPL-MCNC: 114 MG/DL (ref 0–199)
CHOLESTEROL/HDL RATIO: 3.4
CO2 SERPL-SCNC: 27 MMOL/L (ref 20–31)
CREAT SERPL-MCNC: 1 MG/DL (ref 0.7–1.2)
EKG Q-T INTERVAL: 378 MS
EKG QRS DURATION: 86 MS
EKG QTC CALCULATION (BAZETT): 396 MS
EKG R AXIS: 109 DEGREES
EKG T AXIS: 26 DEGREES
EKG VENTRICULAR RATE: 66 BPM
EOSINOPHIL # BLD: 0.3 K/UL (ref 0–0.4)
EOSINOPHILS RELATIVE PERCENT: 6 % (ref 0–4)
ERYTHROCYTE [DISTWIDTH] IN BLOOD BY AUTOMATED COUNT: 14 % (ref 11.5–14.9)
EST. AVERAGE GLUCOSE BLD GHB EST-MCNC: 120 MG/DL
GFR, ESTIMATED: 75 ML/MIN/1.73M2
GLUCOSE SERPL-MCNC: 91 MG/DL (ref 74–99)
HBA1C MFR BLD: 5.8 % (ref 4–6)
HCT VFR BLD AUTO: 41.5 % (ref 41–53)
HDLC SERPL-MCNC: 34 MG/DL
HGB BLD-MCNC: 14.1 G/DL (ref 13.5–17.5)
LDLC SERPL CALC-MCNC: 69 MG/DL (ref 0–100)
LYMPHOCYTES NFR BLD: 0.9 K/UL (ref 1–4.8)
LYMPHOCYTES RELATIVE PERCENT: 20 % (ref 24–44)
MCH RBC QN AUTO: 30.9 PG (ref 26–34)
MCHC RBC AUTO-ENTMCNC: 34 G/DL (ref 31–37)
MCV RBC AUTO: 90.8 FL (ref 80–100)
MONOCYTES NFR BLD: 0.5 K/UL (ref 0.1–1.3)
MONOCYTES NFR BLD: 11 % (ref 1–7)
NEUTROPHILS NFR BLD: 62 % (ref 36–66)
NEUTS SEG NFR BLD: 2.8 K/UL (ref 1.3–9.1)
PLATELET # BLD AUTO: 128 K/UL (ref 150–450)
PMV BLD AUTO: 7 FL (ref 6–12)
POTASSIUM SERPL-SCNC: 4.3 MMOL/L (ref 3.7–5.3)
RBC # BLD AUTO: 4.57 M/UL (ref 4.5–5.9)
SODIUM SERPL-SCNC: 141 MMOL/L (ref 136–145)
TRIGL SERPL-MCNC: 53 MG/DL (ref 0–149)
TSH SERPL DL<=0.05 MIU/L-ACNC: 2.09 UIU/ML (ref 0.27–4.2)
WBC OTHER # BLD: 4.5 K/UL (ref 3.5–11)

## 2025-05-13 PROCEDURE — 85025 COMPLETE CBC W/AUTO DIFF WBC: CPT

## 2025-05-13 PROCEDURE — 97161 PT EVAL LOW COMPLEX 20 MIN: CPT

## 2025-05-13 PROCEDURE — 99222 1ST HOSP IP/OBS MODERATE 55: CPT | Performed by: PSYCHIATRY & NEUROLOGY

## 2025-05-13 PROCEDURE — 2500000003 HC RX 250 WO HCPCS: Performed by: INTERNAL MEDICINE

## 2025-05-13 PROCEDURE — 80061 LIPID PANEL: CPT

## 2025-05-13 PROCEDURE — 97165 OT EVAL LOW COMPLEX 30 MIN: CPT

## 2025-05-13 PROCEDURE — 36415 COLL VENOUS BLD VENIPUNCTURE: CPT

## 2025-05-13 PROCEDURE — 2060000000 HC ICU INTERMEDIATE R&B

## 2025-05-13 PROCEDURE — 97530 THERAPEUTIC ACTIVITIES: CPT

## 2025-05-13 PROCEDURE — 99223 1ST HOSP IP/OBS HIGH 75: CPT | Performed by: INTERNAL MEDICINE

## 2025-05-13 PROCEDURE — 93010 ELECTROCARDIOGRAM REPORT: CPT | Performed by: INTERNAL MEDICINE

## 2025-05-13 PROCEDURE — 84443 ASSAY THYROID STIM HORMONE: CPT

## 2025-05-13 PROCEDURE — 80048 BASIC METABOLIC PNL TOTAL CA: CPT

## 2025-05-13 PROCEDURE — 83036 HEMOGLOBIN GLYCOSYLATED A1C: CPT

## 2025-05-13 PROCEDURE — 70551 MRI BRAIN STEM W/O DYE: CPT

## 2025-05-13 PROCEDURE — 6370000000 HC RX 637 (ALT 250 FOR IP): Performed by: INTERNAL MEDICINE

## 2025-05-13 RX ADMIN — PRAVASTATIN SODIUM 20 MG: 20 TABLET ORAL at 20:41

## 2025-05-13 RX ADMIN — APIXABAN 5 MG: 5 TABLET, FILM COATED ORAL at 10:43

## 2025-05-13 RX ADMIN — DOCUSATE SODIUM 100 MG: 100 CAPSULE, LIQUID FILLED ORAL at 10:43

## 2025-05-13 RX ADMIN — SODIUM CHLORIDE, PRESERVATIVE FREE 10 ML: 5 INJECTION INTRAVENOUS at 10:43

## 2025-05-13 RX ADMIN — DOXAZOSIN 2 MG: 1 TABLET ORAL at 20:41

## 2025-05-13 RX ADMIN — SODIUM CHLORIDE, PRESERVATIVE FREE 10 ML: 5 INJECTION INTRAVENOUS at 20:41

## 2025-05-13 RX ADMIN — DOCUSATE SODIUM 100 MG: 100 CAPSULE, LIQUID FILLED ORAL at 20:41

## 2025-05-13 RX ADMIN — PANTOPRAZOLE SODIUM 40 MG: 40 TABLET, DELAYED RELEASE ORAL at 06:27

## 2025-05-13 RX ADMIN — ASPIRIN 81 MG: 81 TABLET, CHEWABLE ORAL at 10:43

## 2025-05-13 RX ADMIN — APIXABAN 5 MG: 5 TABLET, FILM COATED ORAL at 20:41

## 2025-05-13 RX ADMIN — METOPROLOL TARTRATE 50 MG: 50 TABLET, FILM COATED ORAL at 20:41

## 2025-05-13 NOTE — PROGRESS NOTES
Physician Progress Note      PATIENT:               AD LAZAR  CSN #:                  [id number]  :                       1940  ADMIT DATE:       2025 2:09 PM  DISCH DATE:  RESPONDING  PROVIDER #:        Arthur Chavira MD          QUERY TEXT:    Based on your medical judgment, please clarify these findings and document if   any of the following are being evaluated and/or treated:    The clinical indicators include:   IM: \"84 y.o...admitted to the hospital for the management of TIA...HTN.\"    Per MAR: Eliquis  Options provided:  -- Secondary hypercoagulable state in a patient with atrial fibrillation  -- Other - I will add my own diagnosis  -- Disagree - Not applicable / Not valid  -- Disagree - Clinically unable to determine / Unknown  -- Refer to Clinical Documentation Reviewer    PROVIDER RESPONSE TEXT:    This patient has secondary hypercoagulable state in a patient with atrial   fibrillation.    Query created by: Keisha Bautista on 2025 10:23 AM      Electronically signed by:  Arthur Chavira MD 2025 10:30 AM

## 2025-05-13 NOTE — PROGRESS NOTES
[facility]   Occupational Therapy Evaluation  Date: 25  Patient Name: Nile Lacy       Room: /0-01  MRN: [id number]  Account: [id number]   : 1940  (84 y.o.) Gender: male     Discharge Recommendations:  The patient's needs are being met with no further Occupational Therapy recommended at discharge.       Referring Practitioner: Arthur Chavira MD  Diagnosis: TIA     Past Medical History:  has a past medical history of Anxiety attack, Arthritis, Atrial fibrillation (HCC), Atrial flutter (HCC), Back pain, CAD (coronary artery disease), Chest pain, Colon polyps, Constipation, Dementia (HCC), Dizziness, GERD (gastroesophageal reflux disease), Hyperlipidemia, Hypertension, Lower GI bleed, Lung nodule seen on imaging study, Prediabetes, Rectal bleeding, S/P colonoscopy with polypectomy, Seizure (HCC), Sleep apnea, SOB (shortness of breath), Status post coronary artery bypass graft, and Wears dentures.    Past Surgical History:   has a past surgical history that includes Coronary artery bypass graft (); Cataract removal (Bilateral); ventral hernia repair; Total knee arthroplasty (Bilateral); Colonoscopy (2018); Cardiac catheterization; and Colonoscopy (N/A, 2021).    Restrictions  Restrictions/Precautions  Activity Level: Up as Tolerated, Up with Assist  Required Braces or Orthoses?: No      Vitals  Vitals  O2 Device: None (Room air)     Subjective  Subjective: \"I'm feeling a lot better today\" pt pleasant and agreeable to OT/PT eval  Comments: Okay for OT/PT eval per LEATHA Villalpando  Pain  Pre-Pain: 0 (pt denies acute pain but reports chronic arthritic pain in Bilat hands)    Social/Functional History  Social/Functional History  Lives With: Alone  Type of Home: House  Home Layout: One level  Home Access: Stairs to enter without rails  Entrance Stairs - Number of Steps: 1 [place]  Bathroom Shower/Tub: Tub/Shower unit, Curtain  Bathroom Toilet: Standard  Bathroom  Accessibility: Walker accessible  Home Equipment: Cane  Has the patient had two or more falls in the past year or any fall with injury in the past year?: No (just one fall)  Receives Help From: Family  Prior Level of Assist for ADLs: Independent  Prior Level of Assist for Homemaking: Independent  Homemaking Responsibilities: Yes  Prior Level of Assist for Ambulation: Independent community ambulator, with or without device (typically without AD; on windy days/inclement weather pt uses SPC)  Prior Level of Assist for Transfers: Independent  Active : Yes  Mode of Transportation: Car  Occupation: Retired  Type of Occupation: ; Soft Ball   IADL Comments: pt sleeps on flat bed  Additional Comments: pt reports his family may be able to assist    Objective  ADL  Feeding: Independent, Based on clinical judgement  Grooming: Independent, Based on clinical judgement  UE Bathing: Independent, Based on clinical judgement  LE Bathing: Independent, Based on clinical judgement  UE Dressing: Independent, Based on clinical judgement  LE Dressing: Independent, Based on clinical judgement  Putting On/Taking Off Footwear: Independent  Putting On/Taking Off Footwear Skilled Clinical Factors: able to doff socks and don slippers independently at EOB  Toileting: Independent, Based on clinical judgement  Functional Mobility: Independent  Functional Mobility Skilled Clinical Factors: No device  Additional Comments: ADL scores are based on skilled observation unless otherwise noted.    Hand Dominance  Hand Dominance: Right    Orientation  Overall Orientation Status: Within Functional Limits  Orientation Level: Oriented X4  Cognition  Overall Cognitive Status: WFL   Sensation  Overall Sensation Status: WFL  Vision  Vision: Impaired  Vision Exceptions: Wears glasses for reading  Hearing  Hearing: Within functional limits    UE Function  LUE AROM (degrees)  LUE AROM : WFL  Left Hand AROM (degrees)  Left Hand AROM: WFL  Tone

## 2025-05-13 NOTE — H&P
Color, UA Yellow Yellow    Turbidity UA Clear Clear    Glucose, Ur NEGATIVE NEGATIVE mg/dL    Bilirubin, Urine NEGATIVE NEGATIVE    Ketones, Urine NEGATIVE NEGATIVE mg/dL    Specific Gravity, UA 1.021 1.000 - 1.030    Urine Hgb NEGATIVE NEGATIVE    pH, Urine 6.5 5.0 - 8.0    Protein, UA NEGATIVE NEGATIVE mg/dL    Urobilinogen, Urine Normal 0.0 - 1.0 EU/dL    Nitrite, Urine NEGATIVE NEGATIVE    Leukocyte Esterase, Urine NEGATIVE NEGATIVE    Comment       Microscopic exam not performed based on chemical results unless requested in original order.   Troponin    Collection Time: 05/12/25  4:17 PM   Result Value Ref Range    Troponin, High Sensitivity 12 0 - 22 ng/L   CBC with Auto Differential    Collection Time: 05/13/25  5:50 AM   Result Value Ref Range    WBC 4.5 3.5 - 11.0 k/uL    RBC 4.57 4.5 - 5.9 m/uL    Hemoglobin 14.1 13.5 - 17.5 g/dL    Hematocrit 41.5 41 - 53 %    MCV 90.8 80 - 100 fL    MCH 30.9 26 - 34 pg    MCHC 34.0 31 - 37 g/dL    RDW 14.0 11.5 - 14.9 %    Platelets 128 (L) 150 - 450 k/uL    MPV 7.0 6.0 - 12.0 fL    Neutrophils % 62 36 - 66 %    Lymphocytes % 20 (L) 24 - 44 %    Monocytes % 11 (H) 1 - 7 %    Eosinophils % 6 (H) 0 - 4 %    Basophils % 1 0 - 2 %    Neutrophils Absolute 2.80 1.3 - 9.1 k/uL    Lymphocytes Absolute 0.90 (L) 1.0 - 4.8 k/uL    Monocytes Absolute 0.50 0.1 - 1.3 k/uL    Eosinophils Absolute 0.30 0.0 - 0.4 k/uL    Basophils Absolute 0.00 0.0 - 0.2 k/uL   Basic Metabolic Panel w/ Reflex to MG    Collection Time: 05/13/25  5:50 AM   Result Value Ref Range    Sodium 141 136 - 145 mmol/L    Potassium 4.3 3.7 - 5.3 mmol/L    Chloride 108 (H) 98 - 107 mmol/L    CO2 27 20 - 31 mmol/L    Anion Gap 6 (L) 9 - 16 mmol/L    Glucose 91 74 - 99 mg/dL    BUN 12 8 - 23 mg/dL    Creatinine 1.0 0.7 - 1.2 mg/dL    Est, Glom Filt Rate 75 >60 mL/min/1.73m2    Calcium 9.6 8.6 - 10.4 mg/dL   Lipid Panel    Collection Time: 05/13/25  5:50 AM   Result Value Ref Range    Cholesterol, Total 114 0 - 199  3d      Consultations:   IP CONSULT TO HOSPITALIST  IP CONSULT TO CARDIOLOGY  IP CONSULT TO NEUROLOGY     Patient is admitted as inpatient status because of co-morbidities listed above, severity of signs and symptoms as outlined, requirement for current medical therapies and most importantly because of direct risk to patient if care not provided in a hospital setting.  Expected length of stay > 48 hours.    Arthur Chavira MD  5/13/2025  3:14 PM    Copy sent to Dewey Marin MD    Please note that this chart was generated using voice recognition Dragon dictation software.  Although every effort was made to ensure the accuracy of this automated transcription, some errors in transcription may have occurred.

## 2025-05-13 NOTE — ED NOTES
Called to check status with rash  Instructed mom to call us back at clinic   Gave number, left VM        Report given to LEATHA Dacosta from U.   Report method by phone   The following was reviewed with receiving RN:   Current vital signs:  /68   Pulse 61   Temp 97.3 °F (36.3 °C) (Oral)   Resp 14   Ht 1.676 m (5' 6\")   Wt 86.2 kg (190 lb)   SpO2 97%   BMI 30.67 kg/m²                MEWS Score: 1     Any medication or safety alerts were reviewed. Any pending diagnostics and notifications were also reviewed, as well as any safety concerns or issues, abnormal labs, abnormal imaging, and abnormal assessment findings. Questions were answered.

## 2025-05-13 NOTE — CONSULTS
Date:   5/13/2025  Patient name: Nile Lacy  Date of admission:  5/12/2025  2:09 PM  MRN:   [id number]  YOB: 1940  PCP: Dewey Vera MD    Reason for Admission: TIA (transient ischemic attack) [G45.9]  Near syncope [R55]  Atrial fibrillation, unspecified type (HCC) [I48.91]    Cardiology consult       Referring physician: Dr Arthur Chavira    Impression    Admission 5/12/2025 sudden onset bilateral upper extremities numbness with the significant resolution of symptoms, negative CTA head and neck, less likely stroke/TIA as per neurology assessment  Chronic A-fib  Coronary artery disease, CABG x 4  Hypertension  Hyperlipidemia  Overweight BMI 32  Colonic diverticulosis  Renal cyst    History of present illness  84 years old male who lives alone independent in his activities of daily living with a past medical history of CABG x 4 many years ago probably 2008, chronic atrial fibrillation, hypertension, hyperlipidemia got hospitalized on 5/12/2025 at about 2 PM with sudden onset numbness in both hands while he was driving, he stopped at a local fire station for help.  He had no chest pain or palpitation.  No problem in the lower extremities.  No vision problem.  CTA head and neck no significant stenosis or occlusion.  Patient has been on Eliquis for his chronic A-fib as well as aspirin.  No exertional angina.  No obvious history of previous CVA.  No syncopal episode.  His symptoms lasted about 30 minutes.  He did mention that he has numbness in his right hand which is something that is chronic with this episode appeared to be more severe affecting both sides  ECG on admission showed A-fib, heart rate 66, right axis deviation  On admission blood pressure 138/90 heart rate 72 oxygen saturation 97% room air temperature 97.3    Lab work on admission sodium 138 potassium 4.6 BUN 15 creatinine 1.1  Troponin high-sensitivity 14, proBNP 1166, albumin 3.7    Current evaluation  Patient seen and examined he  medical condition     TIA (transient ischemic attack)      Plan of Treatment:   Medications reviewed  1: Chronic A-fib continue current dose of metoprolol 50 mg twice a day, Eliquis 5 mg twice a day and Cardizem  mg a day  2: CAD, CABG continue current dose Pravachol 20 mg a day aspirin 81 mg a day  Elevated proBNP at present patient not on diuretic  We will check proBNP again and 2D echo  Continue ECG monitoring    Ambulate as tolerated      Electronically signed by Ly Murray MD on 5/13/2025 at 6:36 PM

## 2025-05-13 NOTE — PLAN OF CARE
Problem: Discharge Planning  Goal: Discharge to home or other facility with appropriate resources  Outcome: Progressing  Flowsheets (Taken 5/13/2025 1044)  Discharge to home or other facility with appropriate resources: Identify barriers to discharge with patient and caregiver     Problem: Confusion  Goal: Confusion, delirium, dementia, or psychosis is improved or at baseline  Outcome: Progressing  Flowsheets (Taken 5/13/2025 1044)  Effect of thought disturbance (confusion, delirium, dementia, or psychosis) are managed with adequate functional status: Assess for contributors to thought disturbance, including medications, impaired vision or hearing, underlying metabolic abnormalities, dehydration, psychiatric diagnoses, notify LIP     Problem: Safety - Adult  Goal: Free from fall injury  Outcome: Progressing  Flowsheets (Taken 5/13/2025 1540)  Free From Fall Injury: Instruct family/caregiver on patient safety    Problem: ABCDS Injury Assessment  Goal: Absence of physical injury  Outcome: Progressing

## 2025-05-13 NOTE — CONSULTS
[facility] Neurology   IN-PATIENT SERVICE      NEUROLOGY CONSULT  NOTE            Date:   5/13/2025  Patient name:  Nlie Lacy  Date of admission:  5/12/2025  YOB: 1940      Chief Complaint:     Chief Complaint   Patient presents with    Numbness     Pt reports he was out and about, c/o R hand numbness.  Stopped at a local firestation for help.        Reason for Consult:      Bilateral hand numbness    History of Present Illness:     The patient is a 84 y.o. male who presents with Numbness (Pt reports he was out and about, c/o R hand numbness.  Stopped at a local firestation for help. )  . The patient was seen and examined and the chart was reviewed.  Patient was driving yesterday when he felt sudden onset numbness in bilateral hands right greater than left as well as weakness prompting visitation to the ED at [facility].  Underwent CT of the head without acute abnormalities.  CTA head and neck no significant stenosis or occlusion.  Patient already on Eliquis for history of A-fib as well as aspirin.  Admitted for further workup.    At this time he is sitting up in chair with no current complaints.  States the symptoms may have lasted approximately 30 minutes or so yesterday.  States he usually has numbness in his right hand which is something that is chronic with this episode appeared to be more severe affecting both sides.  Blood pressures are within normal limits.  No significant abnormalities on lab workup.    Past Medical History:     Past Medical History:   Diagnosis Date    Anxiety attack     Arthritis     Atrial fibrillation (HCC)     Atrial flutter (HCC)     Back pain     CAD (coronary artery disease)     Chest pain 04/12/2016    Colon polyps     Constipation     Dementia (HCC) 03/23/2019    Dizziness     GERD (gastroesophageal reflux disease)     Hyperlipidemia     Hypertension     Lower GI bleed     Lung nodule seen on imaging study 02/17/2019    Prediabetes     Rectal bleeding      aneurysm.    OTHER: No dural venous sinus thrombosis on this non-dedicated study.    Impression  1. No acute intracranial abnormality.  2.  No significant stenosis or evidence for large vessel occlusion.      I personally reviewed all of the above medications, clinical laboratory, imaging and other diagnostic tests.         Impression:      Transient episode of bilateral hand numbness with full resolution of symptoms; less likely stroke/TIA but will rule out.  History of A-fib on Eliquis    Plan:     MRI brain without contrast.  Continue Eliquis and aspirin at this time.  PT OT.  Consider outpatient C-spine imaging if symptoms persist.  Stable for discharge neurologically if MRI brain is negative.  Discussed with Dr. Chavira       Thank you for this very interesting consultation.      Electronically signed by Mayco Simpson DO on 5/13/2025 at 1:43 PM      Mayco Simpson DO  [facility] [place]  Neurology

## 2025-05-13 NOTE — PROGRESS NOTES
Physical Therapy  [facility]   Physical Therapy Evaluation  Date: 25  Patient Name: Nile Lacy       Room: 2120/2120-01  MRN: [id number]  Account: [id number]   : 1940  (84 y.o.) Gender: male     Discharge Recommendations:    Safe to return home; no skilled PT services needed    PT D/C Equipment  Equipment Needed: No  PT Equipment Recommendations  Equipment Needed: No     Past Medical History:  has a past medical history of Anxiety attack, Arthritis, Atrial fibrillation (HCC), Atrial flutter (HCC), Back pain, CAD (coronary artery disease), Chest pain, Colon polyps, Constipation, Dementia (HCC), Dizziness, GERD (gastroesophageal reflux disease), Hyperlipidemia, Hypertension, Lower GI bleed, Lung nodule seen on imaging study, Prediabetes, Rectal bleeding, S/P colonoscopy with polypectomy, Seizure (HCC), Sleep apnea, SOB (shortness of breath), Status post coronary artery bypass graft, and Wears dentures.  Past Surgical History:   has a past surgical history that includes Coronary artery bypass graft (); Cataract removal (Bilateral); ventral hernia repair; Total knee arthroplasty (Bilateral); Colonoscopy (2018); Cardiac catheterization; and Colonoscopy (N/A, 2021).    Subjective  Subjective  Subjective: Pt reports full resolution of symptoms today compared to yesterday's experience of Bilateral UE tingling and heaviness.     General  Chart Reviewed: Yes  Patient assessed for rehabilitation services?: Yes  Additional Pertinent Hx: The patient is a 84 y.o. male who presents with Numbness (Pt reports he was out and about, c/o R hand numbness.  Stopped at a local firestation for help. )  . The patient was seen and examined and the chart was reviewed.  Patient was driving yesterday when he felt sudden onset numbness in bilateral hands right greater than left as well as weakness prompting visitation to the ED at [facility].  Underwent CT of the head without acute

## 2025-05-13 NOTE — PLAN OF CARE
Please have patient or POA complete online MRI screening form in [facility]. The patient will need to be in hospital gown for their MRI. If there are any questions please call [phone]!  Thanks!

## 2025-05-13 NOTE — PROGRESS NOTES
RN messaged , \"Patient blood pressure is 161/139. Are we wanting permissive hypertension for this patient?\"     1118: RN messaged , \"I did not give any blood pressure medications, but his BP is 115/88.\"      said okay.  said okay to hold off on BP meds at this time.

## 2025-05-13 NOTE — CARE COORDINATION
Case Management Assessment  Initial Evaluation    Date/Time of Evaluation: 5/13/2025 11:00AM  Assessment Completed by: Isabel Lamb RN    If patient is discharged prior to next notation, then this note serves as note for discharge by case management.    Patient Name: Nile Lacy                   YOB: 1940  Diagnosis: TIA (transient ischemic attack) [G45.9]  Near syncope [R55]  Atrial fibrillation, unspecified type (HCC) [I48.91]                   Date / Time: 5/12/2025  2:09 PM    Patient Admission Status: Inpatient   Readmission Risk (Low < 19, Mod (19-27), High > 27): Readmission Risk Score: 8.6    Current PCP: Dewey Vera MD  PCP verified by CM? Yes    Chart Reviewed: Yes      History Provided by: Patient, Medical Record  Patient Orientation: Alert and Oriented    Patient Cognition: Alert    Hospitalization in the last 30 days (Readmission):  No    If yes, Readmission Assessment in  Navigator will be completed.    Advance Directives:      Code Status: Full Code   Patient's Primary Decision Maker is: Legal Next of Kin    Primary Decision Maker: Maru Mendez - Child - [phone]    Primary Decision Maker: Ryanne Russell - Child - [phone]    Primary Decision Maker: Oj Lacy - Child - [phone]    Discharge Planning:    Patient lives with: Alone Type of Home: House  Primary Care Giver: Self  Patient Support Systems include: Family Members, Children   Current Financial resources: Medicare  Current community resources: None  Current services prior to admission: Durable Medical Equipment            Current DME: Walker, [employer]ane            Type of Home Care services:  OT, PT, Nursing Services    ADLS  Prior functional level: Independent in ADLs/IADLs  Current functional level: Independent in ADLs/IADLs    PT AM-PAC:   /24  OT AM-PAC: 24 /24    Family can provide assistance at DC: Yes  Would you like Case Management to discuss the discharge plan with any other family  members/significant others, and if so, who? No  Plans to Return to Present Housing: Yes  Other Identified Issues/Barriers to RETURNING to current housing: none  Potential Assistance needed at discharge: Home Care            Potential DME:    Patient expects to discharge to: House  Plan for transportation at discharge: Self    Financial    Payor: [employer] / Plan: [employer]I [employer] / Product Type: *No Product type* /     Does insurance require precert for SNF: Yes    Potential assistance Purchasing Medications: No  Meds-to-Beds request:        [facility] - [place], [place] - [address] - P [phone] - F [phone]  [address] [facility] [address]  Phone: [phone] Fax: [phone]      Notes:    Factors facilitating achievement of predicted outcomes: Family support, Cooperative, and Pleasant    Barriers to discharge: None    Additional Case Management Notes: 5/13 [employer]. Pt from 1 story house alone. Independent & Drives. Eliquis PTA. DME Osmar Bradley. [facility]. Denies needs. Neuro/Cardiac Consults. CT/CTA negative. NIH 0. PT/OT on. Will follow for needs. //AM     The Plan for Transition of Care is related to the following treatment goals of TIA (transient ischemic attack) [G45.9]  Near syncope [R55]  Atrial fibrillation, unspecified type (HCC) [I48.91]    IF APPLICABLE: The Patient and/or patient representative Nile and his family were provided with a choice of provider and agrees with the discharge plan. Freedom of choice list with basic dialogue that supports the patient's individualized plan of care/goals and shares the quality data associated with the providers was provided to: Patient   Patient Representative Name:       The Patient and/or Patient Representative Agree with the Discharge Plan? Yes    Isabel Lamb RN  Case Management Department  Ph: [phone] Fax: [phone]

## 2025-05-13 NOTE — PROGRESS NOTES
MRI ended early due to patient yelling that he couldn't breathe and wanted out of machine. Pt not willing to go back in for rest of scans after calming down. RN notified.

## 2025-05-13 NOTE — ACP (ADVANCE CARE PLANNING)
Advance Care Planning     Advance Care Planning Activator (Inpatient)  Conversation Note      Date of ACP Conversation: 5/13/2025     Conversation Conducted with: Patient with Decision Making Capacity    ACP Activator: Isabel Lamb RN    Health Care Decision Maker:     Current Designated Health Care Decision Maker:     Primary Decision Maker: Maru Mendez - Child - [phone]    Primary Decision Maker: Ryanne Russell - Child - [phone]    Primary Decision Maker: BambiOj - Child - [phone]  Click here to complete Healthcare Decision Makers including section of the Healthcare Decision Maker Relationship (ie \"Primary\")  Today we documented Decision Maker(s) consistent with Legal Next of Kin hierarchy.    Care Preferences    Ventilation:  \"If you were in your present state of health and suddenly became very ill and were unable to breathe on your own, what would your preference be about the use of a ventilator (breathing machine) if it were available to you?\"      Would the patient desire the use of ventilator (breathing machine)?: yes    \"If your health worsens and it becomes clear that your chance of recovery is unlikely, what would your preference be about the use of a ventilator (breathing machine) if it were available to you?\"     Would the patient desire the use of ventilator (breathing machine)?: Unsure      Resuscitation  \"CPR works best to restart the heart when there is a sudden event, like a heart attack, in someone who is otherwise healthy. Unfortunately, CPR does not typically restart the heart for people who have serious health conditions or who are very sick.\"    \"In the event your heart stopped as a result of an underlying serious health condition, would you want attempts to be made to restart your heart (answer \"yes\" for attempt to resuscitate) or would you prefer a natural death (answer \"no\" for do not attempt to resuscitate)?\" yes       [] Yes   [] No   Educated Patient / Decision Maker

## 2025-05-14 ENCOUNTER — APPOINTMENT (OUTPATIENT)
Age: 85
DRG: 069 | End: 2025-05-14
Payer: COMMERCIAL

## 2025-05-14 PROBLEM — R20.0 BILATERAL HAND NUMBNESS: Status: ACTIVE | Noted: 2025-05-14

## 2025-05-14 LAB
ANION GAP SERPL CALCULATED.3IONS-SCNC: 10 MMOL/L (ref 9–16)
BASOPHILS # BLD: 0 K/UL (ref 0–0.2)
BASOPHILS NFR BLD: 1 % (ref 0–2)
BNP SERPL-MCNC: 1209 PG/ML (ref 0–300)
BUN SERPL-MCNC: 15 MG/DL (ref 8–23)
CALCIUM SERPL-MCNC: 9.7 MG/DL (ref 8.6–10.4)
CHLORIDE SERPL-SCNC: 107 MMOL/L (ref 98–107)
CO2 SERPL-SCNC: 24 MMOL/L (ref 20–31)
CREAT SERPL-MCNC: 1 MG/DL (ref 0.7–1.2)
EOSINOPHIL # BLD: 0.2 K/UL (ref 0–0.4)
EOSINOPHILS RELATIVE PERCENT: 4 % (ref 0–4)
ERYTHROCYTE [DISTWIDTH] IN BLOOD BY AUTOMATED COUNT: 13.9 % (ref 11.5–14.9)
GFR, ESTIMATED: 75 ML/MIN/1.73M2
GLUCOSE SERPL-MCNC: 163 MG/DL (ref 74–99)
HCT VFR BLD AUTO: 45.2 % (ref 41–53)
HGB BLD-MCNC: 15.2 G/DL (ref 13.5–17.5)
LYMPHOCYTES NFR BLD: 1.1 K/UL (ref 1–4.8)
LYMPHOCYTES RELATIVE PERCENT: 21 % (ref 24–44)
MCH RBC QN AUTO: 30.9 PG (ref 26–34)
MCHC RBC AUTO-ENTMCNC: 33.6 G/DL (ref 31–37)
MCV RBC AUTO: 92 FL (ref 80–100)
MONOCYTES NFR BLD: 0.6 K/UL (ref 0.1–1.3)
MONOCYTES NFR BLD: 10 % (ref 1–7)
NEUTROPHILS NFR BLD: 64 % (ref 36–66)
NEUTS SEG NFR BLD: 3.4 K/UL (ref 1.3–9.1)
PLATELET # BLD AUTO: 131 K/UL (ref 150–450)
PMV BLD AUTO: 7.3 FL (ref 6–12)
POTASSIUM SERPL-SCNC: 4.1 MMOL/L (ref 3.7–5.3)
RBC # BLD AUTO: 4.91 M/UL (ref 4.5–5.9)
SODIUM SERPL-SCNC: 141 MMOL/L (ref 136–145)
WBC OTHER # BLD: 5.3 K/UL (ref 3.5–11)

## 2025-05-14 PROCEDURE — 99233 SBSQ HOSP IP/OBS HIGH 50: CPT

## 2025-05-14 PROCEDURE — 99232 SBSQ HOSP IP/OBS MODERATE 35: CPT | Performed by: PSYCHIATRY & NEUROLOGY

## 2025-05-14 PROCEDURE — 93306 TTE W/DOPPLER COMPLETE: CPT

## 2025-05-14 PROCEDURE — 93306 TTE W/DOPPLER COMPLETE: CPT | Performed by: INTERNAL MEDICINE

## 2025-05-14 PROCEDURE — 6370000000 HC RX 637 (ALT 250 FOR IP): Performed by: INTERNAL MEDICINE

## 2025-05-14 PROCEDURE — 85025 COMPLETE CBC W/AUTO DIFF WBC: CPT

## 2025-05-14 PROCEDURE — 51798 US URINE CAPACITY MEASURE: CPT

## 2025-05-14 PROCEDURE — 83880 ASSAY OF NATRIURETIC PEPTIDE: CPT

## 2025-05-14 PROCEDURE — 90792 PSYCH DIAG EVAL W/MED SRVCS: CPT | Performed by: PSYCHIATRY & NEUROLOGY

## 2025-05-14 PROCEDURE — 80048 BASIC METABOLIC PNL TOTAL CA: CPT

## 2025-05-14 PROCEDURE — 36415 COLL VENOUS BLD VENIPUNCTURE: CPT

## 2025-05-14 PROCEDURE — 2060000000 HC ICU INTERMEDIATE R&B

## 2025-05-14 RX ADMIN — APIXABAN 5 MG: 5 TABLET, FILM COATED ORAL at 20:31

## 2025-05-14 RX ADMIN — DOCUSATE SODIUM 100 MG: 100 CAPSULE, LIQUID FILLED ORAL at 09:15

## 2025-05-14 RX ADMIN — DILTIAZEM HYDROCHLORIDE 120 MG: 120 CAPSULE, COATED, EXTENDED RELEASE ORAL at 09:15

## 2025-05-14 RX ADMIN — METOPROLOL TARTRATE 50 MG: 50 TABLET, FILM COATED ORAL at 09:15

## 2025-05-14 RX ADMIN — ISOSORBIDE MONONITRATE 60 MG: 60 TABLET, EXTENDED RELEASE ORAL at 09:15

## 2025-05-14 RX ADMIN — DOXAZOSIN 2 MG: 1 TABLET ORAL at 20:31

## 2025-05-14 RX ADMIN — PRAVASTATIN SODIUM 20 MG: 20 TABLET ORAL at 20:31

## 2025-05-14 RX ADMIN — METOPROLOL TARTRATE 50 MG: 50 TABLET, FILM COATED ORAL at 20:31

## 2025-05-14 RX ADMIN — ASPIRIN 81 MG: 81 TABLET, CHEWABLE ORAL at 09:15

## 2025-05-14 RX ADMIN — ACETAMINOPHEN 650 MG: 325 TABLET ORAL at 06:08

## 2025-05-14 RX ADMIN — APIXABAN 5 MG: 5 TABLET, FILM COATED ORAL at 09:15

## 2025-05-14 ASSESSMENT — PAIN SCALES - GENERAL
PAINLEVEL_OUTOF10: 3
PAINLEVEL_OUTOF10: 0

## 2025-05-14 NOTE — PLAN OF CARE
Problem: Discharge Planning  Goal: Discharge to home or other facility with appropriate resources  5/13/2025 1541 by Irasema Barr RN  Outcome: Progressing  Flowsheets (Taken 5/13/2025 1044)  Discharge to home or other facility with appropriate resources: Identify barriers to discharge with patient and caregiver     Problem: Confusion  Goal: Confusion, delirium, dementia, or psychosis is improved or at baseline  Description: INTERVENTIONS:1. Assess for possible contributors to thought disturbance, including medications, impaired vision or hearing, underlying metabolic abnormalities, dehydration, psychiatric diagnoses, and notify attending LIP2. [place] high risk fall precautions, as indicated3. Provide frequent short contacts to provide reality reorientation, refocusing and direction4. Decrease environmental stimuli, including noise as appropriate5. Monitor and intervene to maintain adequate nutrition, hydration, elimination, sleep and activity6. If unable to ensure safety without constant attention obtain sitter and review sitter guidelines with assigned personnel7. Initiate Psychosocial CNS and Spiritual Care consult, as indicated  INTERVENTIONS:1. Assess for possible contributors to thought disturbance, including medications, impaired vision or hearing, underlying metabolic abnormalities, dehydration, psychiatric diagnoses, and notify attending LIP2. [place] high risk fall precautions, as indicated3. Provide frequent short contacts to provide reality reorientation, refocusing and direction4. Decrease environmental stimuli, including noise as appropriate5. Monitor and intervene to maintain adequate nutrition, hydration, elimination, sleep and activity6. If unable to ensure safety without constant attention obtain sitter and review sitter guidelines with assigned personnel7. Initiate Psychosocial CNS and Spiritual Care consult, as indicated  INTERVENTIONS:1. Assess for possible contributors to thought  without constant attention obtain sitter and review sitter guidelines with assigned personnel7. Initiate Psychosocial CNS and Spiritual Care consult, as indicated  INTERVENTIONS:1. Assess for possible contributors to thought disturbance, including medications, impaired vision or hearing, underlying metabolic abnormalities, dehydration, psychiatric diagnoses, and notify attending LIP2. [place] high risk fall precautions, as indicated3. Provide frequent short contacts to provide reality reorientation, refocusing and direction4. Decrease environmental stimuli, including noise as appropriate5. Monitor and intervene to maintain adequate nutrition, hydration, elimination, sleep and activity6. If unable to ensure safety without constant attention obtain sitter and review sitter guidelines with assigned personnel7. Initiate Psychosocial CNS and Spiritual Care consult, as indicated  INTERVENTIONS:1. Assess for possible contributors to thought disturbance, including medications, impaired vision or hearing, underlying metabolic abnormalities, dehydration, psychiatric diagnoses, and notify attending LIP2. [place] high risk fall precautions, as indicated3. Provide frequent short contacts to provide reality reorientation, refocusing and direction4. Decrease environmental stimuli, including noise as appropriate5. Monitor and intervene to maintain adequate nutrition, hydration, elimination, sleep and activity6. If unable to ensure safety without constant attention obtain sitter and review sitter guidelines with assigned personnel7. Initiate Psychosocial CNS and Spiritual Care consult, as indicated  INTERVENTIONS:1. Assess for possible contributors to thought disturbance, including medications, impaired vision or hearing, underlying metabolic abnormalities, dehydration, psychiatric diagnoses, and notify attending LIP2. [place] high risk fall precautions, as indicated3. Provide frequent short contacts to provide reality

## 2025-05-14 NOTE — CONSULTS
has no signs of suicidal ideation and mood is normal today.  He is able to contract for safety in the community.  He denies any symptoms of bhavesh.    The patient does acknowledge that he does have anxiety.  Patient does not report any history of panic attacks.  Patient denies OCD PTSD hallucinations and paranoia.  Patient does show signs of paranoia though.  Patient says he used to take anxiety medications a while ago but does not remember what it was.    Patient denies any history of drug or alcohol use.  UDS was negative upon admission.  At this time patient is able to contract for safety in the community and is not a threat to self.  Admission plans will be discussed with Dr. Jang.           The patient is not currently receiving care for the above psychiatric illness.    Past Psychiatric History:  Prior Diagnosis: Anxiety  Outpatient psychiatric provider: unable to obtain  Psychiatric Hospitalization: yes  Hx of Suicidal Attempts: no  Hx of violence:  no    Past psychiatric medications includes:   Unable to obtain    Adverse reactions from psychotropic medications:  none    Substance Abuse History:  ETOH: Past alcohol usage.  Type of Drink(s):  Bbeer.  Frequency of use:  Rarely. .  Approximate quit date:  15 years ago.  Marijuana: denies  Opiates: Denies  Other Drugs: Denies    Social History:     RESIDENCE: x  : x    CHILDREN: 3  OCCUPATION: many different jobs. Still works occasionally with [employer]  EDUCATION: high school graduate    Past Medical History:        Diagnosis Date    Anxiety attack     Arthritis     Atrial fibrillation (HCC)     Atrial flutter (HCC)     Back pain     CAD (coronary artery disease)     Chest pain 04/12/2016    Colon polyps     Constipation     Dementia (HCC) 03/23/2019    Dizziness     GERD (gastroesophageal reflux disease)     Hyperlipidemia     Hypertension     Lower GI bleed     Lung nodule seen on imaging study 02/17/2019    Prediabetes     Rectal  MD Duc   pravastatin (PRAVACHOL) 40 MG tablet Take 1 tablet by mouth daily    Duc Rodríguez MD   omeprazole (PRILOSEC) 20 MG capsule Take 1 capsule by mouth every evening    Provider, MD Duc        Medications:    Current Facility-Administered Medications: sodium chloride flush 0.9 % injection 10 mL, 10 mL, IntraVENous, PRN  apixaban (ELIQUIS) tablet 5 mg, 5 mg, Oral, BID  aspirin chewable tablet 81 mg, 81 mg, Oral, Daily  dilTIAZem (CARDIZEM CD) extended release capsule 120 mg, 120 mg, Oral, Daily  docusate sodium (COLACE) capsule 100 mg, 100 mg, Oral, BID  doxazosin (CARDURA) tablet 2 mg, 2 mg, Oral, Nightly  isosorbide mononitrate (IMDUR) extended release tablet 60 mg, 60 mg, Oral, Daily  metoprolol tartrate (LOPRESSOR) tablet 50 mg, 50 mg, Oral, BID  nitroGLYCERIN (NITROSTAT) SL tablet 0.4 mg, 0.4 mg, SubLINGual, Q5 Min PRN  pantoprazole (PROTONIX) tablet 40 mg, 40 mg, Oral, QAM AC  pravastatin (PRAVACHOL) tablet 20 mg, 20 mg, Oral, Nightly  acetaminophen (TYLENOL) tablet 650 mg, 650 mg, Oral, BID PRN  sodium chloride flush 0.9 % injection 5-40 mL, 5-40 mL, IntraVENous, 2 times per day  sodium chloride flush 0.9 % injection 10 mL, 10 mL, IntraVENous, PRN  0.9 % sodium chloride infusion, , IntraVENous, PRN  potassium chloride (KLOR-CON M) extended release tablet 40 mEq, 40 mEq, Oral, PRN **OR** potassium bicarb-citric acid (EFFER-K) effervescent tablet 40 mEq, 40 mEq, Oral, PRN **OR** potassium chloride 10 mEq/100 mL IVPB (Peripheral Line), 10 mEq, IntraVENous, PRN  magnesium sulfate 1000 mg in dextrose 5% 100 mL IVPB, 1,000 mg, IntraVENous, PRN  ondansetron (ZOFRAN-ODT) disintegrating tablet 4 mg, 4 mg, Oral, Q8H PRN **OR** ondansetron (ZOFRAN) injection 4 mg, 4 mg, IntraVENous, Q6H PRN  polyethylene glycol (GLYCOLAX) packet 17 g, 17 g, Oral, Daily PRN  acetaminophen (TYLENOL) tablet 650 mg, 650 mg, Oral, Q6H PRN **OR** acetaminophen (TYLENOL) suppository 650 mg, 650 mg, Rectal, Q6H PRN

## 2025-05-14 NOTE — PROGRESS NOTES
[facility] Internal Medicine  Aftab Khan MD; Parker Thomason MD, Vy Ace MD,    Arthur Chavira MD, Olimpia Lucia MD.    [facility] Internal Medicine   IN-PATIENT SERVICE   [facility]    Progress Note            Date:   5/14/2025  Patient name:  Nile Lacy  Date of admission:  5/12/2025  2:09 PM  MRN:   [id number]  Account:  [id number]  YOB: 1940  PCP:    Dewey Vera MD  Room:   2120/2120-01  Code Status:    Full Code    Chief Complaint:     Chief Complaint   Patient presents with    Numbness     Pt reports he was out and about, c/o R hand numbness.  Stopped at a local firestation for help.        History Obtained From:     Patient/EMR/Bedside RN    History of Present Illness:   Nile Lacy is a 84 y.o. Non- / non  male who presents with Numbness (Pt reports he was out and about, c/o R hand numbness.  Stopped at a local firestation for help.) and is admitted to the hospital for the management of TIA (transient ischemic attack). Medical history significant for HTN, HLD, atrial fibrillation on Eliquis, CAD s/p CABG (2004), dementia and seizure disorder.      Patient arrived to ED per EMS after pulling his vehicle into a fire station and complaining of heaviness, numbness and tingling of bilateral hands. Reports that right hand symptoms are more severe. Also reported lightheadedness and dizziness. States that symptoms were present for 20-30 minutes and all symptoms have since resolved. He is on Eliquis for atrial fibrillation, denies any falls or injuries. Denies headache dizziness or blurred vision. CT head no acute intracranial abnormality. CTA head and neck no significant stenosis or large vessel occlusion. CXR airspace disease could represent atelectasis vs pneumonia. Denies cough or shortness of breath. WBC 5.6, afebrile. EKG atrial fibrillation with no ST or T-wave changes. Troponin 14 repeat 12. BNP 1166. No

## 2025-05-14 NOTE — PROGRESS NOTES
Date:   5/14/2025  Patient name: Nile Lacy  Date of admission:  5/12/2025  2:09 PM  MRN:   [id number]  YOB: 1940  PCP: Dewey Vera MD    Reason for Admission: TIA (transient ischemic attack) [G45.9]  Near syncope [R55]  Atrial fibrillation, unspecified type (HCC) [I48.91]    Cardiology follow-up: Atrial fibrillation, CAD, CABG x 4, hypertension       Referring physician: Dr Arthur Chavira     Impression     Admission 5/12/2025 sudden onset bilateral upper extremities numbness with the significant resolution of symptoms, negative CTA head and neck, less likely stroke/TIA as per neurology assessment  Episodes of confusion  Chronic A-fib  Coronary artery disease, CABG x 4  Hypertension  Hyperlipidemia  Overweight BMI 32  Colonic diverticulosis  Renal cyst     History of present illness  84 years old male who lives alone independent in his activities of daily living with a past medical history of CABG x 4 many years ago probably 2008, chronic atrial fibrillation, hypertension, hyperlipidemia got hospitalized on 5/12/2025 at about 2 PM with sudden onset numbness in both hands while he was driving, he stopped at a local [employer] station for help.  He had no chest pain or palpitation.  No problem in the lower extremities.  No vision problem.  CTA head and neck no significant stenosis or occlusion.  Patient has been on Eliquis for his chronic A-fib as well as aspirin.  No exertional angina.  No obvious history of previous CVA.  No syncopal episode.  His symptoms lasted about 30 minutes.  He did mention that he has numbness in his right hand which is something that is chronic with this episode appeared to be more severe affecting both sides  ECG on admission showed A-fib, heart rate 66, right axis deviation  On admission blood pressure 138/90 heart rate 72 oxygen saturation 97% room air temperature 97.3     Lab work on admission sodium 138 potassium 4.6 BUN 15 creatinine 1.1  Troponin high-sensitivity

## 2025-05-14 NOTE — CARE COORDINATION
DISCHARGE PLANNING NOTE:    Isi Phan for [facility] ([phone]) called stating she received a call from patients daughter stating she didn't think he was safe to return home and needed placement. Isi asked about a capacity evaluation. This writer reached out to Dr. Jang asking for capacity evaluation. Once evaluation is done this writer will touch base with Isi.     This writer called daughter Maru and spoke with her about placement. This writer explained that per PT/OT patient is independent in mobility and self care and even if he lacked capacity a Skilled nursing facility or memory care unit would be an out of pocket cost. Family would need to apply for Medicaid for patient. Maru stated Neither her nor her siblings could take patient home with them.    Maru stated that patient has been increasingly forgetful and paranoid. Maru stated that patient thinks that her and her siblings are trying to kill him, drug him, and trying to steal money and other things from him. On April 4th Maru states that the patient went to the police station and reported that his children were trying to drug him and \"do things to him.\" Per Maru patient has been hiding things around his house. He has a handsaw hidden in his dresser drawer. He has been hiding toilet paper and his pills along with other things around the house. Patient has pillows, clothing and important documents that he keeps in his car in cases he has to go on the run. Maru states that the have an apple tracker in patients car and patient will go out and drive around at 1-4 AM. Maru also states patient will report taking his pill but has not been taking them. Maru expresses a great concern her and her siblings have for patients safety.      Electronically signed by Isabel Lamb RN on 5/14/2025 at 11:22 AM

## 2025-05-14 NOTE — CARE COORDINATION
Case Management   Daily Progress Note       Patient Name: Nile Lacy                   YOB: 1940  Diagnosis: TIA (transient ischemic attack) [G45.9]  Near syncope [R55]  Atrial fibrillation, unspecified type (HCC) [I48.91]                       GMLOS: 2 days  Length of Stay: 2  days    Readmission Risk (Low < 19, Mod (19-27), High > 27): Readmission Risk Score: 9.7      Patient is alert and oriented.    Spoke with patient, and Current Transitional Plan is:    [x] Home Independently    [] Home with HC    [] Skilled Nursing Facility    [] Acute Rehabilitation    [] Long Term Acute Care (LTAC)    [] Other:     Medical Management: CT/CTA negative. NIH 0. Unable to tolerate MRI, pulled early. Echo ordered. Psych Consult. Telesitter.     PT/OT on.     Additional Notes:  Declining need for home care.    Electronically signed by Isabel Lamb RN on 5/14/2025 at 11:32 AM

## 2025-05-14 NOTE — PROGRESS NOTES
[facility]   IN-PATIENT SERVICE      NEUROLOGY PROGRESS  NOTE            Date:   5/14/2025  Patient name:  Nile Lacy  Date of admission:  5/12/2025  YOB: 1940      Interval History:     Sitting up eating lunch currently.  Reportedly had a very rough night was out wandering the halls very confused and disoriented.  Also reportedly had those symptoms this morning.  Psychiatry has been consulted.  MRI of the brain was negative although it was not fully completed.    History of Present Illness:     The patient is a 84 y.o. male who presents with Numbness (Pt reports he was out and about, c/o R hand numbness.  Stopped at a local firestation for help. )  . The patient was seen and examined and the chart was reviewed.  Patient was driving yesterday when he felt sudden onset numbness in bilateral hands right greater than left as well as weakness prompting visitation to the ED at [facility].  Underwent CT of the head without acute abnormalities.  CTA head and neck no significant stenosis or occlusion.  Patient already on Eliquis for history of A-fib as well as aspirin.  Admitted for further workup.     At this time he is sitting up in chair with no current complaints.  States the symptoms may have lasted approximately 30 minutes or so yesterday.  States he usually has numbness in his right hand which is something that is chronic with this episode appeared to be more severe affecting both sides.  Blood pressures are within normal limits.  No significant abnormalities on lab workup.      Past Medical History:     Past Medical History:   Diagnosis Date    Anxiety attack     Arthritis     Atrial fibrillation (HCC)     Atrial flutter (HCC)     Back pain     CAD (coronary artery disease)     Chest pain 04/12/2016    Colon polyps     Constipation     Dementia (HCC) 03/23/2019    Dizziness     GERD (gastroesophageal reflux disease)     Hyperlipidemia     Hypertension     Lower GI bleed     Lung  review.    COMPARISON:  None.    HISTORY:  ORDERING SYSTEM PROVIDED HISTORY: TIA, symptoms  TECHNOLOGIST PROVIDED HISTORY:  TIA, symptoms  Decision Support Exception - unselect if not a suspected or confirmed  emergency medical condition->Emergency Medical Condition (MA)  Reason for Exam: TIA, symptoms  Additional signs and symptoms: bilateral arm numbness, head pressure, blurry  vision started today; ORDERING SYSTEM PROVIDED HISTORY: TIA, symptoms  TECHNOLOGIST PROVIDED HISTORY:    TIA, symptoms  Decision Support Exception - unselect if not a suspected or confirmed  emergency medical condition->Emergency Medical Condition (MA)  Reason for Exam: TIA, symptoms  Additional signs and symptoms: bilateral arm numbness, head pressure, blurry  vision started today    FINDINGS:    CT HEAD:    BRAIN/VENTRICLES:  There is no acute intracranial hemorrhage, mass effect, or  midline shift.  There is satisfactory overall gray-white matter  differentiation.  There is cerebral atrophy.  The ventricular structures are  symmetric and unremarkable.  The infratentorial structures are unremarkable.    ORBITS: The visualized portion of the orbits demonstrate no acute abnormality.    SINUSES:  The visualized paranasal sinuses and mastoid air cells demonstrate  no acute abnormality.    SOFT TISSUES/SKULL: No acute abnormality of the visualized skull or soft  tissues.      CTA NECK:    AORTIC ARCH/ARCH VESSELS: No dissection or arterial injury.  No significant  stenosis of the brachiocephalic or subclavian arteries.    CAROTID ARTERIES: No dissection, arterial injury, or hemodynamically  significant stenosis by NASCET criteria.    VERTEBRAL ARTERIES: No dissection, arterial injury, or significant stenosis.    SOFT TISSUES: The lung apices are clear.  No cervical or superior mediastinal  lymphadenopathy.  The larynx and pharynx are unremarkable.  No acute  abnormality of the salivary and thyroid glands.    BONES: No acute osseous

## 2025-05-14 NOTE — PLAN OF CARE
Problem: Discharge Planning  Goal: Discharge to home or other facility with appropriate resources  Outcome: Progressing  Flowsheets (Taken 5/14/2025 1610)  Discharge to home or other facility with appropriate resources: Identify barriers to discharge with patient and caregiver   Pt to discharge once medically cleared to either home with VNS/family or SNF. Continuing to monitor.   Problem: Confusion  Goal: Confusion, delirium, dementia, or psychosis is improved or at baseline  Description: INTERVENTIONS:1. Assess for possible contributors to thought disturbance, including medications, impaired vision or hearing, underlying metabolic abnormalities, dehydration, psychiatric diagnoses, and notify attending LIP2. [place] high risk fall precautions, as indicated3. Provide frequent short contacts to provide reality reorientation, refocusing and direction4. Decrease environmental stimuli, including noise as appropriate5. Monitor and intervene to maintain adequate nutrition, hydration, elimination, sleep and activity6. If unable to ensure safety without constant attention obtain sitter and review sitter guidelines with assigned personnel7. Initiate Psychosocial CNS and Spiritual Care consult, as indicated  INTERVENTIONS:1. Assess for possible contributors to thought disturbance, including medications, impaired vision or hearing, underlying metabolic abnormalities, dehydration, psychiatric diagnoses, and notify attending LIP2. [place] high risk fall precautions, as indicated3. Provide frequent short contacts to provide reality reorientation, refocusing and direction4. Decrease environmental stimuli, including noise as appropriate5. Monitor and intervene to maintain adequate nutrition, hydration, elimination, sleep and activity6. If unable to ensure safety without constant attention obtain sitter and review sitter guidelines with assigned personnel7. Initiate Psychosocial CNS and Spiritual Care consult, as  as indicated3. Provide frequent short contacts to provide reality reorientation, refocusing and direction4. Decrease environmental stimuli, including noise as appropriate5. Monitor and intervene to maintain adequate nutrition, hydration, elimination, sleep and activity6. If unable to ensure safety without constant attention obtain sitter and review sitter guidelines with assigned personnel7. Initiate Psychosocial CNS and Spiritual Care consult, as indicated  INTERVENTIONS:1. Assess for possible contributors to thought disturbance, including medications, impaired vision or hearing, underlying metabolic abnormalities, dehydration, psychiatric diagnoses, and notify attending LIP2. [place] high risk fall precautions, as indicated3. Provide frequent short contacts to provide reality reorientation, refocusing and direction4. Decrease environmental stimuli, including noise as appropriate5. Monitor and intervene to maintain adequate nutrition, hydration, elimination, sleep and activity6. If unable to ensure safety without constant attention obtain sitter and review sitter guidelines with assigned personnel7. Initiate Psychosocial CNS and Spiritual Care consult, as indicated  INTERVENTIONS:1. Assess for possible contributors to thought disturbance, including medications, impaired vision or hearing, underlying metabolic abnormalities, dehydration, psychiatric diagnoses, and notify attending LIP2. [place] high risk fall precautions, as indicated3. Provide frequent short contacts to provide reality reorientation, refocusing and direction4. Decrease environmental stimuli, including noise as appropriate5. Monitor and intervene to maintain adequate nutrition, hydration, elimination, sleep and activity6. If unable to ensure safety without constant attention obtain sitter and review sitter guidelines with assigned personnel7. Initiate Psychosocial CNS and Spiritual Care consult, as indicated  INTERVENTIONS:1. Assess for possible

## 2025-05-15 VITALS
HEART RATE: 89 BPM | TEMPERATURE: 97.7 F | HEIGHT: 66 IN | BODY MASS INDEX: 31.29 KG/M2 | DIASTOLIC BLOOD PRESSURE: 96 MMHG | RESPIRATION RATE: 17 BRPM | WEIGHT: 194.67 LBS | OXYGEN SATURATION: 97 % | SYSTOLIC BLOOD PRESSURE: 119 MMHG

## 2025-05-15 LAB
ANION GAP SERPL CALCULATED.3IONS-SCNC: 9 MMOL/L (ref 9–16)
BASOPHILS # BLD: 0.1 K/UL (ref 0–0.2)
BASOPHILS NFR BLD: 1 % (ref 0–2)
BUN SERPL-MCNC: 16 MG/DL (ref 8–23)
CALCIUM SERPL-MCNC: 9.5 MG/DL (ref 8.6–10.4)
CHLORIDE SERPL-SCNC: 109 MMOL/L (ref 98–107)
CO2 SERPL-SCNC: 24 MMOL/L (ref 20–31)
CREAT SERPL-MCNC: 0.9 MG/DL (ref 0.7–1.2)
ECHO AO ROOT DIAM: 2.9 CM
ECHO AO ROOT INDEX: 1.45 CM/M2
ECHO AV AREA PEAK VELOCITY: 1.5 CM2
ECHO AV AREA VTI: 1.4 CM2
ECHO AV AREA/BSA PEAK VELOCITY: 0.8 CM2/M2
ECHO AV AREA/BSA VTI: 0.7 CM2/M2
ECHO AV MEAN GRADIENT: 4 MMHG
ECHO AV MEAN VELOCITY: 1 M/S
ECHO AV PEAK GRADIENT: 9 MMHG
ECHO AV PEAK VELOCITY: 1.5 M/S
ECHO AV VELOCITY RATIO: 0.47
ECHO AV VTI: 24.6 CM
ECHO BSA: 2.03 M2
ECHO EST RA PRESSURE: 3 MMHG
ECHO LA AREA 2C: 21.7 CM2
ECHO LA AREA 4C: 28.6 CM2
ECHO LA DIAMETER INDEX: 2.35 CM/M2
ECHO LA DIAMETER: 4.7 CM
ECHO LA MAJOR AXIS: 7.2 CM
ECHO LA MINOR AXIS: 6.3 CM
ECHO LA TO AORTIC ROOT RATIO: 1.62
ECHO LA VOL BP: 80 ML (ref 18–58)
ECHO LA VOL MOD A2C: 62 ML (ref 18–58)
ECHO LA VOL MOD A4C: 90 ML (ref 18–58)
ECHO LA VOL/BSA BIPLANE: 40 ML/M2 (ref 16–34)
ECHO LA VOLUME INDEX MOD A2C: 31 ML/M2 (ref 16–34)
ECHO LA VOLUME INDEX MOD A4C: 45 ML/M2 (ref 16–34)
ECHO LV EF PHYSICIAN: 55 %
ECHO LV FRACTIONAL SHORTENING: 35 % (ref 28–44)
ECHO LV INTERNAL DIMENSION DIASTOLE INDEX: 2 CM/M2
ECHO LV INTERNAL DIMENSION DIASTOLIC: 4 CM (ref 4.2–5.9)
ECHO LV INTERNAL DIMENSION SYSTOLIC INDEX: 1.3 CM/M2
ECHO LV INTERNAL DIMENSION SYSTOLIC: 2.6 CM
ECHO LV IVSD: 1.3 CM (ref 0.6–1)
ECHO LV MASS 2D: 186.5 G (ref 88–224)
ECHO LV MASS INDEX 2D: 93.3 G/M2 (ref 49–115)
ECHO LV POSTERIOR WALL DIASTOLIC: 1.3 CM (ref 0.6–1)
ECHO LV RELATIVE WALL THICKNESS RATIO: 0.65
ECHO LVOT AREA: 3.1 CM2
ECHO LVOT AV VTI INDEX: 0.45
ECHO LVOT DIAM: 2 CM
ECHO LVOT MEAN GRADIENT: 1 MMHG
ECHO LVOT PEAK GRADIENT: 2 MMHG
ECHO LVOT PEAK VELOCITY: 0.7 M/S
ECHO LVOT STROKE VOLUME INDEX: 17.3 ML/M2
ECHO LVOT SV: 34.5 ML
ECHO LVOT VTI: 11 CM
ECHO PV MAX VELOCITY: 0.8 M/S
ECHO PV PEAK GRADIENT: 2 MMHG
ECHO RA AREA 4C: 19.4 CM2
ECHO RA END SYSTOLIC VOLUME APICAL 4 CHAMBER INDEX BSA: 28 ML/M2
ECHO RA VOLUME: 56 ML
ECHO RIGHT VENTRICULAR SYSTOLIC PRESSURE (RVSP): 20 MMHG
ECHO RV INTERNAL DIMENSION: 5.1 CM
ECHO RV TAPSE: 1.7 CM (ref 1.7–?)
ECHO TV REGURGITANT MAX VELOCITY: 2.04 M/S
ECHO TV REGURGITANT PEAK GRADIENT: 17 MMHG
EOSINOPHIL # BLD: 0.3 K/UL (ref 0–0.4)
EOSINOPHILS RELATIVE PERCENT: 6 % (ref 0–4)
ERYTHROCYTE [DISTWIDTH] IN BLOOD BY AUTOMATED COUNT: 13.8 % (ref 11.5–14.9)
GFR, ESTIMATED: 85 ML/MIN/1.73M2
GLUCOSE SERPL-MCNC: 95 MG/DL (ref 74–99)
HCT VFR BLD AUTO: 42.8 % (ref 41–53)
HGB BLD-MCNC: 14.5 G/DL (ref 13.5–17.5)
LYMPHOCYTES NFR BLD: 1.8 K/UL (ref 1–4.8)
LYMPHOCYTES RELATIVE PERCENT: 35 % (ref 24–44)
MCH RBC QN AUTO: 31.2 PG (ref 26–34)
MCHC RBC AUTO-ENTMCNC: 33.9 G/DL (ref 31–37)
MCV RBC AUTO: 92 FL (ref 80–100)
MONOCYTES NFR BLD: 0.5 K/UL (ref 0.1–1.3)
MONOCYTES NFR BLD: 10 % (ref 1–7)
NEUTROPHILS NFR BLD: 48 % (ref 36–66)
NEUTS SEG NFR BLD: 2.5 K/UL (ref 1.3–9.1)
PLATELET # BLD AUTO: 127 K/UL (ref 150–450)
PMV BLD AUTO: 7.6 FL (ref 6–12)
POTASSIUM SERPL-SCNC: 4 MMOL/L (ref 3.7–5.3)
RBC # BLD AUTO: 4.65 M/UL (ref 4.5–5.9)
SODIUM SERPL-SCNC: 142 MMOL/L (ref 136–145)
WBC OTHER # BLD: 5.3 K/UL (ref 3.5–11)

## 2025-05-15 PROCEDURE — 99239 HOSP IP/OBS DSCHRG MGMT >30: CPT | Performed by: INTERNAL MEDICINE

## 2025-05-15 PROCEDURE — 6370000000 HC RX 637 (ALT 250 FOR IP): Performed by: PSYCHIATRY & NEUROLOGY

## 2025-05-15 PROCEDURE — 36415 COLL VENOUS BLD VENIPUNCTURE: CPT

## 2025-05-15 PROCEDURE — 80048 BASIC METABOLIC PNL TOTAL CA: CPT

## 2025-05-15 PROCEDURE — 99232 SBSQ HOSP IP/OBS MODERATE 35: CPT | Performed by: PSYCHIATRY & NEUROLOGY

## 2025-05-15 PROCEDURE — 85025 COMPLETE CBC W/AUTO DIFF WBC: CPT

## 2025-05-15 PROCEDURE — 6370000000 HC RX 637 (ALT 250 FOR IP): Performed by: INTERNAL MEDICINE

## 2025-05-15 RX ORDER — ISOSORBIDE MONONITRATE 60 MG/1
60 TABLET, EXTENDED RELEASE ORAL DAILY
OUTPATIENT
Start: 2025-05-16

## 2025-05-15 RX ORDER — PANTOPRAZOLE SODIUM 40 MG/1
40 TABLET, DELAYED RELEASE ORAL
OUTPATIENT
Start: 2025-05-16

## 2025-05-15 RX ORDER — ACETAMINOPHEN 650 MG/1
650 SUPPOSITORY RECTAL EVERY 6 HOURS PRN
OUTPATIENT
Start: 2025-05-15

## 2025-05-15 RX ORDER — ASPIRIN 81 MG/1
81 TABLET, CHEWABLE ORAL DAILY
OUTPATIENT
Start: 2025-05-16

## 2025-05-15 RX ORDER — DILTIAZEM HYDROCHLORIDE 120 MG/1
120 CAPSULE, COATED, EXTENDED RELEASE ORAL DAILY
OUTPATIENT
Start: 2025-05-16

## 2025-05-15 RX ORDER — METOPROLOL TARTRATE 50 MG
50 TABLET ORAL 2 TIMES DAILY
OUTPATIENT
Start: 2025-05-15

## 2025-05-15 RX ORDER — RIVASTIGMINE 4.6 MG/24H
1 PATCH, EXTENDED RELEASE TRANSDERMAL DAILY
Status: DISCONTINUED | OUTPATIENT
Start: 2025-05-15 | End: 2025-05-16 | Stop reason: HOSPADM

## 2025-05-15 RX ORDER — ACETAMINOPHEN 325 MG/1
650 TABLET ORAL EVERY 6 HOURS PRN
OUTPATIENT
Start: 2025-05-15

## 2025-05-15 RX ORDER — DOCUSATE SODIUM 100 MG/1
100 CAPSULE, LIQUID FILLED ORAL 2 TIMES DAILY
OUTPATIENT
Start: 2025-05-15

## 2025-05-15 RX ORDER — NITROGLYCERIN 0.4 MG/1
0.4 TABLET SUBLINGUAL EVERY 5 MIN PRN
OUTPATIENT
Start: 2025-05-15

## 2025-05-15 RX ORDER — POLYETHYLENE GLYCOL 3350 17 G/17G
17 POWDER, FOR SOLUTION ORAL DAILY PRN
OUTPATIENT
Start: 2025-05-15

## 2025-05-15 RX ORDER — DOXAZOSIN 1 MG/1
2 TABLET ORAL NIGHTLY
OUTPATIENT
Start: 2025-05-15

## 2025-05-15 RX ORDER — RIVASTIGMINE 4.6 MG/24H
1 PATCH, EXTENDED RELEASE TRANSDERMAL DAILY
OUTPATIENT
Start: 2025-05-16

## 2025-05-15 RX ORDER — PRAVASTATIN SODIUM 20 MG
20 TABLET ORAL NIGHTLY
OUTPATIENT
Start: 2025-05-15

## 2025-05-15 RX ADMIN — METOPROLOL TARTRATE 50 MG: 50 TABLET, FILM COATED ORAL at 08:47

## 2025-05-15 RX ADMIN — DOXAZOSIN 2 MG: 1 TABLET ORAL at 20:32

## 2025-05-15 RX ADMIN — PRAVASTATIN SODIUM 20 MG: 20 TABLET ORAL at 20:32

## 2025-05-15 RX ADMIN — APIXABAN 5 MG: 5 TABLET, FILM COATED ORAL at 08:47

## 2025-05-15 RX ADMIN — METOPROLOL TARTRATE 50 MG: 50 TABLET, FILM COATED ORAL at 20:32

## 2025-05-15 RX ADMIN — DOCUSATE SODIUM 100 MG: 100 CAPSULE, LIQUID FILLED ORAL at 08:47

## 2025-05-15 RX ADMIN — DOCUSATE SODIUM 100 MG: 100 CAPSULE, LIQUID FILLED ORAL at 20:32

## 2025-05-15 RX ADMIN — ASPIRIN 81 MG: 81 TABLET, CHEWABLE ORAL at 08:47

## 2025-05-15 RX ADMIN — APIXABAN 5 MG: 5 TABLET, FILM COATED ORAL at 20:32

## 2025-05-15 RX ADMIN — ISOSORBIDE MONONITRATE 60 MG: 60 TABLET, EXTENDED RELEASE ORAL at 08:47

## 2025-05-15 RX ADMIN — DILTIAZEM HYDROCHLORIDE 120 MG: 120 CAPSULE, COATED, EXTENDED RELEASE ORAL at 08:47

## 2025-05-15 NOTE — PROGRESS NOTES
Date:   5/15/2025  Patient name: Nile Lacy  Date of admission:  5/12/2025  2:09 PM  MRN:   [id number]  YOB: 1940  PCP: Dewey Vera MD    Reason for Admission: TIA (transient ischemic attack) [G45.9]  Near syncope [R55]  Atrial fibrillation, unspecified type (HCC) [I48.91]    Cardiology follow-up: Atrial fibrillation, CAD, CABG x 4, hypertension        Referring physician: Dr Arthur Chavira     Impression     Admission 5/12/2025 sudden onset bilateral upper extremities numbness with the significant resolution of symptoms, negative CTA head and neck, less likely stroke/TIA as per neurology assessment  Episodes of confusion  Chronic A-fib  Coronary artery disease, CABG x 4  Hypertension  Hyperlipidemia  Overweight BMI 32  Colonic diverticulosis  Renal cyst     History of present illness  84 years old male who lives alone independent in his activities of daily living with a past medical history of CABG x 4 many years ago probably 2008, chronic atrial fibrillation, hypertension, hyperlipidemia got hospitalized on 5/12/2025 at about 2 PM with sudden onset numbness in both hands while he was driving, he stopped at a local [employer] station for help.  He had no chest pain or palpitation.  No problem in the lower extremities.  No vision problem.  CTA head and neck no significant stenosis or occlusion.  Patient has been on Eliquis for his chronic A-fib as well as aspirin.  No exertional angina.  No obvious history of previous CVA.  No syncopal episode.  His symptoms lasted about 30 minutes.  He did mention that he has numbness in his right hand which is something that is chronic with this episode appeared to be more severe affecting both sides  ECG on admission showed A-fib, heart rate 66, right axis deviation  On admission blood pressure 138/90 heart rate 72 oxygen saturation 97% room air temperature 97.3     Lab work on admission sodium 138 potassium 4.6 BUN 15 creatinine 1.1  Troponin high-sensitivity

## 2025-05-15 NOTE — PROGRESS NOTES
[facility] Internal Medicine  Aftab Khan MD; Parker Thomason MD, Vy Ace MD,    Arthur Chavira MD, Olimpia Lucia MD.    [facility] Internal Medicine   IN-PATIENT SERVICE   [facility]    Progress Note            Date:   5/15/2025  Patient name:  Nile Lacy  Date of admission:  5/12/2025  2:09 PM  MRN:   [id number]  Account:  [id number]  YOB: 1940  PCP:    Dewey Vera MD  Room:   2120/2120-01  Code Status:    Full Code    Chief Complaint:     Chief Complaint   Patient presents with    Numbness     Pt reports he was out and about, c/o R hand numbness.  Stopped at a local firestation for help.        History Obtained From:     Patient/EMR/Bedside RN    History of Present Illness:   Nile Lacy is a 84 y.o. Non- / non  male who presents with Numbness (Pt reports he was out and about, c/o R hand numbness.  Stopped at a local firestation for help.) and is admitted to the hospital for the management of TIA (transient ischemic attack). Medical history significant for HTN, HLD, atrial fibrillation on Eliquis, CAD s/p CABG (2004), dementia and seizure disorder.      Patient arrived to ED per EMS after pulling his vehicle into a fire station and complaining of heaviness, numbness and tingling of bilateral hands. Reports that right hand symptoms are more severe. Also reported lightheadedness and dizziness. States that symptoms were present for 20-30 minutes and all symptoms have since resolved. He is on Eliquis for atrial fibrillation, denies any falls or injuries. Denies headache dizziness or blurred vision. CT head no acute intracranial abnormality. CTA head and neck no significant stenosis or large vessel occlusion. CXR airspace disease could represent atelectasis vs pneumonia. Denies cough or shortness of breath. WBC 5.6, afebrile. EKG atrial fibrillation with no ST or T-wave changes. Troponin 14 repeat 12. BNP 1166. No

## 2025-05-15 NOTE — PROGRESS NOTES
BEHAVIORAL HEALTH FOLLOW-UP NOTE     5/15/2025     Patient was seen and examined in person, Chart reviewed   Patient's case discussed with staff/team    Chief Complaint: Anxiety and confusion     Interim History:      The patient is a 84 y.o. male with significant psychiatric history of anxiety, dementia who is admitted medically Numbness (Pt reports he was out and about, c/o R hand numbness. Stopped at a local firestation for help.) and is admitted to the hospital for the management of TIA (transient ischemic attack). Medical history significant for HTN, HLD, atrial fibrillation on Eliquis, CAD s/p CABG (2004), dementia and seizure disorder.  Patient admitted for TIA.     Patient was seen up for initial evaluation today. He was resting in bed upon approach. He was alert and oriented to person time and event and not place. He was agreeable to conversation. Thought process was linear and speech normal. Patient has been having trouble with his family members. He believes his kids are trying to take his home. Patient has extreme anxiety and has had a most of his life. He has been admitted to a I unit once before while he was in the [employer]. Patient has been having episodes at night where he goes and drives around from 1 AM to 4 PM. He also been having an episode last night where he ended up walking to the [employer] and just wandering around around 2 AM. When asked this to the patient he does not deny any says he he walks around so he knows his surroundings in case he needs to make an emergency exit. When asked why the patient drives from 1 AM to 4 AM he says he gets lonely at night so he just drives around so he could remember his surroundings.     Patient seen today and feels much less anxious.  Last night he did have another episode of waking up and walking around but the staff saw him before he went too far and kept him in the area.  When asked why he walked around he just says he needs to get up and moving a little  bit.  Patient says that he does not feel anxious today and feels pretty relaxed.  The reason for being relaxes he says he feels that he is going to start getting better and is happy that is in the hospital.  Patient has had no contact with his children in the past day.  Patient feels neutral about not having contact with his children.  Staff mention something about him having stuff in his car that might be used to hurt himself.  When this was addressed to the patient he says he has nothing in his car maybe some clothes.     Patient overall is still anxious and is still having episodes of waking up in the night and walking around and wandering.  Patient denies any suicide ideations.  Patient will be admitted into the [facility] unit.    BP (!) 126/97   Pulse 85   Temp 97.5 °F (36.4 °C) (Oral)   Resp 18   Ht 1.676 m (5' 6\")   Wt 88.3 kg (194 lb 10.7 oz)   SpO2 96%   BMI 31.42 kg/m²   Appetite:   [x] Normal/Unchanged  [] Increased  [] Decreased      Sleep:       [x] Normal/Unchanged  [] Fair       [] Poor              Energy:    [x] Normal/Unchanged  [] Increased  [] Decreased        Aggression:  [] yes  [x] no    Patient is [x] able  [] unable to CONTRACT FOR SAFETY ON THE UNIT    PAST MEDICAL/PSYCHIATRIC HISTORY:   Past Medical History:   Diagnosis Date    Anxiety attack     Arthritis     Atrial fibrillation (HCC)     Atrial flutter (HCC)     Back pain     CAD (coronary artery disease)     Chest pain 04/12/2016    Colon polyps     Constipation     Dementia (HCC) 03/23/2019    Dizziness     GERD (gastroesophageal reflux disease)     Hyperlipidemia     Hypertension     Lower GI bleed     Lung nodule seen on imaging study 02/17/2019    Prediabetes     Rectal bleeding     S/P colonoscopy with polypectomy     Seizure (HCC)     NOTED PER CARE EVERYWHERE- PATIENT DENIES HX OF, STATES HE HAS NEVER BEEN ON SEIZURE MEDICATION    Sleep apnea     not tested, no cpap    SOB (shortness of breath)     Status post coronary artery

## 2025-05-15 NOTE — PLAN OF CARE
Problem: Discharge Planning  Goal: Discharge to home or other facility with appropriate resources  Outcome: Progressing     Problem: Confusion  Goal: Confusion, delirium, dementia, or psychosis is improved or at baseline  Description: INTERVENTIONS:1. Assess for possible contributors to thought disturbance, including medications, impaired vision or hearing, underlying metabolic abnormalities, dehydration, psychiatric diagnoses, and notify attending LIP2. [place] high risk fall precautions, as indicated3. Provide frequent short contacts to provide reality reorientation, refocusing and direction4. Decrease environmental stimuli, including noise as appropriate5. Monitor and intervene to maintain adequate nutrition, hydration, elimination, sleep and activity6. If unable to ensure safety without constant attention obtain sitter and review sitter guidelines with assigned personnel7. Initiate Psychosocial CNS and Spiritual Care consult, as indicated  INTERVENTIONS:1. Assess for possible contributors to thought disturbance, including medications, impaired vision or hearing, underlying metabolic abnormalities, dehydration, psychiatric diagnoses, and notify attending LIP2. [place] high risk fall precautions, as indicated3. Provide frequent short contacts to provide reality reorientation, refocusing and direction4. Decrease environmental stimuli, including noise as appropriate5. Monitor and intervene to maintain adequate nutrition, hydration, elimination, sleep and activity6. If unable to ensure safety without constant attention obtain sitter and review sitter guidelines with assigned personnel7. Initiate Psychosocial CNS and Spiritual Care consult, as indicated  INTERVENTIONS:1. Assess for possible contributors to thought disturbance, including medications, impaired vision or hearing, underlying metabolic abnormalities, dehydration, psychiatric diagnoses, and notify attending LIP2. [place] high risk fall precautions,

## 2025-05-15 NOTE — CARE COORDINATION
Case Management   Daily Progress Note       Patient Name: Nile Lacy                   YOB: 1940  Diagnosis: TIA (transient ischemic attack) [G45.9]  Near syncope [R55]  Atrial fibrillation, unspecified type (HCC) [I48.91]                       GMLOS: 2 days  Length of Stay: 3  days    Readmission Risk (Low < 19, Mod (19-27), High > 27): Readmission Risk Score: 9        Chart Review, and Current Transitional Plan is:    [] Home Independently    [] Home with HC    [] Skilled Nursing Facility    [] Acute Rehabilitation    [] Long Term Acute Care (LTAC)    [x] Other: [facility]    Medical Management: Neuro/Cardiac Consults. CT/CTA negative. Echo EF 55-60%. APS following (see note 5/14) Psych Consult. Telesitter. Plan to admit to [facility]. PT/OT on.     Additional Notes: Discharge order in. Daughter Maru updated. Attempted to update Isi with APS, phone went to voicemail. HIPAA compliant message left to call writer back.    Electronically signed by Isabel Lamb RN on 5/15/2025 at 1:35 PM

## 2025-05-15 NOTE — DISCHARGE SUMMARY
[facility] Internal Medicine    Aftab Khan MD; Parker Thomason MD, Vy Ace MD,Dr. LOREN Lucia MD. ; Arthur Chavira MD      [facility] Internal Medicine  IN-PATIENT SERVICE   [facility]    Discharge Summary     Patient ID: Nile Lacy  :  1940   MRN: [id number]     ACCOUNT:  [id number]   Patient's PCP: Dewey Vera MD  Admit Date: 2025   Discharge Date: 5/15/2025     Length of Stay: 3  Code Status:  Full Code  Admitting Physician: Arthur Chavira MD  Discharge Physician: Arthur Chavira MD     Active Discharge Diagnoses:     Hospital Problem Lists:  Principal Problem:    TIA (transient ischemic attack)  Active Problems:    Late onset Alzheimer's dementia without behavioral disturbance (HCC)    PAF (paroxysmal atrial fibrillation) (HCC)    Coronary artery disease involving coronary bypass graft of native heart without angina pectoris    Major neurocognitive disorder (HCC)    Hyperlipidemia    Essential hypertension    ASCVD (arteriosclerotic cardiovascular disease)    Bilateral hand numbness  Resolved Problems:    * No resolved hospital problems. *      Admission Condition:  Serious      Discharged Condition: Stable     Hospital Stay:     Hospital Course:  84-year-old gentleman admitted with possible TIA, neurology and cardiology on board, telemetry, echocardiogram, MRI brain incomplete as patient requested removal from the machine, echo pending  Advanced dementia, continue to monitor,  Chronic atrial fibrillation on Eliquis, rate controlled at this time,  Coronary disease, continue aspirin statins beta-blockers,  Hyperlipidemia, statins,  Essential hypertension, continue home medications,  PT OT,  Prediabetes   DVT prophylaxis with Eliquis,  Wandered off yesterday on the corridors     Cardiology and neurology both are okay to discharge the patient,  Discussed with Dr. Trevor sharma looked fine     Psych consult appreciated, advised to admit to  for: \"POCPH\", \"PHART\", \"PH\", \"POCPCO2\", \"DYU8ITZ\", \"PCO2\", \"POCPO2\", \"PO2ART\", \"PO2\", \"POCHCO3\", \"QVY4GKN\", \"HCO3\", \"NBEA\", \"PBEA\", \"BEART\", \"BE\", \"THGBART\", \"THB\", \"ZZF8NEX\", \"JMPI4DGF\", \"M5WNBCUQ\", \"O2SAT\", \"FIO2\"  Lab Results   Component Value Date/Time    SPECIAL NOT REPORTED 05/27/2021 06:17 PM     Lab Results   Component Value Date/Time    CULTURE NO GROWTH 5 DAYS 03/22/2024 10:11 AM    CULTURE NO GROWTH 5 DAYS 03/22/2024 10:11 AM       Radiology:  MRI BRAIN WO CONTRAST  Result Date: 5/13/2025  Limited brain MRI as the patient requested to be removed from the machine. No areas of restricted diffusion to suggest an acute ischemic event.  No obvious acute finding within the confines of the exam.     CTA HEAD NECK W CONTRAST  Result Date: 5/12/2025  1. No acute intracranial abnormality. 2.  No significant stenosis or evidence for large vessel occlusion.     CT HEAD WO CONTRAST  Result Date: 5/12/2025  1. No acute intracranial abnormality. 2.  No significant stenosis or evidence for large vessel occlusion.     XR CHEST PORTABLE  Result Date: 5/12/2025  Airspace disease in the left costophrenic angle could represent atelectasis and/or pneumonia       Consultations:    Consults:     Final Specialist Recommendations/Findings:   IP CONSULT TO HOSPITALIST  IP CONSULT TO CARDIOLOGY  IP CONSULT TO NEUROLOGY  IP CONSULT TO PSYCHIATRY      The patient was seen and examined on day of discharge and this discharge summary is in conjunction with any daily progress note from day of discharge.    Discharge plan:     Disposition: Discharge/Readmit    Physician Follow Up:   No follow-up provider specified.     Requiring Further Evaluation/Follow Up POST HOSPITALIZATION/Incidental Findings:     Diet: cardiac diet    Activity: As tolerated    Instructions to Patient:     Discharge Medications:      Medication List        ASK your doctor about these medications      acetaminophen 325 MG tablet  Commonly known as: TYLENOL     aspirin

## 2025-05-15 NOTE — PLAN OF CARE
Problem: Discharge Planning  Goal: Discharge to home or other facility with appropriate resources  5/15/2025 1657 by Velasquez Bearden RN  Outcome: Progressing     Problem: Confusion  Goal: Confusion, delirium, dementia, or psychosis is improved or at baseline  Description: INTERVENTIONS:1. Assess for possible contributors to thought disturbance, including medications, impaired vision or hearing, underlying metabolic abnormalities, dehydration, psychiatric diagnoses, and notify attending LIP2. [place] high risk fall precautions, as indicated3. Provide frequent short contacts to provide reality reorientation, refocusing and direction4. Decrease environmental stimuli, including noise as appropriate5. Monitor and intervene to maintain adequate nutrition, hydration, elimination, sleep and activity6. If unable to ensure safety without constant attention obtain sitter and review sitter guidelines with assigned personnel7. Initiate Psychosocial CNS and Spiritual Care consult, as indicated  INTERVENTIONS:1. Assess for possible contributors to thought disturbance, including medications, impaired vision or hearing, underlying metabolic abnormalities, dehydration, psychiatric diagnoses, and notify attending LIP2. [place] high risk fall precautions, as indicated3. Provide frequent short contacts to provide reality reorientation, refocusing and direction4. Decrease environmental stimuli, including noise as appropriate5. Monitor and intervene to maintain adequate nutrition, hydration, elimination, sleep and activity6. If unable to ensure safety without constant attention obtain sitter and review sitter guidelines with assigned personnel7. Initiate Psychosocial CNS and Spiritual Care consult, as indicated  INTERVENTIONS:1. Assess for possible contributors to thought disturbance, including medications, impaired vision or hearing, underlying metabolic abnormalities, dehydration, psychiatric diagnoses, and notify attending LIP2.  contributors to thought disturbance, including medications, impaired vision or hearing, underlying metabolic abnormalities, dehydration, psychiatric diagnoses, and notify attending LIP2. [place] high risk fall precautions, as indicated3. Provide frequent short contacts to provide reality reorientation, refocusing and direction4. Decrease environmental stimuli, including noise as appropriate5. Monitor and intervene to maintain adequate nutrition, hydration, elimination, sleep and activity6. If unable to ensure safety without constant attention obtain sitter and review sitter guidelines with assigned personnel7. Initiate Psychosocial CNS and Spiritual Care consult, as indicated  5/15/2025 1657 by Velasquez Bearden, RN  Outcome: Progressing     Problem: Safety - Adult  Goal: Free from fall injury  5/15/2025 1657 by Velasquez Bearden, RN  Outcome: Progressing     Problem: ABCDS Injury Assessment  Goal: Absence of physical injury  5/15/2025 1657 by Velasquez Bearden, RN  Outcome: Progressing     Problem: Risk for Elopement  Goal: Patient will not exit the unit/facility without proper excort  5/15/2025 1657 by Velasquez Bearden, RN  Outcome: Progressing     Problem: Pain  Goal: Verbalizes/displays adequate comfort level or baseline comfort level  5/15/2025 1657 by Velasquez Bearden, RN  Outcome: Progressing

## 2025-05-16 NOTE — PROGRESS NOTES
Pt wandering around outside of his room. When confronted and asked to go back to his room pt stated \" you are not allowed to hold me against my will\" , patient requested to leave AMA. RN called Maru ( daughter ) to come and pick him up. Educated patient that earlier in the day he volunteers to admit himself to [facility] and he will not get the psych help he needs if he leaves AMA.

## 2025-05-16 NOTE — PROGRESS NOTES
Pt left the unit against AMA, pt walked down to main lobby where his ride (daughter) picked him up. SU paperwork signed and in chart

## 2025-05-16 NOTE — PROGRESS NOTES
NP of the night, cardio, psych perfect served and notifed that patient left ama    \"Just wanted to inform all the doctors with this patient that, pt in 2120 Nile Lacy left AMA, pt requested to leave AMA. Signed out the AMA paperwork himself, he was walked down to the main lobby with someone and was picked up by his daughter. \"

## 2025-05-20 NOTE — PROGRESS NOTES
Physician Progress Note      PATIENT:               AD LAZAR  [facility] #:                  [id number]  :                       1940  ADMIT DATE:       2025 2:09 PM  DISCH DATE:        5/15/2025 10:08 PM  RESPONDING  PROVIDER #:        Arthur Chavira MD          QUERY TEXT:    \"Possible TIA\" is documented in the discharge summary.  Please further specify   the etiology of the TIA:    The clinical indicators include:  Discharge summary:  \"84-year-old gentleman admitted with possible TIA, neurology and cardiology on   board, telemetry, echocardiogram, MRI brain incomplete as patient requested   removal from the machine, echo pending  Advanced dementia, continue to monitor,  Chronic atrial fibrillation on Eliquis, rate controlled at this time,  Coronary disease, continue aspirin statins beta-blockers,  Hyperlipidemia, statins,  Essential hypertension, continue home medications,  PT OT,  Prediabetes  DVT prophylaxis with Eliquis.\"     Neuro:  \"1. Transient episode of bilateral hand numbness with full resolution of   symptoms; less likely stroke/TIA  2. Acute hospital-acquired delirium...  -Likely dealing with acute delirium.  -Psychiatry has been consulted.\"     Psych:  \"DSM-5 DIAGNOSIS:  TIA  Delirium due to medical condition  Major Neurocognitive disorder.\"     CTA Head/Neck: \"1. No acute intracranial abnormality.  2.  No significant   stenosis or evidence for large vessel occlusion.\"     MRI: \"Limited brain MRI as the patient requested to be removed from the   machine.  No areas of restricted diffusion to suggest an acute ischemic event.    No obvious acute finding within the confines of the exam.\"  Options provided:  -- Embolic TIA related to cerebral embolism  -- TIA ruled out, Transient episode of bilateral hand numbness due to, Please   specify cause  -- Other - I will add my own diagnosis  -- Disagree - Not applicable / Not valid  -- Disagree - Clinically unable to determine /

## 2025-06-29 ENCOUNTER — APPOINTMENT (OUTPATIENT)
Dept: CT IMAGING | Age: 85
DRG: 074 | End: 2025-06-29
Payer: COMMERCIAL

## 2025-06-29 ENCOUNTER — APPOINTMENT (OUTPATIENT)
Dept: GENERAL RADIOLOGY | Age: 85
DRG: 074 | End: 2025-06-29
Payer: COMMERCIAL

## 2025-06-29 ENCOUNTER — HOSPITAL ENCOUNTER (INPATIENT)
Age: 85
LOS: 2 days | Discharge: HOME | DRG: 074 | End: 2025-07-01
Attending: EMERGENCY MEDICINE
Payer: COMMERCIAL

## 2025-06-29 DIAGNOSIS — R20.0 RIGHT ARM NUMBNESS: Primary | ICD-10-CM

## 2025-06-29 DIAGNOSIS — M48.02 CERVICAL SPINAL STENOSIS: ICD-10-CM

## 2025-06-29 DIAGNOSIS — G51.0 FACIAL PARALYSIS ON LEFT SIDE: ICD-10-CM

## 2025-06-29 PROBLEM — R29.90 STROKE-LIKE SYMPTOMS: Status: ACTIVE | Noted: 2025-06-29

## 2025-06-29 LAB
ALBUMIN SERPL-MCNC: 4 G/DL (ref 3.5–5.2)
ALP SERPL-CCNC: 69 U/L (ref 40–129)
ALT SERPL-CCNC: 10 U/L (ref 10–50)
ANION GAP SERPL CALCULATED.3IONS-SCNC: 11 MMOL/L (ref 9–16)
AST SERPL-CCNC: 17 U/L (ref 10–50)
BASOPHILS # BLD: 0.05 K/UL (ref 0–0.2)
BASOPHILS NFR BLD: 1 % (ref 0–2)
BILIRUB SERPL-MCNC: 0.4 MG/DL (ref 0–1.2)
BUN SERPL-MCNC: 13 MG/DL (ref 8–23)
CALCIUM SERPL-MCNC: 9.7 MG/DL (ref 8.6–10.4)
CHLORIDE SERPL-SCNC: 111 MMOL/L (ref 98–107)
CHP ED QC CHECK: YES
CO2 SERPL-SCNC: 21 MMOL/L (ref 20–31)
CREAT SERPL-MCNC: 1 MG/DL (ref 0.7–1.2)
EOSINOPHIL # BLD: 0.26 K/UL (ref 0–0.44)
EOSINOPHILS RELATIVE PERCENT: 4 % (ref 0–4)
ERYTHROCYTE [DISTWIDTH] IN BLOOD BY AUTOMATED COUNT: 12.9 % (ref 11.5–14.9)
GFR, ESTIMATED: 75 ML/MIN/1.73M2
GLUCOSE BLD-MCNC: 87 MG/DL
GLUCOSE BLD-MCNC: 87 MG/DL (ref 75–110)
GLUCOSE SERPL-MCNC: 112 MG/DL (ref 74–99)
HCT VFR BLD AUTO: 40.2 % (ref 41–53)
HGB BLD-MCNC: 14.3 G/DL (ref 13.5–17.5)
IMM GRANULOCYTES # BLD AUTO: <0.03 K/UL (ref 0–0.3)
IMM GRANULOCYTES NFR BLD: 0 %
INR PPP: 1.1
LYMPHOCYTES NFR BLD: 1.86 K/UL (ref 1.1–3.7)
LYMPHOCYTES RELATIVE PERCENT: 28 % (ref 24–44)
MCH RBC QN AUTO: 32.4 PG (ref 26–34)
MCHC RBC AUTO-ENTMCNC: 35.6 G/DL (ref 31–37)
MCV RBC AUTO: 91 FL (ref 80–100)
MONOCYTES NFR BLD: 0.71 K/UL (ref 0.1–1.2)
MONOCYTES NFR BLD: 11 % (ref 3–12)
NEUTROPHILS NFR BLD: 56 % (ref 36–66)
NEUTS SEG NFR BLD: 3.64 K/UL (ref 1.5–8.1)
NRBC BLD-RTO: 0 PER 100 WBC
PARTIAL THROMBOPLASTIN TIME: 30.4 SEC (ref 24–36)
PLATELET # BLD AUTO: ABNORMAL K/UL (ref 150–450)
PLATELET, FLUORESCENCE: 148 K/UL (ref 150–450)
PLATELETS.RETICULATED NFR BLD AUTO: 1 % (ref 1.1–10.3)
PMV BLD AUTO: 8.9 FL (ref 8–13.5)
POTASSIUM SERPL-SCNC: 4.2 MMOL/L (ref 3.7–5.3)
PROT SERPL-MCNC: 6.5 G/DL (ref 6.6–8.7)
PROTHROMBIN TIME: 15.7 SEC (ref 11.8–14.6)
RBC # BLD AUTO: 4.42 M/UL (ref 4.21–5.77)
SODIUM SERPL-SCNC: 143 MMOL/L (ref 136–145)
TROPONIN I SERPL HS-MCNC: 12 NG/L (ref 0–22)
TROPONIN I SERPL HS-MCNC: 13 NG/L (ref 0–22)
WBC OTHER # BLD: 6.5 K/UL (ref 3.5–11)

## 2025-06-29 PROCEDURE — 2500000003 HC RX 250 WO HCPCS: Performed by: EMERGENCY MEDICINE

## 2025-06-29 PROCEDURE — 36415 COLL VENOUS BLD VENIPUNCTURE: CPT

## 2025-06-29 PROCEDURE — 85025 COMPLETE CBC W/AUTO DIFF WBC: CPT

## 2025-06-29 PROCEDURE — 80053 COMPREHEN METABOLIC PANEL: CPT

## 2025-06-29 PROCEDURE — 84484 ASSAY OF TROPONIN QUANT: CPT

## 2025-06-29 PROCEDURE — 85610 PROTHROMBIN TIME: CPT

## 2025-06-29 PROCEDURE — 93005 ELECTROCARDIOGRAM TRACING: CPT

## 2025-06-29 PROCEDURE — 82947 ASSAY GLUCOSE BLOOD QUANT: CPT

## 2025-06-29 PROCEDURE — 85730 THROMBOPLASTIN TIME PARTIAL: CPT

## 2025-06-29 PROCEDURE — 2580000003 HC RX 258: Performed by: EMERGENCY MEDICINE

## 2025-06-29 PROCEDURE — 6360000004 HC RX CONTRAST MEDICATION: Performed by: EMERGENCY MEDICINE

## 2025-06-29 PROCEDURE — 99285 EMERGENCY DEPT VISIT HI MDM: CPT

## 2025-06-29 PROCEDURE — 2060000000 HC ICU INTERMEDIATE R&B

## 2025-06-29 PROCEDURE — 71045 X-RAY EXAM CHEST 1 VIEW: CPT

## 2025-06-29 PROCEDURE — 70496 CT ANGIOGRAPHY HEAD: CPT

## 2025-06-29 PROCEDURE — 70450 CT HEAD/BRAIN W/O DYE: CPT

## 2025-06-29 RX ORDER — SODIUM CHLORIDE 0.9 % (FLUSH) 0.9 %
10 SYRINGE (ML) INJECTION ONCE
Status: COMPLETED | OUTPATIENT
Start: 2025-06-29 | End: 2025-06-29

## 2025-06-29 RX ORDER — IOPAMIDOL 755 MG/ML
75 INJECTION, SOLUTION INTRAVASCULAR
Status: COMPLETED | OUTPATIENT
Start: 2025-06-29 | End: 2025-06-29

## 2025-06-29 RX ORDER — 0.9 % SODIUM CHLORIDE 0.9 %
100 INTRAVENOUS SOLUTION INTRAVENOUS ONCE
Status: COMPLETED | OUTPATIENT
Start: 2025-06-29 | End: 2025-06-29

## 2025-06-29 RX ADMIN — IOPAMIDOL 75 ML: 755 INJECTION, SOLUTION INTRAVENOUS at 21:13

## 2025-06-29 RX ADMIN — SODIUM CHLORIDE, PRESERVATIVE FREE 10 ML: 5 INJECTION INTRAVENOUS at 21:13

## 2025-06-29 RX ADMIN — SODIUM CHLORIDE 100 ML: 9 INJECTION, SOLUTION INTRAVENOUS at 21:13

## 2025-06-29 ASSESSMENT — PAIN - FUNCTIONAL ASSESSMENT
PAIN_FUNCTIONAL_ASSESSMENT: NONE - DENIES PAIN
PAIN_FUNCTIONAL_ASSESSMENT: NONE - DENIES PAIN

## 2025-06-30 ENCOUNTER — APPOINTMENT (OUTPATIENT)
Dept: MRI IMAGING | Age: 85
DRG: 074 | End: 2025-06-30
Payer: COMMERCIAL

## 2025-06-30 LAB
ANION GAP SERPL CALCULATED.3IONS-SCNC: 9 MMOL/L (ref 9–16)
BUN SERPL-MCNC: 10 MG/DL (ref 8–23)
CALCIUM SERPL-MCNC: 9.6 MG/DL (ref 8.6–10.4)
CHLORIDE SERPL-SCNC: 109 MMOL/L (ref 98–107)
CHOLEST SERPL-MCNC: 157 MG/DL (ref 0–199)
CHOLESTEROL/HDL RATIO: 4
CO2 SERPL-SCNC: 24 MMOL/L (ref 20–31)
CREAT SERPL-MCNC: 0.9 MG/DL (ref 0.7–1.2)
EKG Q-T INTERVAL: 354 MS
EKG QRS DURATION: 90 MS
EKG QTC CALCULATION (BAZETT): 392 MS
EKG R AXIS: 77 DEGREES
EKG T AXIS: 1 DEGREES
EKG VENTRICULAR RATE: 74 BPM
EST. AVERAGE GLUCOSE BLD GHB EST-MCNC: 114 MG/DL
FOLATE SERPL-MCNC: 16.2 NG/ML (ref 4.8–24.2)
GFR, ESTIMATED: 85 ML/MIN/1.73M2
GLUCOSE SERPL-MCNC: 99 MG/DL (ref 74–99)
HBA1C MFR BLD: 5.6 % (ref 4–6)
HDLC SERPL-MCNC: 39 MG/DL
LDLC SERPL CALC-MCNC: 102 MG/DL (ref 0–100)
POTASSIUM SERPL-SCNC: 3.8 MMOL/L (ref 3.7–5.3)
SODIUM SERPL-SCNC: 142 MMOL/L (ref 136–145)
TRIGL SERPL-MCNC: 82 MG/DL (ref 0–149)
VIT B12 SERPL-MCNC: 245 PG/ML (ref 232–1245)

## 2025-06-30 PROCEDURE — 6370000000 HC RX 637 (ALT 250 FOR IP): Performed by: PSYCHIATRY & NEUROLOGY

## 2025-06-30 PROCEDURE — 92610 EVALUATE SWALLOWING FUNCTION: CPT

## 2025-06-30 PROCEDURE — 99223 1ST HOSP IP/OBS HIGH 75: CPT | Performed by: PSYCHIATRY & NEUROLOGY

## 2025-06-30 PROCEDURE — 6370000000 HC RX 637 (ALT 250 FOR IP): Performed by: NURSE PRACTITIONER

## 2025-06-30 PROCEDURE — 36415 COLL VENOUS BLD VENIPUNCTURE: CPT

## 2025-06-30 PROCEDURE — 2060000000 HC ICU INTERMEDIATE R&B

## 2025-06-30 PROCEDURE — 97110 THERAPEUTIC EXERCISES: CPT

## 2025-06-30 PROCEDURE — 2500000003 HC RX 250 WO HCPCS: Performed by: NURSE PRACTITIONER

## 2025-06-30 PROCEDURE — 80048 BASIC METABOLIC PNL TOTAL CA: CPT

## 2025-06-30 PROCEDURE — 82607 VITAMIN B-12: CPT

## 2025-06-30 PROCEDURE — 82746 ASSAY OF FOLIC ACID SERUM: CPT

## 2025-06-30 PROCEDURE — 97162 PT EVAL MOD COMPLEX 30 MIN: CPT

## 2025-06-30 PROCEDURE — 97530 THERAPEUTIC ACTIVITIES: CPT

## 2025-06-30 PROCEDURE — 99223 1ST HOSP IP/OBS HIGH 75: CPT

## 2025-06-30 PROCEDURE — 70551 MRI BRAIN STEM W/O DYE: CPT

## 2025-06-30 PROCEDURE — 92523 SPEECH SOUND LANG COMPREHEN: CPT

## 2025-06-30 PROCEDURE — 97116 GAIT TRAINING THERAPY: CPT

## 2025-06-30 PROCEDURE — 83036 HEMOGLOBIN GLYCOSYLATED A1C: CPT

## 2025-06-30 PROCEDURE — 6370000000 HC RX 637 (ALT 250 FOR IP)

## 2025-06-30 PROCEDURE — 97166 OT EVAL MOD COMPLEX 45 MIN: CPT

## 2025-06-30 PROCEDURE — 80061 LIPID PANEL: CPT

## 2025-06-30 RX ORDER — LORAZEPAM 0.5 MG/1
0.5 TABLET ORAL ONCE
Status: COMPLETED | OUTPATIENT
Start: 2025-06-30 | End: 2025-06-30

## 2025-06-30 RX ORDER — POLYETHYLENE GLYCOL 3350 17 G/17G
17 POWDER, FOR SOLUTION ORAL DAILY PRN
Status: DISCONTINUED | OUTPATIENT
Start: 2025-06-30 | End: 2025-07-01 | Stop reason: HOSPADM

## 2025-06-30 RX ORDER — LORAZEPAM 0.5 MG/1
0.5 TABLET ORAL ONCE
Status: COMPLETED | OUTPATIENT
Start: 2025-06-30 | End: 2025-07-01

## 2025-06-30 RX ORDER — TRAZODONE HYDROCHLORIDE 50 MG/1
50 TABLET ORAL NIGHTLY PRN
Status: DISCONTINUED | OUTPATIENT
Start: 2025-06-30 | End: 2025-07-01 | Stop reason: HOSPADM

## 2025-06-30 RX ORDER — ONDANSETRON 2 MG/ML
4 INJECTION INTRAMUSCULAR; INTRAVENOUS EVERY 6 HOURS PRN
Status: DISCONTINUED | OUTPATIENT
Start: 2025-06-30 | End: 2025-07-01 | Stop reason: HOSPADM

## 2025-06-30 RX ORDER — METOPROLOL TARTRATE 50 MG
50 TABLET ORAL 2 TIMES DAILY
Status: DISCONTINUED | OUTPATIENT
Start: 2025-06-30 | End: 2025-07-01 | Stop reason: HOSPADM

## 2025-06-30 RX ORDER — SODIUM CHLORIDE 9 MG/ML
INJECTION, SOLUTION INTRAVENOUS PRN
Status: DISCONTINUED | OUTPATIENT
Start: 2025-06-30 | End: 2025-07-01 | Stop reason: HOSPADM

## 2025-06-30 RX ORDER — ISOSORBIDE MONONITRATE 60 MG/1
60 TABLET, EXTENDED RELEASE ORAL DAILY
Status: DISCONTINUED | OUTPATIENT
Start: 2025-06-30 | End: 2025-07-01 | Stop reason: HOSPADM

## 2025-06-30 RX ORDER — LANOLIN ALCOHOL/MO/W.PET/CERES
1000 CREAM (GRAM) TOPICAL DAILY
Status: DISCONTINUED | OUTPATIENT
Start: 2025-06-30 | End: 2025-07-01 | Stop reason: HOSPADM

## 2025-06-30 RX ORDER — DOXAZOSIN 1 MG/1
2 TABLET ORAL NIGHTLY
Status: DISCONTINUED | OUTPATIENT
Start: 2025-06-30 | End: 2025-07-01 | Stop reason: HOSPADM

## 2025-06-30 RX ORDER — PANTOPRAZOLE SODIUM 40 MG/1
40 TABLET, DELAYED RELEASE ORAL
Status: DISCONTINUED | OUTPATIENT
Start: 2025-06-30 | End: 2025-07-01 | Stop reason: HOSPADM

## 2025-06-30 RX ORDER — DOCUSATE SODIUM 100 MG/1
100 CAPSULE, LIQUID FILLED ORAL 2 TIMES DAILY
Status: DISCONTINUED | OUTPATIENT
Start: 2025-06-30 | End: 2025-07-01 | Stop reason: HOSPADM

## 2025-06-30 RX ORDER — DILTIAZEM HYDROCHLORIDE 120 MG/1
120 CAPSULE, COATED, EXTENDED RELEASE ORAL DAILY
Status: DISCONTINUED | OUTPATIENT
Start: 2025-06-30 | End: 2025-07-01 | Stop reason: HOSPADM

## 2025-06-30 RX ORDER — ASPIRIN 81 MG/1
81 TABLET, CHEWABLE ORAL DAILY
Status: DISCONTINUED | OUTPATIENT
Start: 2025-06-30 | End: 2025-07-01 | Stop reason: HOSPADM

## 2025-06-30 RX ORDER — ONDANSETRON 4 MG/1
4 TABLET, ORALLY DISINTEGRATING ORAL EVERY 8 HOURS PRN
Status: DISCONTINUED | OUTPATIENT
Start: 2025-06-30 | End: 2025-07-01 | Stop reason: HOSPADM

## 2025-06-30 RX ORDER — LORAZEPAM 0.5 MG/1
0.5 TABLET ORAL ONCE
Status: DISCONTINUED | OUTPATIENT
Start: 2025-06-30 | End: 2025-06-30

## 2025-06-30 RX ORDER — SODIUM CHLORIDE 0.9 % (FLUSH) 0.9 %
5-40 SYRINGE (ML) INJECTION EVERY 12 HOURS SCHEDULED
Status: DISCONTINUED | OUTPATIENT
Start: 2025-06-30 | End: 2025-07-01 | Stop reason: HOSPADM

## 2025-06-30 RX ORDER — SODIUM CHLORIDE 0.9 % (FLUSH) 0.9 %
5-40 SYRINGE (ML) INJECTION PRN
Status: DISCONTINUED | OUTPATIENT
Start: 2025-06-30 | End: 2025-07-01 | Stop reason: HOSPADM

## 2025-06-30 RX ORDER — PRAVASTATIN SODIUM 40 MG
40 TABLET ORAL NIGHTLY
Status: DISCONTINUED | OUTPATIENT
Start: 2025-06-30 | End: 2025-07-01 | Stop reason: HOSPADM

## 2025-06-30 RX ADMIN — LORAZEPAM 0.5 MG: 0.5 TABLET ORAL at 11:29

## 2025-06-30 RX ADMIN — PRAVASTATIN SODIUM 40 MG: 40 TABLET ORAL at 20:45

## 2025-06-30 RX ADMIN — APIXABAN 5 MG: 5 TABLET, FILM COATED ORAL at 20:46

## 2025-06-30 RX ADMIN — PRAVASTATIN SODIUM 40 MG: 40 TABLET ORAL at 01:07

## 2025-06-30 RX ADMIN — SODIUM CHLORIDE, PRESERVATIVE FREE 10 ML: 5 INJECTION INTRAVENOUS at 20:46

## 2025-06-30 RX ADMIN — PANTOPRAZOLE SODIUM 40 MG: 40 TABLET, DELAYED RELEASE ORAL at 05:34

## 2025-06-30 RX ADMIN — DOCUSATE SODIUM 100 MG: 100 CAPSULE, LIQUID FILLED ORAL at 20:46

## 2025-06-30 RX ADMIN — APIXABAN 5 MG: 5 TABLET, FILM COATED ORAL at 09:02

## 2025-06-30 RX ADMIN — ASPIRIN 81 MG: 81 TABLET, CHEWABLE ORAL at 09:02

## 2025-06-30 RX ADMIN — SODIUM CHLORIDE, PRESERVATIVE FREE 10 ML: 5 INJECTION INTRAVENOUS at 01:07

## 2025-06-30 RX ADMIN — METOPROLOL TARTRATE 50 MG: 50 TABLET, FILM COATED ORAL at 20:46

## 2025-06-30 RX ADMIN — CYANOCOBALAMIN TAB 1000 MCG 1000 MCG: 1000 TAB at 20:45

## 2025-06-30 RX ADMIN — DOXAZOSIN 2 MG: 1 TABLET ORAL at 20:45

## 2025-06-30 RX ADMIN — SODIUM CHLORIDE, PRESERVATIVE FREE 10 ML: 5 INJECTION INTRAVENOUS at 09:03

## 2025-06-30 RX ADMIN — DOCUSATE SODIUM 100 MG: 100 CAPSULE, LIQUID FILLED ORAL at 09:02

## 2025-06-30 RX ADMIN — DOCUSATE SODIUM 100 MG: 100 CAPSULE, LIQUID FILLED ORAL at 01:03

## 2025-06-30 ASSESSMENT — PAIN SCALES - GENERAL: PAINLEVEL_OUTOF10: 4

## 2025-06-30 ASSESSMENT — PAIN DESCRIPTION - PAIN TYPE: TYPE: ACUTE PAIN

## 2025-06-30 ASSESSMENT — PAIN DESCRIPTION - LOCATION: LOCATION: CHEST

## 2025-06-30 NOTE — ED PROVIDER NOTES
[facility] EMERGENCY DEPARTMENT  eMERGENCY dEPARTMENT eNCOUnter   Attending Attestation     Pt Name: Nile Lacy  MRN: [id number]  Birthdate 1940  Date of evaluation: 6/29/25       Nile Lacy is a 84 y.o. male who presents with Numbness      History:   84-year-old male presenting to the ER complaining of bilateral hand numbness.    Exam: Vitals:   Vitals:    06/29/25 2037   BP: (!) 147/99   Pulse: 85   Resp: 18   Temp: 98.1 °F (36.7 °C)   TempSrc: Oral   SpO2: 96%   Weight: 81.6 kg (180 lb)   Height: 1.676 m (5' 6\")     On exam by the resident, facial droop was noticed on the left side.  I did not see evidence of facial droop on my exam.  The patient does have an underlying history of atrial fibrillation and does utilize Eliquis.  Neuro on-call for stroke did recommend patient be admitted for an MRI.  Patient was admitted after speaking with the admitting team.  Patient and family understand and agree with the plan.  Cardiac workup in the ER did not display positive signs of acute cardiac ischemia.  EKG was reviewed and interpreted by myself, the ED physician    I performed a history and physical examination of the patient and discussed management with the resident. I reviewed the resident’s note and agree with the documented findings and plan of care. Any areas of disagreement are noted on the chart. I was personally present for the key portions of any procedures. I have documented in the chart those procedures where I was not present during the key portions. I have personally reviewed all images and agree with the resident's interpretation. I have reviewed the emergency nurses triage note. I agree with the chief complaint, past medical history, past surgical history, allergies, medications, social and family history as documented unless otherwise noted below. Documentation of the HPI, Physical Exam and Medical Decision Making performed by medical students or scribes is based on my personal

## 2025-06-30 NOTE — H&P
[facility] Internal Medicine   Aftab Khan MD; MD Vy Andersen MD, MD , Radha Lucia MD    [facility] Internal Medicine   IN-PATIENT SERVICE   [facility]    HISTORY AND PHYSICAL EXAMINATION            Date:   6/30/2025  Patient name:  Nile Lacy  Date of admission:  6/29/2025  8:40 PM  MRN:   [id number]  Account:  [id number]  YOB: 1940  PCP:    Deewy Vera MD  Room:   2120/2120-01  Code Status:    Full Code    Chief Complaint:     Chief Complaint   Patient presents with    Numbness       History Obtained From:     patient, electronic medical record    History of Present Illness:     Nile Lacy is a 84 y.o. Non- / non  male who presents with Numbness   and is admitted to the hospital for the management of Stroke-like symptoms.    Nile Lacy is a 84 y.o. Non- / non  male who presents with Numbness   and is admitted to the hospital for the management of Stroke-like symptoms.  Medical history significant for HTN, HLD, atrial fibrillation on Eliquis, CAD s/p CABG (2004), dementia and seizure disorder.     Patient was recently admitted 5/12 with TIA symptoms.  Patient became anxious with MRI brain requesting removal from machine and then left AMA.     Patient presented to ED after developing bilateral upper extremity numbness and weakness at 7:45 PM approximately 1 hour before arriving in ED.  On arrival to ED he reported symptoms improving with continued right upper arm numbness, was noted to have left facial droop per ED resident.  Normal speech, denies headache, denies vision changes.  Denies any fall or traumas.  Stroke alert called for NIH of 1.  CT head negative for acute intracranial abnormality. CTA head/neck no large vessel occlusion.  Not a candidate for thrombolytic therapy with being on Eliquis.  Symptoms completely resolved while in ED.  Recommended admission  MD  6/30/2025  9:10 AM    Copy sent to Dewey Marin MD    Please note that this chart was generated using voice recognition Dragon dictation software.  Although every effort was made to ensure the accuracy of this automated transcription, some errors in transcription may have occurred.

## 2025-06-30 NOTE — ED NOTES
Report given to Deanna Ferrari RN from ED.   Report method in person   The following was reviewed with receiving RN:   Current vital signs:  BP (!) 182/99   Pulse 73   Temp 98.1 °F (36.7 °C) (Oral)   Resp 14   Ht 1.676 m (5' 6\")   Wt 81.6 kg (180 lb)   SpO2 95%   BMI 29.05 kg/m²                MEWS Score: 1     Any medication or safety alerts were reviewed. Any pending diagnostics and notifications were also reviewed, as well as any safety concerns or issues, abnormal labs, abnormal imaging, and abnormal assessment findings. Questions were answered.

## 2025-06-30 NOTE — ED TRIAGE NOTES
Mode of arrival (squad #, walk in, police, etc) : walk in         Chief complaint(s): numbess        Arrival Note (brief scenario, treatment PTA, etc).: onset 1.5 hrs, numbness from waist up        C= \"Have you ever felt that you should Cut down on your drinking?\"  No  A= \"Have people Annoyed you by criticizing your drinking?\"  No  G= \"Have you ever felt bad or Guilty about your drinking?\"  No  E= \"Have you ever had a drink as an Eye-opener first thing in the morning to steady your nerves or to help a hangover?\"  No      Deferred []      Reason for deferring: N/A    *If yes to two or more: probable alcohol abuse.*

## 2025-06-30 NOTE — ACP (ADVANCE CARE PLANNING)
Advance Care Planning     Advance Care Planning Activator (Inpatient)  Conversation Note      Date of ACP Conversation: 6/30/2025     Conversation Conducted with: Patient with Decision Making Capacity    ACP Activator: Isabel Lamb RN    Health Care Decision Maker:     Current Designated Health Care Decision Maker:     Primary Decision Maker: Maru Mendez - Child - [phone]    Primary Decision Maker: Ryanne Russell - Child - [phone]    Primary Decision Maker: BambiOj - Child - [phone]  Click here to complete Healthcare Decision Makers including section of the Healthcare Decision Maker Relationship (ie \"Primary\")  Today we documented Decision Maker(s) consistent with Legal Next of Kin hierarchy.    Care Preferences    Ventilation:  \"If you were in your present state of health and suddenly became very ill and were unable to breathe on your own, what would your preference be about the use of a ventilator (breathing machine) if it were available to you?\"      Would the patient desire the use of ventilator (breathing machine)?: yes    \"If your health worsens and it becomes clear that your chance of recovery is unlikely, what would your preference be about the use of a ventilator (breathing machine) if it were available to you?\"     Would the patient desire the use of ventilator (breathing machine)?: Unsure      Resuscitation  \"CPR works best to restart the heart when there is a sudden event, like a heart attack, in someone who is otherwise healthy. Unfortunately, CPR does not typically restart the heart for people who have serious health conditions or who are very sick.\"    \"In the event your heart stopped as a result of an underlying serious health condition, would you want attempts to be made to restart your heart (answer \"yes\" for attempt to resuscitate) or would you prefer a natural death (answer \"no\" for do not attempt to resuscitate)?\" yes       [] Yes   [] No   Educated Patient / Decision Maker  regarding differences between Advance Directives and portable DNR orders.    Length of ACP Conversation in minutes:      Conversation Outcomes:  ACP discussion completed    Follow-up plan:    [] Schedule follow-up conversation to continue planning  [x] Referred individual to Provider for additional questions/concerns   [] Advised patient/agent/surrogate to review completed ACP document and update if needed with changes in condition, patient preferences or care setting    [] This note routed to one or more involved healthcare providers

## 2025-06-30 NOTE — PLAN OF CARE
Problem: Discharge Planning  Goal: Discharge to home or other facility with appropriate resources  Outcome: Progressing  Flowsheets (Taken 6/30/2025 0455)  Discharge to home or other facility with appropriate resources:   Identify barriers to discharge with patient and caregiver   Arrange for needed discharge resources and transportation as appropriate   Identify discharge learning needs (meds, wound care, etc)   Refer to discharge planning if patient needs post-hospital services based on physician order or complex needs related to functional status, cognitive ability or social support system     Problem: ABCDS Injury Assessment  Goal: Absence of physical injury  Outcome: Progressing  Flowsheets (Taken 6/30/2025 0455)  Absence of Physical Injury: Implement safety measures based on patient assessment     Problem: Neurosensory - Adult  Goal: Achieves stable or improved neurological status  Outcome: Progressing  Flowsheets (Taken 6/30/2025 0455)  Achieves stable or improved neurological status: Assess for and report changes in neurological status     Problem: Neurosensory - Adult  Goal: Achieves maximal functionality and self care  Outcome: Progressing  Flowsheets (Taken 6/30/2025 0455)  Achieves maximal functionality and self care:   Monitor swallowing and airway patency with patient fatigue and changes in neurological status   Encourage and assist patient to increase activity and self care with guidance from physical therapy/occupational therapy     Problem: Skin/Tissue Integrity - Adult  Goal: Skin integrity remains intact  Outcome: Progressing  Flowsheets (Taken 6/30/2025 0455)  Skin Integrity Remains Intact: Monitor for areas of redness and/or skin breakdown

## 2025-06-30 NOTE — ED NOTES
Report given to LEATHA Ambrose from Brooke Ferrari RN.   Report method by phone   The following was reviewed with receiving RN:   Current vital signs:  BP (!) 145/88   Pulse 77   Temp 98.1 °F (36.7 °C) (Oral)   Resp 14   Ht 1.676 m (5' 6\")   Wt 81.6 kg (180 lb)   SpO2 95%   BMI 29.05 kg/m²                MEWS Score: 1     Any medication or safety alerts were reviewed. Any pending diagnostics and notifications were also reviewed, as well as any safety concerns or issues, abnormal labs, abnormal imaging, and abnormal assessment findings. Questions were answered.

## 2025-06-30 NOTE — CARE COORDINATION
DISCHARGE PLANNING NOTE:      Patient and/or primary caregiver participated in post-hospital care planning and their ability to address care needs was assessed. Yes    Family/caregiver is willing and able to care for patient at home.  Yes    Family/caregiver educated on resources available including durable medical equipment and respite care. Yes      Electronically signed by Isabel Lamb RN on 6/30/2025 at 2:28 PM

## 2025-06-30 NOTE — CONSULTS
[facility] Neurology   IN-PATIENT SERVICE      NEUROLOGY CONSULT  NOTE            Date:   6/30/2025  Patient name:  Nile Lacy  Date of admission:  6/29/2025  YOB: 1940      Chief Complaint:     Chief Complaint   Patient presents with    Numbness       Reason for Consult:      Stroke alert    History of Present Illness:     84-year-old male with past medical history of dementia, hypertension, hyperlipidemia, CAD status post CABG, A-fib on Eliquis, who presented with episode concerning for stroke/TIA.  Neurology consulted for further evaluation.  History limited given patient's baseline cognitive impairment.  Obtained primarily from nursing staff, chart review, and daughter over phone.    He presented to the ED yesterday after developing bilateral upper extremity numbness and weakness.  Reportedly, his symptoms were improving by time of arrival in the ED.  Stroke alert was called.  NIH 1.  CT brain and CTA head and neck with no acute findings.  He was admitted for further evaluation.  Since admission, he states that he overall feels better.  Continues to complain of some numbness in his arms and hands bilaterally.  No weakness.  Denies headache, neck pain, any focality to his symptoms, visual changes, dysarthria, aphasia.     Of note, he was seen by our team last month for similar symptoms.  Limited MRI brain with no acute findings.  He subsequently developed confusion, disorientation with concern for possible delirium.  Psychiatry was consulted.  There was concern for worsening paranoia in setting of dementia.  BHI was recommended although appears that his daughter later picked him up from the hospital and he left AMA.    Daughter states that patient remains relatively independent at baseline.  He lives by himself, she checks on him frequently.  She ensures that he has been taking his medications, has a pillbox which she manages.  He continues to drive, has not gotten lost.  He is working with

## 2025-06-30 NOTE — PROGRESS NOTES
Patient was admitted to PCU. Patient was placed in fall precautions, was assessed, 4 eyes was completed, admission questions were answered, and NIH was done.

## 2025-06-30 NOTE — PROGRESS NOTES
[facility]/Department[place] [facility]  Initial Speech/Language/Cognitive Assessment    NAME: Nile Lacy  : 1940   MRN: [id number]  ADMISSION DATE: 2025  ADMITTING DIAGNOSIS: has PAF (paroxysmal atrial fibrillation) ([facility]); Chest pain; Rectal bleeding; Lower GI bleed; Morbid obesity ([facility]); S/P colonoscopy with polypectomy; Elevated INR; Lung nodule seen on imaging study; Coronary artery disease involving coronary bypass graft of native heart without angina pectoris; Major neurocognitive disorder ([facility]); Hyperlipidemia; Essential hypertension; ASCVD (arteriosclerotic cardiovascular disease); Bradycardia; Atrial fibrillation with RVR (HCC); Late onset Alzheimer's dementia without behavioral disturbance ([facility]); Colitis; Complicated UTI (urinary tract infection); Acute cystitis with hematuria; E coli bacteremia; Allergy to penicillin; Delirium due to another medical condition; TIA (transient ischemic attack); Right arm numbness; 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    Date of Eval: 2025   Evaluating Therapist: TRAVIS Pressley    RECENT RESULTS  CT OF HEAD/MRI: - CT head- nothing acute.   MRI brain to be complete.     Primary Complaint: Per IM H&P: Nile Lacy is a 84 y.o. Non- / non  male who presents with Numbness   and is admitted to the hospital for the management of Stroke-like symptoms.  Medical history significant for HTN, HLD, atrial fibrillation on Eliquis, CAD s/p CABG (), dementia and seizure disorder.     Patient was recently admitted  with TIA symptoms.  Patient became anxious with MRI brain requesting removal from machine and then left AMA.     Patient presented to ED after developing bilateral upper extremity numbness and weakness at 7:45 PM approximately 1 hour before arriving in ED.  On arrival to ED he reported symptoms improving with continued right upper arm numbness, was noted to have left facial droop  dementia        Vision  Vision: Within Functional Limits  Hearing  Hearing: Within functional limits           Objective:  Oral Motor   Labial: No impairment  Dentition: Limited  Lingual: No impairment    Motor Speech  Apraxic Characteristics: None  Dysarthric Characteristics: None  Intelligibility: No impairment  Overall Impairment Severity: None    Auditory Comprehension  Comprehension: Within Functional Limits         Expression  Primary Mode of Expression: Verbal    Verbal Expression  Verbal Expression: Within functional limits                   Cognition:      Orientation  Overall Orientation Status: Within Normal Limits  Attention  Attention: Within Functional Limits  Memory  Memory: Exceptions to WFL  Short-term Memory: Moderate  Working Memory: Moderate  Problem Solving  Problem Solving: Exceptions to WFL  Simple Functional Tasks: Mild  Abstract Reasoning  Abstract Reasoning: Within Functional Limits  Safety/Judgment  Safety/Judgment: Exceptions to WFL  Flexibility of Thought: Mild      Education:  Patient Education Response: Verbalizes understanding          Therapy Time:   Individual Concurrent Group Co-treatment   Time In 0847         Time Out 0900         Minutes 13                 Electronically signed by Maricel Swanson M.A.CCC/SLP    on 6/30/2025 at 9:20 AM

## 2025-06-30 NOTE — PROGRESS NOTES
Spiritual Health History and Assessment/Progress Note  [facility]    (P) Loneliness/Social Isolation,  ,  ,      Name: Nile Lacy MRN: [id number]    Age: 84 y.o.     Sex: male   Language: English   Baptist: Other   Stroke-like symptoms     Date: 6/30/2025            Total Time Calculated: (P) 9 min              Patient was getting ready to go and have a MRI. Patient's nurse mentioned he was anxious for the procedure and  came in to talk as he was getting ready and to have prayer over the patient. Patient was very thankful for the prayer and  and patient discussed his life currently before patient had to depart for MRI.     Spiritual Assessment began in [facility] PROGRESSIVE CARE        Referral/Consult From: (P) Nurse   Encounter Overview/Reason: (P) Loneliness/Social Isolation  Service Provided For: (P) Patient    Teresa, Belief, Meaning:   Patient identifies as spiritual and is connected with a teresa tradition or spiritual practice  Family/Friends No family/friends present      Importance and Influence:  Patient has no beliefs influential to healthcare decision-making identified during this visit  Family/Friends No family/friends present    Community:  Patient feels well-supported. Support system includes: Children and Extended family  Family/Friends No family/friends present    Assessment and Plan of Care:     Patient Interventions include: Facilitated expression of thoughts and feelings and Explored spiritual coping/struggle/distress  Family/Friends Interventions include: No family/friends present    Patient Plan of Care: Spiritual Care available upon further referral  Family/Friends Plan of Care: No family/friends present    Electronically signed by Chaplain Millie on 6/30/2025 at 1:10 PM

## 2025-06-30 NOTE — FLOWSHEET NOTE
06/30/25 1944   Treatment Team Notification   Reason for Communication Evaluate;Abnormal vitals   Name of Team Member Notified Bernarda Perez   Treatment Team Role Consulting Provider   Method of Communication Secure Message   Response No new orders   Notification Time 1947       NP messaged regarding BP meds tonight /90 running a fib on monitor. Per NP okay to give metoprolol and Cardura tonight.

## 2025-06-30 NOTE — CARE COORDINATION
Case Management Assessment  Initial Evaluation    Date/Time of Evaluation: 6/30/2025 11:00AM  Assessment Completed by: Isabel Lamb RN    If patient is discharged prior to next notation, then this note serves as note for discharge by case management.    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 Yes    Chart Reviewed: Yes      History Provided by: Patient, Medical Record  Patient Orientation: Alert and Oriented    Patient Cognition: Alert    Hospitalization in the last 30 days (Readmission):  No    If yes, Readmission Assessment in CM Navigator will be completed.    Advance Directives:      Code Status: Full Code   Patient's Primary Decision Maker is: Legal Next of Kin    Primary Decision Maker: Maru Mendez - Child - [phone]    Primary Decision Maker: Ryanne Russell - Child - [phone]    Primary Decision Maker: Oj Lacy - Child - [phone]    Discharge Planning:    Patient lives with: Alone Type of Home: House  Primary Care Giver: Self  Patient Support Systems include: Family Members, Children   Current Financial resources: Medicare  Current community resources: None  Current services prior to admission: Durable Medical Equipment            Current DME: Cane            Type of Home Care services:  None    ADLS  Prior functional level: Assistance with the following:, Mobility  Current functional level: Assistance with the following:, Mobility    PT AM-PAC: 20 /24  OT AM-PAC: 19 /24    Family can provide assistance at DC: Yes  Would you like Case Management to discuss the discharge plan with any other family members/significant others,  and if so, who? No  Plans to Return to Present Housing: Yes  Other Identified Issues/Barriers to RETURNING to current housing: none  Potential Assistance needed at discharge: Home Care            Potential DME:    Patient expects to discharge to: House  Plan for transportation at discharge: Self    Financial    Payor: [employer] / Plan: [employer] / Product Type: *No Product type* /     Does insurance require precert for SNF: Yes    Potential assistance Purchasing Medications: No  Meds-to-Beds request:        [facility] - [place], [place] - [address] - P [phone] - F [phone]  [address]  [place] [address]  Phone: [phone] Fax: [phone]      Notes:    Factors facilitating achievement of predicted outcomes: Family support, Cooperative, and Pleasant    Barriers to discharge: Limited insight into deficits    Additional Case Management Notes: 6/30 [employer]. Pt from 1 story house (1 step) alone. Independent & Drives. DME Cane. VNS none/declines. Denies needs. Neuro Consult. MRI brain ordered. NIH 1. Stroke note done. PT/OT on. 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 Freedom of choice list with basic dialogue that supports the patient's individualized plan of care/goals and shares the quality data associated with the providers was provided to: Patient   Patient Representative Name:       The Patient and/or Patient Representative Agree with the Discharge Plan? Yes    Isabel Lamb RN  Case Management Department  Ph: [phone] Fax: [phone]

## 2025-06-30 NOTE — FLOWSHEET NOTE
06/30/25 1927   Treatment Team Notification   Reason for Communication Evaluate;Abnormal vitals   Name of Team Member Notified Dr. Robertson   Treatment Team Role Consulting Provider   Method of Communication Secure Message   Response Waiting for response   Notification Time 1927       HI Dr. Robertson, this patient came in last night with stroke like symptoms, and had an MRI brain done today that showed no acute infarct and mild diffuse cerebral volume loss and mild chronic small vessel ischemic changes. His BP had been within parameters for the permissive hypertension, but his last two BP's were 123/85 at 1645 and 109/91 at 1915 tonight. Just wanted to see if you want me to do anything to help increase his BP to get him back within range. Thanks so much .     Message above sent to Dr. Robertson neuro     0717: per neuro Dr. Robertson no need for permissive hypertension. No need to artificially increase BP if he is asymptomatic.

## 2025-06-30 NOTE — PROGRESS NOTES
[facility] Internal Medicine  Aftab Khna MD; Parker Thomason MD; Arthur Chavira MD;  Vy Ace MD; Olimpia Lucia MD  [facility] Internal Medicine   IN-PATIENT SERVICE  [facility]                 Date:   6/30/2025  Patientname:  Nile Lacy  Date of admission:  6/29/2025  8:40 PM  MRN:   [id number]  Account:  [id number]  YOB: 1940  PCP:    Dewey Vera MD  Room:   2120/2120-01  Code Status:    Full Code      Chief Complaint:     Chief Complaint   Patient presents with    Numbness       History of Present Illness:     Nile Lacy is a 84 y.o. Non- / non  male who presents with Numbness   and is admitted to the hospital for the management of Stroke-like symptoms.  Medical history significant for HTN, HLD, atrial fibrillation on Eliquis, CAD s/p CABG (2004), dementia and seizure disorder.    Patient was recently admitted 5/12 with TIA symptoms.  Patient became anxious with MRI brain requesting removal from machine and then left AMA.    Patient presented to ED after developing bilateral upper extremity numbness and weakness at 7:45 PM approximately 1 hour before arriving in ED.  On arrival to ED he reported symptoms improving with continued right upper arm numbness, was noted to have left facial droop per ED resident.  Normal speech, denies headache, denies vision changes.  Denies any fall or traumas.  Stroke alert called for NIH of 1.  CT head negative for acute intracranial abnormality. CTA head/neck no large vessel occlusion.  Not a candidate for thrombolytic therapy with being on Eliquis.  Symptoms completely resolved while in ED.  Recommended admission for MRI in the morning.  Patient requesting antianxiety medication prior to MRI.  CXR no acute abnormality.  EKG atrial fibrillation, no ST or T wave changes.  Troponin 12 repeat 13.  Echo on 5/14/2025 EF 55%. Denies chest pain. Denies fever, chills, cough, abdominal pain, nausea,  4.21 - 5.77 m/uL    Hemoglobin 14.3 13.5 - 17.5 g/dL    Hematocrit 40.2 (L) 41.0 - 53.0 %    MCV 91.0 80.0 - 100.0 fL    MCH 32.4 26.0 - 34.0 pg    MCHC 35.6 31.0 - 37.0 g/dL    RDW 12.9 11.5 - 14.9 %    Platelets See Reflexed IPF Result 150 - 450 k/uL    MPV 8.9 8.0 - 13.5 fL    Platelet, Fluorescence 148 (L) 150 - 450 k/uL    Platelet, Immature Fraction 1.0 (L) 1.1 - 10.3 %    NRBC Automated 0.0 0 per 100 WBC    Neutrophils % 56 36 - 66 %    Lymphocytes % 28 24 - 44 %    Monocytes % 11 3 - 12 %    Eosinophils % 4 0 - 4 %    Basophils % 1 0 - 2 %    Immature Granulocytes % 0 0 %    Neutrophils Absolute 3.64 1.50 - 8.10 k/uL    Lymphocytes Absolute 1.86 1.10 - 3.70 k/uL    Monocytes Absolute 0.71 0.10 - 1.20 k/uL    Eosinophils Absolute 0.26 0.00 - 0.44 k/uL    Basophils Absolute 0.05 0.00 - 0.20 k/uL    Immature Granulocytes Absolute <0.03 0.00 - 0.30 k/uL   Comprehensive Metabolic Panel w/ Reflex to MG    Collection Time: 06/29/25  8:31 PM   Result Value Ref Range    Sodium 143 136 - 145 mmol/L    Potassium 4.2 3.7 - 5.3 mmol/L    Chloride 111 (H) 98 - 107 mmol/L    CO2 21 20 - 31 mmol/L    Anion Gap 11 9 - 16 mmol/L    Glucose 112 (H) 74 - 99 mg/dL    BUN 13 8 - 23 mg/dL    Creatinine 1.0 0.7 - 1.2 mg/dL    Est, Glom Filt Rate 75 >60 mL/min/1.73m2    Calcium 9.7 8.6 - 10.4 mg/dL    Total Protein 6.5 (L) 6.6 - 8.7 g/dL    Albumin 4.0 3.5 - 5.2 g/dL    Total Bilirubin 0.4 0.0 - 1.2 mg/dL    Alkaline Phosphatase 69 40 - 129 U/L    ALT 10 10 - 50 U/L    AST 17 10 - 50 U/L   Troponin    Collection Time: 06/29/25  8:31 PM   Result Value Ref Range    Troponin, High Sensitivity 12 0 - 22 ng/L   Protime-INR    Collection Time: 06/29/25  8:31 PM   Result Value Ref Range    Protime 15.7 (H) 11.8 - 14.6 sec    INR 1.1    APTT    Collection Time: 06/29/25  8:31 PM   Result Value Ref Range    APTT 30.4 24.0 - 36.0 sec   POCT Glucose    Collection Time: 06/29/25  9:22 PM   Result Value Ref Range    Glucose 87 mg/dL    QC OK?

## 2025-06-30 NOTE — PROGRESS NOTES
[facility]   Occupational Therapy Evaluation  Date: 25  Patient Name: Nile Lacy       Room: -  MRN: [id number]  Account: [id number]   : 1940  (84 y.o.) Gender: male     Discharge Recommendations:  Further Occupational Therapy is recommended upon facility discharge.    OT Equipment Recommendations  Other: CTA    Referring Practitioner: Mari Medina APRN - NP  Diagnosis: stroke like symptoms        Treatment Diagnosis: impaired self care status    Past Medical History:  has a past medical history of Anxiety attack, Arthritis, Atrial fibrillation (HCC), Atrial flutter (HCC), Back pain, CAD (coronary artery disease), Chest pain, Colon polyps, Constipation, Dementia (HCC), Dizziness, GERD (gastroesophageal reflux disease), Hyperlipidemia, Hypertension, Lower GI bleed, Lung nodule seen on imaging study, Prediabetes, Rectal bleeding, S/P colonoscopy with polypectomy, Seizure (HCC), Sleep apnea, SOB (shortness of breath), Status post coronary artery bypass graft, and Wears dentures.    Past Surgical History:   has a past surgical history that includes Coronary artery bypass graft (); Cataract removal (Bilateral); ventral hernia repair; Total knee arthroplasty (Bilateral); Colonoscopy (2018); Cardiac catheterization; and Colonoscopy (N/A, 2021).    Restrictions  Restrictions/Precautions  Restrictions/Precautions: Fall Risk, General Precautions  Activity Level: Up as Tolerated, Up with Assist  Required Braces or Orthoses?: No  Implants Present? : Metal implants (B TKA's)      Vitals  Vitals  O2 Device: None (Room air)     Subjective  Subjective: pt supine upon OT and PT arrival into room. Pt agreeable to participate in eval process.  Comments: LEATHA wheat'lavonne pt to be seen by therapy this am.  Pain  Pre-Pain: 0  Post-Pain: 0    Social/Functional History  Social/Functional History  Lives With: Alone  Type of Home: House  Home Layout: One level  Home  Function  LUE AROM (degrees)  LUE AROM : WFL  Left Hand AROM (degrees)  Left Hand AROM: WFL  Tone LUE  LUE Tone: Normotonic  LUE Strength  L Hand General: 4/5  LUE Strength Comment: grossly 4/5    RUE AROM (degrees)  RUE AROM : WFL  Right Hand AROM (degrees)  Right Hand AROM: WFL  Tone RUE  RUE Tone: Normotonic  RUE Strength  R Hand General: 4/5  RUE Strength Comment: grossly 4/5    Fine Motor Skills/Coordination  Coordination  Movements Are Fluid And Coordinated: Yes              Bed Mobility  Bed mobility  Supine to Sit: Modified independent  Sit to Supine: Modified independent  Scooting: Modified independent  Bed Mobility Comments: bed flat    Balance  Balance  Sitting Balance: Independent  Standing Balance: Stand by assistance  Standing Balance  Time: 3-4 minutes  Activity: functional transfers, func mobility.    Transfers  Transfers  Sit to stand: Supervision  Stand to sit: Supervision    Functional Mobility  Functional - Mobility Device: No device  Activity: Other (in room and in hallway.)  Assist Level: Stand by assistance  Functional Mobility Comments: SBA with No AD for func mobility in room and in hallway, however, pt felt insecure and less confident with anxiety, so pt also completed func mobility with a RW with SBA. no LOB noted. pt was provided with encouragement.          Assessment  Assessment  Performance deficits / Impairments: Decreased functional mobility , Decreased ADL status, Decreased strength, Decreased safe awareness, Decreased cognition, Decreased endurance, Decreased sensation, Decreased balance, Decreased high-level IADLs  Assessment: pt anxious and some confusion with recalling recent events, pt reports that he does notice a decline with his memory lately. pt's answers were inconsistent when explaining recent events and what recently brought him to the hospital. pt reported that his kids are concerned about his safety at home and do not think he should be living alone anymore. pt  expressed concerns that he needs help with remembering to take his meds and also has concerns that his children are trying to put him into a nursing home and take away his home.  Treatment Diagnosis: impaired self care status  Prognosis: Good  Decision Making: Medium Complexity  Discharge Recommendations: Continue to assess pending progress    Activity Tolerance  Activity Tolerance: Patient limited by fatigue    Safety Devices  Type of Devices: All fall risk precautions in place, Call light within reach, Chair alarm in place, Patient at risk for falls, Left in chair, Nurse notified, Gait belt    Patient Education  Patient Education  Education Given To: Patient  Education Provided: Role of Therapy, Plan of Care, Transfer Training, Energy Conservation, Fall Prevention Strategies, Mobility Training  Education Method: Verbal, Demonstration  Barriers to Learning: Cognition  Education Outcome: Verbalized understanding, Demonstrated understanding, Continued education needed    Functional Outcome Measures  AM-PAC Daily Activity - Inpatient   How much help is needed for putting on and taking off regular lower body clothing?: A Little  How much help is needed for bathing (which includes washing, rinsing, drying)?: A Little  How much help is needed for toileting (which includes using toilet, bedpan, or urinal)?: A Little  How much help is needed for putting on and taking off regular upper body clothing?: A Little  How much help is needed for taking care of personal grooming?: A Little  How much help for eating meals?: None  AM-[facility] Inpatient Daily Activity Raw Score: 19  AM-PAC Inpatient ADL T-Scale Score : 40.22  ADL Inpatient CMS 0-100% Score: 42.8  ADL Inpatient CMS G-Code Modifier : CK       Goals     Short Term Goals  Time Frame for Short Term Goals: By discharge  Short Term Goal 1: Pt will complete ADLs and light IADLS with Mod I, good safety, and use of AE/DME/Modified techniques as needed  Short Term Goal 2: Pt will

## 2025-06-30 NOTE — PROGRESS NOTES
Facility/Department: [facility]   CLINICAL BEDSIDE SWALLOW EVALUATION    NAME: Nile Lacy  : 1940  MRN: [id number]    ADMISSION DATE: 2025  ADMITTING DIAGNOSIS: has PAF (paroxysmal atrial fibrillation) ([facility]); Chest pain; Rectal bleeding; Lower GI bleed; Morbid obesity ([facility]); S/P colonoscopy with polypectomy; Elevated INR; Lung nodule seen on imaging study; Coronary artery disease involving coronary bypass graft of native heart without angina pectoris; Major neurocognitive disorder ([facility]); Hyperlipidemia; Essential hypertension; ASCVD (arteriosclerotic cardiovascular disease); Bradycardia; Atrial fibrillation with RVR (HCC); Late onset Alzheimer's dementia without behavioral disturbance ([facility]); Colitis; Complicated UTI (urinary tract infection); Acute cystitis with hematuria; E coli bacteremia; Allergy to penicillin; Delirium due to another medical condition; TIA (transient ischemic attack); Right arm numbness; 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    Recent Chest Xray/CT of Chest: ( Date CXR )nothing acute    Date of Eval: 2025  Evaluating Therapist: TRAVIS Pressley    Current Diet level:  Current Diet : Regular  Current Liquid Diet : Thin    Primary Complaint   Per IM H&P: Nile Lacy is a 84 y.o. Non- / non  male who presents with Numbness   and is admitted to the hospital for the management of Stroke-like symptoms.  Medical history significant for HTN, HLD, atrial fibrillation on Eliquis, CAD s/p CABG (), dementia and seizure disorder.     Patient was recently admitted  with TIA symptoms.  Patient became anxious with MRI brain requesting removal from machine and then left AMA.     Patient presented to ED after developing bilateral upper extremity numbness and weakness at 7:45 PM approximately 1 hour before arriving in ED.  On arrival to ED he reported symptoms improving with continued right upper arm  numbness, was noted to have left facial droop per ED resident.  Normal speech, denies headache, denies vision changes.  Denies any fall or traumas.  Stroke alert called for NIH of 1.  CT head negative for acute intracranial abnormality. CTA head/neck no large vessel occlusion.  Not a candidate for thrombolytic therapy with being on Eliquis.  Symptoms completely resolved while in ED.  Recommended admission for MRI in the morning.  Patient requesting antianxiety medication prior to MRI.  CXR no acute abnormality.  EKG atrial fibrillation, no ST or T wave changes.  Troponin 12 repeat 13.  Echo on 5/14/2025 EF 55%. Denies chest pain. Denies fever, chills, cough, abdominal pain, nausea, vomiting, diarrhea, and urinary symptoms.     Admit to progressive unit for strokelike symptoms  Neurology consult, MRI brain in AM patient to have 0.5 mg Ativan 30 minutes prior to MRI.  Check TSH, lipids, A1c, routine daily labs.  Atrial fibrillation continue home dose Eliquis.    Pain:  Pain Assessment  Pain Assessment: None - Denies Pain  Pain Level: 4  Pain Location: Chest  Pain Type: Acute pain    Reason for Referral  Nile Lacy was referred for a bedside swallow evaluation to assess the efficiency of his swallow function, identify signs and symptoms of aspiration and make recommendations regarding safe dietary consistencies, effective compensatory strategies, and safe eating environment.    Impression  Dysphagia Diagnosis: Swallow function appears WFL;No clinical indicators of dysphagia  Dysphagia Outcome Severity Scale: Level 6: Within functional limits/Modified independence     Treatment Plan  Requires SLP Intervention: No             Recommended Diet and Intervention  Diet Solids Recommendation: Regular  Liquid Consistency Recommendation: Thin             Compensatory Swallowing Strategies  Compensatory Swallowing Strategies : Upright as possible for all oral intake      General  Behavior/Cognition:

## 2025-06-30 NOTE — ED NOTES
ED to inpatient nurses report     Admitting Diagnosis: Right arm numbness/ facial paralysis on left side  Chief Complaint   Patient presents with    Numbness      Present to ED from home  LOC: alert and orientated to name, place, date  Patient confused: no  Vital signs   Vitals:    06/29/25 2116 06/29/25 2123 06/29/25 2154 06/29/25 2211   BP: (!) 182/99  (!) 143/97 (!) 145/88   Pulse:  73 76 77   Resp:  14 16 14   Temp:       TempSrc:       SpO2: 96% 95% 95% 95%   Weight:       Height:          Oxygen Baseline RA    Current needs required RA   SEPSIS: no  [no] Lactate X 2 ordered (Yes or No)  [no] Antibiotics given (Yes or No)  [no] IV Fluids ordered (Yes or No)             [no] 2nd IV completed (Yes or No)  [no] Hourly Vital Signs (Validated)  [no] Outstanding Orders:     LDAs:   Peripheral IV 06/29/25 Left;Posterior Forearm (Active)   Site Assessment Clean, dry & intact 06/29/25 2056   Line Status Blood return noted;Flushed;Normal saline locked 06/29/25 2056   Line Care Connections checked and tightened 06/29/25 2056   Phlebitis Assessment No symptoms 06/29/25 2056   Infiltration Assessment 0 06/29/25 2056   Dressing Status New dressing applied;Clean, dry & intact 06/29/25 2056   Dressing Type Transparent 06/29/25 2056   Dressing Intervention New 06/29/25 2056     Mobility: Independent  Fall Risk: [place] 1 Fall Risk  Presents to emergency department  because of falls (Syncope, seizure, or loss of consciousness): No (06/29/25 2039)  Age > 70: Yes (06/29/25 2039)  Altered Mental Status, Intoxication with alcohol or substance confusion (Disorientation, impaired judgment, poor safety awaremess, or inability to follow instructions): No (06/29/25 2039)  Impaired Mobility: Ambulates or transfers with assistive devices or assistance; Unable to ambulate or transer.: No (06/29/25 2039)  Nursing Judgement: No (06/29/25 2039)  Outstanding ED orders: none    Consults: IP CONSULT TO INTERNAL MEDICINE  [x]  Hospitalist

## 2025-06-30 NOTE — PROGRESS NOTES
Physical Therapy    [facility]   Physical Therapy Evaluation  Date: 25  Patient Name: Nile Lacy       Room: 2120/2120-01  MRN: [id number]  Account: [id number]   : 1940  (84 y.o.) Gender: male     Discharge Recommendations:  Discharge Recommendations: Therapy recommended at discharge     PT D/C Equipment  Equipment Needed:  (Isabel OWEN planner to call pt's daughter to find out if pt has a rolling walker at home.)     Past Medical History:  has a past medical history of Anxiety attack, Arthritis, Atrial fibrillation (HCC), Atrial flutter (HCC), Back pain, CAD (coronary artery disease), Chest pain, Colon polyps, Constipation, Dementia (HCC), Dizziness, GERD (gastroesophageal reflux disease), Hyperlipidemia, Hypertension, Lower GI bleed, Lung nodule seen on imaging study, Prediabetes, Rectal bleeding, S/P colonoscopy with polypectomy, Seizure (HCC), Sleep apnea, SOB (shortness of breath), Status post coronary artery bypass graft, and Wears dentures.  Past Surgical History:   has a past surgical history that includes Coronary artery bypass graft (); Cataract removal (Bilateral); ventral hernia repair; Total knee arthroplasty (Bilateral); Colonoscopy (2018); Cardiac catheterization; and Colonoscopy (N/A, 2021).    Subjective  Subjective  Subjective: LEATHA ANAYA for PT/OT     General  Patient assessed for rehabilitation services?: Yes  Additional Pertinent Hx: Nile Lacy is a 84 y.o. Non- / non  male who presents with Numbness   and is admitted to the hospital for the management of Stroke-like symptoms.  Medical history significant for HTN, HLD, atrial fibrillation on Eliquis, CAD s/p CABG (), dementia and seizure disorder.     Patient was recently admitted  with TIA symptoms.  Patient became anxious with MRI brain requesting removal from machine and then left AMA.     Patient presented to ED after developing bilateral upper extremity  step up(atleast 6\" ), SBA      Plan  Physical Therapy Plan  General Plan: 3-5 times per week  Specific Instructions for Next Treatment: Try ambulation with st cane  Current Treatment Recommendations: Strengthening, Balance training, Functional mobility training, Endurance training, Gait training, Stair training, Neuromuscular re-education, Safety education & training, Patient/Caregiver education & training, Equipment evaluation, education, & procurement, Therapeutic activities   Safety Devices  Type of Devices: All fall risk precautions in place, Call light within reach, Chair alarm in place, Patient at risk for falls, Left in chair, Nurse notified, Gait belt       PT Individual Minutes  Time In: 1013  Time Out: 1052  Minutes: 39   Time Code Minutes  Timed Code Treatment Minutes: 23 Minutes       Electronically signed by Isa Kasper PT on 6/30/25 at 1:09 PM EDT

## 2025-06-30 NOTE — ED PROVIDER NOTES
[facility] EMERGENCY DEPARTMENT  Emergency Department Encounter  Emergency Medicine Resident     Pt Name:Nile Layc  MRN: [id number]  Birthdate 1940  Date of evaluation: 6/29/25  PCP:  Dewey Vera MD  Note Started: 8:42 PM EDT      CHIEF COMPLAINT       Chief Complaint   Patient presents with    Numbness     HISTORY OF PRESENT ILLNESS  (Location/Symptom, Timing/Onset, Context/Setting, Quality, Duration, Modifying Factors, Severity.)      Nile Lacy is a 84 y.o. male who presents with bilateral lower numbness right more than left extending towards the legs started 1 hour ago, approximately at 7:45 PM.  Patient stated was sitting watching television when he started experiencing numbness to his right upper arm radiating across his chest towards his left side.  Patient stated was initially having weakness as well as numbness however is currently improving.  Patient stated has previous history of MI/CAD however is not complaining of any chest pain at this time.  Patient is currently on aspirin and Eliquis for atrial fibrillation.   Patient not complaining of any slurred speech no shortness of breath no fever no trauma.  Patient is able to ambulate at this time.  Patient is denying any dizziness any blurry vision or headache nausea vomiting.  Patient has multiple comorbidities including hyperlipidemia, hypertension, CAD, atrial fibrillation.  With a previous history of CABG in 2004 and cardiac catheterization in 2021    PAST MEDICAL / SURGICAL / SOCIAL / FAMILY HISTORY      has a past medical history of Anxiety attack, Arthritis, Atrial fibrillation (HCC), Atrial flutter (HCC), Back pain, CAD (coronary artery disease), Chest pain, Colon polyps, Constipation, Dementia (HCC), Dizziness, GERD (gastroesophageal reflux disease), Hyperlipidemia, Hypertension, Lower GI bleed, Lung nodule seen on imaging study, Prediabetes, Rectal bleeding, S/P colonoscopy with polypectomy, Seizure (HCC), Sleep

## 2025-07-01 ENCOUNTER — APPOINTMENT (OUTPATIENT)
Dept: MRI IMAGING | Age: 85
DRG: 074 | End: 2025-07-01
Payer: COMMERCIAL

## 2025-07-01 VITALS
TEMPERATURE: 97.9 F | WEIGHT: 180 LBS | BODY MASS INDEX: 28.93 KG/M2 | DIASTOLIC BLOOD PRESSURE: 88 MMHG | HEIGHT: 66 IN | OXYGEN SATURATION: 95 % | SYSTOLIC BLOOD PRESSURE: 101 MMHG | HEART RATE: 81 BPM | RESPIRATION RATE: 18 BRPM

## 2025-07-01 PROBLEM — E53.8 VITAMIN B12 DEFICIENCY: Status: ACTIVE | Noted: 2025-07-01

## 2025-07-01 PROBLEM — R20.2 BILATERAL NUMBNESS AND TINGLING OF ARMS AND LEGS: Status: ACTIVE | Noted: 2025-05-14

## 2025-07-01 PROBLEM — M48.02 CERVICAL SPINAL STENOSIS: Status: ACTIVE | Noted: 2025-07-01

## 2025-07-01 PROCEDURE — 72141 MRI NECK SPINE W/O DYE: CPT

## 2025-07-01 PROCEDURE — 2500000003 HC RX 250 WO HCPCS: Performed by: NURSE PRACTITIONER

## 2025-07-01 PROCEDURE — 97116 GAIT TRAINING THERAPY: CPT

## 2025-07-01 PROCEDURE — 6370000000 HC RX 637 (ALT 250 FOR IP): Performed by: NURSE PRACTITIONER

## 2025-07-01 PROCEDURE — 6360000002 HC RX W HCPCS: Performed by: PSYCHIATRY & NEUROLOGY

## 2025-07-01 PROCEDURE — 95819 EEG AWAKE AND ASLEEP: CPT

## 2025-07-01 PROCEDURE — 99232 SBSQ HOSP IP/OBS MODERATE 35: CPT | Performed by: PSYCHIATRY & NEUROLOGY

## 2025-07-01 PROCEDURE — 95819 EEG AWAKE AND ASLEEP: CPT | Performed by: PSYCHIATRY & NEUROLOGY

## 2025-07-01 PROCEDURE — 36415 COLL VENOUS BLD VENIPUNCTURE: CPT

## 2025-07-01 PROCEDURE — 99239 HOSP IP/OBS DSCHRG MGMT >30: CPT

## 2025-07-01 PROCEDURE — 83921 ORGANIC ACID SINGLE QUANT: CPT

## 2025-07-01 PROCEDURE — 97530 THERAPEUTIC ACTIVITIES: CPT

## 2025-07-01 PROCEDURE — 6370000000 HC RX 637 (ALT 250 FOR IP): Performed by: PSYCHIATRY & NEUROLOGY

## 2025-07-01 RX ORDER — CYANOCOBALAMIN 1000 UG/ML
1000 INJECTION, SOLUTION INTRAMUSCULAR; SUBCUTANEOUS ONCE
Status: COMPLETED | OUTPATIENT
Start: 2025-07-01 | End: 2025-07-01

## 2025-07-01 RX ADMIN — LORAZEPAM 0.5 MG: 0.5 TABLET ORAL at 16:54

## 2025-07-01 RX ADMIN — ISOSORBIDE MONONITRATE 60 MG: 60 TABLET, EXTENDED RELEASE ORAL at 07:54

## 2025-07-01 RX ADMIN — PANTOPRAZOLE SODIUM 40 MG: 40 TABLET, DELAYED RELEASE ORAL at 05:53

## 2025-07-01 RX ADMIN — DILTIAZEM HYDROCHLORIDE 120 MG: 120 CAPSULE, COATED, EXTENDED RELEASE ORAL at 07:53

## 2025-07-01 RX ADMIN — ASPIRIN 81 MG: 81 TABLET, CHEWABLE ORAL at 07:53

## 2025-07-01 RX ADMIN — SODIUM CHLORIDE, PRESERVATIVE FREE 10 ML: 5 INJECTION INTRAVENOUS at 07:54

## 2025-07-01 RX ADMIN — APIXABAN 5 MG: 5 TABLET, FILM COATED ORAL at 07:53

## 2025-07-01 RX ADMIN — METOPROLOL TARTRATE 50 MG: 50 TABLET, FILM COATED ORAL at 07:53

## 2025-07-01 RX ADMIN — CYANOCOBALAMIN TAB 1000 MCG 1000 MCG: 1000 TAB at 07:53

## 2025-07-01 RX ADMIN — CYANOCOBALAMIN 1000 MCG: 1000 INJECTION, SOLUTION INTRAMUSCULAR; SUBCUTANEOUS at 10:30

## 2025-07-01 NOTE — CARE COORDINATION
Case Management   Daily Progress Note       Patient Name: Nile Lacy                   YOB: 1940  Diagnosis: Right arm numbness [R20.0]  Stroke-like symptoms [R29.90]  Facial paralysis on left side [G51.0]                       GMLOS: 2.8 days  Length of Stay: 2  days    Readmission Risk (Low < 19, Mod (19-27), High > 27): Readmission Risk Score: 14.8      Patient is alert and oriented.    Spoke with Patient, and Current Transitional Plan is:    [x] Home Independently    [] Home with HC    [] Skilled Nursing Facility    [] Acute Rehabilitation    [] Long Term Acute Care (LTAC)    [] Other:     Medical Management:  MRI brain negative for new CVA, Neurology consult,     Testing Ordered:     Additional Notes:     Electronically signed by Junie Cervantes on 7/1/2025 at 1:29 PM

## 2025-07-01 NOTE — PROGRESS NOTES
[facility] Internal Medicine   Aftab Khan MD; MD Vy Andersen MD, MD , Radha Lucia MD    [facility] Internal Medicine   IN-PATIENT SERVICE   [facility]    Progress Note            Date:   7/1/2025  Patient name:  Nile Lacy  Date of admission:  6/29/2025  8:40 PM  MRN:   [id number]  Account:  [id number]  YOB: 1940  PCP:    Dewey Vera MD  Room:   2120/2120-01  Code Status:    Full Code    Chief Complaint:     Chief Complaint   Patient presents with    Numbness       History Obtained From:     patient, electronic medical record    History of Present Illness:     Nile Lacy is a 84 y.o. Non- / non  male who presents with Numbness   and is admitted to the hospital for the management of Stroke-like symptoms.    Nile Lacy is a 84 y.o. Non- / non  male who presents with Numbness   and is admitted to the hospital for the management of Stroke-like symptoms.  Medical history significant for HTN, HLD, atrial fibrillation on Eliquis, CAD s/p CABG (2004), dementia and seizure disorder.     Patient was recently admitted 5/12 with TIA symptoms.  Patient became anxious with MRI brain requesting removal from machine and then left AMA.     Patient presented to ED after developing bilateral upper extremity numbness and weakness at 7:45 PM approximately 1 hour before arriving in ED.  On arrival to ED he reported symptoms improving with continued right upper arm numbness, was noted to have left facial droop per ED resident.  Normal speech, denies headache, denies vision changes.  Denies any fall or traumas.  Stroke alert called for NIH of 1.  CT head negative for acute intracranial abnormality. CTA head/neck no large vessel occlusion.  Not a candidate for thrombolytic therapy with being on Eliquis.  Symptoms completely resolved while in ED.  Recommended admission for MRI in the  ear, no discharge, hearing intact  Nose: no drainage noted  Mouth: mucous membranes moist  Neck: supple, no carotid bruits, thyroid not palpable  Lungs: Bilateral equal air entry, clear to ausculation, no wheezing, rales or rhonchi, normal effort  Cardiovascular: normal rate, regular rhythm, no murmur, gallop, rub  Abdomen: Soft, nontender, nondistended, normal bowel sounds, no hepatomegaly or splenomegaly  Neurologic: There are no new focal motor or sensory deficits, normal muscle tone and bulk, no abnormal sensation, normal speech, cranial nerves II through XII grossly intact  Skin: No gross lesions, rashes, bruising or bleeding on exposed skin area  Extremities: peripheral pulses palpable, no pedal edema or calf pain with palpation  Psych: normal affect    Investigations:      Laboratory Testing:  Recent Results (from the past 24 hours)   Vitamin B12 & Folate    Collection Time: 06/30/25  7:45 PM   Result Value Ref Range    Vitamin B-12 245 232 - 1245 pg/mL    Folate 16.2 4.8 - 24.2 ng/mL       Imaging/Diagnostics:  XR CHEST PORTABLE  Result Date: 6/29/2025  1. No acute process.     CTA HEAD NECK W CONTRAST  Result Date: 6/29/2025  No large vessel occlusion in the head or neck.     CT HEAD WO CONTRAST  Result Date: 6/29/2025  No acute intracranial abnormality.       Assessment :      Hospital Problems           Last Modified POA    * (Principal) Stroke-like symptoms 6/29/2025 Yes    Dementia (HCC) 7/1/2025 Yes    Bilateral numbness and tingling of arms and legs 7/1/2025 Unknown    Facial paralysis on left side 6/29/2025 Yes       Plan:     Patient status inpatient in the Progressive Unit/Step down    84-year-old male presented to the emergency department after he developed bilateral upper extremity numbness and weakness 1 hour prior to arrival to the ED.  Stroke alert was called with an NIH of 1, telestroke attending recommended admission for further neurological workup.  Strokelike symptoms with bilateral upper

## 2025-07-01 NOTE — PROGRESS NOTES
Dr Robertson reviewed the MRI and is okay with the patient discharging tonight. Bernarda RODRIGUEZ notified. Family is present and willing to take the patient home.

## 2025-07-01 NOTE — PROGRESS NOTES
Physical Therapy  [facility]   Physical Therapy Treatment  Date: 25  Patient Name: Nile Lacy       Room: 2120/2120-01  MRN: [id number]  Account: [id number]   : 1940  (84 y.o.) Gender: male     Discharge Recommendations:  Discharge Recommendations: Therapy recommended at discharge     PT D/C Equipment  Equipment Needed:  (Isabel OWEN planner to call pt's daughter to find out if pt has a rolling walker at home.)    General  Patient assessed for rehabilitation services?: Yes  Additional Pertinent Hx: Nile Lacy is a 84 y.o. Non- / non  male who presents with Numbness   and is admitted to the hospital for the management of Stroke-like symptoms.  Medical history significant for HTN, HLD, atrial fibrillation on Eliquis, CAD s/p CABG (), dementia and seizure disorder.     Patient was recently admitted  with TIA symptoms.  Patient became anxious with MRI brain requesting removal from machine and then left AMA.     Patient presented to ED after developing bilateral upper extremity numbness and weakness at 7:45 PM approximately 1 hour before arriving in ED.  On arrival to ED he reported symptoms improving with continued right upper arm numbness, was noted to have left facial droop per ED resident.  Normal speech, denies headache, denies vision changes.  Denies any fall or traumas.  Stroke alert called for NIH of 1.  CT head negative for acute intracranial abnormality. CTA head/neck no large vessel occlusion.  Not a candidate for thrombolytic therapy with being on Eliquis.  Symptoms completely resolved while in ED.  Recommended admission for MRI in the morning.  Patient requesting antianxiety medication prior to MRI.  CXR no acute abnormality.  EKG atrial fibrillation, no ST or T wave changes.  Troponin 12 repeat 13.  Echo on 2025 EF 55%. Denies chest pain. Denies fever, chills, cough, abdominal pain, nausea, vomiting, diarrhea, and urinary

## 2025-07-01 NOTE — PLAN OF CARE
Problem: Discharge Planning  Goal: Discharge to home or other facility with appropriate resources  7/1/2025 1657 by Jose James RN  Outcome: Progressing     Problem: Neurosensory - Adult  Goal: Achieves stable or improved neurological status  7/1/2025 1657 by Jose James RN  Outcome: Progressing     Problem: Neurosensory - Adult  Goal: Achieves maximal functionality and self care  7/1/2025 1657 by Jose James RN  Outcome: Progressing     Problem: Skin/Tissue Integrity - Adult  Goal: Skin integrity remains intact  7/1/2025 1657 by Jose James RN  Outcome: Progressing

## 2025-07-01 NOTE — DISCHARGE INSTR - DIET

## 2025-07-01 NOTE — PROGRESS NOTES
Speech Language Pathology  [facility] PROGRESSIVE CARE    Date: 7/1/2025  Patient Name: Nile Lacy  YOB: 1940   AGE: 84 y.o.  MRN: [id number]        Patient Not Available for Speech Therapy     Due to:  [] Testing  [] Hemodialysis  [] Cancelled by RN  [] Surgery   [] Intubation/Sedation/Pain Medication  [] Medical instability  [] Refusal  [x] Other:    MRI brain  negative for new CVA.    Per neurology H&P pt. C premorbid cognitive issues d/t dementia.    Further ST for cognition is not recommended.        Maricel Swanson M.A.SISI/SLP

## 2025-07-01 NOTE — PLAN OF CARE
Problem: Discharge Planning  Goal: Discharge to home or other facility with appropriate resources  Outcome: Progressing  Flowsheets (Taken 7/1/2025 0444)  Discharge to home or other facility with appropriate resources:   Identify barriers to discharge with patient and caregiver   Arrange for needed discharge resources and transportation as appropriate   Identify discharge learning needs (meds, wound care, etc)   Refer to discharge planning if patient needs post-hospital services based on physician order or complex needs related to functional status, cognitive ability or social support system     Problem: ABCDS Injury Assessment  Goal: Absence of physical injury  Outcome: Progressing  Flowsheets (Taken 7/1/2025 0444)  Absence of Physical Injury: Implement safety measures based on patient assessment     Problem: Neurosensory - Adult  Goal: Achieves stable or improved neurological status  Outcome: Progressing  Flowsheets (Taken 7/1/2025 0444)  Achieves stable or improved neurological status: Assess for and report changes in neurological status     Problem: Neurosensory - Adult  Goal: Achieves maximal functionality and self care  Outcome: Progressing     Problem: Skin/Tissue Integrity - Adult  Goal: Skin integrity remains intact  Outcome: Progressing  Flowsheets (Taken 7/1/2025 0444)  Skin Integrity Remains Intact: Monitor for areas of redness and/or skin breakdown

## 2025-07-01 NOTE — PLAN OF CARE
Problem: Discharge Planning  Goal: Discharge to home or other facility with appropriate resources  7/1/2025 1857 by Jose James, RN  Outcome: Adequate for Discharge     Problem: Neurosensory - Adult  Goal: Achieves stable or improved neurological status  7/1/2025 1857 by Jose James RN  Outcome: Adequate for Discharge     Problem: Neurosensory - Adult  Goal: Achieves maximal functionality and self care  7/1/2025 1857 by Jose James, RN  Outcome: Adequate for Discharge     Problem: Skin/Tissue Integrity - Adult  Goal: Skin integrity remains intact  7/1/2025 1857 by Jose James, RN  Outcome: Adequate for Discharge

## 2025-07-01 NOTE — PROGRESS NOTES
Discharge instructions reviewed with the patient and daughter. All questions answered. Patient was wheeled to his daughters car via wheelchair with all belongings.

## 2025-07-01 NOTE — PROCEDURES
PROCEDURE NOTE  Date: 7/1/2025   Name: Nile Lacy  YOB: 1940    Procedures    EEG REPORT     CLINICAL NEUROPHYSIOLOGY LABORATORY  DEPARTMENT OF NEUROLOGY  [facility]       Patient: Nile Lacy Age: 84 y.o.  MRN: [id number]      Referring Provider: No ref. provider found    History: This routine 30 minute scalp EEG was recorded with video- monitoring for a 84 y.o.. male  who presented with encephalopathy. This EEG was performed to evaluate for focal and epileptiform abnormalities.     Nile Lacy   Current Facility-Administered Medications   Medication Dose Route Frequency Provider Last Rate Last Admin    apixaban (ELIQUIS) tablet 5 mg  5 mg Oral BID Mari Medina APRN - NP   5 mg at 07/01/25 0753    aspirin chewable tablet 81 mg  81 mg Oral Daily Mari Medina APRN - NP   81 mg at 07/01/25 0753    dilTIAZem (CARDIZEM CD) extended release capsule 120 mg  120 mg Oral Daily Mari Medina APRN - NP   120 mg at 07/01/25 0753    docusate sodium (COLACE) capsule 100 mg  100 mg Oral BID Mari Medina APRN - NP   100 mg at 06/30/25 2046    doxazosin (CARDURA) tablet 2 mg  2 mg Oral Nightly Mari Medina APRN - NP   2 mg at 06/30/25 2045    isosorbide mononitrate (IMDUR) extended release tablet 60 mg  60 mg Oral Daily Mari Medina APRN - NP   60 mg at 07/01/25 0754    metoprolol tartrate (LOPRESSOR) tablet 50 mg  50 mg Oral BID Mari Medina APRN - NP   50 mg at 07/01/25 0753    pantoprazole (PROTONIX) tablet 40 mg  40 mg Oral QAM AC Mari Medina APRN - NP   40 mg at 07/01/25 0553    pravastatin (PRAVACHOL) tablet 40 mg  40 mg Oral Nightly Mari Medina APRN - NP   40 mg at 06/30/25 2045    traZODone (DESYREL) tablet 50 mg  50 mg Oral Nightly PRN Mari Medina APRN - NP        sodium chloride flush 0.9 % injection 5-40 mL  5-40 mL IntraVENous 2 times per day Mari Medina, ANUPAMA - NP   10 mL at 07/01/25 0754    sodium chloride flush 0.9 % injection 5-40 mL  5-40  mL IntraVENous PRN Mari Medina APRN - NP   10 mL at 06/30/25 0107    0.9 % sodium chloride infusion   IntraVENous PRN Mari Medina APRN - NP        ondansetron (ZOFRAN-ODT) disintegrating tablet 4 mg  4 mg Oral Q8H PRN Mari Medina APRN - NP        Or    ondansetron (ZOFRAN) injection 4 mg  4 mg IntraVENous Q6H PRN Mari Medina APRN - ANITA        polyethylene glycol (GLYCOLAX) packet 17 g  17 g Oral Daily PRN Mari Medina APRN - NP        vitamin B-12 (CYANOCOBALAMIN) tablet 1,000 mcg  1,000 mcg Oral Daily Donny Robertson DO   1,000 mcg at 07/01/25 0753        Technical Description: This is a 21 channel digital EEG recording with time-locked video. Electrodes were placed in accordance with the 10-20 International System of Electrode Placement. Single lead EKG monitoring as well as temporal electrodes were included.    The patient was not sleep deprived. This recording was obtained during wakefulness.    EEG Description: The dominant background activity during maximal recorded wakefulness consisted of bioccipitally dominant 9 - 11 Hz, 25-35 uV symmetric, regular activity that was reactive to eye opening. During drowsiness, the background rhythm waxed and waned and there were periods of slowing. During stage II sleep symmetric V waves, K complexes, and sleep spindles were seen. There was appropriate diffuse delta activity during slow wave sleep.    Photic stimulation - stepwise photic stimulation at 2-30 Hz was performed and there was a biposterior, symmetric, driving response.    Hyperventilation - was  performed.     No abnormalities were activated by photic stimulation     The EKG channel demonstrated a normal sinus rhythm.    Interpretation  This EEG was normal in wakefulness and sleep.     Clinical correlation  This EEG was normal. No focal or epileptiform abnormalities were seen.    Jose Ragsdale MD  [facility]

## 2025-07-01 NOTE — PROGRESS NOTES
[facility] Neurology   IN-PATIENT SERVICE      NEUROLOGY PROGRESS  NOTE            Date:   7/1/2025  Patient name:  Nile Lacy  Date of admission:  6/29/2025  YOB: 1940      Interval History:     MRI cervical spine done.  At time of evaluation, he seems paranoid.  Requesting to leave.  States that he is at a hotel.    Discussed with daughter, Maru, over phone.  She states that this is his baseline and he has intermittent periods of confusion at home as well.    History of Present Illness:     84-year-old male with past medical history of dementia, hypertension, hyperlipidemia, CAD status post CABG, A-fib on Eliquis, who presented with episode concerning for stroke/TIA.  Neurology consulted for further evaluation.  History limited given patient's baseline cognitive impairment.  Obtained primarily from nursing staff, chart review, and daughter over phone.     He presented to the ED yesterday after developing bilateral upper extremity numbness and weakness.  Reportedly, his symptoms were improving by time of arrival in the ED.  Stroke alert was called.  NIH 1.  CT brain and CTA head and neck with no acute findings.  He was admitted for further evaluation.  Since admission, he states that he overall feels better.  Continues to complain of some numbness in his arms and hands bilaterally.  No weakness.  Denies headache, neck pain, any focality to his symptoms, visual changes, dysarthria, aphasia.      Of note, he was seen by our team last month for similar symptoms.  Limited MRI brain with no acute findings.  He subsequently developed confusion, disorientation with concern for possible delirium.  Psychiatry was consulted.  There was concern for worsening paranoia in setting of dementia.  BHI was recommended although appears that his daughter later picked him up from the hospital and he left AMA.     Daughter states that patient remains relatively independent at baseline.  He lives by himself, she   2 mg Oral Nightly    isosorbide mononitrate  60 mg Oral Daily    metoprolol tartrate  50 mg Oral BID    pantoprazole  40 mg Oral QAM AC    pravastatin  40 mg Oral Nightly    sodium chloride flush  5-40 mL IntraVENous 2 times per day    LORazepam  0.5 mg Oral Once    vitamin B-12  1,000 mcg Oral Daily         Physical Exam:   /88   Pulse 81   Temp 97.9 °F (36.6 °C) (Oral)   Resp 18   Ht 1.676 m (5' 6\")   Wt 81.6 kg (180 lb)   SpO2 95%   BMI 29.05 kg/m²   Temp (24hrs), Av.9 °F (36.6 °C), Min:97.7 °F (36.5 °C), Max:98.1 °F (36.7 °C)        Neurological examination:     Mental status    Alert and oriented to person, states he is at a hotel, knows month July, not year; following all commands; speech is fluent, no dysarthria, aphasia.       Cranial nerves    II - visual fields intact to confrontation; pupils reactive  III, IV, VI - extraocular muscles intact; no PEDRO; no nystagmus; no ptosis   V - normal facial sensation                                                               VII - normal facial symmetry                                                             VIII - hearing decreased bilaterally                                                                           IX, X - symmetrical palate elevation                                               XI - symmetrical shoulder shrug                                                       XII - midline tongue without atrophy or fasciculation      Motor function  Strength: 5/5 RUE, 5/5 RLE, 5/5 LUE, 5/5  LLE  Normal bulk and tone.                      Sensory function Subjectively decreased to light touch and pinprick bilateral upper and lower extremities in somewhat patchy distribution, diminished vibration of proprioception at the toes      Cerebellar Intact finger-nose-finger testing.      Reflex function 1/4 symmetric throughout . Equivocal plantar response bilaterally. (-)Franks's sign bilaterally    Gait                  Cautious, normal based

## 2025-07-04 LAB — METHYLMALONATE SERPL-SCNC: 0.31 UMOL/L (ref 0–0.4)

## 2025-07-10 NOTE — PROGRESS NOTES
Physician Progress Note      PATIENT:               AD LAZAR  [facility] #:                  [id number]  :                       1940  ADMIT DATE:       2025 8:40 PM  DISCH DATE:        2025 7:22 PM  RESPONDING  PROVIDER #:        Olimpia Lucia MD          QUERY TEXT:    TIA is documented in the medical record on H&P. Please clarify the cause of   bilateral upper extremity numbness and weakness.    The clinical indicators include:  Per  H&P, \"presented to ED after developing bilateral upper extremity   numbness and weakness at 7:45 PM approximately 1 hour before arriving in ED.    On arrival to ED he reported symptoms improving with continued right upper arm   numbness, was noted to have left facial droop per ED resident.  Normal   speech, denies headache, denies vision changes.  Denies any fall or traumas.    Stroke alert called for NIH of 1.  CT head negative for acute intracranial   abnormality. CTA head/neck no large vessel occlusion.  Not a candidate for   thrombolytic therapy with being on Eliquis.  Sympto  -----------  Per  Neurology note, \"Transient episodes of bilateral upper extremity   numbness/weakness, low suspicion for vascular etiology.  Repeat MRI brain with   no acute findings.  Recently was seen for similar symptoms.  EEG normal.    Vitamin B12 low.  This could be contributing to his symptoms of   numbness/paresthesias.  Obtained MRI cervical spine-noted with moderate to   severe stenosis, worst at C3/4, no clear signal change on my review.  Pending   report\"    Risk factors: age 84, previous CVA, cognitive deficit at baseline  Treatment: labs, imaging, neuro consult, ASA, Eliquis, statin, IM Vit B12  Options provided:  -- TIA symptoms related to B12 deficiency, TIA ruled out after study  -- TIA symptoms related to cervical stenosis, TIA ruled out after study  -- TIA ruled out, TIA symptoms related to, please specify cause of symptoms  -- Other - I will add my own

## 2025-07-26 ENCOUNTER — APPOINTMENT (OUTPATIENT)
Dept: GENERAL RADIOLOGY | Age: 85
End: 2025-07-26
Payer: COMMERCIAL

## 2025-07-26 ENCOUNTER — HOSPITAL ENCOUNTER (INPATIENT)
Age: 85
LOS: 6 days | Discharge: NURSING, REHAB OR LONG-TERM CARE | End: 2025-08-01
Attending: HEALTH CARE PROVIDER | Admitting: INTERNAL MEDICINE
Payer: COMMERCIAL

## 2025-07-26 DIAGNOSIS — R00.1 BRADYCARDIA: ICD-10-CM

## 2025-07-26 DIAGNOSIS — I21.3 STEMI (ST ELEVATION MYOCARDIAL INFARCTION) (HCC): ICD-10-CM

## 2025-07-26 DIAGNOSIS — I24.9 ACUTE CORONARY SYNDROME (HCC): ICD-10-CM

## 2025-07-26 DIAGNOSIS — I21.3 ACUTE ST ELEVATION MYOCARDIAL INFARCTION (STEMI), UNSPECIFIED ARTERY (HCC): ICD-10-CM

## 2025-07-26 DIAGNOSIS — I21.21 ST ELEVATION MYOCARDIAL INFARCTION INVOLVING LEFT CIRCUMFLEX CORONARY ARTERY (HCC): Primary | ICD-10-CM

## 2025-07-26 LAB
ACT BLD: 235 SEC (ref 79–149)
ACT BLD: 345 SEC (ref 79–149)
ALBUMIN SERPL-MCNC: 4 G/DL (ref 3.5–5.2)
ALBUMIN/GLOB SERPL: 1.3 {RATIO} (ref 1–2.5)
ALP SERPL-CCNC: 67 U/L (ref 40–129)
ALT SERPL-CCNC: 25 U/L (ref 10–50)
ANION GAP SERPL CALCULATED.3IONS-SCNC: 13 MMOL/L (ref 9–16)
ANTI-XA UNFRAC HEPARIN: 0.1 IU/L
ANTI-XA UNFRAC HEPARIN: 1.1 IU/L
AST SERPL-CCNC: 111 U/L (ref 10–50)
BASOPHILS # BLD: 0.05 K/UL (ref 0–0.2)
BASOPHILS NFR BLD: 1 % (ref 0–2)
BILIRUB SERPL-MCNC: 1.5 MG/DL (ref 0–1.2)
BUN BLD-MCNC: 18 MG/DL (ref 8–26)
BUN BLD-MCNC: 20 MG/DL (ref 8–26)
BUN SERPL-MCNC: 18 MG/DL (ref 8–23)
CA-I BLD-SCNC: 1.25 MMOL/L (ref 1.13–1.33)
CA-I BLD-SCNC: 1.32 MMOL/L (ref 1.15–1.33)
CA-I BLD-SCNC: 1.35 MMOL/L (ref 1.15–1.33)
CALCIUM SERPL-MCNC: 9.9 MG/DL (ref 8.6–10.4)
CHLORIDE BLD-SCNC: 111 MMOL/L (ref 98–107)
CHLORIDE BLD-SCNC: 112 MMOL/L (ref 98–107)
CHLORIDE SERPL-SCNC: 109 MMOL/L (ref 98–107)
CO2 BLD CALC-SCNC: 22 MMOL/L (ref 22–30)
CO2 BLD CALC-SCNC: 24 MMOL/L (ref 22–30)
CO2 SERPL-SCNC: 18 MMOL/L (ref 20–31)
CREAT SERPL-MCNC: 0.9 MG/DL (ref 0.7–1.2)
EGFR, POC: 60 ML/MIN/1.73M2
EGFR, POC: 88 ML/MIN/1.73M2
EOSINOPHIL # BLD: 0.09 K/UL (ref 0–0.44)
EOSINOPHILS RELATIVE PERCENT: 1 % (ref 1–4)
ERYTHROCYTE [DISTWIDTH] IN BLOOD BY AUTOMATED COUNT: 12.7 % (ref 11.8–14.4)
ERYTHROCYTE [DISTWIDTH] IN BLOOD BY AUTOMATED COUNT: 12.8 % (ref 11.8–14.4)
GFR, ESTIMATED: 85 ML/MIN/1.73M2
GLUCOSE BLD-MCNC: 176 MG/DL (ref 74–100)
GLUCOSE BLD-MCNC: 204 MG/DL (ref 74–100)
GLUCOSE SERPL-MCNC: 194 MG/DL (ref 74–99)
HCO3 VENOUS: 25.4 MMOL/L (ref 22–29)
HCT VFR BLD AUTO: 44 % (ref 41–53)
HCT VFR BLD AUTO: 45.1 % (ref 40.7–50.3)
HCT VFR BLD AUTO: 48.7 % (ref 40.7–50.3)
HCT VFR BLD AUTO: 52 % (ref 41–53)
HGB BLD-MCNC: 14.9 G/DL (ref 13–17)
HGB BLD-MCNC: 16.9 G/DL (ref 13–17)
IMM GRANULOCYTES # BLD AUTO: 0.03 K/UL (ref 0–0.3)
IMM GRANULOCYTES NFR BLD: 0 %
INR PPP: 1.1
LYMPHOCYTES NFR BLD: 1.02 K/UL (ref 1.1–3.7)
LYMPHOCYTES RELATIVE PERCENT: 13 % (ref 24–43)
MAGNESIUM SERPL-MCNC: 1.9 MG/DL (ref 1.6–2.4)
MCH RBC QN AUTO: 30.6 PG (ref 25.2–33.5)
MCH RBC QN AUTO: 31.5 PG (ref 25.2–33.5)
MCHC RBC AUTO-ENTMCNC: 33 G/DL (ref 28.4–34.8)
MCHC RBC AUTO-ENTMCNC: 34.7 G/DL (ref 28.4–34.8)
MCV RBC AUTO: 90.7 FL (ref 82.6–102.9)
MCV RBC AUTO: 92.6 FL (ref 82.6–102.9)
MONOCYTES NFR BLD: 0.66 K/UL (ref 0.1–1.2)
MONOCYTES NFR BLD: 9 % (ref 3–12)
NEGATIVE BASE EXCESS, ART: 2.5 MMOL/L (ref 0–2)
NEUTROPHILS NFR BLD: 76 % (ref 36–65)
NEUTS SEG NFR BLD: 5.87 K/UL (ref 1.5–8.1)
NRBC BLD-RTO: 0 PER 100 WBC
NRBC BLD-RTO: 0 PER 100 WBC
O2 SAT, VEN: 42.2 % (ref 60–85)
PARTIAL THROMBOPLASTIN TIME: 24.1 SEC (ref 23–36.5)
PARTIAL THROMBOPLASTIN TIME: 37.8 SEC (ref 23–36.5)
PCO2 VENOUS: 39.1 MM HG (ref 41–51)
PH VENOUS: 7.42 (ref 7.32–7.43)
PLATELET # BLD AUTO: 155 K/UL (ref 138–453)
PLATELET # BLD AUTO: 166 K/UL (ref 138–453)
PMV BLD AUTO: 9.7 FL (ref 8.1–13.5)
PMV BLD AUTO: 9.8 FL (ref 8.1–13.5)
PO2 VENOUS: 23.3 MM HG (ref 30–50)
POC ANION GAP: 13 MMOL/L (ref 7–16)
POC ANION GAP: 14 MMOL/L (ref 7–16)
POC CREATININE: 0.8 MG/DL (ref 0.51–1.19)
POC CREATININE: 1.2 MG/DL (ref 0.51–1.19)
POC HCO3: 22.9 MMOL/L (ref 21–28)
POC HEMOGLOBIN (CALC): 14.9 G/DL (ref 13.5–17.5)
POC HEMOGLOBIN (CALC): 17.8 G/DL (ref 13.5–17.5)
POC LACTIC ACID: 1 MMOL/L (ref 0.56–1.39)
POC LACTIC ACID: 2.6 MMOL/L (ref 0.56–1.39)
POC O2 SATURATION: 99.9 % (ref 94–98)
POC PCO2: 40.9 MM HG (ref 35–48)
POC PH: 7.36 (ref 7.35–7.45)
POC PO2: 279.6 MM HG (ref 83–108)
POSITIVE BASE EXCESS, VEN: 0.9 MMOL/L (ref 0–3)
POTASSIUM BLD-SCNC: 3.4 MMOL/L (ref 3.5–4.5)
POTASSIUM BLD-SCNC: 3.6 MMOL/L (ref 3.5–4.5)
POTASSIUM SERPL-SCNC: 3.4 MMOL/L (ref 3.7–5.3)
PROT SERPL-MCNC: 7 G/DL (ref 6.6–8.7)
PROTHROMBIN TIME: 14.5 SEC (ref 11.7–14.9)
RBC # BLD AUTO: 4.87 M/UL (ref 4.21–5.77)
RBC # BLD AUTO: 5.37 M/UL (ref 4.21–5.77)
SODIUM BLD-SCNC: 146 MMOL/L (ref 138–146)
SODIUM BLD-SCNC: 148 MMOL/L (ref 138–146)
SODIUM SERPL-SCNC: 140 MMOL/L (ref 136–145)
TROPONIN I SERPL HS-MCNC: 43 NG/L (ref 0–22)
WBC OTHER # BLD: 7.7 K/UL (ref 3.5–11.3)
WBC OTHER # BLD: 7.8 K/UL (ref 3.5–11.3)

## 2025-07-26 PROCEDURE — 85347 COAGULATION TIME ACTIVATED: CPT

## 2025-07-26 PROCEDURE — 6360000002 HC RX W HCPCS

## 2025-07-26 PROCEDURE — 96375 TX/PRO/DX INJ NEW DRUG ADDON: CPT

## 2025-07-26 PROCEDURE — 92920 PRQ TRLUML C ANGIOP 1ART&/BR: CPT | Performed by: INTERNAL MEDICINE

## 2025-07-26 PROCEDURE — 31500 INSERT EMERGENCY AIRWAY: CPT

## 2025-07-26 PROCEDURE — 85025 COMPLETE CBC W/AUTO DIFF WBC: CPT

## 2025-07-26 PROCEDURE — C9606 PERC D-E COR REVASC W AMI S: HCPCS | Performed by: INTERNAL MEDICINE

## 2025-07-26 PROCEDURE — 83735 ASSAY OF MAGNESIUM: CPT

## 2025-07-26 PROCEDURE — 82947 ASSAY GLUCOSE BLOOD QUANT: CPT

## 2025-07-26 PROCEDURE — 80051 ELECTROLYTE PANEL: CPT

## 2025-07-26 PROCEDURE — 71045 X-RAY EXAM CHEST 1 VIEW: CPT

## 2025-07-26 PROCEDURE — 0DH67UZ INSERTION OF FEEDING DEVICE INTO STOMACH, VIA NATURAL OR ARTIFICIAL OPENING: ICD-10-PCS | Performed by: INTERNAL MEDICINE

## 2025-07-26 PROCEDURE — 2580000003 HC RX 258: Performed by: INTERNAL MEDICINE

## 2025-07-26 PROCEDURE — 93459 L HRT ART/GRFT ANGIO: CPT | Performed by: INTERNAL MEDICINE

## 2025-07-26 PROCEDURE — 5A1945Z RESPIRATORY VENTILATION, 24-96 CONSECUTIVE HOURS: ICD-10-PCS | Performed by: HEALTH CARE PROVIDER

## 2025-07-26 PROCEDURE — 2709999900 HC NON-CHARGEABLE SUPPLY: Performed by: INTERNAL MEDICINE

## 2025-07-26 PROCEDURE — 0BH17EZ INSERTION OF ENDOTRACHEAL AIRWAY INTO TRACHEA, VIA NATURAL OR ARTIFICIAL OPENING: ICD-10-PCS | Performed by: HEALTH CARE PROVIDER

## 2025-07-26 PROCEDURE — 80053 COMPREHEN METABOLIC PANEL: CPT

## 2025-07-26 PROCEDURE — 84520 ASSAY OF UREA NITROGEN: CPT

## 2025-07-26 PROCEDURE — 92937 PRQ TRLUML REVSC CAB GRF 1: CPT | Performed by: INTERNAL MEDICINE

## 2025-07-26 PROCEDURE — C1894 INTRO/SHEATH, NON-LASER: HCPCS | Performed by: INTERNAL MEDICINE

## 2025-07-26 PROCEDURE — 82803 BLOOD GASES ANY COMBINATION: CPT

## 2025-07-26 PROCEDURE — 2700000000 HC OXYGEN THERAPY PER DAY

## 2025-07-26 PROCEDURE — 2500000003 HC RX 250 WO HCPCS

## 2025-07-26 PROCEDURE — B2131ZZ FLUOROSCOPY OF MULTIPLE CORONARY ARTERY BYPASS GRAFTS USING LOW OSMOLAR CONTRAST: ICD-10-PCS | Performed by: INTERNAL MEDICINE

## 2025-07-26 PROCEDURE — 93005 ELECTROCARDIOGRAM TRACING: CPT | Performed by: INTERNAL MEDICINE

## 2025-07-26 PROCEDURE — 99223 1ST HOSP IP/OBS HIGH 75: CPT | Performed by: INTERNAL MEDICINE

## 2025-07-26 PROCEDURE — C1887 CATHETER, GUIDING: HCPCS | Performed by: INTERNAL MEDICINE

## 2025-07-26 PROCEDURE — 6360000002 HC RX W HCPCS: Performed by: INTERNAL MEDICINE

## 2025-07-26 PROCEDURE — B2111ZZ FLUOROSCOPY OF MULTIPLE CORONARY ARTERIES USING LOW OSMOLAR CONTRAST: ICD-10-PCS | Performed by: INTERNAL MEDICINE

## 2025-07-26 PROCEDURE — B2151ZZ FLUOROSCOPY OF LEFT HEART USING LOW OSMOLAR CONTRAST: ICD-10-PCS | Performed by: INTERNAL MEDICINE

## 2025-07-26 PROCEDURE — 94761 N-INVAS EAR/PLS OXIMETRY MLT: CPT

## 2025-07-26 PROCEDURE — 36556 INSERT NON-TUNNEL CV CATH: CPT

## 2025-07-26 PROCEDURE — 99291 CRITICAL CARE FIRST HOUR: CPT | Performed by: INTERNAL MEDICINE

## 2025-07-26 PROCEDURE — 6370000000 HC RX 637 (ALT 250 FOR IP): Performed by: INTERNAL MEDICINE

## 2025-07-26 PROCEDURE — 2580000003 HC RX 258

## 2025-07-26 PROCEDURE — 83605 ASSAY OF LACTIC ACID: CPT

## 2025-07-26 PROCEDURE — C1874 STENT, COATED/COV W/DEL SYS: HCPCS | Performed by: INTERNAL MEDICINE

## 2025-07-26 PROCEDURE — 3E033XZ INTRODUCTION OF VASOPRESSOR INTO PERIPHERAL VEIN, PERCUTANEOUS APPROACH: ICD-10-PCS | Performed by: INTERNAL MEDICINE

## 2025-07-26 PROCEDURE — 84484 ASSAY OF TROPONIN QUANT: CPT

## 2025-07-26 PROCEDURE — 94002 VENT MGMT INPAT INIT DAY: CPT

## 2025-07-26 PROCEDURE — 85520 HEPARIN ASSAY: CPT

## 2025-07-26 PROCEDURE — 4A023N7 MEASUREMENT OF CARDIAC SAMPLING AND PRESSURE, LEFT HEART, PERCUTANEOUS APPROACH: ICD-10-PCS | Performed by: INTERNAL MEDICINE

## 2025-07-26 PROCEDURE — 6370000000 HC RX 637 (ALT 250 FOR IP)

## 2025-07-26 PROCEDURE — 85014 HEMATOCRIT: CPT

## 2025-07-26 PROCEDURE — 2000000000 HC ICU R&B

## 2025-07-26 PROCEDURE — 99285 EMERGENCY DEPT VISIT HI MDM: CPT

## 2025-07-26 PROCEDURE — 027035Z DILATION OF CORONARY ARTERY, ONE ARTERY WITH TWO DRUG-ELUTING INTRALUMINAL DEVICES, PERCUTANEOUS APPROACH: ICD-10-PCS | Performed by: INTERNAL MEDICINE

## 2025-07-26 PROCEDURE — C1769 GUIDE WIRE: HCPCS | Performed by: INTERNAL MEDICINE

## 2025-07-26 PROCEDURE — 85027 COMPLETE CBC AUTOMATED: CPT

## 2025-07-26 PROCEDURE — 96374 THER/PROPH/DIAG INJ IV PUSH: CPT

## 2025-07-26 PROCEDURE — 87641 MR-STAPH DNA AMP PROBE: CPT

## 2025-07-26 PROCEDURE — 82330 ASSAY OF CALCIUM: CPT

## 2025-07-26 PROCEDURE — 2500000003 HC RX 250 WO HCPCS: Performed by: INTERNAL MEDICINE

## 2025-07-26 PROCEDURE — 85730 THROMBOPLASTIN TIME PARTIAL: CPT

## 2025-07-26 PROCEDURE — 85610 PROTHROMBIN TIME: CPT

## 2025-07-26 PROCEDURE — 82565 ASSAY OF CREATININE: CPT

## 2025-07-26 PROCEDURE — C1725 CATH, TRANSLUMIN NON-LASER: HCPCS | Performed by: INTERNAL MEDICINE

## 2025-07-26 PROCEDURE — 37799 UNLISTED PX VASCULAR SURGERY: CPT

## 2025-07-26 PROCEDURE — 76937 US GUIDE VASCULAR ACCESS: CPT | Performed by: INTERNAL MEDICINE

## 2025-07-26 PROCEDURE — 6360000004 HC RX CONTRAST MEDICATION: Performed by: INTERNAL MEDICINE

## 2025-07-26 PROCEDURE — 5A2204Z RESTORATION OF CARDIAC RHYTHM, SINGLE: ICD-10-PCS | Performed by: INTERNAL MEDICINE

## 2025-07-26 PROCEDURE — 06HY33Z INSERTION OF INFUSION DEVICE INTO LOWER VEIN, PERCUTANEOUS APPROACH: ICD-10-PCS | Performed by: INTERNAL MEDICINE

## 2025-07-26 DEVICE — STENT ONYXNG22515UX ONYX 2.25X15RX
Type: IMPLANTABLE DEVICE | Status: FUNCTIONAL
Brand: ONYX FRONTIER™

## 2025-07-26 DEVICE — STENT ONYXNG35022UX ONYX 3.50X22RX
Type: IMPLANTABLE DEVICE | Status: FUNCTIONAL
Brand: ONYX FRONTIER™

## 2025-07-26 RX ORDER — CLOPIDOGREL BISULFATE 75 MG/1
75 TABLET ORAL DAILY
Status: DISCONTINUED | OUTPATIENT
Start: 2025-07-27 | End: 2025-08-01 | Stop reason: HOSPADM

## 2025-07-26 RX ORDER — MAGNESIUM SULFATE 1 G/100ML
1000 INJECTION INTRAVENOUS ONCE
Status: COMPLETED | OUTPATIENT
Start: 2025-07-26 | End: 2025-07-26

## 2025-07-26 RX ORDER — HEPARIN SODIUM 1000 [USP'U]/ML
2000 INJECTION, SOLUTION INTRAVENOUS; SUBCUTANEOUS PRN
Status: DISCONTINUED | OUTPATIENT
Start: 2025-07-26 | End: 2025-07-26

## 2025-07-26 RX ORDER — PROPOFOL 10 MG/ML
INJECTION, EMULSION INTRAVENOUS
Status: COMPLETED
Start: 2025-07-26 | End: 2025-07-26

## 2025-07-26 RX ORDER — IOPAMIDOL 755 MG/ML
INJECTION, SOLUTION INTRAVASCULAR PRN
Status: DISCONTINUED | OUTPATIENT
Start: 2025-07-26 | End: 2025-07-26 | Stop reason: HOSPADM

## 2025-07-26 RX ORDER — HEPARIN SODIUM 1000 [USP'U]/ML
4000 INJECTION, SOLUTION INTRAVENOUS; SUBCUTANEOUS PRN
Status: DISCONTINUED | OUTPATIENT
Start: 2025-07-26 | End: 2025-07-26

## 2025-07-26 RX ORDER — SODIUM CHLORIDE 9 MG/ML
INJECTION, SOLUTION INTRAVENOUS PRN
Status: DISCONTINUED | OUTPATIENT
Start: 2025-07-26 | End: 2025-08-01 | Stop reason: HOSPADM

## 2025-07-26 RX ORDER — HEPARIN SODIUM 10000 [USP'U]/100ML
5-30 INJECTION, SOLUTION INTRAVENOUS CONTINUOUS
Status: DISCONTINUED | OUTPATIENT
Start: 2025-07-26 | End: 2025-07-31

## 2025-07-26 RX ORDER — HEPARIN SODIUM 1000 [USP'U]/ML
4000 INJECTION, SOLUTION INTRAVENOUS; SUBCUTANEOUS PRN
Status: DISCONTINUED | OUTPATIENT
Start: 2025-07-26 | End: 2025-07-31

## 2025-07-26 RX ORDER — SODIUM CHLORIDE 0.9 % (FLUSH) 0.9 %
5-40 SYRINGE (ML) INJECTION PRN
Status: DISCONTINUED | OUTPATIENT
Start: 2025-07-26 | End: 2025-08-01 | Stop reason: HOSPADM

## 2025-07-26 RX ORDER — HEPARIN SODIUM 1000 [USP'U]/ML
INJECTION, SOLUTION INTRAVENOUS; SUBCUTANEOUS PRN
Status: DISCONTINUED | OUTPATIENT
Start: 2025-07-26 | End: 2025-07-26 | Stop reason: HOSPADM

## 2025-07-26 RX ORDER — PROPOFOL 10 MG/ML
INJECTION, EMULSION INTRAVENOUS CONTINUOUS PRN
Status: COMPLETED | OUTPATIENT
Start: 2025-07-26 | End: 2025-07-26

## 2025-07-26 RX ORDER — ONDANSETRON 2 MG/ML
4 INJECTION INTRAMUSCULAR; INTRAVENOUS ONCE
Status: DISCONTINUED | OUTPATIENT
Start: 2025-07-26 | End: 2025-07-30

## 2025-07-26 RX ORDER — EPTIFIBATIDE 2 MG/ML
INJECTION, SOLUTION INTRAVENOUS PRN
Status: DISCONTINUED | OUTPATIENT
Start: 2025-07-26 | End: 2025-07-26 | Stop reason: HOSPADM

## 2025-07-26 RX ORDER — CLOPIDOGREL 300 MG/1
600 TABLET, FILM COATED ORAL DAILY
Status: DISCONTINUED | OUTPATIENT
Start: 2025-07-26 | End: 2025-07-26

## 2025-07-26 RX ORDER — PROPOFOL 10 MG/ML
5-50 INJECTION, EMULSION INTRAVENOUS CONTINUOUS
Status: DISCONTINUED | OUTPATIENT
Start: 2025-07-26 | End: 2025-07-30

## 2025-07-26 RX ORDER — MIDAZOLAM HYDROCHLORIDE 1 MG/ML
INJECTION, SOLUTION INTRAMUSCULAR; INTRAVENOUS
Status: COMPLETED
Start: 2025-07-26 | End: 2025-07-26

## 2025-07-26 RX ORDER — DOPAMINE HYDROCHLORIDE 160 MG/100ML
INJECTION, SOLUTION INTRAVENOUS
Status: DISPENSED
Start: 2025-07-26 | End: 2025-07-27

## 2025-07-26 RX ORDER — HEPARIN SODIUM 1000 [USP'U]/ML
4000 INJECTION, SOLUTION INTRAVENOUS; SUBCUTANEOUS ONCE
Status: DISCONTINUED | OUTPATIENT
Start: 2025-07-26 | End: 2025-07-26

## 2025-07-26 RX ORDER — SODIUM CHLORIDE 9 MG/ML
INJECTION, SOLUTION INTRAVENOUS CONTINUOUS
Status: ACTIVE | OUTPATIENT
Start: 2025-07-26 | End: 2025-07-26

## 2025-07-26 RX ORDER — HEPARIN SODIUM 1000 [USP'U]/ML
2000 INJECTION, SOLUTION INTRAVENOUS; SUBCUTANEOUS PRN
Status: DISCONTINUED | OUTPATIENT
Start: 2025-07-26 | End: 2025-07-31

## 2025-07-26 RX ORDER — PANTOPRAZOLE SODIUM 40 MG/10ML
40 INJECTION, POWDER, LYOPHILIZED, FOR SOLUTION INTRAVENOUS DAILY
Status: DISCONTINUED | OUTPATIENT
Start: 2025-07-26 | End: 2025-07-30

## 2025-07-26 RX ORDER — EZETIMIBE 10 MG/1
10 TABLET ORAL NIGHTLY
Status: DISCONTINUED | OUTPATIENT
Start: 2025-07-26 | End: 2025-08-01 | Stop reason: HOSPADM

## 2025-07-26 RX ORDER — PROPOFOL 10 MG/ML
INJECTION, EMULSION INTRAVENOUS
Status: DISPENSED
Start: 2025-07-26 | End: 2025-07-27

## 2025-07-26 RX ORDER — MIDAZOLAM HYDROCHLORIDE 2 MG/2ML
1 INJECTION, SOLUTION INTRAMUSCULAR; INTRAVENOUS ONCE
Status: COMPLETED | OUTPATIENT
Start: 2025-07-26 | End: 2025-07-26

## 2025-07-26 RX ORDER — EPTIFIBATIDE 0.75 MG/ML
2 INJECTION, SOLUTION INTRAVENOUS CONTINUOUS
Status: DISCONTINUED | OUTPATIENT
Start: 2025-07-26 | End: 2025-07-27

## 2025-07-26 RX ORDER — EPTIFIBATIDE 0.75 MG/ML
INJECTION, SOLUTION INTRAVENOUS CONTINUOUS PRN
Status: COMPLETED | OUTPATIENT
Start: 2025-07-26 | End: 2025-07-26

## 2025-07-26 RX ORDER — ATORVASTATIN CALCIUM 40 MG/1
40 TABLET, FILM COATED ORAL NIGHTLY
Status: DISCONTINUED | OUTPATIENT
Start: 2025-07-26 | End: 2025-08-01 | Stop reason: HOSPADM

## 2025-07-26 RX ORDER — FENTANYL CITRATE 50 UG/ML
25 INJECTION, SOLUTION INTRAMUSCULAR; INTRAVENOUS ONCE
Status: COMPLETED | OUTPATIENT
Start: 2025-07-26 | End: 2025-07-26

## 2025-07-26 RX ORDER — DOPAMINE HYDROCHLORIDE 160 MG/100ML
1-20 INJECTION, SOLUTION INTRAVENOUS CONTINUOUS
Status: DISCONTINUED | OUTPATIENT
Start: 2025-07-26 | End: 2025-07-30

## 2025-07-26 RX ORDER — HEPARIN SODIUM 10000 [USP'U]/100ML
5-30 INJECTION, SOLUTION INTRAVENOUS CONTINUOUS
Status: DISCONTINUED | OUTPATIENT
Start: 2025-07-26 | End: 2025-07-26

## 2025-07-26 RX ORDER — SODIUM CHLORIDE 9 MG/ML
INJECTION, SOLUTION INTRAVENOUS CONTINUOUS PRN
Status: COMPLETED | OUTPATIENT
Start: 2025-07-26 | End: 2025-07-26

## 2025-07-26 RX ORDER — ASPIRIN 81 MG/1
81 TABLET ORAL DAILY
Status: DISCONTINUED | OUTPATIENT
Start: 2025-07-27 | End: 2025-07-27

## 2025-07-26 RX ORDER — DOPAMINE HYDROCHLORIDE 160 MG/100ML
1-20 INJECTION, SOLUTION INTRAVENOUS CONTINUOUS
Status: DISCONTINUED | OUTPATIENT
Start: 2025-07-26 | End: 2025-07-26

## 2025-07-26 RX ORDER — CLOPIDOGREL 300 MG/1
600 TABLET, FILM COATED ORAL ONCE
Status: COMPLETED | OUTPATIENT
Start: 2025-07-26 | End: 2025-07-26

## 2025-07-26 RX ORDER — NITROGLYCERIN 20 MG/100ML
INJECTION INTRAVENOUS PRN
Status: DISCONTINUED | OUTPATIENT
Start: 2025-07-26 | End: 2025-07-26 | Stop reason: HOSPADM

## 2025-07-26 RX ORDER — SODIUM CHLORIDE 0.9 % (FLUSH) 0.9 %
5-40 SYRINGE (ML) INJECTION EVERY 12 HOURS SCHEDULED
Status: DISCONTINUED | OUTPATIENT
Start: 2025-07-26 | End: 2025-08-01 | Stop reason: HOSPADM

## 2025-07-26 RX ORDER — MIDAZOLAM HYDROCHLORIDE 1 MG/ML
INJECTION, SOLUTION INTRAMUSCULAR; INTRAVENOUS PRN
Status: DISCONTINUED | OUTPATIENT
Start: 2025-07-26 | End: 2025-07-26 | Stop reason: HOSPADM

## 2025-07-26 RX ORDER — HEPARIN SODIUM 1000 [USP'U]/ML
4000 INJECTION, SOLUTION INTRAVENOUS; SUBCUTANEOUS ONCE
Status: COMPLETED | OUTPATIENT
Start: 2025-07-26 | End: 2025-07-26

## 2025-07-26 RX ORDER — FENTANYL CITRATE 50 UG/ML
100 INJECTION, SOLUTION INTRAMUSCULAR; INTRAVENOUS ONCE
Status: COMPLETED | OUTPATIENT
Start: 2025-07-26 | End: 2025-07-26

## 2025-07-26 RX ADMIN — SODIUM CHLORIDE: 0.9 INJECTION, SOLUTION INTRAVENOUS at 18:16

## 2025-07-26 RX ADMIN — Medication 8 MCG/MIN: at 22:49

## 2025-07-26 RX ADMIN — Medication 50 MCG/HR: at 20:26

## 2025-07-26 RX ADMIN — POTASSIUM BICARBONATE 40 MEQ: 782 TABLET, EFFERVESCENT ORAL at 18:08

## 2025-07-26 RX ADMIN — PROPOFOL 50 MCG/KG/MIN: 10 INJECTION, EMULSION INTRAVENOUS at 15:50

## 2025-07-26 RX ADMIN — PANTOPRAZOLE SODIUM 40 MG: 40 INJECTION, POWDER, FOR SOLUTION INTRAVENOUS at 23:59

## 2025-07-26 RX ADMIN — HEPARIN SODIUM AND DEXTROSE 11 UNITS/KG/HR: 10000; 5 INJECTION INTRAVENOUS at 22:17

## 2025-07-26 RX ADMIN — SODIUM CHLORIDE: 0.9 INJECTION, SOLUTION INTRAVENOUS at 17:58

## 2025-07-26 RX ADMIN — FENTANYL CITRATE 50 MCG: 50 INJECTION INTRAMUSCULAR; INTRAVENOUS at 12:31

## 2025-07-26 RX ADMIN — PROPOFOL 40 MCG/KG/MIN: 10 INJECTION, EMULSION INTRAVENOUS at 22:38

## 2025-07-26 RX ADMIN — HEPARIN SODIUM 4000 UNITS: 1000 INJECTION INTRAVENOUS; SUBCUTANEOUS at 22:15

## 2025-07-26 RX ADMIN — ATORVASTATIN CALCIUM 40 MG: 40 TABLET, FILM COATED ORAL at 23:00

## 2025-07-26 RX ADMIN — MIDAZOLAM HYDROCHLORIDE 1 MG: 2 INJECTION, SOLUTION INTRAMUSCULAR; INTRAVENOUS at 20:40

## 2025-07-26 RX ADMIN — FENTANYL CITRATE 100 MCG: 50 INJECTION INTRAMUSCULAR; INTRAVENOUS at 13:02

## 2025-07-26 RX ADMIN — DOPAMINE HYDROCHLORIDE 5 MCG/KG/MIN: 160 INJECTION, SOLUTION INTRAVENOUS at 22:59

## 2025-07-26 RX ADMIN — SODIUM CHLORIDE, PRESERVATIVE FREE 10 ML: 5 INJECTION INTRAVENOUS at 22:00

## 2025-07-26 RX ADMIN — EZETIMIBE 10 MG: 10 TABLET ORAL at 23:00

## 2025-07-26 RX ADMIN — MAGNESIUM SULFATE HEPTAHYDRATE 1000 MG: 1 INJECTION, SOLUTION INTRAVENOUS at 18:17

## 2025-07-26 RX ADMIN — CLOPIDOGREL BISULFATE 600 MG: 300 TABLET, FILM COATED ORAL at 17:21

## 2025-07-26 RX ADMIN — MIDAZOLAM HYDROCHLORIDE 1 MG: 1 INJECTION, SOLUTION INTRAMUSCULAR; INTRAVENOUS at 20:40

## 2025-07-26 ASSESSMENT — PULMONARY FUNCTION TESTS
PIF_VALUE: 16
PIF_VALUE: 16
PIF_VALUE: 17
PIF_VALUE: 17
PIF_VALUE: 18
PIF_VALUE: 15
PIF_VALUE: 17

## 2025-07-26 ASSESSMENT — PAIN - FUNCTIONAL ASSESSMENT: PAIN_FUNCTIONAL_ASSESSMENT: 0-10

## 2025-07-26 ASSESSMENT — PAIN SCALES - GENERAL: PAINLEVEL_OUTOF10: 9

## 2025-07-26 ASSESSMENT — PAIN DESCRIPTION - LOCATION: LOCATION: CHEST

## 2025-07-26 ASSESSMENT — PAIN DESCRIPTION - ORIENTATION: ORIENTATION: LEFT

## 2025-07-26 NOTE — FLOWSHEET NOTE
Patient arrived to floor from cathlab with Levophed running at 5mcg and Dopamine at 15mcg/kg and Propofol at 50mcg/kg/hr and Eptifibatide running at 2mcg/kg/hr. Levophed was turned off when patient was connected to monitor and SBP was 190s

## 2025-07-26 NOTE — PROGRESS NOTES
Date: 7/26/2025  Time: 1245  Patient identity confirmed:  Yes  Indications: airway protection  Preoxygenation: BVM with 100% O2  Laryngoscope size and type Glidescope  Airway introducer used: No  Evac: No  Tube size:a 7.5 cuffed  Number of attempts:1   Cords visualized:  [x] Clearly  [] Poorly  Breath sounds present bilaterally: Yes   ETCO2   [x] Positive   Tube secured at 24 at lip  Chest x-ray ordered: Yes  BP: BP: (!) 146/79    Notes:    Procedure performed by: Dr. Paradise Camarena RCP  12:56 PM

## 2025-07-26 NOTE — PLAN OF CARE
Patient evaluated in cath lab  Currently on 5 of levo, 5 of dopamine   Per Dr. Roth, start heparin drip, give 600 plavix, continue fluids @ 100cc/hr   Will obtain stat labs including abg, cbc, cmp, mag, ionized calcium   Will obtain stat cxr

## 2025-07-26 NOTE — ED NOTES
5000 units heparin bolus given by LEATHA Crawford verbal order Dr. Roth  50mcg fentanyl IVP given by LEATHA Crawford verbal order Dr. Roth

## 2025-07-26 NOTE — H&P
Critical Care - History and Physical Examination    Patient's name:  Nile Lacy  Medical Record Number: [id number]  Patient's account/billing number: [id number]  Patient's YOB: 1940  Age: 84 y.o.  Date of Admission: 7/26/2025 12:09 PM  Date of History and Physical Examination: 7/26/2025      Primary Care Physician: Dewey Vrea MD    Code Status: Full Code    Chief complaint:   Chief Complaint   Patient presents with    Chest Pain    Hypotension                HISTORY OF PRESENT ILLNESS:      History was obtained from chart review and the patient.      Nile Lacy is a 84 y.o. with relevant PMH of   - S/p coronary artery bypass graft  -Hypertension  -CAD  - Atrial fibrillation  - DVT  - Obesity with obstructive sleep apnea    Presented to ER with complaints of crushing chest pain since 20 minutes.  The chest pain was acute on onset, associated with diaphoresis and shortness of breath. The patient was brought by EMS, heart rate was in 30-40s, and was given nitric oxide.  Patient has a recent history of cath at [facility], CABG 2008.  He was noted to have atrial fibrillation with slow ventricular response in 30s and acute inferior wall STEMI with reciprocal changes in the lateral leads.    In the ER, the patient was given atropine, started on dopamine & levo drip for hemodynamic support.  He was given 5000 heparin bolus in the emergency room and with that ST elevation in the inferior leads somewhat resolved and hemodynamics improved.  Cardiology was consulted and they planned to perform emergent cardiac catheterization and the patient was intubated for the same.    Cardiology performed emergent cardiac catheterization and during coronary angiography he went into V-fib cardiac arrest requiring defibrillations x 2 with successful restoration of sinus rhythm.  The SVG to OM had a filling defect noted at the insertion site to the OM branch with critical 99% stenosis which appeared to be  Agent  [] N2Iyqun [] Sucralfate  [] Other:  VTE:   [x] Enoxaparin  [] Unfract. Heparin Subcut  [] EPC Cuffs    NUTRITION:  [x] NPO  [] Tube Feeding (Specify: ) [] TPN  [] PO     HOME MEDICATIONS RECONCILED: [] No  [x] Yes    FAMILY UPDATED:    [] No   [x] Yes     PLAN/MEDICAL DECISION MAKING:    NEUROLOGIC:  - Mental status: Intubated and sedated  - Sedation: Propofol  - Pain control: Fentanyl    CARDIOVASCULAR:  BP Range: Systolic (24hrs), Av , Min:64 , Max:146     Diastolic (24hrs), Av, Min:30, Max:79    Pulse Range: Pulse  Av.2  Min: 36  Max: 88  - Dx: Inferior STEMI   Coronary artery disease  H/o CABG  Hypertension  Atrial fibrillation  H/o DVT  -Troponins normal, PT/INR normal  -Continue aspirin and Plavix daily  - Refer to cardiac rehabilitation  - Cardiology on board  - Continue heparin infusion until oral apixaban is resumed  - On dopamine and Levophed, weaning  - Atorvastatin and ezetimibe for dyslipidemia      PULMONARY:  Respiration Range: Resp  Av.4  Min: 10  Max: 23  Current Pulse Ox: SpO2: 98 %  24HR Pulse Ox Range: SpO2  Av.7 %  Min: 91 %  Max: 100 %  - Dx: Acute hypoxic respiratory failure s/p mechanical ventilation  - Vent Settings: 510/18/5/40  - AB.42/39.1/25.4  - CXR: mild pulmonary edema, prior median sternotomy    RENAL/FLUID/ELECTROLYTE:  - BUN/Cr: At baseline  - I/O: In: 1000 [I.V.:1000]  Out: 10       GI/NUTRITION:  Diet NPO  - Bowel regimen: GlycoLax as needed  - GI prophylaxis: PPI    ID:  Tmax: Temp (24hrs), Av.2 °F (36.2 °C), Min:97.2 °F (36.2 °C), Max:97.2 °F (36.2 °C)    Temperature Range: Temp: 97.2 °F (36.2 °C) Temp  Av.2 °F (36.2 °C)  Min: 97.2 °F (36.2 °C)  Max: 97.2 °F (36.2 °C)  - WBC   Lab Results   Component Value Date    WBC 7.8 2025     - Antimicrobials: not indicated     HEME:   Recent Labs     25  1222   HGB 14.9   Stable      ENDOCRINE:  - Dx: Prediabetes  - Continue to monitor blood glucose, goal <180  - Most recent BGL is

## 2025-07-26 NOTE — ED NOTES
Cath lab called and they are ready for patient, requesting ER staff transport pt to cath lab. Awaiting portable x ray for post intubation chest x ray

## 2025-07-26 NOTE — ED PROVIDER NOTES
Feeling of Stress : To some extent   Social Connections: Socially Isolated (12/3/2024)    Received from [facility]    Social Connection and Isolation Panel [NHANES]     Frequency of Communication with Friends and Family: More than three times a week     Frequency of Social Gatherings with Friends and Family: Three times a week     Attends Jainism Services: Never     Active Member of Clubs or Organizations: No     Attends Club or Organization Meetings: Never     Marital Status:    Intimate Partner Violence: Not on file   Housing Stability: Low Risk  (6/30/2025)    Housing Stability Vital Sign     Unable to Pay for Housing in the Last Year: No     Number of Times Moved in the Last Year: 0     Homeless in the Last Year: No       Family History   Problem Relation Age of Onset    Alzheimer's Disease Mother     Arthritis Mother         lumbar disc disease    Heart Disease Father        Allergies:  Oxycodone, Pcn [penicillins], and Warfarin and related    Home Medications:  Prior to Admission medications    Medication Sig Start Date End Date Taking? Authorizing Provider   vitamin B-12 1000 MCG tablet Take 1 tablet by mouth daily 7/2/25   Olimpia Lucia MD   rivastigmine (EXELON) Place 1 patch onto the skin daily    ProviderDuc MD   traZODone (DESYREL) 50 MG tablet Take 1 tablet by mouth nightly    ProviderDuc MD   metoprolol tartrate (LOPRESSOR) 50 MG tablet Take 1 tablet by mouth 2 times daily 3/29/24   Parker Thomason MD   silodosin (RAPAFLO) 8 MG CAPS Take 1 capsule by mouth every evening  Patient not taking: Reported on 5/12/2025 1/22/24   Fer Cuevas MD   docusate sodium (COLACE) 100 MG capsule Take 1 capsule by mouth 2 times daily 9/11/22   Frank Blankenship MD   isosorbide mononitrate (IMDUR) 60 MG extended release tablet Take 1 tablet by mouth daily 5/13/22   Corie Ortiz MD   dilTIAZem (CARDIZEM CD) 120 MG extended release capsule Take 1 capsule by mouth  interpreted by the Emergency Department Physician who either signs or Co-signs this chart in the absence of a cardiologist.    EMERGENCY DEPARTMENT COURSE:    ED Course as of 07/26/25 1359   Sat Jul 26, 2025   1222 Discussed with Dr. Roht, christina cath lab for STEMI. Atropine as needed, fluids, heparin bolus. ASA already given by EMS.  [MW]      ED Course User Index  [MW] Rayo Ghotra DO       PROCEDURES:  PROCEDURE NOTE - EMERGENCY INTUBATION    PATIENT NAME: Nile Lacy  MEDICAL RECORD NO. [id number]  DATE: 7/26/2025  ATTENDING PHYSICIAN: Dr. Eric Watts    PREOPERATIVE DIAGNOSIS:  Acute Respiratory Failure  POSTOPERATIVE DIAGNOSIS:  Same  PROCEDURE PERFORMED:  Emergency endotracheal intubation  PERFORMING PHYSICIAN: Rayo Ghotra DO    MEDICATIONS: etomidate 20 mg intravenously and succinycholine 100 mg intravenously    DISCUSSION:  Nile Lacy is a 84 y.o.-year-old male who requires intubation and ventilatory support due to impending respiratory failure and airway protection.  The history and physical examination were reviewed and confirmed.      CONSENT: The patient provided verbal consent for this procedure.     PROCEDURE:  A timeout was initiated by the bedside nurse and was confirmed by those present.  The patient was placed in the appropriate position.  Cricoid pressure was not required.  Intubation was performed by direct laryngoscopy using a laryngoscope and a 7.5 cuffed endotracheal tube.  The cuff was then inflated and the tube was secured appropriately at a distance of 23 cm to the dental ridge.  Initial confirmation of placement included bilateral breath sounds, an end tidal CO2 detector, absence of sounds over the stomach, tube fogging, and adequate chest rise.  A chest x-ray to verify correct placement of the tube showed appropriate tube position.    The patient tolerated the procedure well.     COMPLICATIONS:  None     Rayo Ghotra DO  1:59 PM, 7/26/25      CONSULTS:  IP

## 2025-07-26 NOTE — BRIEF OP NOTE
Brief Postoperative Note      Patient: Nile Lacy  YOB: 1940  MRN: [id number]    Date of Procedure: 7/26/2025    Pre-Op Diagnosis Codes:      * STEMI (ST elevation myocardial infarction) ([facility]) [I21.3]    Post-Op Diagnosis: PCI to SVG to OM       Procedure(s):  Left heart cath / coronary angiography  Ultrasound guided vascular access  Percutaneous coronary intervention    Surgeon(s):  Maria Dolores Roth MD    Assistant:  * No surgical staff found *    Anesthesia: * No anesthesia type entered *    Estimated Blood Loss (mL): Minimal    Complications: None    Specimens:   * No specimens in log *    Implants:  Implant Name Type Inv. Item Serial No.  Lot No. LRB No. Used Action   STENT CORONARY GEM FRONTIER RX 2.25X15 MM ZOTAROLIMUS ELUTE - [id number] Coronary stents STENT CORONARY GEM FRONTIER RX 2.25X15 MM ZOTAROLIMUS ELUTE  [employer] [id number] N/A 1 Implanted   STENT CORONARY GEM FRONTIER RX 3.5X22 MM ZOTAROLIMUS ELUT - [id number] Coronary stents STENT CORONARY GEM FRONTIER RX 3.5X22 MM ZOTAROLIMUS ELUT  [employer]WD [id number] N/A 1 Implanted         Drains:   NG/OG/NJ/NE Tube (Active)       Urinary Catheter 07/26/25 Moreno-Temperature (Active)   $ Urethral catheter insertion Inserted for procedure 07/26/25 1251   Catheter Indications Need for fluid volume management of the critically ill patient in a critical care setting 07/26/25 1251   Site Assessment Pink;Pale 07/26/25 1251       [REMOVED] NG/OG/NJ/NE Tube Nasogastric 16 fr Right nostril (Removed)   Surrounding Skin Clean, dry & intact 07/26/25 1253   Securement device Tape 07/26/25 1253   Status Clamped 07/26/25 1253       Findings:    Critical and thrombotic 99% occlusion at the insertion site of SVG to OM s/p successful PCI in the OM branch with 2.25 x 15 mm and to distal SVG with 3.5 x 22 mm drug-eluting [place] stents.    Patent SVG to diagonal branch with retrograde flow into the LAD

## 2025-07-26 NOTE — ED PROVIDER NOTES
[facility] EMERGENCY DEPARTMENT     Emergency Department     Faculty Attestation        I performed a history and physical examination of the patient and discussed management with the resident. I reviewed the resident’s note and agree with the documented findings and plan of care. Any areas of disagreement are noted on the chart. I was personally present for the key portions of any procedures. I have documented in the chart those procedures where I was not present during the key portions. I have reviewed the emergency nurses triage note. I agree with the chief complaint, past medical history, past surgical history, allergies, medications, social and family history as documented unless otherwise noted below.    For mid-level providers such as nurse practitioners as well as physicians assistants:    I have personally seen and evaluated the patient.    I find the patient's history and physical exam are consistent with NP/PA documentation.  I agree with the care provided, treatment rendered, disposition, & follow-up plan.     Additional findings are as noted.    Vital Signs: BP (!) 76/52   Pulse (!) 47   Temp 97.2 °F (36.2 °C) (Axillary)   Resp 18   Wt 89.8 kg (198 lb)   SpO2 99%   BMI 31.96 kg/m²   PCP:  Dewey Vera MD  Note Started: 12:33 PM EDT    Pertinent Comments:     Patient is an 84-year-old male presents via EMS for concerns of inferior STEMI.    At time of arrival patient was bradycardic in the 30s, diaphoretic, complaining of midsternal chest pain.    Patient was placed on lifepack.  The second IV access was obtained.  Patient was given 1 mg atropine. EKG showed ST elevations in leads II, III and aVF, V5 and V6.  Reciprocal depressions noted in aVL.  They have unable to show ST depressions with upright T wave suggesting possible posterior involvement.    The patient did have some improvement with the atropine and heart rate went up to the 40s.

## 2025-07-26 NOTE — H&P
Cardiology History and physical   Tamiko Messina MD [facility]  Maria Dolores Roth MD [facility]  Isi Portillo, CNP      Reason for Consult:  chest pain with inferior STEMI  Requesting Physician: Garrick Francis MD    CHIEF COMPLAINT: Chest pain with inferior STEMI      HISTORY OF PRESENT ILLNESS:    This is an 84-year-old gentleman with history of atherosclerotic CAD status post CABG in 2008, dyslipidemia, essential hypertension, TIA, paroxysmal atrial fibrillation, obesity with obstructive sleep apnea but not on CPAP, dementia, generalized osteoarthritis with chronic back pain, depression/anxiety, lower GI bleed s/p polypectomy and GERD was brought to emergency room via EMS with acute onset of chest pain associated with diaphoresis and shortness of breath.  He was noted to have atrial fibrillation with slow ventricular response in 30s and acute inferior wall STEMI with reciprocal changes in the lateral leads.    I met with the emergency room team personally in the ER. Patient was hypotensive with heart rate in 30s and underlying atrial fibrillation rhythm. He was complaining of severe crushing chest discomfort. He was becoming obtunded with hypoxia and needed to be stabilized in the emergency room with intubation and mechanical ventilation. He was started on IV hydration, dopamine drip and norepinephrine drip for hemodynamic support. He was given 5000 heparin bolus in the emergency room and with that ST elevation in the inferior leads somewhat resolved and hemodynamics improved.    We performed emergent cardiac catheterization and during coronary angiography he went into V-fib cardiac arrest requiring defibrillation's x 2 with successful restoration of sinus rhythm.  The SVG to OM had a filling defect noted at the insertion site to the OM branch with critical 99% stenosis which appeared to be the culprit of patient's presentation and was successfully intervened with 2.25 x 15 mm stent to native OM and a

## 2025-07-26 NOTE — PROGRESS NOTES
[facility]     Emergency/Trauma Note    PATIENT NAME: Nile Lacy    Shift date: 07/26/2025  Shift day: Saturday   Shift # 1    Room # Cath Pool Room/PL     Name: Nile Lacy            Age: 84 y.o.  Gender: male          Restorationist: Other   Place of Gnosticism:     Trauma/Incident type: Stemi Alert  Admit Date & Time: 7/26/2025 12:09 PM  TRAUMA NAME: None     ADVANCE DIRECTIVES IN CHART?  No    NAME OF DECISION MAKER:     RELATIONSHIP OF DECISION MAKER TO PATIENT:     PATIENT/EVENT DESCRIPTION:  Nile Lacy is a 84 y.o. male who arrived via ground ambulance as stemi alert. Patient to be admitted to Cath Pool Room/PL.       SPIRITUAL ASSESSMENT-INTERVENTION-OUTCOME:  No spiritual assessment was carried out because patient was having a rough time. Family arrived later and  took them to Cath Lab family waiting room, where the doctor updated them before the procedure.  maintained supportive presence, prayed with family and reassured them that patient was in good hands. Family expressed appreciation for the spiritual and emotional support they received.      PATIENT BELONGINGS:  This  did not handle patient's belongings.     ANY BELONGINGS OF SIGNIFICANT VALUE NOTED:  Unknown    REGISTRATION STAFF NOTIFIED?  Yes    WHAT IS YOUR SPIRITUAL CARE PLAN FOR THIS PATIENT?:   Follow up visits recommended for ongoing assessment of patient's condition and for more prayers and support.     Electronically signed by Chaplain Zackery, on 7/26/2025 at 3:01 PM.  [facility]  [phone]

## 2025-07-26 NOTE — ED NOTES
Pt. Arrives to ED via [facility] from home for c/o chest pain.  Pt. Was found outside after approximately 20 minutes to be diaphoretic and c/o chest pain.  EMS submitted EKG x2 reflecting STEMI.  EMS gave 1 nitro, pt. Hypotensive, diaphoretic.

## 2025-07-27 LAB
ALBUMIN SERPL-MCNC: 3.4 G/DL (ref 3.5–5.2)
ALBUMIN/GLOB SERPL: 1.5 {RATIO} (ref 1–2.5)
ALP SERPL-CCNC: 55 U/L (ref 40–129)
ALT SERPL-CCNC: 23 U/L (ref 10–50)
ANION GAP SERPL CALCULATED.3IONS-SCNC: 10 MMOL/L (ref 9–16)
AST SERPL-CCNC: 97 U/L (ref 10–50)
BASOPHILS # BLD: 0.07 K/UL (ref 0–0.2)
BASOPHILS NFR BLD: 1 % (ref 0–2)
BILIRUB SERPL-MCNC: 0.6 MG/DL (ref 0–1.2)
BUN SERPL-MCNC: 13 MG/DL (ref 8–23)
CALCIUM SERPL-MCNC: 9.5 MG/DL (ref 8.6–10.4)
CHLORIDE SERPL-SCNC: 111 MMOL/L (ref 98–107)
CO2 SERPL-SCNC: 20 MMOL/L (ref 20–31)
CREAT SERPL-MCNC: 0.7 MG/DL (ref 0.7–1.2)
EKG ATRIAL RATE: 113 BPM
EKG P AXIS: 78 DEGREES
EKG P-R INTERVAL: 214 MS
EKG Q-T INTERVAL: 394 MS
EKG QRS DURATION: 92 MS
EKG QTC CALCULATION (BAZETT): 540 MS
EKG R AXIS: 96 DEGREES
EKG T AXIS: 15 DEGREES
EKG VENTRICULAR RATE: 113 BPM
EOSINOPHIL # BLD: 0.29 K/UL (ref 0–0.44)
EOSINOPHILS RELATIVE PERCENT: 3 % (ref 1–4)
ERYTHROCYTE [DISTWIDTH] IN BLOOD BY AUTOMATED COUNT: 12.8 % (ref 11.8–14.4)
GFR, ESTIMATED: >90 ML/MIN/1.73M2
GLUCOSE BLD-MCNC: 167 MG/DL (ref 74–100)
GLUCOSE SERPL-MCNC: 166 MG/DL (ref 74–99)
HCT VFR BLD AUTO: 42.7 % (ref 40.7–50.3)
HGB BLD-MCNC: 14.5 G/DL (ref 13–17)
IMM GRANULOCYTES # BLD AUTO: 0.04 K/UL (ref 0–0.3)
IMM GRANULOCYTES NFR BLD: 0 %
LYMPHOCYTES NFR BLD: 1.33 K/UL (ref 1.1–3.7)
LYMPHOCYTES RELATIVE PERCENT: 13 % (ref 24–43)
MAGNESIUM SERPL-MCNC: 2 MG/DL (ref 1.6–2.4)
MCH RBC QN AUTO: 30.8 PG (ref 25.2–33.5)
MCHC RBC AUTO-ENTMCNC: 34 G/DL (ref 28.4–34.8)
MCV RBC AUTO: 90.7 FL (ref 82.6–102.9)
MONOCYTES NFR BLD: 1.02 K/UL (ref 0.1–1.2)
MONOCYTES NFR BLD: 10 % (ref 3–12)
MRSA, DNA, NASAL: NEGATIVE
NEGATIVE BASE EXCESS, ART: 1.3 MMOL/L (ref 0–2)
NEUTROPHILS NFR BLD: 73 % (ref 36–65)
NEUTS SEG NFR BLD: 7.51 K/UL (ref 1.5–8.1)
NRBC BLD-RTO: 0 PER 100 WBC
PARTIAL THROMBOPLASTIN TIME: 119.3 SEC (ref 23–36.5)
PATIENT TEMP: 37.3
PLATELET # BLD AUTO: 153 K/UL (ref 138–453)
PMV BLD AUTO: 10.1 FL (ref 8.1–13.5)
POC HCO3: 22 MMOL/L (ref 21–28)
POC O2 SATURATION: 99.2 % (ref 94–98)
POC PCO2 TEMP: 32.8 MM HG
POC PCO2: 32.4 MM HG (ref 35–48)
POC PH TEMP: 7.44
POC PH: 7.44 (ref 7.35–7.45)
POC PO2 TEMP: 135.3 MM HG
POC PO2: 133.5 MM HG (ref 83–108)
POTASSIUM SERPL-SCNC: 3.6 MMOL/L (ref 3.7–5.3)
PROT SERPL-MCNC: 5.7 G/DL (ref 6.6–8.7)
RBC # BLD AUTO: 4.71 M/UL (ref 4.21–5.77)
SAMPLE SITE: ABNORMAL
SODIUM SERPL-SCNC: 141 MMOL/L (ref 136–145)
SPECIMEN DESCRIPTION: NORMAL
WBC OTHER # BLD: 10.3 K/UL (ref 3.5–11.3)

## 2025-07-27 PROCEDURE — 82947 ASSAY GLUCOSE BLOOD QUANT: CPT

## 2025-07-27 PROCEDURE — 99232 SBSQ HOSP IP/OBS MODERATE 35: CPT | Performed by: INTERNAL MEDICINE

## 2025-07-27 PROCEDURE — 82803 BLOOD GASES ANY COMBINATION: CPT

## 2025-07-27 PROCEDURE — 6360000002 HC RX W HCPCS

## 2025-07-27 PROCEDURE — 6370000000 HC RX 637 (ALT 250 FOR IP)

## 2025-07-27 PROCEDURE — 85730 THROMBOPLASTIN TIME PARTIAL: CPT

## 2025-07-27 PROCEDURE — 85025 COMPLETE CBC W/AUTO DIFF WBC: CPT

## 2025-07-27 PROCEDURE — 37799 UNLISTED PX VASCULAR SURGERY: CPT

## 2025-07-27 PROCEDURE — 2500000003 HC RX 250 WO HCPCS: Performed by: INTERNAL MEDICINE

## 2025-07-27 PROCEDURE — 6370000000 HC RX 637 (ALT 250 FOR IP): Performed by: INTERNAL MEDICINE

## 2025-07-27 PROCEDURE — 2000000000 HC ICU R&B

## 2025-07-27 PROCEDURE — 83735 ASSAY OF MAGNESIUM: CPT

## 2025-07-27 PROCEDURE — 2700000000 HC OXYGEN THERAPY PER DAY

## 2025-07-27 PROCEDURE — 6360000002 HC RX W HCPCS: Performed by: INTERNAL MEDICINE

## 2025-07-27 PROCEDURE — 2500000003 HC RX 250 WO HCPCS

## 2025-07-27 PROCEDURE — 93010 ELECTROCARDIOGRAM REPORT: CPT | Performed by: INTERNAL MEDICINE

## 2025-07-27 PROCEDURE — 80053 COMPREHEN METABOLIC PANEL: CPT

## 2025-07-27 PROCEDURE — 94761 N-INVAS EAR/PLS OXIMETRY MLT: CPT

## 2025-07-27 PROCEDURE — 94003 VENT MGMT INPAT SUBQ DAY: CPT

## 2025-07-27 PROCEDURE — 2580000003 HC RX 258: Performed by: INTERNAL MEDICINE

## 2025-07-27 PROCEDURE — 3E0G76Z INTRODUCTION OF NUTRITIONAL SUBSTANCE INTO UPPER GI, VIA NATURAL OR ARTIFICIAL OPENING: ICD-10-PCS | Performed by: INTERNAL MEDICINE

## 2025-07-27 PROCEDURE — 99291 CRITICAL CARE FIRST HOUR: CPT | Performed by: INTERNAL MEDICINE

## 2025-07-27 RX ORDER — ASPIRIN 81 MG/1
81 TABLET, CHEWABLE ORAL DAILY
Status: DISCONTINUED | OUTPATIENT
Start: 2025-07-27 | End: 2025-07-31

## 2025-07-27 RX ORDER — ACETAMINOPHEN 325 MG/1
650 TABLET ORAL EVERY 4 HOURS PRN
Status: DISCONTINUED | OUTPATIENT
Start: 2025-07-27 | End: 2025-07-29

## 2025-07-27 RX ADMIN — ASPIRIN 81 MG: 81 TABLET, CHEWABLE ORAL at 07:59

## 2025-07-27 RX ADMIN — Medication 50 MCG/HR: at 23:59

## 2025-07-27 RX ADMIN — PROPOFOL 15 MCG/KG/MIN: 10 INJECTION, EMULSION INTRAVENOUS at 15:06

## 2025-07-27 RX ADMIN — ACETAMINOPHEN 650 MG: 325 TABLET ORAL at 18:00

## 2025-07-27 RX ADMIN — CLOPIDOGREL BISULFATE 75 MG: 75 TABLET, FILM COATED ORAL at 07:48

## 2025-07-27 RX ADMIN — SODIUM CHLORIDE: 0.9 INJECTION, SOLUTION INTRAVENOUS at 04:45

## 2025-07-27 RX ADMIN — DOPAMINE HYDROCHLORIDE 5 MCG/KG/MIN: 160 INJECTION, SOLUTION INTRAVENOUS at 12:34

## 2025-07-27 RX ADMIN — ATORVASTATIN CALCIUM 40 MG: 40 TABLET, FILM COATED ORAL at 21:13

## 2025-07-27 RX ADMIN — SODIUM CHLORIDE, PRESERVATIVE FREE 20 ML: 5 INJECTION INTRAVENOUS at 07:49

## 2025-07-27 RX ADMIN — PROPOFOL 35 MCG/KG/MIN: 10 INJECTION, EMULSION INTRAVENOUS at 06:25

## 2025-07-27 RX ADMIN — EZETIMIBE 10 MG: 10 TABLET ORAL at 21:13

## 2025-07-27 RX ADMIN — PROPOFOL 20 MCG/KG/MIN: 10 INJECTION, EMULSION INTRAVENOUS at 22:25

## 2025-07-27 RX ADMIN — POTASSIUM BICARBONATE 40 MEQ: 782 TABLET, EFFERVESCENT ORAL at 07:47

## 2025-07-27 RX ADMIN — PROPOFOL 40 MCG/KG/MIN: 10 INJECTION, EMULSION INTRAVENOUS at 02:16

## 2025-07-27 RX ADMIN — PANTOPRAZOLE SODIUM 40 MG: 40 INJECTION, POWDER, FOR SOLUTION INTRAVENOUS at 07:47

## 2025-07-27 RX ADMIN — SODIUM CHLORIDE, PRESERVATIVE FREE 10 ML: 5 INJECTION INTRAVENOUS at 21:14

## 2025-07-27 ASSESSMENT — PULMONARY FUNCTION TESTS
PIF_VALUE: 18
PIF_VALUE: 19
PIF_VALUE: 16
PIF_VALUE: 19
PIF_VALUE: 19
PIF_VALUE: 18
PIF_VALUE: 18
PIF_VALUE: 20
PIF_VALUE: 18
PIF_VALUE: 17
PIF_VALUE: 18
PIF_VALUE: 16
PIF_VALUE: 20
PIF_VALUE: 18
PIF_VALUE: 20
PIF_VALUE: 18
PIF_VALUE: 17
PIF_VALUE: 19
PIF_VALUE: 20
PIF_VALUE: 18
PIF_VALUE: 20
PIF_VALUE: 21
PIF_VALUE: 20
PIF_VALUE: 19
PIF_VALUE: 18

## 2025-07-27 NOTE — PROCEDURES
PROCEDURE NOTE - CENTRAL VENOUS LINE PLACEMENT    PATIENT NAME: Nile Lacy  MEDICAL RECORD NO. [id number]  DATE: 7/26/2025      PREOPERATIVE DIAGNOSIS:  vascular access and centrally administered medications  POSTOPERATIVE DIAGNOSIS:  Same  PROCEDURE PERFORMED:  Left Femoral Vein Central Line Insertion  PERFORMING PHYSICIAN: Bernadine Ramsey MD  ANESTHESIA:  Local utilizing 1% lidocaine  ESTIMATED BLOOD LOSS:  Less than 25 ml  COMPLICATIONS:  None immediately appreciated.     DISCUSSION:  Nile Lacy is a 84 y.o.-year-old male who requires central IV access vascular access and centrally administered medications. The history and physical examination were reviewed and confirmed.      CONSENT: Unable to be obtained due to patient's condition.     PROCEDURE:  A timeout was initiated by the bedside nurse and was confirmed by those present.  The patient was placed in a supine position. The skin overlying the Left Femoral Vein was prepped with chlorhexadine and draped in sterile fashion. The skin was infiltrated with local anesthetic. The vessel and surrounding anatomy was visualized using ultrasound. Through the anesthetized region, the introducer needle was inserted into the femoral vein returning dark red non pulsatile blood. A guidewire was placed through the center of the needle with no resistance. Ultrasound confirmed presence of wire in the vein. A small incision made in the skin with a #11 scalpel blade. The dilator was inserted into the skin and vein over guidewire using Seldinger technique. The dilator was then removed and the 12F 20cm catheter was placed in the vein over the guidewire using Seldinger technique. The guidewire was then removed and all ports aspirated and flushed appropriately. The catheter then secured using silk suture and a temporary sterile dressing was applied.  No immediate complication was evident.  All sponge, instrument and needle counts were correct at the completion of the

## 2025-07-27 NOTE — CARE COORDINATION
Case Management Assessment  Initial Evaluation    Date/Time of Evaluation: 7/27/2025 12:21 PM  Assessment Completed by: Evelia Chapman RN    If patient is discharged prior to next notation, then this note serves as note for discharge by case management.    Patient Name: Nile Lacy                   YOB: 1940  Diagnosis: Bradycardia [R00.1]  STEMI (ST elevation myocardial infarction) (HCC) [I21.3]  Acute ST elevation myocardial infarction (STEMI), unspecified artery (HCC) [I21.3]                   Date / Time: 7/26/2025 12:09 PM    Patient Admission Status: Inpatient   Readmission Risk (Low < 19, Mod (19-27), High > 27): Readmission Risk Score: 21.1    Current PCP: Dewey Vera MD  PCP verified by CM? (P) Yes (Dewey Vera)    Chart Reviewed: Yes      History Provided by: (P) Child/Family (dtr Maru and dtr Ryanne)  Patient Orientation: (P) Other (see comment) (I/S)    Patient Cognition: (P) Dementia / Early Alzheimer's (I/S)    Hospitalization in the last 30 days (Readmission):  Yes    If yes, Readmission Assessment in CM Navigator will be completed.    Advance Directives:      Code Status: Full Code   Patient's Primary Decision Maker is: (P) Legal Next of Kin (, has 3 children)    Primary Decision Maker: Maru Mendez - Child - [phone]    Primary Decision Maker: Ryanne Russell - Child - [phone]    Primary Decision Maker: Oj Lacy - Child - [phone]    Discharge Planning:    Patient lives with: (P) Alone Type of Home: (P) House  Primary Care Giver: (P) Self  Patient Support Systems include: (P) Children, Family Members   Current Financial resources: (P) Medicare  Current community resources: (P) None  Current services prior to admission: (P) Durable Medical Equipment            Current DME: (P) Walker            Type of Home Care services:  (P) None    ADLS  Prior functional level: (P) Independent in ADLs/IADLs  Current functional level: (P) Assistance with the

## 2025-07-27 NOTE — PROGRESS NOTES
81 mg Per NG tube Daily    ondansetron  4 mg IntraVENous Once    sodium chloride flush  5-40 mL IntraVENous 2 times per day    clopidogrel  75 mg Oral Daily    atorvastatin  40 mg Oral Nightly    ezetimibe  10 mg Oral Nightly    pantoprazole  40 mg IntraVENous Daily     Continuous Infusions:   sodium chloride 10 mL/hr at 07/27/25 1353    norepinephrine 1 mcg/min (07/27/25 1353)    propofol 15 mcg/kg/min (07/27/25 1353)    fentaNYL 50 mcg/hr (07/27/25 1353)    DOPamine 5 mcg/kg/min (07/27/25 1353)    heparin (PORCINE) Infusion Stopped (07/27/25 1313)     PRN Meds:   sodium chloride flush, 5-40 mL, PRN  sodium chloride, , PRN  heparin (porcine), 4,000 Units, PRN  heparin (porcine), 2,000 Units, PRN          ABGs:   No results found for: \"PHART\", \"PH\", \"WKX3WBC\", \"PCO2\", \"PO2ART\", \"PO2\", \"GHY4NKW\", \"HCO3\", \"BEART\", \"BE\", \"THGBART\", \"THB\", \"OQN4WOG\", \"H8EQUIKR\", \"O2SAT\", \"FIO2\"    DATA:  Complete Blood Count:   Recent Labs     07/26/25  1222 07/26/25  1609 07/27/25  0425   WBC 7.8 7.7 10.3   RBC 4.87 5.37 4.71   HGB 14.9 16.9 14.5   HCT 45.1 48.7 42.7   MCV 92.6 90.7 90.7   MCH 30.6 31.5 30.8   MCHC 33.0 34.7 34.0   RDW 12.8 12.7 12.8    166 153   MPV 9.7 9.8 10.1        Last 3 Blood Glucose:   Recent Labs     07/26/25  1609 07/27/25  0425   GLUCOSE 194* 166*        PT/INR:    Lab Results   Component Value Date/Time    PROTIME 14.5 07/26/2025 12:22 PM    PROTIME 33.3 03/19/2019 10:16 AM    INR 1.1 07/26/2025 12:22 PM     PTT:    Lab Results   Component Value Date/Time    APTT 119.3 07/27/2025 05:35 AM       Comprehensive Metabolic Profile:   Recent Labs     07/26/25  1555 07/26/25  1609 07/27/25  0425   NA  --  140 141   K  --  3.4* 3.6*   CL  --  109* 111*   CO2  --  18* 20   BUN  --  18 13   CREATININE 0.8 0.9 0.7   GLUCOSE  --  194* 166*   CALCIUM  --  9.9 9.5   BILITOT  --  1.5* 0.6   ALKPHOS  --  67 55   AST  --  111* 97*   ALT  --  25 23      Magnesium:   Lab Results   Component Value Date/Time    MG 2.0  07/27/2025 04:25 AM    MG 1.9 07/26/2025 04:09 PM    MG 2.0 05/12/2025 02:18 PM     Phosphorus:   Lab Results   Component Value Date/Time    PHOS 2.9 03/21/2019 07:12 PM     Ionized Calcium:   Lab Results   Component Value Date/Time    CAION 1.25 07/26/2025 04:09 PM    CAION 1.28 03/21/2019 07:12 PM        Urinalysis:   Lab Results   Component Value Date/Time    NITRU NEGATIVE 05/12/2025 04:12 PM    COLORU Yellow 05/12/2025 04:12 PM    PHUR 6.5 05/12/2025 04:12 PM    PHUR 6.0 03/20/2024 10:05 PM    WBCUA TOO NUMEROUS TO COUNT 03/20/2024 10:05 PM    RBCUA 0 TO 2 03/20/2024 10:05 PM    MUCUS NOT REPORTED 05/27/2021 06:16 PM    TRICHOMONAS NOT REPORTED 05/27/2021 06:16 PM    YEAST NOT REPORTED 05/27/2021 06:16 PM    BACTERIA MANY 03/20/2024 10:05 PM    LEUKOCYTESUR NEGATIVE 05/12/2025 04:12 PM    UROBILINOGEN Normal 05/12/2025 04:12 PM    BILIRUBINUR NEGATIVE 05/12/2025 04:12 PM    GLUCOSEU NEGATIVE 05/12/2025 04:12 PM    KETUA NEGATIVE 05/12/2025 04:12 PM    AMORPHOUS NOT REPORTED 05/27/2021 06:16 PM       HgBA1c:    Lab Results   Component Value Date/Time    LABA1C 5.6 06/30/2025 05:44 AM     TSH:    Lab Results   Component Value Date/Time    TSH 2.09 05/13/2025 05:50 AM       Lactic Acid: No results found for: \"LACTA\"   Troponin: No results for input(s): \"TROPONINI\" in the last 72 hours.    Electrocardiogram:   Sinus tachycardia with a first-degree AV block with occasional premature ventricular complexes  Rightward axis  ST and T wave abnormality  Prolonged QT    Last Echocardiogram findings (5/14/2025):   (See actual reports for details)  Left Ventricle Normal left ventricular systolic function with a visually estimated EF of 55 - 60%. Left ventricle size is normal. Increased wall thickness. Findings consistent with mild concentric hypertrophy. Normal wall motion. Indeterminate diastolic function due to arrhythmia.   Left Atrium Left atrium is moderately dilated.   Interatrial Septum No interatrial shunt visualized

## 2025-07-27 NOTE — PROGRESS NOTES
Cardiology progress note        Date:  7/27/2025  Patient: Nile Lacy  Admission:  7/26/2025 12:09 PM  Admit DX: Bradycardia [R00.1]  STEMI (ST elevation myocardial infarction) (HCC) [I21.3]  Acute ST elevation myocardial infarction (STEMI), unspecified artery (HCC) [I21.3]  Age:  84 y.o., 1940     LOS: 1 day     Reason for evaluation:   Acute myocardial infarction      SUBJECTIVE:     The patient was seen and examined. Notes and labs reviewed.    Patient remains intubated and sedated however is hemodynamically stable    Right femoral arterial sheath will be removed today and heparin can be resumed in 6 hours without bolus    OBJECTIVE:      EXAM:   Vitals:    VITALS:  BP (!) 116/49   Pulse 76   Temp 99.7 °F (37.6 °C)   Resp 18   Wt 89.8 kg (198 lb)   SpO2 100%   BMI 31.96 kg/m²    24HR INTAKE/OUTPUT:    Intake/Output Summary (Last 24 hours) at 7/27/2025 1202  Last data filed at 7/27/2025 1200  Gross per 24 hour   Intake 3336.27 ml   Output 2882 ml   Net 454.27 ml       General appearance: sedated  HEENT: intubated  Lungs: coarse breathing bilaterally  Heart: regular rate and rhythm, S1, S2, 2/6 systolic murmur  Abdomen: soft, non-tender; bowel sounds active  Extremities: no significant lower extremity edema, right femoral arterial sheath in place  Skin: warm and dry    Current Inpatient Medications:   aspirin  81 mg Per NG tube Daily    ondansetron  4 mg IntraVENous Once    sodium chloride flush  5-40 mL IntraVENous 2 times per day    clopidogrel  75 mg Oral Daily    atorvastatin  40 mg Oral Nightly    ezetimibe  10 mg Oral Nightly    pantoprazole  40 mg IntraVENous Daily       IV Infusions (if any):   sodium chloride 10 mL/hr at 07/27/25 0912    norepinephrine 4 mcg/min (07/27/25 0912)    propofol Stopped (07/27/25 0857)    fentaNYL Stopped (07/27/25 0857)    DOPamine 5 mcg/kg/min (07/27/25 0912)    heparin (PORCINE) Infusion 8 Units/kg/hr (07/27/25 0912)       Diagnostics:  Circumflex: Left circumflex coronary artery is a nondominant vessel which is totally occluded after a small 1st obtuse marginal which is subtotally occluded. Saphenous vein graft to obtuse marginal is widely patent without insertional stenosis. There is a non flow limiting valve noted in the mid body of the graft.   Right Coronary Artery: Right coronary artery is a dominant vessel which is totally occluded. Saphenous vein graft the posterior descending artery is widely patent without insertional stenosis.     Labs:   CBC:  Recent Labs     07/26/25  1609 07/27/25  0425   WBC 7.7 10.3   HGB 16.9 14.5   HCT 48.7 42.7    153     Magnesium:  Recent Labs     07/26/25  1609 07/27/25  0425   MG 1.9 2.0     BMP:  Recent Labs     07/26/25  1609 07/27/25  0425    141   K 3.4* 3.6*   CALCIUM 9.9 9.5   CO2 18* 20   BUN 18 13   CREATININE 0.9 0.7   LABGLOM 85 >90   GLUCOSE 194* 166*     BNP:No results for input(s): \"BNP\", \"PROBNP\" in the last 72 hours.  PT/INR:  Recent Labs     07/26/25  1222   PROTIME 14.5   INR 1.1     APTT:  Recent Labs     07/26/25 2034 07/27/25  0535   APTT 37.8* 119.3*     CARDIAC ENZYMES:  Recent Labs     07/26/25  1222   TROPHS 43*     FASTING LIPID PANEL:      Component Value Date/Time    CHOL 157 06/30/2025 0544    HDL 39 (L) 06/30/2025 0544     (H) 06/30/2025 0544    TRIG 82 06/30/2025 0544     LIVER PROFILE:  Recent Labs     07/26/25  1609 07/27/25  0425   * 97*   ALT 25 23   ALKPHOS 67 55   BILITOT 1.5* 0.6   ALBUMIN 4.0 3.4*     TSH: 5/13/2025; 2.09    HGA1C:    Recent Labs     06/30/25  0544   LABA1C 5.6        Impression:   Patient Active Problem List:     PAF (paroxysmal atrial fibrillation) (HCC)     Chest pain     Rectal bleeding     Lower GI bleed     Morbid obesity (HCC)     S/P colonoscopy with polypectomy     Elevated INR     Lung nodule seen on imaging study     Coronary artery disease involving coronary bypass graft of native heart without angina pectoris

## 2025-07-27 NOTE — PLAN OF CARE
Problem: Pain  Goal: Verbalizes/displays adequate comfort level or baseline comfort level  Outcome: Progressing     Problem: Discharge Planning  Goal: Discharge to home or other facility with appropriate resources  Outcome: Progressing  Flowsheets (Taken 7/26/2025 2000)  Discharge to home or other facility with appropriate resources: Identify barriers to discharge with patient and caregiver     Problem: Safety - Adult  Goal: Free from fall injury  Outcome: Progressing     Problem: Safety - Medical Restraint  Goal: Remains free of injury from restraints (Restraint for Interference with Medical Device)  Description: INTERVENTIONS:  1. Determine that other, less restrictive measures have been tried or would not be effective before applying the restraint  2. Evaluate the patient's condition at the time of restraint application  3. Inform patient/family regarding the reason for restraint  4. Q2H: Monitor safety, psychosocial status, comfort, nutrition and hydration  Outcome: Progressing  Flowsheets  Taken 7/27/2025 0200  Remains free of injury from restraints (restraint for interference with medical device): Determine that other, less restrictive measures have been tried or would not be effective before applying the restraint  Taken 7/27/2025 0000  Remains free of injury from restraints (restraint for interference with medical device): Determine that other, less restrictive measures have been tried or would not be effective before applying the restraint  Taken 7/26/2025 2200  Remains free of injury from restraints (restraint for interference with medical device): Determine that other, less restrictive measures have been tried or would not be effective before applying the restraint  Taken 7/26/2025 2000  Remains free of injury from restraints (restraint for interference with medical device): Determine that other, less restrictive measures have been tried or would not be effective before applying the restraint

## 2025-07-27 NOTE — PLAN OF CARE
Problem: Respiratory - Adult  Goal: Achieves optimal ventilation and oxygenation  Outcome: Progressing     MECHANICAL VENTILATION     [x]  PROVIDE OPTIMAL VENTILATION  [x]   ASSESS FOR EXTUBATION READINESS  [x]   ASSESS FOR WEANING READINESS  [x]  EXTUBATE AS TOLERATED  [x]  IMPLEMENT ADULT MECHANICAL VENTILATION PROTOCOL  [x]  MAINTAIN ADEQUATE OXYGENATION  [x]  PERFORM SPONTANEOUS WEANING TRIAL AS TOLERATED    PROVIDE ADEQUATE OXYGENATION WITH ACCEPTABLE SP02/ABG'S    [x]  IDENTIFY APPROPRIATE OXYGEN THERAPY  [x]   MONITOR SP02/ABG'S AS NEEDED   [x]   PATIENT EDUCATION AS NEEDED

## 2025-07-27 NOTE — CARE COORDINATION
07/27/25 1215   Readmission Assessment   Number of Days since last admission? 8-30 days   Previous Disposition Home Alone   Who is being Interviewed Caregiver  (dtr Maru and Ryanne)   What was the patient's/caregiver's perception as to why they think they needed to return back to the hospital? Other (Comment)  (chest pain)   Did you visit your Primary Care Physician after you left the hospital, before you returned this time? Yes   Did you see a specialist, such as Cardiac, Pulmonary, Orthopedic Physician, etc. after you left the hospital? No   Does the patient report anything that got in the way of taking their medications? Yes   What reasons did they give? Other (Comment)  (dtrs relate he has dementia and hid his medications and could not remember where he put them, did not take them for a week)   In our efforts to provide the best possible care to you and others like you, can you think of anything that we could have done to help you after you left the hospital the first time, so that you might not have needed to return so soon? Other (Comment)  (no)        [No Acute Distress] : no acute distress [Normal Sclera/Conjunctiva] : normal sclera/conjunctiva [No JVD] : no jugular venous distention [No Respiratory Distress] : no respiratory distress  [No Accessory Muscle Use] : no accessory muscle use [Normal Rate] : normal rate  [Regular Rhythm] : with a regular rhythm [Soft] : abdomen soft [Non Tender] : non-tender [No HSM] : no HSM [No Spinal Tenderness] : no spinal tenderness [No Focal Deficits] : no focal deficits [Normal Gait] : normal gait [Alert and Oriented x3] : oriented to person, place, and time [de-identified] : calm and engaging [de-identified] : JOE [de-identified] : NO HERNIA appreciated on exam  Chaperoned exam by FRENCH [email or web address]

## 2025-07-27 NOTE — PROGRESS NOTES
Comprehensive Nutrition Assessment    Type and Reason for Visit:  Initial    Nutrition Recommendations/Plan:   Recommend initiate Standard without Fiber TF at trickle rate 15 mL/hr continuous + protein modular TID to provide 648 kcal, 74 gm protein.  Monitor progression of nutrition, weight, labs, GI status, fluid status, POC.     Malnutrition Assessment:  Malnutrition Status:  Insufficient data (07/27/25 1605)    Context:  Acute Illness       Nutrition Assessment:    83 yo M adm STEMI. PMHx reviewed. Weight fluctuations noted. Intubated and sedated at visit. On Dopamine, Levophed. +Fem sheath in place. Pt lying flat. Will provide recommendations for trickle TF, d/w RN.    Nutrition Related Findings:    Meds/labs reviewed. Active bowel sounds. Wound Type:  (Femoral puncture)       Current Nutrition Intake & Therapies:    Average Meal Intake: NPO  Average Supplements Intake: NPO  Diet NPO  Additional Calorie Sources:  Propofol at 8.1 mL/hr = 213 kcal/d    Anthropometric Measures:  Height: 167.6 cm (5' 5.98\")  Ideal Body Weight (IBW): 142 lbs (65 kg)    Current Body Weight: 89.8 kg (197 lb 15.6 oz), 139.4 % IBW. Weight Source: Stated  Current BMI (kg/m2): 32  BMI Categories: Obese Class 1 (BMI 30.0-34.9)    Estimated Daily Nutrient Needs:  Energy Requirements Based On: Kcal/kg  Weight Used for Energy Requirements: Current  Energy (kcal/day): 4854-6393 kcal/d  Weight Used for Protein Requirements: Ideal  Protein (g/day): 110-130 gm/d  Method Used for Fluid Requirements: Defer to provider  Fluid (ml/day): per provider    Nutrition Diagnosis:   Inadequate oral intake related to impaired respiratory function as evidenced by NPO or clear liquid status due to medical condition, intubation    Nutrition Interventions:   Food and/or Nutrient Delivery: Start Tube Feeding  Nutrition Education/Counseling: No recommendation at this time  Coordination of Nutrition Care: Continue to monitor while inpatient  Plan of Care discussed

## 2025-07-28 PROBLEM — I21.21 ST ELEVATION MYOCARDIAL INFARCTION INVOLVING LEFT CIRCUMFLEX CORONARY ARTERY (HCC): Status: ACTIVE | Noted: 2025-07-28

## 2025-07-28 LAB
ALBUMIN SERPL-MCNC: 3.1 G/DL (ref 3.5–5.2)
ALBUMIN/GLOB SERPL: 1.3 {RATIO} (ref 1–2.5)
ALLEN TEST: POSITIVE
ALP SERPL-CCNC: 55 U/L (ref 40–129)
ALT SERPL-CCNC: 18 U/L (ref 10–50)
ANION GAP SERPL CALCULATED.3IONS-SCNC: 12 MMOL/L (ref 9–16)
AST SERPL-CCNC: 51 U/L (ref 10–50)
BASOPHILS # BLD: 0.04 K/UL (ref 0–0.2)
BASOPHILS NFR BLD: 0 % (ref 0–2)
BILIRUB SERPL-MCNC: 1.1 MG/DL (ref 0–1.2)
BUN SERPL-MCNC: 11 MG/DL (ref 8–23)
CA-I BLD-SCNC: 1.26 MMOL/L (ref 1.13–1.33)
CALCIUM SERPL-MCNC: 9.5 MG/DL (ref 8.6–10.4)
CHLORIDE SERPL-SCNC: 110 MMOL/L (ref 98–107)
CO2 SERPL-SCNC: 19 MMOL/L (ref 20–31)
CREAT SERPL-MCNC: 0.8 MG/DL (ref 0.7–1.2)
EOSINOPHIL # BLD: 0.14 K/UL (ref 0–0.44)
EOSINOPHILS RELATIVE PERCENT: 1 % (ref 1–4)
ERYTHROCYTE [DISTWIDTH] IN BLOOD BY AUTOMATED COUNT: 13.2 % (ref 11.8–14.4)
FIO2: 30
GFR, ESTIMATED: 88 ML/MIN/1.73M2
GLUCOSE BLD-MCNC: 119 MG/DL (ref 74–100)
GLUCOSE SERPL-MCNC: 104 MG/DL (ref 74–99)
HCT VFR BLD AUTO: 41.8 % (ref 40.7–50.3)
HGB BLD-MCNC: 14.4 G/DL (ref 13–17)
IMM GRANULOCYTES # BLD AUTO: 0.04 K/UL (ref 0–0.3)
IMM GRANULOCYTES NFR BLD: 0 %
LYMPHOCYTES NFR BLD: 1.52 K/UL (ref 1.1–3.7)
LYMPHOCYTES RELATIVE PERCENT: 13 % (ref 24–43)
MAGNESIUM SERPL-MCNC: 1.8 MG/DL (ref 1.6–2.4)
MCH RBC QN AUTO: 31 PG (ref 25.2–33.5)
MCHC RBC AUTO-ENTMCNC: 34.4 G/DL (ref 28.4–34.8)
MCV RBC AUTO: 90.1 FL (ref 82.6–102.9)
MONOCYTES NFR BLD: 1.47 K/UL (ref 0.1–1.2)
MONOCYTES NFR BLD: 12 % (ref 3–12)
NEGATIVE BASE EXCESS, ART: 2 MMOL/L (ref 0–2)
NEUTROPHILS NFR BLD: 73 % (ref 36–65)
NEUTS SEG NFR BLD: 8.87 K/UL (ref 1.5–8.1)
NRBC BLD-RTO: 0 PER 100 WBC
PARTIAL THROMBOPLASTIN TIME: 115.3 SEC (ref 23–36.5)
PARTIAL THROMBOPLASTIN TIME: 41.7 SEC (ref 23–36.5)
PARTIAL THROMBOPLASTIN TIME: 54.9 SEC (ref 23–36.5)
PLATELET # BLD AUTO: ABNORMAL K/UL (ref 138–453)
PLATELET, FLUORESCENCE: 96 K/UL (ref 138–453)
PLATELETS.RETICULATED NFR BLD AUTO: 1.5 % (ref 1.1–10.3)
POC HCO3: 22 MMOL/L (ref 21–28)
POC O2 SATURATION: 94.8 % (ref 94–98)
POC PCO2: 34.3 MM HG (ref 35–48)
POC PH: 7.42 (ref 7.35–7.45)
POC PO2: 72.8 MM HG (ref 83–108)
POTASSIUM SERPL-SCNC: 4 MMOL/L (ref 3.7–5.3)
PROT SERPL-MCNC: 5.5 G/DL (ref 6.6–8.7)
RBC # BLD AUTO: 4.64 M/UL (ref 4.21–5.77)
SAMPLE SITE: ABNORMAL
SODIUM SERPL-SCNC: 141 MMOL/L (ref 136–145)
WBC OTHER # BLD: 12.1 K/UL (ref 3.5–11.3)

## 2025-07-28 PROCEDURE — 85055 RETICULATED PLATELET ASSAY: CPT

## 2025-07-28 PROCEDURE — 94761 N-INVAS EAR/PLS OXIMETRY MLT: CPT

## 2025-07-28 PROCEDURE — 6360000002 HC RX W HCPCS

## 2025-07-28 PROCEDURE — 6370000000 HC RX 637 (ALT 250 FOR IP)

## 2025-07-28 PROCEDURE — 82330 ASSAY OF CALCIUM: CPT

## 2025-07-28 PROCEDURE — 2000000000 HC ICU R&B

## 2025-07-28 PROCEDURE — 2580000003 HC RX 258

## 2025-07-28 PROCEDURE — 6360000002 HC RX W HCPCS: Performed by: INTERNAL MEDICINE

## 2025-07-28 PROCEDURE — 2580000003 HC RX 258: Performed by: INTERNAL MEDICINE

## 2025-07-28 PROCEDURE — 2500000003 HC RX 250 WO HCPCS

## 2025-07-28 PROCEDURE — 83735 ASSAY OF MAGNESIUM: CPT

## 2025-07-28 PROCEDURE — 99232 SBSQ HOSP IP/OBS MODERATE 35: CPT | Performed by: INTERNAL MEDICINE

## 2025-07-28 PROCEDURE — 6370000000 HC RX 637 (ALT 250 FOR IP): Performed by: INTERNAL MEDICINE

## 2025-07-28 PROCEDURE — 85730 THROMBOPLASTIN TIME PARTIAL: CPT

## 2025-07-28 PROCEDURE — 36600 WITHDRAWAL OF ARTERIAL BLOOD: CPT

## 2025-07-28 PROCEDURE — 82803 BLOOD GASES ANY COMBINATION: CPT

## 2025-07-28 PROCEDURE — 87070 CULTURE OTHR SPECIMN AEROBIC: CPT

## 2025-07-28 PROCEDURE — 36415 COLL VENOUS BLD VENIPUNCTURE: CPT

## 2025-07-28 PROCEDURE — 80053 COMPREHEN METABOLIC PANEL: CPT

## 2025-07-28 PROCEDURE — 82947 ASSAY GLUCOSE BLOOD QUANT: CPT

## 2025-07-28 PROCEDURE — 99291 CRITICAL CARE FIRST HOUR: CPT | Performed by: INTERNAL MEDICINE

## 2025-07-28 PROCEDURE — 94003 VENT MGMT INPAT SUBQ DAY: CPT

## 2025-07-28 PROCEDURE — 2500000003 HC RX 250 WO HCPCS: Performed by: INTERNAL MEDICINE

## 2025-07-28 PROCEDURE — 87205 SMEAR GRAM STAIN: CPT

## 2025-07-28 PROCEDURE — 85025 COMPLETE CBC W/AUTO DIFF WBC: CPT

## 2025-07-28 PROCEDURE — 2700000000 HC OXYGEN THERAPY PER DAY

## 2025-07-28 RX ORDER — MAGNESIUM SULFATE IN WATER 40 MG/ML
2000 INJECTION, SOLUTION INTRAVENOUS ONCE
Status: COMPLETED | OUTPATIENT
Start: 2025-07-28 | End: 2025-07-28

## 2025-07-28 RX ORDER — SODIUM CHLORIDE, SODIUM LACTATE, POTASSIUM CHLORIDE, AND CALCIUM CHLORIDE .6; .31; .03; .02 G/100ML; G/100ML; G/100ML; G/100ML
500 INJECTION, SOLUTION INTRAVENOUS ONCE
Status: COMPLETED | OUTPATIENT
Start: 2025-07-28 | End: 2025-07-28

## 2025-07-28 RX ADMIN — HEPARIN SODIUM 2000 UNITS: 1000 INJECTION INTRAVENOUS; SUBCUTANEOUS at 22:05

## 2025-07-28 RX ADMIN — Medication 5 MCG/MIN: at 00:05

## 2025-07-28 RX ADMIN — SODIUM CHLORIDE, PRESERVATIVE FREE 30 ML: 5 INJECTION INTRAVENOUS at 21:05

## 2025-07-28 RX ADMIN — SODIUM CHLORIDE, SODIUM LACTATE, POTASSIUM CHLORIDE, AND CALCIUM CHLORIDE 500 ML: .6; .31; .03; .02 INJECTION, SOLUTION INTRAVENOUS at 02:36

## 2025-07-28 RX ADMIN — PROPOFOL 20 MCG/KG/MIN: 10 INJECTION, EMULSION INTRAVENOUS at 06:03

## 2025-07-28 RX ADMIN — MAGNESIUM SULFATE HEPTAHYDRATE 2000 MG: 40 INJECTION, SOLUTION INTRAVENOUS at 09:25

## 2025-07-28 RX ADMIN — ASPIRIN 81 MG: 81 TABLET, CHEWABLE ORAL at 08:04

## 2025-07-28 RX ADMIN — PANTOPRAZOLE SODIUM 40 MG: 40 INJECTION, POWDER, FOR SOLUTION INTRAVENOUS at 08:04

## 2025-07-28 RX ADMIN — ATORVASTATIN CALCIUM 40 MG: 40 TABLET, FILM COATED ORAL at 21:05

## 2025-07-28 RX ADMIN — ACETAMINOPHEN 650 MG: 325 TABLET ORAL at 22:02

## 2025-07-28 RX ADMIN — SODIUM CHLORIDE, SODIUM LACTATE, POTASSIUM CHLORIDE, AND CALCIUM CHLORIDE 500 ML: .6; .31; .03; .02 INJECTION, SOLUTION INTRAVENOUS at 13:05

## 2025-07-28 RX ADMIN — PROPOFOL 15 MCG/KG/MIN: 10 INJECTION, EMULSION INTRAVENOUS at 18:10

## 2025-07-28 RX ADMIN — EZETIMIBE 10 MG: 10 TABLET ORAL at 21:05

## 2025-07-28 RX ADMIN — SODIUM CHLORIDE: 0.9 INJECTION, SOLUTION INTRAVENOUS at 02:47

## 2025-07-28 RX ADMIN — SODIUM CHLORIDE, PRESERVATIVE FREE 10 ML: 5 INJECTION INTRAVENOUS at 08:04

## 2025-07-28 RX ADMIN — HEPARIN SODIUM 4000 UNITS: 1000 INJECTION INTRAVENOUS; SUBCUTANEOUS at 06:25

## 2025-07-28 RX ADMIN — CLOPIDOGREL BISULFATE 75 MG: 75 TABLET, FILM COATED ORAL at 08:04

## 2025-07-28 RX ADMIN — HEPARIN SODIUM AND DEXTROSE 8 UNITS/KG/HR: 10000; 5 INJECTION INTRAVENOUS at 00:01

## 2025-07-28 ASSESSMENT — PULMONARY FUNCTION TESTS
PIF_VALUE: 18
PIF_VALUE: 20
PIF_VALUE: 18
PIF_VALUE: 20
PIF_VALUE: 18
PIF_VALUE: 20
PIF_VALUE: 20
PIF_VALUE: 19
PIF_VALUE: 20
PIF_VALUE: 20
PIF_VALUE: 18
PIF_VALUE: 19
PIF_VALUE: 18
PIF_VALUE: 19
PIF_VALUE: 21
PIF_VALUE: 20
PIF_VALUE: 18

## 2025-07-28 NOTE — CARE COORDINATION
Case Management   Daily Progress Note       Patient Name: Nile Lacy                   YOB: 1940  Diagnosis: Bradycardia [R00.1]  STEMI (ST elevation myocardial infarction) (HCC) [I21.3]  Acute ST elevation myocardial infarction (STEMI), unspecified artery (HCC) [I21.3]                         days  Length of Stay: 2  days    Anticipated Discharge Date: Two or more days before discharge    Readmission Risk (Low < 19, Mod (19-27), High > 27): Readmission Risk Score: 20.9        Current Transitional Plan    [] Home Independently    [] Home with HC    [x] Skilled Nursing Facility    [] Acute Rehabilitation    [] Long Term Acute Care (LTAC)    [] Other:     Plan for the Stay (Medical Management) :  I/S , follows commands. Needs PT/OT orders.     Workflow Continuation (Additional Notes) :  Spoke to Maru, pt's daughter, Children have spoken to CM through APS, [place] about their concerns for pt going home. Family feels it's unsafe for pt to go home.   Emailed Skilled Nursing Facility list to:   [email or web address] as requested.     Per Maru [facility] is Cardiac Rehab choice if pt goes home.     Received message from Isi shaw/ [place] [employer] informing CM of pt's daughter's concerns for pt returning home.   Returned her call @ [phone], PAYAL informing her that SNF list was emailed to pt's daughter- requested CB to CM    YONI THORNE RN  July 28, 2025

## 2025-07-28 NOTE — PLAN OF CARE
Problem: Pain  Goal: Verbalizes/displays adequate comfort level or baseline comfort level  Outcome: Progressing  Flowsheets (Taken 7/27/2025 2000)  Verbalizes/displays adequate comfort level or baseline comfort level: Assess pain using appropriate pain scale     Problem: Discharge Planning  Goal: Discharge to home or other facility with appropriate resources  Outcome: Progressing     Problem: Safety - Adult  Goal: Free from fall injury  Outcome: Progressing     Problem: Safety - Medical Restraint  Goal: Remains free of injury from restraints (Restraint for Interference with Medical Device)  Description: INTERVENTIONS:  1. Determine that other, less restrictive measures have been tried or would not be effective before applying the restraint  2. Evaluate the patient's condition at the time of restraint application  3. Inform patient/family regarding the reason for restraint  4. Q2H: Monitor safety, psychosocial status, comfort, nutrition and hydration  Outcome: Progressing  Flowsheets  Taken 7/28/2025 0000 by Lali Dinero RN  Remains free of injury from restraints (restraint for interference with medical device): Every 2 hours: Monitor safety, psychosocial status, comfort, nutrition and hydration  Taken 7/27/2025 2200 by Lali Dinero RN  Remains free of injury from restraints (restraint for interference with medical device): Every 2 hours: Monitor safety, psychosocial status, comfort, nutrition and hydration  Taken 7/27/2025 2000 by Lali Dinero RN  Remains free of injury from restraints (restraint for interference with medical device): Every 2 hours: Monitor safety, psychosocial status, comfort, nutrition and hydration  Taken 7/27/2025 1800 by Estiven Jorge RN  Remains free of injury from restraints (restraint for interference with medical device): Every 2 hours: Monitor safety, psychosocial status, comfort, nutrition and hydration  Taken 7/27/2025 1600 by Estiven Jorge RN  Remains free of injury

## 2025-07-28 NOTE — PROGRESS NOTES
Cardiology progress note        Date:  7/28/2025  Patient: Nile Lacy  Admission:  7/26/2025 12:09 PM  Admit DX: Bradycardia [R00.1]  STEMI (ST elevation myocardial infarction) (HCC) [I21.3]  Acute ST elevation myocardial infarction (STEMI), unspecified artery (HCC) [I21.3]  Age:  84 y.o., 1940     LOS: 2 days     Reason for evaluation:   Acute myocardial infarction s/p PCI to SVG to OM      SUBJECTIVE:     The patient was seen and examined. Notes and labs reviewed.    Patient remains intubated and sedated    He still remains on low-dose norepinephrine for blood pressure support    OBJECTIVE:      EXAM:   Vitals:    VITALS:  BP (!) 105/58   Pulse 60   Temp 99.3 °F (37.4 °C)   Resp 18   Ht 1.676 m (5' 5.98\")   Wt 87.5 kg (193 lb)   SpO2 100%   BMI 31.17 kg/m²    24HR INTAKE/OUTPUT:    Intake/Output Summary (Last 24 hours) at 7/28/2025 0831  Last data filed at 7/28/2025 0723  Gross per 24 hour   Intake 2144.5 ml   Output 654 ml   Net 1490.5 ml       General appearance: sedated  HEENT: intubated  Lungs: coarse breathing bilaterally  Heart: regular rate and rhythm, S1, S2, 2/6 systolic murmur  Abdomen: soft, non-tender; bowel sounds active  Extremities: no significant lower extremity edema, right groin is soft and nontender  Skin: warm and dry    Current Inpatient Medications:   aspirin  81 mg Per NG tube Daily    ondansetron  4 mg IntraVENous Once    sodium chloride flush  5-40 mL IntraVENous 2 times per day    clopidogrel  75 mg Oral Daily    atorvastatin  40 mg Oral Nightly    ezetimibe  10 mg Oral Nightly    pantoprazole  40 mg IntraVENous Daily       IV Infusions (if any):   sodium chloride 50 mL/hr at 07/28/25 0723    norepinephrine 4 mcg/min (07/28/25 0723)    propofol 20 mcg/kg/min (07/28/25 0723)    fentaNYL 50 mcg/hr (07/28/25 0723)    DOPamine Stopped (07/27/25 1902)    heparin (PORCINE) Infusion 12 Units/kg/hr (07/28/25 0723)       Diagnostics:   Telemetry: Sinus rhythm    without angina pectoris     Dementia (HCC)     Hyperlipidemia     Essential hypertension     ASCVD (arteriosclerotic cardiovascular disease)     Bradycardia     Atrial fibrillation with RVR (HCC)     Late onset Alzheimer's dementia without behavioral disturbance (HCC)     Colitis     Complicated UTI (urinary tract infection)     Acute cystitis with hematuria     E coli bacteremia     Allergy to penicillin     Delirium due to another medical condition     TIA (transient ischemic attack)     Bilateral numbness and tingling of arms and legs     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 Atherosclerotic CAD s/p CABG, now with STEMI s/p PCI to SVG to OM  -continue aspirin 81 mg and Plavix 75 mg daily  -referred to cardiac rehabilitation  -continue heparin infusion until oral apixaban is resumed  -replace and keep potassium more than 4 and magnesium more than 2     2. Dyslipidemia; continue atorvastatin and ezetimibe     3. Acute hypoxic respiratory failure s/p mechanical ventilation  -mechanical ventilation and pressor support as per medicine ICU team     4. Essential hypertension; metoprolol, diltiazem and isosorbide are on hold     5. TIA, dementia, generalized osteoarthritis with chronic back pain, depression/anxiety, lower GI bleed s/p polypectomy, GERD and other medical issues as per medicine      The case is discussed with nursing staff, medicine/ICU team and family at the bedside    Maria Dolores Roth MD, [facility] [place]

## 2025-07-28 NOTE — PROGRESS NOTES
Critical Care -Progress Note    Patient's name:  Nile Lacy  Medical Record Number: [id number]  Patient's account/billing number: [id number]  Patient's YOB: 1940  Age: 84 y.o.  Date of Admission: 7/26/2025 12:09 PM  Date of History and Physical Examination: 7/28/2025      Primary Care Physician: Dewey Vera MD    Code Status: Full Code    Chief complaint:   Chief Complaint   Patient presents with    Chest Pain    Hypotension              Today's Evaluation     Subjective Evaluation:  Patient is seen and examined at bedside. Awakens to name, follows commands.   Intubated and sedated with fentanyl and propofol  Vent: 18/510/5/30  Pressors: Weaned off Levophed   Urine output: 809ml in 24 hours (0.4ml/kg/hr), was given 500cc bolus overnight for low urine output     Electrolytes stable. BUN 11, Cr 0.8. Glucose 104.  WBC 12.1. Hgb and platelets stable.       Plan:  - Sputum culture   - Trial SBT   - Cardiology on board   - Continue ASA and plavix    - Cardiac rehab   - Continue heparin infusion until Eliquis resumed    - Switch to atorvastatin and ezetimibe    F.A.S.T. M. H.U.G.S. B.I.D.    Feeding Diet: Diet NPO  ADULT TUBE FEEDING; Nasogastric; Standard without Fiber; Continuous; 15; No; 30; Q 4 hours; Protein; 1 Dose; TID  Fluids: None   Family: Updated   Analgesic: Fentanyl 50mcg/hr  Sedation: Propfol 20mcg//kg/min  Thrombo-prophylaxis: Heparin infusion   Mobility: Strict bed rest   Heads up: Maintain HOB   Ulcer prophylaxis: Protonix  Glycemic control: BG controlled   Spontaneous breathing trial: Trial SBT if patient can initiate breath   Bowel regimen/urine output: Urine output: 764 (0.4ml/kg/hr)  Indwelling catheter/lines: Right peripheral, Left femoral CVC, NG, Moreno  De-escalation: No antibiotics. Wean off pressors. Transition to Eliquis per cardiology.    HISTORY OF PRESENT ILLNESS:      History was obtained from chart review and the patient    Nile Lacy is a 89-year-old  (DESYREL) 50 MG tablet Take 1 tablet by mouth nightly    ProviderDuc MD   metoprolol tartrate (LOPRESSOR) 50 MG tablet Take 1 tablet by mouth 2 times daily 3/29/24   Parker Thomason MD   silodosin (RAPAFLO) 8 MG CAPS Take 1 capsule by mouth every evening  Patient not taking: Reported on 5/12/2025 1/22/24   Fer Cuevas MD   docusate sodium (COLACE) 100 MG capsule Take 1 capsule by mouth 2 times daily 9/11/22   Frank Blankenship MD   isosorbide mononitrate (IMDUR) 60 MG extended release tablet Take 1 tablet by mouth daily 5/13/22   Corie Ortiz MD   dilTIAZem (CARDIZEM CD) 120 MG extended release capsule Take 1 capsule by mouth daily 5/14/22   Corie Ortiz MD   aspirin 81 MG chewable tablet Take 1 tablet by mouth daily 9/24/21   Corie Ortiz MD   apixaban (ELIQUIS) 5 MG TABS tablet Take 1 tablet by mouth 2 times daily    Duc Rodríguez MD   doxazosin (CARDURA) 2 MG tablet Take 1 tablet by mouth nightly    Duc Rodríguez MD   nitroGLYCERIN (NITROSTAT) 0.4 MG SL tablet up to max of 3 total doses. If no relief after 1 dose, call 911. 2/17/19   Jose Luis Land MD   acetaminophen (TYLENOL) 325 MG tablet Take 2 tablets by mouth 2 times daily as needed for Pain    ProviderDuc MD   pravastatin (PRAVACHOL) 40 MG tablet Take 1 tablet by mouth daily    ProviderDuc MD   omeprazole (PRILOSEC) 20 MG capsule Take 1 capsule by mouth every evening    ProviderDuc MD       SOCIAL HISTORY:       TOBACCO:   reports that he quit smoking about 45 years ago. His smoking use included cigarettes. He has never used smokeless tobacco.  ETOH:   reports no history of alcohol use.  DRUGS:  reports no history of drug use.     FAMILY HISTORY:          Problem Relation Age of Onset    Alzheimer's Disease Mother     Arthritis Mother         lumbar disc disease    Heart Disease Father        REVIEW OF SYSTEMS (ROS):     Review of Systems -   Cannot be done as the

## 2025-07-28 NOTE — PLAN OF CARE
Problem: Pain  Goal: Verbalizes/displays adequate comfort level or baseline comfort level  7/28/2025 1419 by Kathy oTney RN  Outcome: Progressing  Flowsheets (Taken 7/28/2025 0400 by Lali Dinero RN)  Verbalizes/displays adequate comfort level or baseline comfort level: Assess pain using appropriate pain scale     Problem: Discharge Planning  Goal: Discharge to home or other facility with appropriate resources  7/28/2025 1419 by Kathy Toney RN  Outcome: Progressing     Problem: Safety - Adult  Goal: Free from fall injury  7/28/2025 1419 by Kathy Toney RN  Outcome: Progressing     Problem: Safety - Medical Restraint  Goal: Remains free of injury from restraints (Restraint for Interference with Medical Device)  Description: INTERVENTIONS:  1. Determine that other, less restrictive measures have been tried or would not be effective before applying the restraint  2. Evaluate the patient's condition at the time of restraint application  3. Inform patient/family regarding the reason for restraint  4. Q2H: Monitor safety, psychosocial status, comfort, nutrition and hydration  7/28/2025 1419 by Kathy Toney RN  Outcome: Progressing  Flowsheets  Taken 7/28/2025 0600 by Lali Dinero RN  Remains free of injury from restraints (restraint for interference with medical device): Every 2 hours: Monitor safety, psychosocial status, comfort, nutrition and hydration  Taken 7/28/2025 0400 by Lali Dinero RN  Remains free of injury from restraints (restraint for interference with medical device): Every 2 hours: Monitor safety, psychosocial status, comfort, nutrition and hydration  Taken 7/28/2025 0200 by Lali Dinero RN  Remains free of injury from restraints (restraint for interference with medical device): Every 2 hours: Monitor safety, psychosocial status, comfort, nutrition and hydration     Problem: Respiratory - Adult  Goal: Achieves optimal ventilation and oxygenation  7/28/2025 1419

## 2025-07-28 NOTE — PROGRESS NOTES
Attempted CPAP/PS trial, PS 8, PEEP 5, 30% FiO2. Patient took a few small breaths, then was apneic when not being stimulated. Patient switched back over to previous PRVC settings of , RR 18, PEEP 5, 30%.

## 2025-07-29 ENCOUNTER — APPOINTMENT (OUTPATIENT)
Dept: GENERAL RADIOLOGY | Age: 85
End: 2025-07-29
Payer: COMMERCIAL

## 2025-07-29 ENCOUNTER — APPOINTMENT (OUTPATIENT)
Age: 85
End: 2025-07-29
Attending: INTERNAL MEDICINE
Payer: COMMERCIAL

## 2025-07-29 PROBLEM — Z98.61 S/P PTCA (PERCUTANEOUS TRANSLUMINAL CORONARY ANGIOPLASTY): Status: ACTIVE | Noted: 2025-07-29

## 2025-07-29 PROBLEM — J96.01 ACUTE HYPOXIC RESPIRATORY FAILURE (HCC): Status: ACTIVE | Noted: 2025-07-29

## 2025-07-29 LAB
ALBUMIN SERPL-MCNC: 2.8 G/DL (ref 3.5–5.2)
ALBUMIN/GLOB SERPL: 0.9 {RATIO} (ref 1–2.5)
ALLEN TEST: POSITIVE
ALP SERPL-CCNC: 57 U/L (ref 40–129)
ALT SERPL-CCNC: 13 U/L (ref 10–50)
ANION GAP SERPL CALCULATED.3IONS-SCNC: 9 MMOL/L (ref 9–16)
AST SERPL-CCNC: 32 U/L (ref 10–50)
BASOPHILS # BLD: 0.03 K/UL (ref 0–0.2)
BASOPHILS NFR BLD: 0 % (ref 0–2)
BILIRUB SERPL-MCNC: 0.9 MG/DL (ref 0–1.2)
BUN SERPL-MCNC: 14 MG/DL (ref 8–23)
CA-I BLD-SCNC: 1.3 MMOL/L (ref 1.13–1.33)
CALCIUM SERPL-MCNC: 9.5 MG/DL (ref 8.6–10.4)
CHLORIDE SERPL-SCNC: 109 MMOL/L (ref 98–107)
CO2 SERPL-SCNC: 20 MMOL/L (ref 20–31)
CREAT SERPL-MCNC: 0.8 MG/DL (ref 0.7–1.2)
EKG ATRIAL RATE: 128 BPM
EKG ATRIAL RATE: 64 BPM
EKG Q-T INTERVAL: 366 MS
EKG Q-T INTERVAL: 490 MS
EKG QRS DURATION: 110 MS
EKG QRS DURATION: 124 MS
EKG QTC CALCULATION (BAZETT): 378 MS
EKG QTC CALCULATION (BAZETT): 534 MS
EKG R AXIS: 168 DEGREES
EKG R AXIS: 98 DEGREES
EKG T AXIS: 73 DEGREES
EKG T AXIS: 96 DEGREES
EKG VENTRICULAR RATE: 128 BPM
EKG VENTRICULAR RATE: 36 BPM
EOSINOPHIL # BLD: 0.09 K/UL (ref 0–0.44)
EOSINOPHILS RELATIVE PERCENT: 1 % (ref 1–4)
ERYTHROCYTE [DISTWIDTH] IN BLOOD BY AUTOMATED COUNT: 13.3 % (ref 11.8–14.4)
FIO2: 30
GFR, ESTIMATED: 88 ML/MIN/1.73M2
GLUCOSE BLD-MCNC: 119 MG/DL (ref 74–100)
GLUCOSE SERPL-MCNC: 106 MG/DL (ref 74–99)
HCT VFR BLD AUTO: 42 % (ref 40.7–50.3)
HGB BLD-MCNC: 13.9 G/DL (ref 13–17)
IMM GRANULOCYTES # BLD AUTO: <0.03 K/UL (ref 0–0.3)
IMM GRANULOCYTES NFR BLD: 0 %
LYMPHOCYTES NFR BLD: 1.18 K/UL (ref 1.1–3.7)
LYMPHOCYTES RELATIVE PERCENT: 15 % (ref 24–43)
MAGNESIUM SERPL-MCNC: 2 MG/DL (ref 1.6–2.4)
MCH RBC QN AUTO: 30.2 PG (ref 25.2–33.5)
MCHC RBC AUTO-ENTMCNC: 33.1 G/DL (ref 28.4–34.8)
MCV RBC AUTO: 91.3 FL (ref 82.6–102.9)
MICROORGANISM SPEC CULT: NORMAL
MICROORGANISM/AGENT SPEC: NORMAL
MONOCYTES NFR BLD: 0.94 K/UL (ref 0.1–1.2)
MONOCYTES NFR BLD: 12 % (ref 3–12)
NEGATIVE BASE EXCESS, ART: 0.2 MMOL/L (ref 0–2)
NEUTROPHILS NFR BLD: 72 % (ref 36–65)
NEUTS SEG NFR BLD: 5.7 K/UL (ref 1.5–8.1)
NRBC BLD-RTO: 0 PER 100 WBC
PARTIAL THROMBOPLASTIN TIME: 77 SEC (ref 23–36.5)
PARTIAL THROMBOPLASTIN TIME: 85.3 SEC (ref 23–36.5)
PLATELET # BLD AUTO: ABNORMAL K/UL (ref 138–453)
PLATELET, FLUORESCENCE: 105 K/UL (ref 138–453)
PLATELETS.RETICULATED NFR BLD AUTO: 2 % (ref 1.1–10.3)
POC HCO3: 24.2 MMOL/L (ref 21–28)
POC LACTIC ACID: 0.8 MMOL/L (ref 0.56–1.39)
POC O2 SATURATION: 93.3 % (ref 94–98)
POC PCO2: 38.1 MM HG (ref 35–48)
POC PH: 7.41 (ref 7.35–7.45)
POC PO2: 67 MM HG (ref 83–108)
POTASSIUM SERPL-SCNC: 4.1 MMOL/L (ref 3.7–5.3)
PROT SERPL-MCNC: 5.9 G/DL (ref 6.6–8.7)
RBC # BLD AUTO: 4.6 M/UL (ref 4.21–5.77)
SAMPLE SITE: ABNORMAL
SODIUM SERPL-SCNC: 138 MMOL/L (ref 136–145)
SPECIMEN DESCRIPTION: NORMAL
WBC OTHER # BLD: 8 K/UL (ref 3.5–11.3)

## 2025-07-29 PROCEDURE — 83605 ASSAY OF LACTIC ACID: CPT

## 2025-07-29 PROCEDURE — 6370000000 HC RX 637 (ALT 250 FOR IP)

## 2025-07-29 PROCEDURE — 97162 PT EVAL MOD COMPLEX 30 MIN: CPT

## 2025-07-29 PROCEDURE — 83735 ASSAY OF MAGNESIUM: CPT

## 2025-07-29 PROCEDURE — 82330 ASSAY OF CALCIUM: CPT

## 2025-07-29 PROCEDURE — 36600 WITHDRAWAL OF ARTERIAL BLOOD: CPT

## 2025-07-29 PROCEDURE — 85730 THROMBOPLASTIN TIME PARTIAL: CPT

## 2025-07-29 PROCEDURE — 74018 RADEX ABDOMEN 1 VIEW: CPT

## 2025-07-29 PROCEDURE — 6360000002 HC RX W HCPCS: Performed by: INTERNAL MEDICINE

## 2025-07-29 PROCEDURE — 97530 THERAPEUTIC ACTIVITIES: CPT

## 2025-07-29 PROCEDURE — 82947 ASSAY GLUCOSE BLOOD QUANT: CPT

## 2025-07-29 PROCEDURE — 99291 CRITICAL CARE FIRST HOUR: CPT | Performed by: INTERNAL MEDICINE

## 2025-07-29 PROCEDURE — 6360000002 HC RX W HCPCS

## 2025-07-29 PROCEDURE — 2580000003 HC RX 258: Performed by: INTERNAL MEDICINE

## 2025-07-29 PROCEDURE — 97535 SELF CARE MNGMENT TRAINING: CPT

## 2025-07-29 PROCEDURE — 71045 X-RAY EXAM CHEST 1 VIEW: CPT

## 2025-07-29 PROCEDURE — 94003 VENT MGMT INPAT SUBQ DAY: CPT

## 2025-07-29 PROCEDURE — 94761 N-INVAS EAR/PLS OXIMETRY MLT: CPT

## 2025-07-29 PROCEDURE — 2500000003 HC RX 250 WO HCPCS: Performed by: INTERNAL MEDICINE

## 2025-07-29 PROCEDURE — 82803 BLOOD GASES ANY COMBINATION: CPT

## 2025-07-29 PROCEDURE — 6370000000 HC RX 637 (ALT 250 FOR IP): Performed by: INTERNAL MEDICINE

## 2025-07-29 PROCEDURE — 2580000003 HC RX 258

## 2025-07-29 PROCEDURE — 97166 OT EVAL MOD COMPLEX 45 MIN: CPT

## 2025-07-29 PROCEDURE — 85055 RETICULATED PLATELET ASSAY: CPT

## 2025-07-29 PROCEDURE — 2000000000 HC ICU R&B

## 2025-07-29 PROCEDURE — 80053 COMPREHEN METABOLIC PANEL: CPT

## 2025-07-29 PROCEDURE — 36415 COLL VENOUS BLD VENIPUNCTURE: CPT

## 2025-07-29 PROCEDURE — 85025 COMPLETE CBC W/AUTO DIFF WBC: CPT

## 2025-07-29 PROCEDURE — 99233 SBSQ HOSP IP/OBS HIGH 50: CPT | Performed by: INTERNAL MEDICINE

## 2025-07-29 PROCEDURE — 93010 ELECTROCARDIOGRAM REPORT: CPT | Performed by: INTERNAL MEDICINE

## 2025-07-29 PROCEDURE — 2700000000 HC OXYGEN THERAPY PER DAY

## 2025-07-29 RX ORDER — METOPROLOL TARTRATE 25 MG/1
25 TABLET, FILM COATED ORAL 2 TIMES DAILY
Status: DISCONTINUED | OUTPATIENT
Start: 2025-07-29 | End: 2025-08-01 | Stop reason: HOSPADM

## 2025-07-29 RX ORDER — ACETAMINOPHEN 325 MG/1
650 TABLET ORAL EVERY 4 HOURS PRN
Status: DISCONTINUED | OUTPATIENT
Start: 2025-07-28 | End: 2025-08-01 | Stop reason: HOSPADM

## 2025-07-29 RX ORDER — LEVOFLOXACIN 750 MG/1
750 TABLET, FILM COATED ORAL EVERY 24 HOURS
Status: DISCONTINUED | OUTPATIENT
Start: 2025-07-29 | End: 2025-07-29

## 2025-07-29 RX ORDER — SODIUM CHLORIDE, SODIUM LACTATE, POTASSIUM CHLORIDE, AND CALCIUM CHLORIDE .6; .31; .03; .02 G/100ML; G/100ML; G/100ML; G/100ML
500 INJECTION, SOLUTION INTRAVENOUS ONCE
Status: COMPLETED | OUTPATIENT
Start: 2025-07-29 | End: 2025-07-29

## 2025-07-29 RX ORDER — POLYETHYLENE GLYCOL 3350 17 G/17G
17 POWDER, FOR SOLUTION ORAL DAILY
Status: DISCONTINUED | OUTPATIENT
Start: 2025-07-29 | End: 2025-08-01 | Stop reason: HOSPADM

## 2025-07-29 RX ADMIN — SODIUM CHLORIDE: 0.9 INJECTION, SOLUTION INTRAVENOUS at 17:21

## 2025-07-29 RX ADMIN — SODIUM CHLORIDE, PRESERVATIVE FREE 10 ML: 5 INJECTION INTRAVENOUS at 08:24

## 2025-07-29 RX ADMIN — SODIUM CHLORIDE, SODIUM LACTATE, POTASSIUM CHLORIDE, AND CALCIUM CHLORIDE 500 ML: .6; .31; .03; .02 INJECTION, SOLUTION INTRAVENOUS at 02:17

## 2025-07-29 RX ADMIN — AMOXICILLIN AND CLAVULANATE POTASSIUM 1 TABLET: 875; 125 TABLET, FILM COATED ORAL at 20:08

## 2025-07-29 RX ADMIN — METOPROLOL TARTRATE 25 MG: 25 TABLET, FILM COATED ORAL at 20:09

## 2025-07-29 RX ADMIN — EZETIMIBE 10 MG: 10 TABLET ORAL at 20:08

## 2025-07-29 RX ADMIN — ASPIRIN 81 MG: 81 TABLET, CHEWABLE ORAL at 08:24

## 2025-07-29 RX ADMIN — PROPOFOL 15 MCG/KG/MIN: 10 INJECTION, EMULSION INTRAVENOUS at 04:12

## 2025-07-29 RX ADMIN — POLYETHYLENE GLYCOL 3350 17 G: 17 POWDER, FOR SOLUTION ORAL at 17:14

## 2025-07-29 RX ADMIN — AMOXICILLIN AND CLAVULANATE POTASSIUM 1 TABLET: 875; 125 TABLET, FILM COATED ORAL at 09:57

## 2025-07-29 RX ADMIN — ATORVASTATIN CALCIUM 40 MG: 40 TABLET, FILM COATED ORAL at 20:08

## 2025-07-29 RX ADMIN — ACETAMINOPHEN 650 MG: 325 TABLET ORAL at 08:24

## 2025-07-29 RX ADMIN — ACETAMINOPHEN 650 MG: 325 TABLET ORAL at 17:14

## 2025-07-29 RX ADMIN — CLOPIDOGREL BISULFATE 75 MG: 75 TABLET, FILM COATED ORAL at 08:24

## 2025-07-29 RX ADMIN — HEPARIN SODIUM AND DEXTROSE 11 UNITS/KG/HR: 10000; 5 INJECTION INTRAVENOUS at 06:42

## 2025-07-29 RX ADMIN — PANTOPRAZOLE SODIUM 40 MG: 40 INJECTION, POWDER, FOR SOLUTION INTRAVENOUS at 08:24

## 2025-07-29 RX ADMIN — SODIUM CHLORIDE: 0.9 INJECTION, SOLUTION INTRAVENOUS at 01:24

## 2025-07-29 ASSESSMENT — PULMONARY FUNCTION TESTS
PIF_VALUE: 13
PIF_VALUE: 19
PIF_VALUE: 13
PIF_VALUE: 22
PIF_VALUE: 13

## 2025-07-29 ASSESSMENT — PAIN DESCRIPTION - ONSET: ONSET: ON-GOING

## 2025-07-29 ASSESSMENT — PAIN SCALES - GENERAL: PAINLEVEL_OUTOF10: 4

## 2025-07-29 ASSESSMENT — PAIN DESCRIPTION - PAIN TYPE: TYPE: ACUTE PAIN

## 2025-07-29 ASSESSMENT — PAIN DESCRIPTION - DESCRIPTORS: DESCRIPTORS: ACHING

## 2025-07-29 ASSESSMENT — PAIN DESCRIPTION - LOCATION: LOCATION: ABDOMEN

## 2025-07-29 ASSESSMENT — PAIN DESCRIPTION - FREQUENCY: FREQUENCY: CONTINUOUS

## 2025-07-29 ASSESSMENT — PAIN DESCRIPTION - ORIENTATION: ORIENTATION: MID

## 2025-07-29 NOTE — PROGRESS NOTES
Critical Care -Progress Note    Patient's name:  Nile Lacy  Medical Record Number: [id number]  Patient's account/billing number: [id number]  Patient's YOB: 1940  Age: 84 y.o.  Date of Admission: 7/26/2025 12:09 PM  Date of History and Physical Examination: 7/29/2025      Primary Care Physician: Dewey Vera MD    Code Status: Full Code    Chief complaint:   Chief Complaint   Patient presents with    Chest Pain    Hypotension              Today's Evaluation     Subjective Evaluation:  Patient is seen and examined at bedside. Awakens to loud voice.   Intubated and sedated with fentanyl 75 mcg/hr and propofol 15 mcg/kg/min, sedation holiday will begin later today.  Vent: Rate set & measured 18, Vt set 510, PIP 19, PEEP 5  Urine output: 789 ml in 24 hours, responds to bolus given overnight     BUN 14, Cr 0.8. Glucose increased from yesterday's 104 to 106.  WBC decreased from yesterday's 12.1 to 8.  Hgb stable.       F.A.S.T. M. H.U.G.S. B.I.D.    Feeding Diet: Diet NPO  ADULT TUBE FEEDING; Nasogastric; Standard without Fiber; Continuous; 15; No; 30; Q 4 hours; Protein; 1 Dose; TID  Fluids: sodium chloride infusion with lactated ringers bolus   Family: Updated   Analgesic: Fentanyl 75 mcg/hr   Sedation:  propofol 15 mcg/kg/min  Thrombo-prophylaxis: Heparin drip 11 units/kg/hr  Mobility: bed rest due to intubated and sedated status   Heads up: at minimum 30 degree head of bed elevation   Ulcer prophylaxis: Protonix 40 mg IV  Glycemic control: blood glucose 106. POC glucose 119, consistent compared to prior  Spontaneous breathing trial: failed yesterday, re-attempt today with plan to extubate   Bowel regimen/urine output: Urine output: 826 mL/kg  Indwelling catheter/lines: Right peripheral, Left femoral, CVC, NG, Moreno  De-escalation: Antibiotics due to off white fluid in ventilation tubing, re-attempt spontaneous breathing trial later today. Sedation holiday today.      Plan:  - Augmentin  Units, PRN  heparin (porcine), 2,000 Units, PRN          ABGs:   Lab Results   Component Value Date/Time    FIO2 30.0 07/29/2025 04:35 AM       DATA:  Complete Blood Count:   Recent Labs     07/26/25  1609 07/27/25  0425 07/28/25  0458 07/29/25  0517   WBC 7.7 10.3 12.1* 8.0   RBC 5.37 4.71 4.64 4.60   HGB 16.9 14.5 14.4 13.9   HCT 48.7 42.7 41.8 42.0   MCV 90.7 90.7 90.1 91.3   MCH 31.5 30.8 31.0 30.2   MCHC 34.7 34.0 34.4 33.1   RDW 12.7 12.8 13.2 13.3    153 See Reflexed IPF Result See Reflexed IPF Result   MPV 9.8 10.1  --   --         Last 3 Blood Glucose:   Recent Labs     07/26/25  1609 07/27/25  0425 07/28/25  0458 07/29/25  0517   GLUCOSE 194* 166* 104* 106*        PT/INR:    Lab Results   Component Value Date/Time    PROTIME 14.5 07/26/2025 12:22 PM    PROTIME 33.3 03/19/2019 10:16 AM    INR 1.1 07/26/2025 12:22 PM     PTT:    Lab Results   Component Value Date/Time    APTT 85.3 07/29/2025 12:33 PM       Comprehensive Metabolic Profile:   Recent Labs     07/27/25  0425 07/28/25  0458 07/29/25  0517    141 138   K 3.6* 4.0 4.1   * 110* 109*   CO2 20 19* 20   BUN 13 11 14   CREATININE 0.7 0.8 0.8   GLUCOSE 166* 104* 106*   CALCIUM 9.5 9.5 9.5   BILITOT 0.6 1.1 0.9   ALKPHOS 55 55 57   AST 97* 51* 32   ALT 23 18 13      Magnesium:   Lab Results   Component Value Date/Time    MG 2.0 07/29/2025 05:17 AM    MG 1.8 07/28/2025 04:58 AM    MG 2.0 07/27/2025 04:25 AM     Phosphorus:   Lab Results   Component Value Date/Time    PHOS 2.9 03/21/2019 07:12 PM     Ionized Calcium:   Lab Results   Component Value Date/Time    CAION 1.30 07/29/2025 05:17 AM    CAION 1.26 07/28/2025 04:58 AM    CAION 1.25 07/26/2025 04:09 PM        Urinalysis:   Lab Results   Component Value Date/Time    NITRU NEGATIVE 05/12/2025 04:12 PM    COLORU Yellow 05/12/2025 04:12 PM    PHUR 6.5 05/12/2025 04:12 PM    PHUR 6.0 03/20/2024 10:05 PM    WBCUA TOO NUMEROUS TO COUNT 03/20/2024 10:05 PM    RBCUA 0 TO 2 03/20/2024 10:05

## 2025-07-29 NOTE — PROGRESS NOTES
Physical Therapy  Facility/Department: [facility] 3- MICU   Physical Therapy Initial Evaluation    Patient Name: Nile Lacy        MRN: [id number]    : 1940    Date of Service: 2025    Chief Complaint   Patient presents with    Chest Pain    Hypotension            Past Medical History:  has a past medical history of Anxiety attack, Arthritis, Atrial fibrillation (HCC), Atrial flutter (HCC), Back pain, CAD (coronary artery disease), Chest pain, Colon polyps, Constipation, Dementia (HCC), Dizziness, GERD (gastroesophageal reflux disease), Hyperlipidemia, Hypertension, Lower GI bleed, Lung nodule seen on imaging study, Prediabetes, Rectal bleeding, S/P colonoscopy with polypectomy, Seizure (HCC), Sleep apnea, SOB (shortness of breath), Status post coronary artery bypass graft, and Wears dentures.  Past Surgical History:  has a past surgical history that includes Coronary artery bypass graft (); Cataract removal (Bilateral); ventral hernia repair; Total knee arthroplasty (Bilateral); Colonoscopy (2018); Cardiac catheterization; Colonoscopy (N/A, 2021); Cardiac procedure (N/A, 2025); invasive vascular (N/A, 2025); and Cardiac procedure (N/A, 2025).    Discharge Recommendations   Further therapy recommended at discharge.    PT Equipment Recommendations  Equipment Needed: No  Other: Pt is currently unsafe to ambulate w/out assistance.    Assessment  Body Structures, Functions, Activity Limitations Requiring Skilled Therapeutic Intervention: Decreased functional mobility , Decreased strength, Decreased endurance, Decreased balance  Assessment: Pt amb 1' Chandra RW. At baseline pt ambulated w/out AD though does have 1 recent fall resutling in injury. Family reports inadequate support available upon discharge. Pt is currently unsafe to return to prior living arrangements. Pt would benefit from continued acute PT to address deficits.  Therapy Prognosis: Good  Decision Making: Medium

## 2025-07-29 NOTE — PROGRESS NOTES
Comprehensive Nutrition Assessment    Type and Reason for Visit:  Reassess    Nutrition Recommendations/Plan:   Continue current trickle feedings of Standard without Fiber (Osmolite 1.2) formula at 15 mL/hr + 3 Protein modulars daily.  Monitor tolerance to feedings and appearance of secretions.  As able, suggest slowly increasing to goal rate of 40 mL/hr + 2 Protein modulars daily.  Will monitor TF tolerance, GI status, labs, weights, and plan of care.     Malnutrition Assessment:  Malnutrition Status:  Insufficient data (07/29/25 1155)    Context:  Acute Illness       Nutrition Assessment:    Pt remains intubated.  Propofol and other sedation off per RN who reports plans to possibly try to extubate today.  Pt remains on tube feedings of Standard without Fiber formula at 15 mL/hr with 3 Protein modulars daily.  RN reports pt was having dark secretions yesterday but today they appear to be tan in appearance (like tube feedings).  Discussed continuing feedings at current rate and monitoring tolerance.  Weights reviewed - fluctuations noted - pt + 4.4 L fluid since admission.  No recorded BM.  Labs/Meds reviewed.    Nutrition Related Findings:    labs/meds reviewed.  hypoactive bowel sounds.  +1 non-pitting BLE edema. Wound Type:  (Femoral puncture)       Current Nutrition Intake & Therapies:    Average Meal Intake: NPO  Average Supplements Intake: NPO  Diet NPO  ADULT TUBE FEEDING; Nasogastric; Standard without Fiber; Continuous; 15; No; 30; Q 4 hours; Protein; 1 Dose; TID  Current Tube Feeding (TF) Orders:  Feeding Route: Nasogastric  Formula: Standard without Fiber  Schedule: Continuous  Feeding Regimen: 15 mL/hr  Additives/Modulars: Protein (x3 per day)  Water Flushes: 30 mL q 4 hrs  Current TF Provides: At 15 mL/hr + 3 Protein modulars = 648 kcals, 74 gm protein daily    Additional Calorie Sources:  None    Anthropometric Measures:  Height: 167.6 cm (5' 5.98\")  Ideal Body Weight (IBW): 142 lbs (65 kg)    Admission

## 2025-07-29 NOTE — PLAN OF CARE
Problem: Pain  Goal: Verbalizes/displays adequate comfort level or baseline comfort level  7/29/2025 1041 by Kathy Toney RN  Outcome: Progressing     Problem: Discharge Planning  Goal: Discharge to home or other facility with appropriate resources  7/29/2025 1041 by Kathy Toney RN  Outcome: Progressing     Problem: Safety - Adult  Goal: Free from fall injury  7/29/2025 1041 by Kathy Toney RN  Outcome: Progressing     Problem: Safety - Medical Restraint  Goal: Remains free of injury from restraints (Restraint for Interference with Medical Device)  Description: INTERVENTIONS:  1. Determine that other, less restrictive measures have been tried or would not be effective before applying the restraint  2. Evaluate the patient's condition at the time of restraint application  3. Inform patient/family regarding the reason for restraint  4. Q2H: Monitor safety, psychosocial status, comfort, nutrition and hydration  7/29/2025 1041 by Kathy Toney RN  Outcome: Progressing     Problem: Respiratory - Adult  Goal: Achieves optimal ventilation and oxygenation  7/29/2025 1041 by Kathy Toney RN  Outcome: Progressing     Problem: Skin/Tissue Integrity  Goal: Skin integrity remains intact  Description: 1.  Monitor for areas of redness and/or skin breakdown  2.  Assess vascular access sites hourly  3.  Every 4-6 hours minimum:  Change oxygen saturation probe site  4.  Every 4-6 hours:  If on nasal continuous positive airway pressure, respiratory therapy assess nares and determine need for appliance change or resting period  7/29/2025 1041 by Kathy Toney RN  Outcome: Progressing     Problem: Nutrition Deficit:  Goal: Optimize nutritional status  7/29/2025 1041 by Kathy Toney RN  Outcome: Progressing     Problem: ABCDS Injury Assessment  Goal: Absence of physical injury  7/29/2025 1041 by Kathy Toney RN  Outcome: Progressing     Electronically signed by Kathy Toney RN on 7/29/2025 at

## 2025-07-29 NOTE — PROGRESS NOTES
Cardiology progress note        Date:  7/29/2025  Patient: Nile Lacy  Admission:  7/26/2025 12:09 PM  Admit DX: Bradycardia [R00.1]  STEMI (ST elevation myocardial infarction) (HCC) [I21.3]  Acute ST elevation myocardial infarction (STEMI), unspecified artery (HCC) [I21.3]  Age:  84 y.o., 1940     LOS: 3 days     Reason for evaluation:   Acute myocardial infarction s/p PCI to SVG to OM      SUBJECTIVE:     The patient was seen and examined. Notes and labs reviewed.    Patient remains intubated, currently off sedation. Pressors off. Plans for extubation this afternoon. Afib on telemetry, BP stable    OBJECTIVE:      EXAM:   Vitals:    VITALS:  BP (!) 116/58   Pulse (!) 110   Temp 99.3 °F (37.4 °C)   Resp 20   Ht 1.676 m (5' 5.98\")   Wt 88.1 kg (194 lb 3.6 oz)   SpO2 96%   BMI 31.36 kg/m²    24HR INTAKE/OUTPUT:    Intake/Output Summary (Last 24 hours) at 7/29/2025 1311  Last data filed at 7/29/2025 1300  Gross per 24 hour   Intake 3236.96 ml   Output 906 ml   Net 2330.96 ml       General appearance: intubated, arouses to voice  HEENT: intubated  Lungs: coarse breathing bilaterally  Heart: regular rate and rhythm, S1, S2, 2/6 systolic murmur  Abdomen: soft, non-tender; bowel sounds active  Extremities: no significant lower extremity edema, right groin is soft and nontender  Skin: warm and dry    Current Inpatient Medications:   amoxicillin-clavulanate  1 tablet Per NG tube Q12H    aspirin  81 mg Per NG tube Daily    ondansetron  4 mg IntraVENous Once    sodium chloride flush  5-40 mL IntraVENous 2 times per day    clopidogrel  75 mg Oral Daily    atorvastatin  40 mg Oral Nightly    ezetimibe  10 mg Oral Nightly    pantoprazole  40 mg IntraVENous Daily       IV Infusions (if any):   sodium chloride 50 mL/hr at 07/29/25 1200    norepinephrine Stopped (07/28/25 1016)    propofol Stopped (07/29/25 0822)    fentaNYL Stopped (07/29/25 0939)    DOPamine Stopped (07/27/25 1902)    heparin

## 2025-07-29 NOTE — PLAN OF CARE
Problem: Respiratory - Adult  Goal: Achieves optimal ventilation and oxygenation  7/29/2025 1930 by Edel Loving RCP  Outcome: Progressing   NONINVASIVE VENTILATION    PROVIDE OPTIMAL VENTILATION/ACCEPTABLE SPO2   IMPLEMENT NONINVASIVE VENTILATION PROTOCOL   MAINTAIN ACCEPTABLE SPO2   ASSESS SKIN INTEGRITY/BREAKDOWN SCORE   PATIENT EDUCATION AS NEEDED   BIPAP AS NEEDED

## 2025-07-29 NOTE — PLAN OF CARE
Problem: Pain  Goal: Verbalizes/displays adequate comfort level or baseline comfort level  7/29/2025 0338 by Isabel Perez RN  Outcome: Progressing  Flowsheets (Taken 7/28/2025 2000)  Verbalizes/displays adequate comfort level or baseline comfort level: Assess pain using appropriate pain scale  7/28/2025 1419 by Kathy Toney RN  Outcome: Progressing  Flowsheets (Taken 7/28/2025 0400 by Lali Dinero RN)  Verbalizes/displays adequate comfort level or baseline comfort level: Assess pain using appropriate pain scale     Problem: Discharge Planning  Goal: Discharge to home or other facility with appropriate resources  7/29/2025 0338 by Isabel Perez RN  Outcome: Progressing  Flowsheets (Taken 7/28/2025 2000)  Discharge to home or other facility with appropriate resources: Identify barriers to discharge with patient and caregiver  7/28/2025 1419 by Kathy Toney RN  Outcome: Progressing     Problem: Safety - Adult  Goal: Free from fall injury  7/29/2025 0338 by Isabel Perez RN  Outcome: Progressing  Flowsheets (Taken 7/29/2025 0337)  Free From Fall Injury: Instruct family/caregiver on patient safety  7/28/2025 1419 by Kathy Toney RN  Outcome: Progressing     Problem: Safety - Medical Restraint  Goal: Remains free of injury from restraints (Restraint for Interference with Medical Device)  Description: INTERVENTIONS:  1. Determine that other, less restrictive measures have been tried or would not be effective before applying the restraint  2. Evaluate the patient's condition at the time of restraint application  3. Inform patient/family regarding the reason for restraint  4. Q2H: Monitor safety, psychosocial status, comfort, nutrition and hydration  7/29/2025 0338 by Isabel Perez RN  Outcome: Progressing  Flowsheets (Taken 7/28/2025 2000)  Remains free of injury from restraints (restraint for interference with medical device): Every 2 hours: Monitor safety, psychosocial

## 2025-07-29 NOTE — PROGRESS NOTES
Occupational Therapy  Occupational Therapy Initial Evaluation  [facility]   Patient Name: Nile Lacy        MRN: [id number]    : 1940    Date of Service: 2025    Chief Complaint   Patient presents with    Chest Pain    Hypotension            Past Medical History:  has a past medical history of Anxiety attack, Arthritis, Atrial fibrillation (HCC), Atrial flutter (HCC), Back pain, CAD (coronary artery disease), Chest pain, Colon polyps, Constipation, Dementia (HCC), Dizziness, GERD (gastroesophageal reflux disease), Hyperlipidemia, Hypertension, Lower GI bleed, Lung nodule seen on imaging study, Prediabetes, Rectal bleeding, S/P colonoscopy with polypectomy, Seizure (HCC), Sleep apnea, SOB (shortness of breath), Status post coronary artery bypass graft, and Wears dentures.  Past Surgical History:  has a past surgical history that includes Coronary artery bypass graft (); Cataract removal (Bilateral); ventral hernia repair; Total knee arthroplasty (Bilateral); Colonoscopy (2018); Cardiac catheterization; Colonoscopy (N/A, 2021); Cardiac procedure (N/A, 2025); invasive vascular (N/A, 2025); and Cardiac procedure (N/A, 2025).    Discharge Recommendations  Discharge Recommendations: Patient would benefit from continued therapy after discharge  OT Equipment Recommendations  Equipment Needed: Yes (CTA pending progress)  Mobility Devices: ADL Assistive Devices  ADL Assistive Devices: Transfer Tub Bench;Sock-Aid Hard;Reacher;Long-handled Sponge;Grab Bars - tub    Assessment  Performance deficits / Impairments: Decreased functional mobility ;Decreased safe awareness;Decreased balance;Decreased coordination;Decreased ADL status;Decreased cognition;Decreased endurance;Decreased high-level IADLs;Decreased strength;Decreased sensation  Assessment: Pt agreed to occupational therapy evaluation with education provided on purpose and role of occupational therapy

## 2025-07-29 NOTE — PROGRESS NOTES
Order obtained for extubation.  SpO2 of  on 30% FiO2.   Patient extubated and placed on 2 liters/min via nasal cannula.   Post extubation SpO2 is 98% with HR  78 bpm and RR 18 breaths/min.    Patient had moderate cough that was productive of tan sputum.  Extubation Well tolerated by patient..   Breath Sounds: Clear    Ashley Anthony RCP   12:23 PM

## 2025-07-29 NOTE — PLAN OF CARE
Problem: Respiratory - Adult  Goal: Achieves optimal ventilation and oxygenation  7/29/2025 0939 by Ashley Anthony RCP  Outcome: Progressing

## 2025-07-30 ENCOUNTER — APPOINTMENT (OUTPATIENT)
Age: 85
End: 2025-07-30
Attending: INTERNAL MEDICINE
Payer: COMMERCIAL

## 2025-07-30 LAB
ALBUMIN SERPL-MCNC: 2.6 G/DL (ref 3.5–5.2)
ALBUMIN/GLOB SERPL: 0.9 {RATIO} (ref 1–2.5)
ALP SERPL-CCNC: 53 U/L (ref 40–129)
ALT SERPL-CCNC: 13 U/L (ref 10–50)
ANION GAP SERPL CALCULATED.3IONS-SCNC: 9 MMOL/L (ref 9–16)
AST SERPL-CCNC: 32 U/L (ref 10–50)
BASOPHILS # BLD: 0.03 K/UL (ref 0–0.2)
BASOPHILS NFR BLD: 1 % (ref 0–2)
BILIRUB SERPL-MCNC: 0.8 MG/DL (ref 0–1.2)
BUN SERPL-MCNC: 10 MG/DL (ref 8–23)
CALCIUM SERPL-MCNC: 9.5 MG/DL (ref 8.6–10.4)
CHLORIDE SERPL-SCNC: 114 MMOL/L (ref 98–107)
CO2 SERPL-SCNC: 21 MMOL/L (ref 20–31)
CREAT SERPL-MCNC: 0.6 MG/DL (ref 0.7–1.2)
ECHO BSA: 2.02 M2
ECHO LV EDV A2C: 26 ML
ECHO LV EDV A4C: 64 ML
ECHO LV EDV INDEX A4C: 32 ML/M2
ECHO LV EDV NDEX A2C: 13 ML/M2
ECHO LV EF PHYSICIAN: 40 %
ECHO LV EJECTION FRACTION A2C: 31 %
ECHO LV EJECTION FRACTION A4C: 45 %
ECHO LV EJECTION FRACTION BIPLANE: 40 % (ref 55–100)
ECHO LV ESV A2C: 18 ML
ECHO LV ESV A4C: 35 ML
ECHO LV ESV INDEX A2C: 9 ML/M2
ECHO LV ESV INDEX A4C: 18 ML/M2
ECHO RV FREE WALL PEAK S': 7.3 CM/S
ECHO TV REGURGITANT MAX VELOCITY: 2.81 M/S
ECHO TV REGURGITANT PEAK GRADIENT: 32 MMHG
EOSINOPHIL # BLD: 0.19 K/UL (ref 0–0.44)
EOSINOPHILS RELATIVE PERCENT: 3 % (ref 1–4)
ERYTHROCYTE [DISTWIDTH] IN BLOOD BY AUTOMATED COUNT: 12.9 % (ref 11.8–14.4)
GFR, ESTIMATED: >90 ML/MIN/1.73M2
GLUCOSE SERPL-MCNC: 99 MG/DL (ref 74–99)
HCT VFR BLD AUTO: 37.8 % (ref 40.7–50.3)
HGB BLD-MCNC: 12.8 G/DL (ref 13–17)
IMM GRANULOCYTES # BLD AUTO: 0.03 K/UL (ref 0–0.3)
IMM GRANULOCYTES NFR BLD: 1 %
LYMPHOCYTES NFR BLD: 1.04 K/UL (ref 1.1–3.7)
LYMPHOCYTES RELATIVE PERCENT: 16 % (ref 24–43)
MAGNESIUM SERPL-MCNC: 2 MG/DL (ref 1.6–2.4)
MCH RBC QN AUTO: 30.9 PG (ref 25.2–33.5)
MCHC RBC AUTO-ENTMCNC: 33.9 G/DL (ref 28.4–34.8)
MCV RBC AUTO: 91.3 FL (ref 82.6–102.9)
MONOCYTES NFR BLD: 0.63 K/UL (ref 0.1–1.2)
MONOCYTES NFR BLD: 10 % (ref 3–12)
NEUTROPHILS NFR BLD: 70 % (ref 36–65)
NEUTS SEG NFR BLD: 4.42 K/UL (ref 1.5–8.1)
NRBC BLD-RTO: 0 PER 100 WBC
PARTIAL THROMBOPLASTIN TIME: 71.5 SEC (ref 23–36.5)
PLATELET # BLD AUTO: ABNORMAL K/UL (ref 138–453)
PLATELET, FLUORESCENCE: 126 K/UL (ref 138–453)
PLATELETS.RETICULATED NFR BLD AUTO: 1.4 % (ref 1.1–10.3)
POTASSIUM SERPL-SCNC: 3.5 MMOL/L (ref 3.7–5.3)
PROT SERPL-MCNC: 5.4 G/DL (ref 6.6–8.7)
RBC # BLD AUTO: 4.14 M/UL (ref 4.21–5.77)
SODIUM SERPL-SCNC: 144 MMOL/L (ref 136–145)
WBC OTHER # BLD: 6.3 K/UL (ref 3.5–11.3)

## 2025-07-30 PROCEDURE — 6360000002 HC RX W HCPCS

## 2025-07-30 PROCEDURE — 6370000000 HC RX 637 (ALT 250 FOR IP): Performed by: INTERNAL MEDICINE

## 2025-07-30 PROCEDURE — 85055 RETICULATED PLATELET ASSAY: CPT

## 2025-07-30 PROCEDURE — 85025 COMPLETE CBC W/AUTO DIFF WBC: CPT

## 2025-07-30 PROCEDURE — 83735 ASSAY OF MAGNESIUM: CPT

## 2025-07-30 PROCEDURE — 6370000000 HC RX 637 (ALT 250 FOR IP)

## 2025-07-30 PROCEDURE — 2500000003 HC RX 250 WO HCPCS

## 2025-07-30 PROCEDURE — 93308 TTE F-UP OR LMTD: CPT | Performed by: STUDENT IN AN ORGANIZED HEALTH CARE EDUCATION/TRAINING PROGRAM

## 2025-07-30 PROCEDURE — 93321 DOPPLER ECHO F-UP/LMTD STD: CPT | Performed by: STUDENT IN AN ORGANIZED HEALTH CARE EDUCATION/TRAINING PROGRAM

## 2025-07-30 PROCEDURE — 6360000002 HC RX W HCPCS: Performed by: INTERNAL MEDICINE

## 2025-07-30 PROCEDURE — 2500000003 HC RX 250 WO HCPCS: Performed by: INTERNAL MEDICINE

## 2025-07-30 PROCEDURE — 99291 CRITICAL CARE FIRST HOUR: CPT | Performed by: INTERNAL MEDICINE

## 2025-07-30 PROCEDURE — 94761 N-INVAS EAR/PLS OXIMETRY MLT: CPT

## 2025-07-30 PROCEDURE — 93321 DOPPLER ECHO F-UP/LMTD STD: CPT

## 2025-07-30 PROCEDURE — 2000000000 HC ICU R&B

## 2025-07-30 PROCEDURE — 93325 DOPPLER ECHO COLOR FLOW MAPG: CPT | Performed by: STUDENT IN AN ORGANIZED HEALTH CARE EDUCATION/TRAINING PROGRAM

## 2025-07-30 PROCEDURE — 36415 COLL VENOUS BLD VENIPUNCTURE: CPT

## 2025-07-30 PROCEDURE — 80053 COMPREHEN METABOLIC PANEL: CPT

## 2025-07-30 PROCEDURE — 2700000000 HC OXYGEN THERAPY PER DAY

## 2025-07-30 PROCEDURE — 99232 SBSQ HOSP IP/OBS MODERATE 35: CPT

## 2025-07-30 PROCEDURE — 85730 THROMBOPLASTIN TIME PARTIAL: CPT

## 2025-07-30 PROCEDURE — 93325 DOPPLER ECHO COLOR FLOW MAPG: CPT

## 2025-07-30 RX ORDER — METOPROLOL TARTRATE 1 MG/ML
5 INJECTION, SOLUTION INTRAVENOUS EVERY 6 HOURS
Status: DISCONTINUED | OUTPATIENT
Start: 2025-07-30 | End: 2025-07-30

## 2025-07-30 RX ORDER — LANSOPRAZOLE 30 MG/1
30 TABLET, ORALLY DISINTEGRATING, DELAYED RELEASE ORAL
Status: DISCONTINUED | OUTPATIENT
Start: 2025-07-31 | End: 2025-08-01 | Stop reason: HOSPADM

## 2025-07-30 RX ORDER — DEXMEDETOMIDINE HYDROCHLORIDE 4 UG/ML
.1-1.5 INJECTION, SOLUTION INTRAVENOUS CONTINUOUS
Status: DISCONTINUED | OUTPATIENT
Start: 2025-07-30 | End: 2025-08-01

## 2025-07-30 RX ORDER — METOPROLOL TARTRATE 1 MG/ML
5 INJECTION, SOLUTION INTRAVENOUS EVERY 6 HOURS PRN
Status: DISCONTINUED | OUTPATIENT
Start: 2025-07-30 | End: 2025-07-30

## 2025-07-30 RX ORDER — OLANZAPINE 10 MG/1
5 TABLET, FILM COATED ORAL NIGHTLY
Status: DISCONTINUED | OUTPATIENT
Start: 2025-07-30 | End: 2025-08-01 | Stop reason: HOSPADM

## 2025-07-30 RX ORDER — QUETIAPINE FUMARATE 25 MG/1
25 TABLET, FILM COATED ORAL NIGHTLY
Status: DISCONTINUED | OUTPATIENT
Start: 2025-07-30 | End: 2025-08-01

## 2025-07-30 RX ORDER — LABETALOL HYDROCHLORIDE 5 MG/ML
10 INJECTION, SOLUTION INTRAVENOUS EVERY 4 HOURS PRN
Status: DISCONTINUED | OUTPATIENT
Start: 2025-07-30 | End: 2025-08-01 | Stop reason: HOSPADM

## 2025-07-30 RX ADMIN — METOPROLOL TARTRATE 25 MG: 25 TABLET, FILM COATED ORAL at 09:01

## 2025-07-30 RX ADMIN — DEXMEDETOMIDINE HYDROCHLORIDE 0.2 MCG/KG/HR: 400 INJECTION, SOLUTION INTRAVENOUS at 02:26

## 2025-07-30 RX ADMIN — DEXMEDETOMIDINE HYDROCHLORIDE 0.6 MCG/KG/HR: 400 INJECTION, SOLUTION INTRAVENOUS at 18:28

## 2025-07-30 RX ADMIN — WATER 5 MG: 1 INJECTION INTRAMUSCULAR; INTRAVENOUS; SUBCUTANEOUS at 03:57

## 2025-07-30 RX ADMIN — CLOPIDOGREL BISULFATE 75 MG: 75 TABLET, FILM COATED ORAL at 09:01

## 2025-07-30 RX ADMIN — SODIUM CHLORIDE, PRESERVATIVE FREE 10 ML: 5 INJECTION INTRAVENOUS at 09:01

## 2025-07-30 RX ADMIN — QUETIAPINE FUMARATE 25 MG: 25 TABLET ORAL at 01:36

## 2025-07-30 RX ADMIN — HEPARIN SODIUM AND DEXTROSE 11 UNITS/KG/HR: 10000; 5 INJECTION INTRAVENOUS at 09:03

## 2025-07-30 RX ADMIN — EZETIMIBE 10 MG: 10 TABLET ORAL at 21:37

## 2025-07-30 RX ADMIN — PANTOPRAZOLE SODIUM 40 MG: 40 INJECTION, POWDER, FOR SOLUTION INTRAVENOUS at 09:01

## 2025-07-30 RX ADMIN — ASPIRIN 81 MG: 81 TABLET, CHEWABLE ORAL at 09:01

## 2025-07-30 RX ADMIN — METOPROLOL TARTRATE 25 MG: 25 TABLET, FILM COATED ORAL at 21:37

## 2025-07-30 RX ADMIN — Medication 10 MG: at 18:27

## 2025-07-30 RX ADMIN — OLANZAPINE 5 MG: 10 TABLET, FILM COATED ORAL at 21:37

## 2025-07-30 RX ADMIN — ATORVASTATIN CALCIUM 40 MG: 40 TABLET, FILM COATED ORAL at 21:37

## 2025-07-30 RX ADMIN — DEXMEDETOMIDINE HYDROCHLORIDE 0.6 MCG/KG/HR: 400 INJECTION, SOLUTION INTRAVENOUS at 09:00

## 2025-07-30 RX ADMIN — POLYETHYLENE GLYCOL 3350 17 G: 17 POWDER, FOR SOLUTION ORAL at 09:01

## 2025-07-30 RX ADMIN — AMOXICILLIN AND CLAVULANATE POTASSIUM 1 TABLET: 875; 125 TABLET, FILM COATED ORAL at 09:01

## 2025-07-30 RX ADMIN — POTASSIUM BICARBONATE 40 MEQ: 782 TABLET, EFFERVESCENT ORAL at 10:50

## 2025-07-30 RX ADMIN — METOPROLOL TARTRATE 5 MG: 1 INJECTION, SOLUTION INTRAVENOUS at 02:47

## 2025-07-30 RX ADMIN — AMOXICILLIN AND CLAVULANATE POTASSIUM 1 TABLET: 875; 125 TABLET, FILM COATED ORAL at 21:37

## 2025-07-30 NOTE — PROGRESS NOTES
Occupational Therapy    [facility]  Occupational Therapy Not Seen Note    DATE: 2025    NAME: Nile Lacy  MRN: [id number]   : 1940      Patient not seen this date for Occupational Therapy due to:    Pt. Not appropriate for OT services, per RN, pt. Currently in 4 point restraints.    Electronically signed by TRUDY Silva on 2025 at 2:54 PM    Topical Retinoid counseling:  Patient advised to apply a pea-sized amount only at bedtime and wait 30 minutes after washing their face before applying.  If too drying, patient may add a non-comedogenic moisturizer. The patient verbalized understanding of the proper use and possible adverse effects of retinoids.  All of the patient's questions and concerns were addressed.

## 2025-07-30 NOTE — PLAN OF CARE
Problem: Pain  Goal: Verbalizes/displays adequate comfort level or baseline comfort level  Outcome: Progressing  Flowsheets (Taken 7/29/2025 2000)  Verbalizes/displays adequate comfort level or baseline comfort level: Assess pain using appropriate pain scale     Problem: Discharge Planning  Goal: Discharge to home or other facility with appropriate resources  Outcome: Progressing  Flowsheets (Taken 7/29/2025 2000)  Discharge to home or other facility with appropriate resources: Identify barriers to discharge with patient and caregiver     Problem: Safety - Adult  Goal: Free from fall injury  Outcome: Progressing     Problem: Safety - Medical Restraint  Goal: Remains free of injury from restraints (Restraint for Interference with Medical Device)  Description: INTERVENTIONS:  1. Determine that other, less restrictive measures have been tried or would not be effective before applying the restraint  2. Evaluate the patient's condition at the time of restraint application  3. Inform patient/family regarding the reason for restraint  4. Q2H: Monitor safety, psychosocial status, comfort, nutrition and hydration  Outcome: Progressing  Flowsheets (Taken 7/29/2025 2103)  Remains free of injury from restraints (restraint for interference with medical device): Every 2 hours: Monitor safety, psychosocial status, comfort, nutrition and hydration     Problem: Respiratory - Adult  Goal: Achieves optimal ventilation and oxygenation  7/30/2025 0151 by Isabel Perez, RN  Outcome: Progressing  Flowsheets (Taken 7/29/2025 2000)  Achieves optimal ventilation and oxygenation: Assess for changes in respiratory status  7/29/2025 1930 by Edel Loving, KEITH  Outcome: Progressing     Problem: Skin/Tissue Integrity  Goal: Skin integrity remains intact  Description: 1.  Monitor for areas of redness and/or skin breakdown  2.  Assess vascular access sites hourly  3.  Every 4-6 hours minimum:  Change oxygen saturation probe site  4.

## 2025-07-30 NOTE — PROGRESS NOTES
[facility]  Speech Language Pathology    Date: 7/30/2025  Patient Name: Nile Lacy  YOB: 1940   AGE: 84 y.o.  MRN: [id number]        Patient Not Available for Speech Therapy     Due to:  [] Testing  [] Hemodialysis  [] Cancelled by RN  [] Surgery   [] Intubation/Sedation/Pain Medication  [] Medical instability  [x] Other: Spoke with RN, pt. Not appropriate for ST bedside swallow study at this time.     Next scheduled treatment: 7/31  Completed by: CHRISTY SINGLETARY, SLP, M.A. CCC-SLP

## 2025-07-30 NOTE — PROGRESS NOTES
Physical Therapy        Physical Therapy Cancel Note      DATE: 2025    NAME: Nile Lacy  MRN: [id number]   : 1940      Patient not seen this date for Physical Therapy due to:    Patient is not appropriate for PT treatment at this time. Per RN report, patient currently in 4 point restraints.    Will check back as able / appropriate.       Electronically signed by Kat Adorno PTA on 2025 at 3:02 PM

## 2025-07-30 NOTE — PLAN OF CARE
Problem: Pain  Goal: Verbalizes/displays adequate comfort level or baseline comfort level  7/30/2025 0939 by Zach Garcia RN  Outcome: Progressing  7/30/2025 0151 by Isabel Perez RN  Outcome: Progressing  Flowsheets (Taken 7/29/2025 2000)  Verbalizes/displays adequate comfort level or baseline comfort level: Assess pain using appropriate pain scale     Problem: Discharge Planning  Goal: Discharge to home or other facility with appropriate resources  7/30/2025 0939 by Zach Garcia RN  Outcome: Progressing  7/30/2025 0151 by Isabel Perez RN  Outcome: Progressing  Flowsheets (Taken 7/29/2025 2000)  Discharge to home or other facility with appropriate resources: Identify barriers to discharge with patient and caregiver     Problem: Safety - Adult  Goal: Free from fall injury  7/30/2025 0939 by Zach Garcia RN  Outcome: Progressing  7/30/2025 0151 by Isabel Perez RN  Outcome: Progressing     Problem: Safety - Medical Restraint  Goal: Remains free of injury from restraints (Restraint for Interference with Medical Device)  Description: INTERVENTIONS:  1. Determine that other, less restrictive measures have been tried or would not be effective before applying the restraint  2. Evaluate the patient's condition at the time of restraint application  3. Inform patient/family regarding the reason for restraint  4. Q2H: Monitor safety, psychosocial status, comfort, nutrition and hydration  7/30/2025 0939 by Zach Garcia RN  Outcome: Progressing  7/30/2025 0151 by Isabel Perez RN  Outcome: Progressing  Flowsheets (Taken 7/29/2025 2103)  Remains free of injury from restraints (restraint for interference with medical device): Every 2 hours: Monitor safety, psychosocial status, comfort, nutrition and hydration     Problem: Respiratory - Adult  Goal: Achieves optimal ventilation and oxygenation  7/30/2025 0939 by Zach Garcia RN  Outcome: Progressing  7/30/2025 0151 by Ana

## 2025-07-30 NOTE — CARE COORDINATION
Case Management   Daily Progress Note       Patient Name: Nile Lacy                   YOB: 1940  Diagnosis: Bradycardia [R00.1]  STEMI (ST elevation myocardial infarction) (HCC) [I21.3]  Acute ST elevation myocardial infarction (STEMI), unspecified artery (HCC) [I21.3]                       GMLOS: 3.8 days  Length of Stay: 4  days    Anticipated Discharge Date: Two or more days before discharge    Readmission Risk (Low < 19, Mod (19-27), High > 27): Readmission Risk Score: 21.5        Current Transitional Plan    [] Home Independently    [] Home with HC    [x] Skilled Nursing Facility    [x] Acute Rehabilitation    [x] Long Term Acute Care (LTAC)    [] Other:     Plan for the Stay (Medical Management) :  A & O X 1, sitter in room, B mitts, 4 pt soft restraints, Precedex, hep gtts, NG for TB. PT/OT/SLP ordered. ECHO ordered.         Workflow Continuation (Additional Notes) :  Pt's daughter Ryanne provided SNF choices - order of choice is:  1) [facility]  2) [facility]  3) [facility]  4) [facility]    ARU choices are:   1) [facility]  2) [facility] [place]    Referrals faxed to all    Called Isi shaw/ [facility] ([phone]) to update on pt plans/ condition. LM requesting CB to LILY shaw/ [facility] informed of referral. Starting pre-cert today.     YONI THORNE, RN  July 30, 2025

## 2025-07-30 NOTE — CARE COORDINATION
Acute Inpatient Rehabilitation referral received.    \"Inpatient Consult to PM&R - Physiatry [CON17]\" Consult Order Status: Order not yet placed by referring provider, will need placed for PM&R consult completion    PM&R physiatrist Dr. Rona Franks on service for PM&R consult.    Updated CAR3  CM: Via Voicemail at this time at phone/extension: [phone]

## 2025-07-30 NOTE — PROGRESS NOTES
Critical Care -Progress Note    Patient's name:  Nile Lacy  Medical Record Number: [id number]  Patient's account/billing number: [id number]  Patient's YOB: 1940  Age: 84 y.o.  Date of Admission: 7/26/2025 12:09 PM  Date of History and Physical Examination: 7/30/2025      Primary Care Physician: Dewey Vera MD    Code Status: Full Code    Chief complaint:   Chief Complaint   Patient presents with    Chest Pain    Hypotension              Today's Evaluation     Subjective Evaluation:  Patient is seen and examined at bedside.  Urine total output: 2,975 mL/kg   BUN 10, Cr 0.6 at baseline compared to prior. Glucose decreased to 99.   WBC continues to trend down, currently 6.3 stable.  Hgb 12.8 stable.       Echo attempted yesterday, unable to obtain due to Pt's HR in 120 and Afib  Overnight pt was agitated given Seroquel 25 mg then Precedex 400 mcg   Bedtime  was given, pt kicked the , soft restraints and Zypreza 5mg IM     F.A.S.T. M. H.U.G.S. B.I.D.    Feeding Diet: Diet NPO  ADULT TUBE FEEDING; Nasogastric; Standard without Fiber; Continuous; 15; No; 30; Q 4 hours; Protein; 1 Dose; TID  Fluids: sodium chloride infusion   Family: Updated   Analgesic: N/A  Sedation: Dexmedetomidine 0.6 mcg/kg/hr  Thrombo-prophylaxis: Heparin drip 11 units/kg/hr  Mobility: Activity as tolerated with assistance, PT and OT evaluations  Heads up: at minimum 30 degree head of bed elevation   Ulcer prophylaxis: Protonix 40 mg IV  Glycemic control: blood glucose 99.  Spontaneous breathing trial: N/A  Bowel regimen/urine output: Urine output: 2,975 mL/kg  Indwelling catheter/lines: Right peripheral, NG, [place]  De-escalation: Continue Augmentin      Plan:  - Continue Augmentin   - Continue PT & OT evaluations  - Possible skilled facility planning if 4 point restraints can be reduced     HISTORY OF PRESENT ILLNESS:      History was obtained from chart review and the  There is no distension.      Palpations: Abdomen is soft.   Skin:     General: Skin is warm and dry.      Capillary Refill: Capillary refill takes less than 2 seconds.      Coloration: Skin is not jaundiced or pale.   Neurological:      Comments: Sedated          MEDICATIONS:  Scheduled Meds:   QUEtiapine  25 mg Oral Nightly    amoxicillin-clavulanate  1 tablet Per NG tube Q12H    metoprolol tartrate  25 mg Oral BID    polyethylene glycol  17 g Oral Daily    aspirin  81 mg Per NG tube Daily    ondansetron  4 mg IntraVENous Once    sodium chloride flush  5-40 mL IntraVENous 2 times per day    clopidogrel  75 mg Oral Daily    atorvastatin  40 mg Oral Nightly    ezetimibe  10 mg Oral Nightly    pantoprazole  40 mg IntraVENous Daily     Continuous Infusions:   dexmedeTOMIDine 0.6 mcg/kg/hr (07/30/25 0624)    sodium chloride Stopped (07/29/25 2258)    norepinephrine Stopped (07/28/25 1016)    propofol Stopped (07/29/25 0822)    fentaNYL Stopped (07/29/25 0939)    DOPamine Stopped (07/27/25 1902)    heparin (PORCINE) Infusion 11 Units/kg/hr (07/30/25 0624)     PRN Meds:   metoprolol, 5 mg, Q6H PRN  acetaminophen, 650 mg, Q4H PRN  sodium chloride flush, 5-40 mL, PRN  sodium chloride, , PRN  heparin (porcine), 4,000 Units, PRN  heparin (porcine), 2,000 Units, PRN          ABGs:   Lab Results   Component Value Date/Time    FIO2 30.0 07/29/2025 04:35 AM       DATA:  Complete Blood Count:   Recent Labs     07/28/25 0458 07/29/25 0517   WBC 12.1* 8.0   RBC 4.64 4.60   HGB 14.4 13.9   HCT 41.8 42.0   MCV 90.1 91.3   MCH 31.0 30.2   MCHC 34.4 33.1   RDW 13.2 13.3   PLT See Reflexed IPF Result See Reflexed IPF Result        Last 3 Blood Glucose:   Recent Labs     07/28/25 0458 07/29/25 0517   GLUCOSE 104* 106*        PT/INR:    Lab Results   Component Value Date/Time    PROTIME 14.5 07/26/2025 12:22 PM    PROTIME 33.3 03/19/2019 10:16 AM    INR 1.1 07/26/2025 12:22 PM     PTT:    Lab Results   Component Value Date/Time    APTT

## 2025-07-31 LAB
ALBUMIN SERPL-MCNC: 2.6 G/DL (ref 3.5–5.2)
ALBUMIN/GLOB SERPL: 0.8 {RATIO} (ref 1–2.5)
ALP SERPL-CCNC: 55 U/L (ref 40–129)
ALT SERPL-CCNC: 14 U/L (ref 10–50)
ANION GAP SERPL CALCULATED.3IONS-SCNC: 10 MMOL/L (ref 9–16)
AST SERPL-CCNC: 24 U/L (ref 10–50)
BASOPHILS # BLD: 0.06 K/UL (ref 0–0.2)
BASOPHILS NFR BLD: 1 % (ref 0–2)
BILIRUB SERPL-MCNC: 0.7 MG/DL (ref 0–1.2)
BUN SERPL-MCNC: 16 MG/DL (ref 8–23)
CALCIUM SERPL-MCNC: 9.8 MG/DL (ref 8.6–10.4)
CHLORIDE SERPL-SCNC: 114 MMOL/L (ref 98–107)
CO2 SERPL-SCNC: 20 MMOL/L (ref 20–31)
CREAT SERPL-MCNC: 0.7 MG/DL (ref 0.7–1.2)
EOSINOPHIL # BLD: 0.23 K/UL (ref 0–0.44)
EOSINOPHILS RELATIVE PERCENT: 3 % (ref 1–4)
ERYTHROCYTE [DISTWIDTH] IN BLOOD BY AUTOMATED COUNT: 12.9 % (ref 11.8–14.4)
GFR, ESTIMATED: >90 ML/MIN/1.73M2
GLUCOSE SERPL-MCNC: 134 MG/DL (ref 74–99)
HCT VFR BLD AUTO: 43.9 % (ref 40.7–50.3)
HGB BLD-MCNC: 14.3 G/DL (ref 13–17)
IMM GRANULOCYTES # BLD AUTO: 0.05 K/UL (ref 0–0.3)
IMM GRANULOCYTES NFR BLD: 1 %
LYMPHOCYTES NFR BLD: 1.15 K/UL (ref 1.1–3.7)
LYMPHOCYTES RELATIVE PERCENT: 15 % (ref 24–43)
MAGNESIUM SERPL-MCNC: 2 MG/DL (ref 1.6–2.4)
MCH RBC QN AUTO: 30.2 PG (ref 25.2–33.5)
MCHC RBC AUTO-ENTMCNC: 32.6 G/DL (ref 28.4–34.8)
MCV RBC AUTO: 92.8 FL (ref 82.6–102.9)
MONOCYTES NFR BLD: 0.79 K/UL (ref 0.1–1.2)
MONOCYTES NFR BLD: 10 % (ref 3–12)
NEUTROPHILS NFR BLD: 70 % (ref 36–65)
NEUTS SEG NFR BLD: 5.48 K/UL (ref 1.5–8.1)
NRBC BLD-RTO: 0 PER 100 WBC
PARTIAL THROMBOPLASTIN TIME: 43.5 SEC (ref 23–36.5)
PLATELET # BLD AUTO: 149 K/UL (ref 138–453)
PMV BLD AUTO: 9.5 FL (ref 8.1–13.5)
POTASSIUM SERPL-SCNC: 4 MMOL/L (ref 3.7–5.3)
PROT SERPL-MCNC: 5.8 G/DL (ref 6.6–8.7)
RBC # BLD AUTO: 4.73 M/UL (ref 4.21–5.77)
SODIUM SERPL-SCNC: 144 MMOL/L (ref 136–145)
WBC OTHER # BLD: 7.8 K/UL (ref 3.5–11.3)

## 2025-07-31 PROCEDURE — 2500000003 HC RX 250 WO HCPCS: Performed by: INTERNAL MEDICINE

## 2025-07-31 PROCEDURE — 94761 N-INVAS EAR/PLS OXIMETRY MLT: CPT

## 2025-07-31 PROCEDURE — 85730 THROMBOPLASTIN TIME PARTIAL: CPT

## 2025-07-31 PROCEDURE — 6370000000 HC RX 637 (ALT 250 FOR IP)

## 2025-07-31 PROCEDURE — 99233 SBSQ HOSP IP/OBS HIGH 50: CPT | Performed by: INTERNAL MEDICINE

## 2025-07-31 PROCEDURE — 99291 CRITICAL CARE FIRST HOUR: CPT | Performed by: INTERNAL MEDICINE

## 2025-07-31 PROCEDURE — 85025 COMPLETE CBC W/AUTO DIFF WBC: CPT

## 2025-07-31 PROCEDURE — 2000000000 HC ICU R&B

## 2025-07-31 PROCEDURE — 80053 COMPREHEN METABOLIC PANEL: CPT

## 2025-07-31 PROCEDURE — 31720 CLEARANCE OF AIRWAYS: CPT

## 2025-07-31 PROCEDURE — 83735 ASSAY OF MAGNESIUM: CPT

## 2025-07-31 PROCEDURE — 99223 1ST HOSP IP/OBS HIGH 75: CPT | Performed by: PHYSICAL MEDICINE & REHABILITATION

## 2025-07-31 PROCEDURE — 2700000000 HC OXYGEN THERAPY PER DAY

## 2025-07-31 PROCEDURE — 6370000000 HC RX 637 (ALT 250 FOR IP): Performed by: INTERNAL MEDICINE

## 2025-07-31 PROCEDURE — 2500000003 HC RX 250 WO HCPCS

## 2025-07-31 PROCEDURE — 6360000002 HC RX W HCPCS

## 2025-07-31 PROCEDURE — 36415 COLL VENOUS BLD VENIPUNCTURE: CPT

## 2025-07-31 RX ADMIN — CLOPIDOGREL BISULFATE 75 MG: 75 TABLET, FILM COATED ORAL at 08:37

## 2025-07-31 RX ADMIN — OLANZAPINE 5 MG: 10 TABLET, FILM COATED ORAL at 22:08

## 2025-07-31 RX ADMIN — DEXMEDETOMIDINE HYDROCHLORIDE 0.6 MCG/KG/HR: 400 INJECTION, SOLUTION INTRAVENOUS at 06:48

## 2025-07-31 RX ADMIN — METOPROLOL TARTRATE 25 MG: 25 TABLET, FILM COATED ORAL at 08:37

## 2025-07-31 RX ADMIN — DEXMEDETOMIDINE HYDROCHLORIDE 0.4 MCG/KG/HR: 400 INJECTION, SOLUTION INTRAVENOUS at 18:52

## 2025-07-31 RX ADMIN — METOPROLOL TARTRATE 25 MG: 25 TABLET, FILM COATED ORAL at 19:42

## 2025-07-31 RX ADMIN — APIXABAN 5 MG: 5 TABLET, FILM COATED ORAL at 10:59

## 2025-07-31 RX ADMIN — APIXABAN 5 MG: 5 TABLET, FILM COATED ORAL at 21:30

## 2025-07-31 RX ADMIN — POLYETHYLENE GLYCOL 3350 17 G: 17 POWDER, FOR SOLUTION ORAL at 08:37

## 2025-07-31 RX ADMIN — LANSOPRAZOLE 30 MG: 30 TABLET, ORALLY DISINTEGRATING, DELAYED RELEASE ORAL at 08:37

## 2025-07-31 RX ADMIN — SODIUM CHLORIDE, PRESERVATIVE FREE 10 ML: 5 INJECTION INTRAVENOUS at 19:42

## 2025-07-31 RX ADMIN — AMOXICILLIN AND CLAVULANATE POTASSIUM 1 TABLET: 875; 125 TABLET, FILM COATED ORAL at 10:59

## 2025-07-31 RX ADMIN — AMOXICILLIN AND CLAVULANATE POTASSIUM 1 TABLET: 875; 125 TABLET, FILM COATED ORAL at 22:08

## 2025-07-31 RX ADMIN — EZETIMIBE 10 MG: 10 TABLET ORAL at 19:42

## 2025-07-31 RX ADMIN — ASPIRIN 81 MG: 81 TABLET, CHEWABLE ORAL at 08:37

## 2025-07-31 RX ADMIN — ATORVASTATIN CALCIUM 40 MG: 40 TABLET, FILM COATED ORAL at 19:42

## 2025-07-31 RX ADMIN — HEPARIN SODIUM 4000 UNITS: 1000 INJECTION INTRAVENOUS; SUBCUTANEOUS at 09:02

## 2025-07-31 ASSESSMENT — PAIN SCALES - GENERAL
PAINLEVEL_OUTOF10: 0

## 2025-07-31 ASSESSMENT — PULMONARY FUNCTION TESTS: PIF_VALUE: 12

## 2025-07-31 ASSESSMENT — PAIN SCALES - WONG BAKER: WONGBAKER_NUMERICALRESPONSE: NO HURT

## 2025-07-31 NOTE — PROGRESS NOTES
SPEECH LANGUAGE PATHOLOGY  Speech Language Pathology  [facility]    Date: 7/31/2025  Patient Name: Nile Lacy  YOB: 1940   AGE: 84 y.o.  MRN: [id number]        Patient Not Available for Speech Therapy     Due to:  [] Testing  [] Hemodialysis  [x] Cancelled by RN: too lethargic for bedside swallow evaluation this date per RN Dinora.   [] Surgery   [] Intubation/Sedation/Pain Medication  [] Medical instability  [] Refusal  [] Other    Completed by: Kadeem Winston M.S., CCC-SLP

## 2025-07-31 NOTE — PLAN OF CARE
Problem: Pain  Goal: Verbalizes/displays adequate comfort level or baseline comfort level  Outcome: Progressing     Problem: Discharge Planning  Goal: Discharge to home or other facility with appropriate resources  Outcome: Progressing     Problem: Safety - Adult  Goal: Free from fall injury  Outcome: Progressing     Problem: Safety - Medical Restraint  Goal: Remains free of injury from restraints (Restraint for Interference with Medical Device)  Description: INTERVENTIONS:  1. Determine that other, less restrictive measures have been tried or would not be effective before applying the restraint  2. Evaluate the patient's condition at the time of restraint application  3. Inform patient/family regarding the reason for restraint  4. Q2H: Monitor safety, psychosocial status, comfort, nutrition and hydration  Outcome: Progressing  Flowsheets  Taken 7/31/2025 0200  Remains free of injury from restraints (restraint for interference with medical device): Every 2 hours: Monitor safety, psychosocial status, comfort, nutrition and hydration  Taken 7/31/2025 0000  Remains free of injury from restraints (restraint for interference with medical device): Every 2 hours: Monitor safety, psychosocial status, comfort, nutrition and hydration  Taken 7/30/2025 2200  Remains free of injury from restraints (restraint for interference with medical device): Every 2 hours: Monitor safety, psychosocial status, comfort, nutrition and hydration  Taken 7/30/2025 2000  Remains free of injury from restraints (restraint for interference with medical device): Every 2 hours: Monitor safety, psychosocial status, comfort, nutrition and hydration     Problem: Respiratory - Adult  Goal: Achieves optimal ventilation and oxygenation  Outcome: Progressing     Problem: Skin/Tissue Integrity  Goal: Skin integrity remains intact  Description: 1.  Monitor for areas of redness and/or skin breakdown  2.  Assess vascular access sites hourly  3.  Every 4-6

## 2025-07-31 NOTE — PROGRESS NOTES
Critical Care -Progress Note    Patient's name:  Nile Lacy  Medical Record Number: [id number]  Patient's account/billing number: [id number]  Patient's YOB: 1940  Age: 84 y.o.  Date of Admission: 7/26/2025 12:09 PM  Date of History and Physical Examination: 7/31/2025      Primary Care Physician: Dewey Vera MD    Code Status: Full Code    Chief complaint:   Chief Complaint   Patient presents with    Chest Pain    Hypotension              Today's Evaluation     Subjective Evaluation:  Patient is seen and examined at bedside.  Urine total output: 1,500  mL/kg  BUN 16, Cr 0.7 at baseline compared to prior. Glucose 134  WBC stable   Hgb 14.3 stable.     Overnight Events  non-agitated, no sitter, 4 point restraints removed, on bilateral soft wrist    F.A.S.T. M. H.U.G.S. B.I.D.    Feeding Diet: Diet NPO  ADULT TUBE FEEDING; Nasogastric; Standard without Fiber; Continuous; 15; No; 30; Q 4 hours; Protein; 1 Dose; TID  Fluids: sodium chloride infusion   Family: Updated   Analgesic: N/A  Sedation: Dexmedetomidine 0.6 mcg/kg/hr  Thrombo-prophylaxis: heparin  Mobility: Activity as tolerated with assistance, PT and OT evaluations  Heads up: at minimum 30 degree head of bed elevation   Ulcer prophylaxis:N/A (Protonix discontinued yesterday evening)  Glycemic control: blood glucose 134, none indicated at this time  Spontaneous breathing trial: N/A  Bowel regimen/urine output: Urine output: 1550 mL/kg  Indwelling catheter/lines: Right cephalic peripheral, NG  De-escalation: Continue Augmentin, pending PM&R evaluation prior to discharge to LTACH      Plan:  - Continue Augmentin   - Continue PT & OT evaluations  - Discharge to LTACH pending PM&R consultation  -Heparin switched to Eliquis  -Aspirin discontinue     HISTORY OF PRESENT ILLNESS:      History was obtained from chart review and the patient    Nile Lacy is a 89-year-old with relevant PMH of  S/p coronary artery bypass  uncovered disc measuring 4 5 mm with component asymmetric toward the right.  The thecal sac is mildly to moderately stenotic.  There is moderate narrowing of the right neural foramen.  There is mild to moderate narrowing of the left neural foramen. C7-T1: Disc and/or osteophytes result in mild narrowing of the neural foramina bilaterally. The right neural foramen is narrowed greater than the left.  The thecal sac is mildly stenotic.     Mild retrolisthesis of C4 on C5, C5 on C6, and C6 on C7.  Disc and osteophytes narrow the neural foramina throughout the cervical region as discussed above.  There is severe stenosis of the thecal sac at C3-4.       ASSESSMENT:     Principal Problem:    Acute ST elevation myocardial infarction (STEMI) ([facility])  Active Problems:    ST elevation myocardial infarction involving left circumflex coronary artery ([facility])    Acute hypoxic respiratory failure ([facility])    S/P PTCA (percutaneous transluminal coronary angioplasty)  Resolved Problems:    * No resolved hospital problems. *          PLAN:       PLAN/MEDICAL DECISION MAKING:    NEUROLOGIC:  Mental status: unable to obtain due to sedation  Sedation: Dexmedetomidine 0.6 mcg/kg/hr  Pain control: N/A    CARDIOVASCULAR:  BP Range: Systolic (24hrs), Av , Min:113 , Max:181     Diastolic (24hrs), Av, Min:72, Max:118    Pulse Range: Pulse  Av.5  Min: 90  Max: 120      Dx: Inferior STEMI  Coronary artery disease  Hypertension  Atrial fibrillation  H/o CABG  H/o DVT  Troponin is normal, PT/INR normal  Continue aspirin and Plavix daily  Refer to cardiac rehabilitation  Cardiology on board  Weaned off Levophed and Dopamine   Atorvastatin for dyslipidemia   Heparin discontinued switched to Eliquis  Aspirin discontinued       PULMONARY:  Respiration Range: Resp  Av.6  Min: 21  Max: 32  Current Pulse Ox: SpO2: 96 %  24HR Pulse Ox Range: SpO2  Av.1 %  Min: 89 %  Max: 97 %  Vent: N/A  Xray abd for NG/OG/NE Tube placement, advanced to

## 2025-07-31 NOTE — CARE COORDINATION
Case Management   Daily Progress Note       Patient Name: Nile Lacy                   YOB: 1940  Diagnosis: Bradycardia [R00.1]  STEMI (ST elevation myocardial infarction) (HCC) [I21.3]  Acute ST elevation myocardial infarction (STEMI), unspecified artery (HCC) [I21.3]                       GMLOS: 3.8 days  Length of Stay: 5  days    Anticipated Discharge Date: Ready for discharge    Readmission Risk (Low < 19, Mod (19-27), High > 27): Readmission Risk Score: 19.9        Current Transitional Plan    [] Home Independently    [] Home with HC    [] Skilled Nursing Facility    [] Acute Rehabilitation    [x] Long Term Acute Care (LTAC)    [] Other:     Plan for the Stay (Medical Management) :  Precedex gtt, 2 L NC, A & O X 1, B MItts, B UE soft restraints.         Workflow Continuation (Additional Notes) :  [facility] started pre-cert yesterday. They anticipate approval this afternoon    Selena @ [facility] confirmed a 5pm transport for to for today going to [facility].     1600 informed [facility]/ [facility] that anticipated pre-cert was not received. Transport cancelled for today, will call when pre-cert is approved.       YONI THORNE RN  July 31, 2025

## 2025-07-31 NOTE — PROGRESS NOTES
Occupational Therapy    [facility]  Occupational Therapy Not Seen Note    DATE: 2025    NAME: Nile Lacy  MRN: [id number]   : 1940      Patient not seen this date for Occupational Therapy due to:    Patient is not appropriate for active participation in OT treatment at this time d/t requiring restraints and Precedex for agitation per RN.    Next Scheduled Treatment:     Electronically signed by TRUDY Montelongo on 2025 at 1:43 PM

## 2025-07-31 NOTE — PLAN OF CARE
Problem: Pain  Goal: Verbalizes/displays adequate comfort level or baseline comfort level  7/31/2025 1904 by Jennifer Ghotra RN  Outcome: Progressing  7/31/2025 0523 by Tremayne Farias RN  Outcome: Progressing     Problem: Discharge Planning  Goal: Discharge to home or other facility with appropriate resources  7/31/2025 1904 by Jennifer Ghotra RN  Outcome: Progressing  7/31/2025 0523 by Tremayne Farias RN  Outcome: Progressing     Problem: Safety - Adult  Goal: Free from fall injury  7/31/2025 1904 by Jennifer Ghotra RN  Outcome: Progressing  7/31/2025 0523 by Tremayne Farias RN  Outcome: Progressing     Problem: Safety - Medical Restraint  Goal: Remains free of injury from restraints (Restraint for Interference with Medical Device)  Description: INTERVENTIONS:  1. Determine that other, less restrictive measures have been tried or would not be effective before applying the restraint  2. Evaluate the patient's condition at the time of restraint application  3. Inform patient/family regarding the reason for restraint  4. Q2H: Monitor safety, psychosocial status, comfort, nutrition and hydration  7/31/2025 1904 by Jennifer Ghotra RN  Outcome: Progressing  Flowsheets  Taken 7/31/2025 1800 by Dinora Womack RN  Remains free of injury from restraints (restraint for interference with medical device): Every 2 hours: Monitor safety, psychosocial status, comfort, nutrition and hydration  Taken 7/31/2025 1600 by Dinora Womack RN  Remains free of injury from restraints (restraint for interference with medical device): Every 2 hours: Monitor safety, psychosocial status, comfort, nutrition and hydration  Taken 7/31/2025 1400 by Dinora Womack RN  Remains free of injury from restraints (restraint for interference with medical device): Every 2 hours: Monitor safety, psychosocial status, comfort, nutrition and hydration  Taken 7/31/2025 1200 by Dinora Womack RN  Remains free of injury from restraints (restraint for  interference with medical device): Every 2 hours: Monitor safety, psychosocial status, comfort, nutrition and hydration  Taken 7/31/2025 1000 by Dinora Womack RN  Remains free of injury from restraints (restraint for interference with medical device): Every 2 hours: Monitor safety, psychosocial status, comfort, nutrition and hydration  Taken 7/31/2025 0800 by Dinora Womack RN  Remains free of injury from restraints (restraint for interference with medical device): Every 2 hours: Monitor safety, psychosocial status, comfort, nutrition and hydration  7/31/2025 0523 by Tremayne Farias RN  Outcome: Progressing  Flowsheets  Taken 7/31/2025 0200  Remains free of injury from restraints (restraint for interference with medical device): Every 2 hours: Monitor safety, psychosocial status, comfort, nutrition and hydration  Taken 7/31/2025 0000  Remains free of injury from restraints (restraint for interference with medical device): Every 2 hours: Monitor safety, psychosocial status, comfort, nutrition and hydration  Taken 7/30/2025 2200  Remains free of injury from restraints (restraint for interference with medical device): Every 2 hours: Monitor safety, psychosocial status, comfort, nutrition and hydration  Taken 7/30/2025 2000  Remains free of injury from restraints (restraint for interference with medical device): Every 2 hours: Monitor safety, psychosocial status, comfort, nutrition and hydration     Problem: Respiratory - Adult  Goal: Achieves optimal ventilation and oxygenation  7/31/2025 1904 by Jennifer Ghotra RN  Outcome: Progressing  7/31/2025 0523 by Tremayne Farias RN  Outcome: Progressing     Problem: Skin/Tissue Integrity  Goal: Skin integrity remains intact  Description: 1.  Monitor for areas of redness and/or skin breakdown  2.  Assess vascular access sites hourly  3.  Every 4-6 hours minimum:  Change oxygen saturation probe site  4.  Every 4-6 hours:  If on nasal continuous positive airway pressure,

## 2025-07-31 NOTE — PROGRESS NOTES
Cardiology progress note        Date:  7/31/2025  Patient: Nile Lacy  Admission:  7/26/2025 12:09 PM  Admit DX: Bradycardia [R00.1]  STEMI (ST elevation myocardial infarction) (HCC) [I21.3]  Acute ST elevation myocardial infarction (STEMI), unspecified artery (HCC) [I21.3]  Age:  84 y.o., 1940     LOS: 5 days     Reason for evaluation:   Acute myocardial infarction s/p PCI to SVG to OM      SUBJECTIVE:     The patient was seen and examined. Notes and labs reviewed.    He appears non-agitated this morning with no sitter.     Afib on telemetry, HR in the 100's.    OBJECTIVE:      EXAM:   Vitals:    VITALS:  /85   Pulse (!) 104   Temp 97.7 °F (36.5 °C) (Axillary)   Resp 21   Ht 1.676 m (5' 5.98\")   Wt 88 kg (194 lb)   SpO2 96%   BMI 31.33 kg/m²    24HR INTAKE/OUTPUT:    Intake/Output Summary (Last 24 hours) at 7/31/2025 1345  Last data filed at 7/31/2025 1002  Gross per 24 hour   Intake 1139.84 ml   Output 850 ml   Net 289.84 ml       General appearance:Arousable  HEENT: atraumatic, NG tube in place  Lungs: coarse breathing bilaterally  Heart: Irregularly irregular S1, S2, 2/6 systolic murmur  Abdomen: soft, non-tender; bowel sounds active  Extremities: no significant lower extremity edema, right groin is soft and nontender  Skin: warm and dry    Current Inpatient Medications:   apixaban  5 mg Oral BID    [Held by provider] QUEtiapine  25 mg Oral Nightly    lansoprazole  30 mg Per NG tube QAM AC    OLANZapine  5 mg Oral Nightly    amoxicillin-clavulanate  1 tablet Per NG tube Q12H    metoprolol tartrate  25 mg Oral BID    polyethylene glycol  17 g Oral Daily    sodium chloride flush  5-40 mL IntraVENous 2 times per day    clopidogrel  75 mg Oral Daily    atorvastatin  40 mg Oral Nightly    ezetimibe  10 mg Oral Nightly       IV Infusions (if any):   dexmedeTOMIDine 0.4 mcg/kg/hr (07/31/25 0481)    sodium chloride Stopped (07/29/25 7327)       Diagnostics:   Telemetry: Sinus

## 2025-07-31 NOTE — CONSULTS
07/24/2018    COLONOSCOPY WITH BIOPSY performed by Sanchez Villa MD at [facility] Endoscopy    COLONOSCOPY N/A 4/19/2021    COLONOSCOPY DIAGNOSTIC performed by Alfred Berger MD at [facility] ENDO    CORONARY ARTERY BYPASS GRAFT  2004    x4 vessel    INVASIVE VASCULAR N/A 7/26/2025    Ultrasound guided vascular access performed by Maria Dolores Roth MD at [facility] CARDIAC CATH LAB    TOTAL KNEE ARTHROPLASTY Bilateral     VENTRAL HERNIA REPAIR         Allergies:    Allergies   Allergen Reactions    Oxycodone Other (See Comments)     Attacking people, black out     Pcn [Penicillins] Other (See Comments)     dizziness    Warfarin And Related      \" I see things\"         Current Medications:   Current Facility-Administered Medications: apixaban (ELIQUIS) tablet 5 mg, 5 mg, Oral, BID  [Held by provider] QUEtiapine (SEROQUEL) tablet 25 mg, 25 mg, Oral, Nightly  dexmedeTOMIDine (PRECEDEX) 400 mcg in sodium chloride 0.9 % 100 mL infusion, 0.1-1.5 mcg/kg/hr, IntraVENous, Continuous  lansoprazole (PREVACID SOLUTAB) disintegrating tablet 30 mg, 30 mg, Per NG tube, QAM AC  OLANZapine (ZYPREXA) tablet 5 mg, 5 mg, Oral, Nightly  labetalol (NORMODYNE;TRANDATE) injection 10 mg, 10 mg, IntraVENous, Q4H PRN  acetaminophen (TYLENOL) tablet 650 mg, 650 mg, Oral, Q4H PRN  amoxicillin-clavulanate (AUGMENTIN) 875-125 MG per tablet 1 tablet, 1 tablet, Per NG tube, Q12H  metoprolol tartrate (LOPRESSOR) tablet 25 mg, 25 mg, Oral, BID  polyethylene glycol (GLYCOLAX) packet 17 g, 17 g, Oral, Daily  sodium chloride flush 0.9 % injection 5-40 mL, 5-40 mL, IntraVENous, 2 times per day  sodium chloride flush 0.9 % injection 5-40 mL, 5-40 mL, IntraVENous, PRN  0.9 % sodium chloride infusion, , IntraVENous, PRN  clopidogrel (PLAVIX) tablet 75 mg, 75 mg, Oral, Daily  atorvastatin (LIPITOR) tablet 40 mg, 40 mg, Oral, Nightly  ezetimibe (ZETIA) tablet 10 mg, 10 mg, Oral, Nightly    Social History:    Lives With: Alone  Type of Home: House  Home Layout: One    Transportation Needs: No Transportation Needs (6/30/2025)    PRAPARE - Transportation     Lack of Transportation (Medical): No     Lack of Transportation (Non-Medical): No   Physical Activity: Inactive (12/3/2024)    Received from [facility]    Exercise Vital Sign     Days of Exercise per Week: 0 days     Minutes of Exercise per Session: 0 min   Stress: Stress Concern Present (12/3/2024)    Received from [facility]    [place] [employer] - Occupational Stress Questionnaire     Feeling of Stress : To some extent   Social Connections: Socially Isolated (12/3/2024)    Received from [facility]    Social Connection and Isolation Panel [NHANES]     Frequency of Communication with Friends and Family: More than three times a week     Frequency of Social Gatherings with Friends and Family: Three times a week     Attends [employer] Services: Never     Active Member of Clubs or Organizations: No     Attends Club or Organization Meetings: Never     Marital Status:    Housing Stability: Low Risk  (6/30/2025)    Housing Stability Vital Sign     Unable to Pay for Housing in the Last Year: No     Number of Times Moved in the Last Year: 0     Homeless in the Last Year: No         Family History:       Problem Relation Age of Onset    Alzheimer's Disease Mother     Arthritis Mother         lumbar disc disease    Heart Disease Father        Diagnostics:    CBC:   Recent Labs     07/29/25  0517 07/30/25  0722 07/31/25  0619   WBC 8.0 6.3 7.8   RBC 4.60 4.14* 4.73   HGB 13.9 12.8* 14.3   HCT 42.0 37.8* 43.9   MCV 91.3 91.3 92.8   RDW 13.3 12.9 12.9   PLT See Reflexed IPF Result See Reflexed IPF Result 149     BMP:    Recent Labs     07/29/25  0517 07/30/25  0722 07/31/25  0619   GLUCOSE 106* 99 134*   BUN 14 10 16   CREATININE 0.8 0.6* 0.7   CALCIUM 9.5 9.5 9.8    144 144   K 4.1 3.5* 4.0   * 114* 114*   CO2 20 21 20   ANIONGAP 9 9 10   LABGLOM 88 >90 >90     HbA1c:   Lab Results

## 2025-07-31 NOTE — PROGRESS NOTES
Comprehensive Nutrition Assessment    Type and Reason for Visit:  Reassess    Nutrition Recommendations/Plan:   Continue TF of Standard without Fiber formula - suggest increasing goal rate to 30 mL/hr.  Continue 2 Protein modulars daily.  Will provide 1008 kcals, 76 gm protein daily.  Monitor tolerance to feedings.  As able, continue to advance to goal 55 mL/hr + 1 Protein modular daily.  Will monitor plans for swallow study as able/appropriate, GI status, labs, weights, and plan of care.     Malnutrition Assessment:  Malnutrition Status:  At risk for malnutrition (07/31/25 1444)    Context:  Acute Illness     Findings of the 6 clinical characteristics of malnutrition:  Energy Intake:  Mild decrease in energy intake  Weight Loss:  Unable to assess   Body Fat Loss:  Mild body fat loss Orbital   Muscle Mass Loss:  Unable to assess  Fluid Accumulation:  Mild Extremities   Strength:  Not Performed    Nutrition Assessment:    Pt extubated 7/29.  Continues to receive Tube Feedings of Standard without Fiber formula at goal 15 mL/hr with 3 Protein modulars daily.  Pt not appropriate for Speech Therapy evaluation due to lethargy.  Weights reviewed.  No recorded BM since admission.  Labs reviewed.  Meds include: Glycolax.    Nutrition Related Findings:    lab/meds reviewed.  hypoactive bowel sounds.  +1 non-pitting BLE edema.  No recorded BM since admission.   Wound Type:  (Femoral puncture)       Current Nutrition Intake & Therapies:    Average Meal Intake: NPO  Average Supplements Intake: NPO  Diet NPO  ADULT TUBE FEEDING; Nasogastric; Standard without Fiber; Continuous; 15; No; 30; Q 4 hours; Protein; 1 Dose; TID  Current Tube Feeding (TF) Orders:  Feeding Route: Nasogastric  Formula: Standard without Fiber  Schedule: Continuous  Feeding Regimen: 15 mL/hr  Additives/Modulars: Protein (x3 per day)  Water Flushes: 30 mL q 4 hrs  Current TF Provides: At 15 mL/hr + 3 Protein modulars = 648 kcals, 74 gm protein  daily    Additional Calorie Sources:  None    Anthropometric Measures:  Height: 167.6 cm (5' 5.98\")  Ideal Body Weight (IBW): 142 lbs (65 kg)    Admission Body Weight: 89.8 kg (197 lb 15.6 oz)  Current Body Weight: 88.1 kg (194 lb 3.6 oz), 136.8 % IBW. Weight Source: Bed scale  Current BMI (kg/m2): 31.4           Weight Adjustment For: No Adjustment                 BMI Categories: Obese Class 1 (BMI 30.0-34.9)    Estimated Daily Nutrient Needs:  Energy Requirements Based On: Kcal/kg  Weight Used for Energy Requirements: Admission  Energy (kcal/day): 8497-0537 kcals/day  Weight Used for Protein Requirements: Ideal  Protein (g/day):  gm pro/day  Method Used for Fluid Requirements: Defer to provider  Fluid (ml/day): per provider    Nutrition Diagnosis:   Inadequate oral intake related to  (recent extubation; mentation/lethargy) as evidenced by NPO or clear liquid status due to medical condition, nutrition support - enteral nutrition    Nutrition Interventions:   Food and/or Nutrient Delivery: Modify Tube Feeding  Nutrition Education/Counseling: No recommendation at this time  Coordination of Nutrition Care: Continue to monitor while inpatient, Speech Therapy, Swallow Evaluation  Plan of Care discussed with: RN    Goals:  Goals: Meet at least 75% of estimated needs, prior to discharge  Type of Goal: Continue current goal  Previous Goal Met: Progressing toward Goal(s)    Nutrition Monitoring and Evaluation:   Behavioral-Environmental Outcomes: None Identified  Food/Nutrient Intake Outcomes: Enteral Nutrition Intake/Tolerance, Progression of Nutrition  Physical Signs/Symptoms Outcomes: Biochemical Data, Chewing or Swallowing, GI Status, Fluid Status or Edema, Hemodynamic Status, Weight, Skin, Nutrition Focused Physical Findings    Discharge Planning:    Too soon to determine     Dior Gómez RD, LD  Contact: [phone]

## 2025-08-01 VITALS
DIASTOLIC BLOOD PRESSURE: 67 MMHG | RESPIRATION RATE: 23 BRPM | TEMPERATURE: 97 F | HEART RATE: 119 BPM | OXYGEN SATURATION: 97 % | HEIGHT: 66 IN | WEIGHT: 194 LBS | SYSTOLIC BLOOD PRESSURE: 119 MMHG | BODY MASS INDEX: 31.18 KG/M2

## 2025-08-01 LAB
ALBUMIN SERPL-MCNC: 2.8 G/DL (ref 3.5–5.2)
ALBUMIN/GLOB SERPL: 0.8 {RATIO} (ref 1–2.5)
ALP SERPL-CCNC: 65 U/L (ref 40–129)
ALT SERPL-CCNC: 37 U/L (ref 10–50)
ANION GAP SERPL CALCULATED.3IONS-SCNC: 10 MMOL/L (ref 9–16)
AST SERPL-CCNC: 68 U/L (ref 10–50)
BASOPHILS # BLD: 0.04 K/UL (ref 0–0.2)
BASOPHILS NFR BLD: 1 % (ref 0–2)
BILIRUB SERPL-MCNC: 0.7 MG/DL (ref 0–1.2)
BUN SERPL-MCNC: 15 MG/DL (ref 8–23)
CALCIUM SERPL-MCNC: 9.8 MG/DL (ref 8.6–10.4)
CHLORIDE SERPL-SCNC: 112 MMOL/L (ref 98–107)
CO2 SERPL-SCNC: 23 MMOL/L (ref 20–31)
CREAT SERPL-MCNC: 0.7 MG/DL (ref 0.7–1.2)
EOSINOPHIL # BLD: 0.31 K/UL (ref 0–0.44)
EOSINOPHILS RELATIVE PERCENT: 5 % (ref 1–4)
ERYTHROCYTE [DISTWIDTH] IN BLOOD BY AUTOMATED COUNT: 12.9 % (ref 11.8–14.4)
GFR, ESTIMATED: >90 ML/MIN/1.73M2
GLUCOSE SERPL-MCNC: 120 MG/DL (ref 74–99)
HCT VFR BLD AUTO: 41.7 % (ref 40.7–50.3)
HGB BLD-MCNC: 14.2 G/DL (ref 13–17)
IMM GRANULOCYTES # BLD AUTO: 0.04 K/UL (ref 0–0.3)
IMM GRANULOCYTES NFR BLD: 1 %
LYMPHOCYTES NFR BLD: 1.03 K/UL (ref 1.1–3.7)
LYMPHOCYTES RELATIVE PERCENT: 15 % (ref 24–43)
MAGNESIUM SERPL-MCNC: 2 MG/DL (ref 1.6–2.4)
MCH RBC QN AUTO: 30.8 PG (ref 25.2–33.5)
MCHC RBC AUTO-ENTMCNC: 34.1 G/DL (ref 28.4–34.8)
MCV RBC AUTO: 90.5 FL (ref 82.6–102.9)
MONOCYTES NFR BLD: 0.97 K/UL (ref 0.1–1.2)
MONOCYTES NFR BLD: 14 % (ref 3–12)
NEUTROPHILS NFR BLD: 64 % (ref 36–65)
NEUTS SEG NFR BLD: 4.56 K/UL (ref 1.5–8.1)
NRBC BLD-RTO: 0 PER 100 WBC
PLATELET # BLD AUTO: 175 K/UL (ref 138–453)
PMV BLD AUTO: 9.6 FL (ref 8.1–13.5)
POTASSIUM SERPL-SCNC: 3.7 MMOL/L (ref 3.7–5.3)
PROT SERPL-MCNC: 6.3 G/DL (ref 6.6–8.7)
RBC # BLD AUTO: 4.61 M/UL (ref 4.21–5.77)
SODIUM SERPL-SCNC: 145 MMOL/L (ref 136–145)
WBC OTHER # BLD: 7 K/UL (ref 3.5–11.3)

## 2025-08-01 PROCEDURE — 6370000000 HC RX 637 (ALT 250 FOR IP)

## 2025-08-01 PROCEDURE — 83735 ASSAY OF MAGNESIUM: CPT

## 2025-08-01 PROCEDURE — 6370000000 HC RX 637 (ALT 250 FOR IP): Performed by: INTERNAL MEDICINE

## 2025-08-01 PROCEDURE — 80053 COMPREHEN METABOLIC PANEL: CPT

## 2025-08-01 PROCEDURE — 85025 COMPLETE CBC W/AUTO DIFF WBC: CPT

## 2025-08-01 PROCEDURE — 6360000002 HC RX W HCPCS

## 2025-08-01 PROCEDURE — 31720 CLEARANCE OF AIRWAYS: CPT

## 2025-08-01 PROCEDURE — 36415 COLL VENOUS BLD VENIPUNCTURE: CPT

## 2025-08-01 PROCEDURE — 97110 THERAPEUTIC EXERCISES: CPT

## 2025-08-01 PROCEDURE — 99232 SBSQ HOSP IP/OBS MODERATE 35: CPT | Performed by: INTERNAL MEDICINE

## 2025-08-01 PROCEDURE — 97530 THERAPEUTIC ACTIVITIES: CPT

## 2025-08-01 PROCEDURE — 2500000003 HC RX 250 WO HCPCS

## 2025-08-01 PROCEDURE — 2500000003 HC RX 250 WO HCPCS: Performed by: INTERNAL MEDICINE

## 2025-08-01 PROCEDURE — 99239 HOSP IP/OBS DSCHRG MGMT >30: CPT | Performed by: INTERNAL MEDICINE

## 2025-08-01 RX ORDER — LANSOPRAZOLE 30 MG/1
30 TABLET, ORALLY DISINTEGRATING, DELAYED RELEASE ORAL
Status: ON HOLD | DISCHARGE
Start: 2025-08-02

## 2025-08-01 RX ORDER — METOPROLOL TARTRATE 50 MG
25 TABLET ORAL 2 TIMES DAILY
Qty: 60 TABLET | Refills: 3 | Status: ON HOLD | OUTPATIENT
Start: 2025-08-01

## 2025-08-01 RX ORDER — METOPROLOL TARTRATE 1 MG/ML
5 INJECTION, SOLUTION INTRAVENOUS ONCE
Status: COMPLETED | OUTPATIENT
Start: 2025-08-01 | End: 2025-08-01

## 2025-08-01 RX ORDER — CLOPIDOGREL BISULFATE 75 MG/1
75 TABLET ORAL DAILY
Status: ON HOLD | DISCHARGE
Start: 2025-08-02

## 2025-08-01 RX ORDER — OLANZAPINE 5 MG/1
5 TABLET, FILM COATED ORAL NIGHTLY
Status: ON HOLD | DISCHARGE
Start: 2025-08-01

## 2025-08-01 RX ORDER — METOPROLOL TARTRATE 1 MG/ML
INJECTION, SOLUTION INTRAVENOUS
Status: COMPLETED
Start: 2025-08-01 | End: 2025-08-01

## 2025-08-01 RX ORDER — LABETALOL HYDROCHLORIDE 5 MG/ML
10 INJECTION, SOLUTION INTRAVENOUS EVERY 4 HOURS PRN
Status: ON HOLD | DISCHARGE
Start: 2025-08-01

## 2025-08-01 RX ORDER — ATORVASTATIN CALCIUM 40 MG/1
40 TABLET, FILM COATED ORAL NIGHTLY
Status: ON HOLD | DISCHARGE
Start: 2025-08-01

## 2025-08-01 RX ORDER — EZETIMIBE 10 MG/1
10 TABLET ORAL NIGHTLY
Status: ON HOLD | DISCHARGE
Start: 2025-08-01

## 2025-08-01 RX ADMIN — AMOXICILLIN AND CLAVULANATE POTASSIUM 1 TABLET: 875; 125 TABLET, FILM COATED ORAL at 09:07

## 2025-08-01 RX ADMIN — POLYETHYLENE GLYCOL 3350 17 G: 17 POWDER, FOR SOLUTION ORAL at 09:00

## 2025-08-01 RX ADMIN — METOPROLOL TARTRATE 25 MG: 25 TABLET, FILM COATED ORAL at 09:00

## 2025-08-01 RX ADMIN — METOPROLOL TARTRATE 5 MG: 1 INJECTION, SOLUTION INTRAVENOUS at 07:02

## 2025-08-01 RX ADMIN — CLOPIDOGREL BISULFATE 75 MG: 75 TABLET, FILM COATED ORAL at 09:00

## 2025-08-01 RX ADMIN — DEXMEDETOMIDINE HYDROCHLORIDE 0.4 MCG/KG/HR: 400 INJECTION, SOLUTION INTRAVENOUS at 06:01

## 2025-08-01 RX ADMIN — Medication 10 MG: at 04:23

## 2025-08-01 RX ADMIN — LANSOPRAZOLE 30 MG: 30 TABLET, ORALLY DISINTEGRATING, DELAYED RELEASE ORAL at 05:16

## 2025-08-01 RX ADMIN — SODIUM CHLORIDE, PRESERVATIVE FREE 10 ML: 5 INJECTION INTRAVENOUS at 08:55

## 2025-08-01 RX ADMIN — APIXABAN 5 MG: 5 TABLET, FILM COATED ORAL at 09:00

## 2025-08-01 ASSESSMENT — PAIN SCALES - GENERAL: PAINLEVEL_OUTOF10: 0

## 2025-08-01 NOTE — DISCHARGE INSTRUCTIONS
You were seen in the ED then transferred to the ICU for stabilization post acute ST elevation myocardial infarction and acute hypoxic respiratory failure.     Please follow-up with your primary care provider for post hospital evaluation appointment.    Please follow-up with cardiology for post hospital evaluation and prior to restarting diltiazem and isosorbide mononitrate.

## 2025-08-01 NOTE — DISCHARGE INSTR - COC
Continuity of Care Form    Patient Name: Nile Lacy   :  1940  MRN:  [id number]    Admit date:  2025  Discharge date:  2025    Code Status Order: Full Code   Advance Directives:     Admitting Physician:  Akshat Rivers MD  PCP: Dewey Vera MD    Discharging Nurse: LEATHA Guidry  Discharging Hospital Unit/Room#: 3017/3017-01  Discharging Unit Phone Number: [phone]    Emergency Contact:   Extended Emergency Contact Information  Primary Emergency Contact: Maru Mendez  Home Phone: [phone]  Mobile Phone: [phone]  Relation: Child  Secondary Emergency Contact: Ryanne Russell  Home Phone: [phone]  Mobile Phone: [phone]  Relation: Child    Past Surgical History:  Past Surgical History:   Procedure Laterality Date    CARDIAC CATHETERIZATION      CARDIAC PROCEDURE N/A 2025    Left heart cath / coronary angiography performed by Maria Dolores Roth MD at [facility] CARDIAC CATH LAB    CARDIAC PROCEDURE N/A 2025    Percutaneous coronary intervention performed by Maria Dolores Roth MD at [facility] CARDIAC CATH LAB    CATARACT REMOVAL Bilateral     COLONOSCOPY  2018    COLONOSCOPY WITH BIOPSY performed by Sanchez Villa MD at [facility] Endoscopy    COLONOSCOPY N/A 2021    COLONOSCOPY DIAGNOSTIC performed by Alfred Berger MD at [facility] ENDO    CORONARY ARTERY BYPASS GRAFT  2004    x4 vessel    INVASIVE VASCULAR N/A 2025    Ultrasound guided vascular access performed by Maria Dolores Roth MD at [facility] CARDIAC CATH LAB    TOTAL KNEE ARTHROPLASTY Bilateral     VENTRAL HERNIA REPAIR         Immunization History:   Immunization History   Administered Date(s) Administered    COVID-19, PFIZER PURPLE top, DILUTE for use, (age 12 y+), 30mcg/0.3mL 2021, 2021, 2021    COVID-19, PFIZER, , (age 12y+), IM, 30mcg/0.3mL 2024    Influenza Virus Vaccine 10/01/2018    TDaP, ADACEL (age 10y-64y), BOOSTRIX (age 10y+), IM, 0.5mL 2025       Active Problems:  Patient

## 2025-08-01 NOTE — PROGRESS NOTES
Critical Care -Progress Note    Patient's name:  Nile Lacy  Medical Record Number: [id number]  Patient's account/billing number: [id number]  Patient's YOB: 1940  Age: 84 y.o.  Date of Admission: 7/26/2025 12:09 PM  Date of History and Physical Examination: 8/1/2025      Primary Care Physician: Dewey Vera MD    Code Status: Full Code    Chief complaint:   Chief Complaint   Patient presents with    Chest Pain    Hypotension              Today's Evaluation     Subjective Evaluation:  Patient is seen and examined at bedside.  Urine total output: 1,575 mL/kg, since admit +1.7 L  BUN 15, Cr 0.7 at baseline compared to prior. Glucose 120 trending down.   WBC stable 7  Hgb  stable.     Overnight Events  Slight agitation (on Precedex) but no sitter required, bilateral soft wrist still in place  Tachycardia in the 170s requiring lopressor    F.A[employer]T. M[employer] [facility]. B.I.D.    Feeding Diet: Diet NPO  ADULT TUBE FEEDING; Nasogastric; Standard without Fiber; Continuous; 15; Yes; 15; Q 4 hours; 30; 30; Q 4 hours; Protein; 1 Dose; BID  Fluids: sodium chloride infusion   Family: Updated   Analgesic: Tylonel   Sedation: Dexmedetomidine discontinued (today 08/01/25)  Thrombo-prophylaxis: Eliquis and Plavix  Mobility: Activity as tolerated with assistance, PT and OT evaluations  Heads up: at minimum 30 degree head of bed elevation   Ulcer prophylaxis:Lansoprazole (Protonix discontinued 2 days ago)   Glycemic control: blood glucose 120, none indicated at this time  Spontaneous breathing trial: N/A  Bowel regimen/urine output: Glycolax, Total urine output: 1,575 mL/kg, since admission +1.7 L  Indwelling catheter/lines: Right cephalic peripheral, NG, external urinary catheter   De-escalation: Continue Augmentin (on day 4 of 7), pending pre authorization prior to discharge to LTACH      Plan:  - Continue Augmentin (on day 4 of 7)  - Continue PT & OT evaluations  - Discharge to LTACH pending pre  37.8* 43.9 41.7   MCV 91.3 92.8 90.5   MCH 30.9 30.2 30.8   MCHC 33.9 32.6 34.1   RDW 12.9 12.9 12.9   PLT See Reflexed IPF Result 149 175   MPV  --  9.5 9.6        Last 3 Blood Glucose:   Recent Labs     07/30/25  0722 07/31/25  0619 08/01/25  0340   GLUCOSE 99 134* 120*        PT/INR:    Lab Results   Component Value Date/Time    PROTIME 14.5 07/26/2025 12:22 PM    PROTIME 33.3 03/19/2019 10:16 AM    INR 1.1 07/26/2025 12:22 PM     PTT:    Lab Results   Component Value Date/Time    APTT 43.5 07/31/2025 06:19 AM       Comprehensive Metabolic Profile:   Recent Labs     07/30/25  0722 07/31/25  0619 08/01/25  0340    144 145   K 3.5* 4.0 3.7   * 114* 112*   CO2 21 20 23   BUN 10 16 15   CREATININE 0.6* 0.7 0.7   GLUCOSE 99 134* 120*   CALCIUM 9.5 9.8 9.8   BILITOT 0.8 0.7 0.7   ALKPHOS 53 55 65   AST 32 24 68*   ALT 13 14 37      Magnesium:   Lab Results   Component Value Date/Time    MG 2.0 08/01/2025 03:40 AM    MG 2.0 07/31/2025 06:19 AM    MG 2.0 07/30/2025 07:22 AM     Phosphorus:   Lab Results   Component Value Date/Time    PHOS 2.9 03/21/2019 07:12 PM     Ionized Calcium:   Lab Results   Component Value Date/Time    CAION 1.30 07/29/2025 05:17 AM    CAION 1.26 07/28/2025 04:58 AM    CAION 1.25 07/26/2025 04:09 PM        Urinalysis:   Lab Results   Component Value Date/Time    NITRU NEGATIVE 05/12/2025 04:12 PM    COLORU Yellow 05/12/2025 04:12 PM    PHUR 6.5 05/12/2025 04:12 PM    PHUR 6.0 03/20/2024 10:05 PM    WBCUA TOO NUMEROUS TO COUNT 03/20/2024 10:05 PM    RBCUA 0 TO 2 03/20/2024 10:05 PM    MUCUS NOT REPORTED 05/27/2021 06:16 PM    TRICHOMONAS NOT REPORTED 05/27/2021 06:16 PM    YEAST NOT REPORTED 05/27/2021 06:16 PM    BACTERIA MANY 03/20/2024 10:05 PM    LEUKOCYTESUR NEGATIVE 05/12/2025 04:12 PM    UROBILINOGEN Normal 05/12/2025 04:12 PM    BILIRUBINUR NEGATIVE 05/12/2025 04:12 PM    GLUCOSEU NEGATIVE 05/12/2025 04:12 PM    KETUA NEGATIVE 05/12/2025 04:12 PM    AMORPHOUS NOT REPORTED

## 2025-08-01 NOTE — PLAN OF CARE
Problem: Pain  Goal: Verbalizes/displays adequate comfort level or baseline comfort level  Outcome: Progressing     Problem: Discharge Planning  Goal: Discharge to home or other facility with appropriate resources  Outcome: Progressing     Problem: Safety - Adult  Goal: Free from fall injury  Outcome: Progressing     Problem: Safety - Medical Restraint  Goal: Remains free of injury from restraints (Restraint for Interference with Medical Device)  Description: INTERVENTIONS:  1. Determine that other, less restrictive measures have been tried or would not be effective before applying the restraint  2. Evaluate the patient's condition at the time of restraint application  3. Inform patient/family regarding the reason for restraint  4. Q2H: Monitor safety, psychosocial status, comfort, nutrition and hydration  Outcome: Progressing  Flowsheets  Taken 8/1/2025 1412 by Chao Morrison RN  Remains free of injury from restraints (restraint for interference with medical device): Every 2 hours: Monitor safety, psychosocial status, comfort, nutrition and hydration  Taken 8/1/2025 0800 by Chao Morrison RN  Remains free of injury from restraints (restraint for interference with medical device): Every 2 hours: Monitor safety, psychosocial status, comfort, nutrition and hydration  Taken 8/1/2025 0600 by Jennifer Ghotra RN  Remains free of injury from restraints (restraint for interference with medical device):   Determine that other, less restrictive measures have been tried or would not be effective before applying the restraint   Evaluate the patient's condition at the time of restraint application   Inform patient/family regarding the reason for restraint   Every 2 hours: Monitor safety, psychosocial status, comfort, nutrition and hydration  Taken 8/1/2025 0400 by Jennifer Ghotra RN  Remains free of injury from restraints (restraint for interference with medical device):   Determine that other, less restrictive

## 2025-08-01 NOTE — PROGRESS NOTES
Occupational Therapy  Occupational Therapy Daily Treatment Note  Facility/Department: [facility]   Patient Name: Nile Lacy        MRN: [id number]    : 1940    Date of Service: 2025    Chief Complaint   Patient presents with    Chest Pain    Hypotension            Past Medical History:  has a past medical history of Anxiety attack, Arthritis, Atrial fibrillation (HCC), Atrial flutter (HCC), Back pain, CAD (coronary artery disease), Chest pain, Colon polyps, Constipation, Dementia (HCC), Dizziness, GERD (gastroesophageal reflux disease), Hyperlipidemia, Hypertension, Lower GI bleed, Lung nodule seen on imaging study, Prediabetes, Rectal bleeding, S/P colonoscopy with polypectomy, Seizure (HCC), Sleep apnea, SOB (shortness of breath), Status post coronary artery bypass graft, and Wears dentures.  Past Surgical History:  has a past surgical history that includes Coronary artery bypass graft (); Cataract removal (Bilateral); ventral hernia repair; Total knee arthroplasty (Bilateral); Colonoscopy (2018); Cardiac catheterization; Colonoscopy (N/A, 2021); Cardiac procedure (N/A, 2025); invasive vascular (N/A, 2025); and Cardiac procedure (N/A, 2025).    Discharge Recommendations  Discharge Recommendations: Patient would benefit from continued therapy after discharge  OT Equipment Recommendations  Equipment Needed:  (CTA as pt. progresses.)  ADL Assistive Devices: Transfer Tub Bench;Sock-Aid Hard;Reacher;Long-handled Sponge;Grab Bars - tub    Assessment  Performance deficits / Impairments: Decreased functional mobility ;Decreased safe awareness;Decreased balance;Decreased coordination;Decreased ADL status;Decreased cognition;Decreased endurance;Decreased high-level IADLs;Decreased strength;Decreased sensation  Prognosis: Fair  Decision Making: Medium Complexity  REQUIRES OT FOLLOW-UP: Yes  Activity Tolerance  Activity Tolerance: Treatment limited secondary to medical

## 2025-08-01 NOTE — CARE COORDINATION
Case Management   Daily Progress Note       Patient Name: Nile Lacy                   YOB: 1940  Diagnosis: Bradycardia [R00.1]  STEMI (ST elevation myocardial infarction) (HCC) [I21.3]  Acute ST elevation myocardial infarction (STEMI), unspecified artery (HCC) [I21.3]                       GMLOS: 3.8 days  Length of Stay: 6  days    Anticipated Discharge Date: Two or more days before discharge    Readmission Risk (Low < 19, Mod (19-27), High > 27): Readmission Risk Score: 19.7        Current Transitional Plan    [] Home Independently    [] Home with HC    [] Skilled Nursing Facility    [] Acute Rehabilitation    [x] Long Term Acute Care (LTAC)    [] Other:     Plan for the Stay (Medical Management) :  2 L NC, B soft wrist restraints.       Workflow Continuation (Additional Notes)   Informed by Dileep shaw/ [facility] that pt has pre-cert approved.     Selena with [employer] confirmed a 5:30-6pm ALS transport to [facility]    Informed pt's daughter, Ryanne that pt will transferring to [facility] today @ 5:30-6pm .      for Isi shaw/ [employer] [facility] [employer] ( [phone]) informing her that pt has been approved for LTACH and will be transferring today between 5:30-6pm.     AVS/ANABELLA faxed to [phone]  Report # [phone] and blue transport envelope provided to pt's RN, Chao THORNE RN  August 1, 2025           Yes

## 2025-08-01 NOTE — PROGRESS NOTES
Cardiology progress note        Date:  8/1/2025  Patient: Nile Lacy  Admission:  7/26/2025 12:09 PM  Admit DX: Bradycardia [R00.1]  STEMI (ST elevation myocardial infarction) (HCC) [I21.3]  Acute ST elevation myocardial infarction (STEMI), unspecified artery (HCC) [I21.3]  Age:  84 y.o., 1940     LOS: 6 days     Reason for evaluation:   Acute myocardial infarction s/p PCI to SVG to OM      SUBJECTIVE:     The patient was seen and examined. Notes and labs reviewed.    He appears non-agitated this morning with no sitter. A lot more awake today.    Afib on telemetry, HR in the 100's.    OBJECTIVE:      EXAM:   Vitals:    VITALS:  BP (!) 140/87   Pulse (!) 115   Temp 97.6 °F (36.4 °C) (Oral)   Resp (!) 31   Ht 1.676 m (5' 5.98\")   Wt 88 kg (194 lb)   SpO2 100%   BMI 31.33 kg/m²    24HR INTAKE/OUTPUT:    Intake/Output Summary (Last 24 hours) at 8/1/2025 1610  Last data filed at 8/1/2025 1608  Gross per 24 hour   Intake 1098 ml   Output 1475 ml   Net -377 ml       General appearance:Arousable  HEENT: atraumatic, NG tube in place  Lungs: coarse breathing bilaterally  Heart: Irregularly irregular S1, S2, 2/6 systolic murmur  Abdomen: soft, non-tender; bowel sounds active  Extremities: no significant lower extremity edema, right groin is soft and nontender  Skin: warm and dry    Current Inpatient Medications:   apixaban  5 mg Oral BID    lansoprazole  30 mg Per NG tube QAM AC    OLANZapine  5 mg Oral Nightly    amoxicillin-clavulanate  1 tablet Per NG tube Q12H    metoprolol tartrate  25 mg Oral BID    polyethylene glycol  17 g Oral Daily    sodium chloride flush  5-40 mL IntraVENous 2 times per day    clopidogrel  75 mg Oral Daily    atorvastatin  40 mg Oral Nightly    ezetimibe  10 mg Oral Nightly       IV Infusions (if any):   sodium chloride Stopped (07/29/25 5780)       Diagnostics:   Telemetry: Sinus rhythm     EKG 7/26/2025  Atrial fibrillation with escape junctional bradycardia 36

## 2025-08-01 NOTE — PROGRESS NOTES
Physical Therapy  Facility/Department: [facility]   Physical Therapy Daily Treatment Note    Patient Name: Nile Lacy        MRN: [id number]    : 1940    Date of Service: 2025    Chief Complaint   Patient presents with    Chest Pain    Hypotension            Past Medical History:  has a past medical history of Anxiety attack, Arthritis, Atrial fibrillation (HCC), Atrial flutter (HCC), Back pain, CAD (coronary artery disease), Chest pain, Colon polyps, Constipation, Dementia (HCC), Dizziness, GERD (gastroesophageal reflux disease), Hyperlipidemia, Hypertension, Lower GI bleed, Lung nodule seen on imaging study, Prediabetes, Rectal bleeding, S/P colonoscopy with polypectomy, Seizure (HCC), Sleep apnea, SOB (shortness of breath), Status post coronary artery bypass graft, and Wears dentures.  Past Surgical History:  has a past surgical history that includes Coronary artery bypass graft (); Cataract removal (Bilateral); ventral hernia repair; Total knee arthroplasty (Bilateral); Colonoscopy (2018); Cardiac catheterization; Colonoscopy (N/A, 2021); Cardiac procedure (N/A, 2025); invasive vascular (N/A, 2025); and Cardiac procedure (N/A, 2025).    Discharge Recommendations  Discharge Recommendations: Patient would benefit from continued therapy after discharge  PT Equipment Recommendations  Equipment Needed: No  Other: Pt is currently unsafe to ambulate w/out assistance.    Assessment  Body Structures, Functions, Activity Limitations Requiring Skilled Therapeutic Intervention: Decreased functional mobility ;Decreased strength;Decreased endurance;Decreased balance  Assessment: Pt maxA x2-dep x2 for sup<>sit. Pt completed x1 sit<>stand from EOB with RW, requiring Chandra x2; modA x2 to maintain standing balance. Pt most limited by lee HR, balance deficits, LE weakness and decreased cognition. Pt is currently unsafe to return to prior living arrangements. Pt would benefit  constant cues for upright posture with overall fair carryover but unable to maintain d/t lethargy, weakness and balance deficits. Extensive time and effort required to complete.    Transfers  Sit to Stand: Minimal Assistance;2 Person Assistance  Stand to Sit: 2 Person Assistance;Minimal Assistance  Comment: Pt completed x1 sit<>stand with RW from EOB. Pt required mod cues for proper UE placement to RW, good carryover. Once standing, pt required modA x2 to maintain standing balance. HR increased to 122 bpm and RR 48 breaths/min, requiring seated rest break and eventual return to supine; HR decreased to <100 bpm and RR <25 breaths/min with edu on proper breathing technique and rest break.    Ambulation  Comments: ambulation deferred d/t tachycardia  More Ambulation?: No    Stairs/Curb  Stairs?: No    Balance   Balance  Posture: Fair  Sitting - Static: Fair  Sitting - Dynamic: Fair;-  Standing - Static: Poor  Comments: Standing balance assessed w/ RW; seated balance assessed EOB    Exercise  PT Exercises  Exercise Treatment: supine B LE: hip ABD/ADD, ankle pumps, quad sets, glute sets x10    Patient Education  Patient Education  Education Given To: Patient  Education Provided: Role of Therapy;Plan of Care;Orientation;Home Exercise Program;Transfer Training;Energy Conservation  Education Method: Demonstration;Verbal  Barriers to Learning: Cognition  Education Outcome: Continued education needed;Verbalized understanding    Plan  Physical Therapy Plan  General Plan:  (6x/wk)  Current Treatment Recommendations: Strengthening, Balance training, Functional mobility training, Transfer training, Endurance training, Gait training, Stair training, Neuromuscular re-education, Home exercise program, Safety education & training, Patient/Caregiver education & training, Equipment evaluation, education, & procurement, Therapeutic activities    Goals  Short Term Goals  Time Frame for Short Term Goals: 8 visits  Short Term Goal 1: Pt

## 2025-08-01 NOTE — DISCHARGE SUMMARY
[facility]     Department of Internal Medicine - Critical Care Service    INPATIENT DISCHARGE SUMMARY      PATIENT IDENTIFICATION:  NAME:  Nile Lacy   :   1940  MRN:    [id number]     Acct:    [id number]   Admit Date:  2025  Discharge date:  25  Attending Provider: Akshat Rivers MD                                     Principal Problem:    Acute ST elevation myocardial infarction (STEMI) (HCC)  Active Problems:    ST elevation myocardial infarction involving left circumflex coronary artery (HCC)    Acute hypoxic respiratory failure (HCC)    S/P PTCA (percutaneous transluminal coronary angioplasty)  Resolved Problems:    * No resolved hospital problems. *       REASON FOR HOSPITALIZATION:   Chief Complaint   Patient presents with    Chest Pain    Hypotension                 Hospital Course  Pt presented to ER with complaints of crushing chest pain for 20 minutes.  The chest pain was acute in onset, associated with diaphoresis and shortness of breath.  Patient was brought by EMS, heart rate was in 30-40s, was given nitric oxide.  Patient has a recent history of cath at [facility], CABG .  He was noted to have A-fib with slow ventricular response in 30s and acute inferior wall STEMI with reciprocal changes in the lateral leads.     In the ER, patient was given atropine, started on dopamine and Levophed for hemodynamic support.  He was given 5000 heparin bolus in the emergency room and with that ST elevation in the inferior leads somewhat resolved and hemodynamics improved.  Cardiology was consulted and the plan to perform emergent cardiac catheterization and the patient was intubated for the same.     Cardiology performed emergent cardiac catheterization during coronary angiography he went into V-fib cardiac arrest requiring 2 defibrillations with successful restoration of sinus rhythm.  The SVG to OM had a filling defect noted at the insertion site of the OM

## 2025-08-01 NOTE — PROGRESS NOTES
Report given to LEATHA Sow at [facility], all questions answered at this time.

## 2025-08-02 ENCOUNTER — APPOINTMENT (OUTPATIENT)
Dept: CT IMAGING | Age: 85
DRG: 280 | End: 2025-08-02
Payer: COMMERCIAL

## 2025-08-02 ENCOUNTER — HOSPITAL ENCOUNTER (INPATIENT)
Age: 85
LOS: 2 days | Discharge: NURSING, REHAB OR LONG-TERM CARE | DRG: 280 | End: 2025-08-04
Attending: EMERGENCY MEDICINE
Payer: COMMERCIAL

## 2025-08-02 ENCOUNTER — APPOINTMENT (OUTPATIENT)
Dept: GENERAL RADIOLOGY | Age: 85
DRG: 280 | End: 2025-08-02
Payer: COMMERCIAL

## 2025-08-02 DIAGNOSIS — I21.4 NSTEMI (NON-ST ELEVATED MYOCARDIAL INFARCTION) (HCC): Primary | ICD-10-CM

## 2025-08-02 DIAGNOSIS — I48.91 ATRIAL FIBRILLATION WITH RVR (HCC): ICD-10-CM

## 2025-08-02 PROBLEM — G93.41 ACUTE METABOLIC ENCEPHALOPATHY: Status: ACTIVE | Noted: 2025-08-02

## 2025-08-02 PROBLEM — R41.0 DELIRIUM: Status: ACTIVE | Noted: 2025-08-02

## 2025-08-02 LAB
ALBUMIN SERPL-MCNC: 3.1 G/DL (ref 3.5–5.2)
ALP SERPL-CCNC: 64 U/L (ref 40–129)
ALT SERPL-CCNC: 67 U/L (ref 10–50)
AMMONIA PLAS-SCNC: <10 UMOL/L (ref 16–60)
ANION GAP SERPL CALCULATED.3IONS-SCNC: 12 MMOL/L (ref 9–16)
ANTI-XA UNFRAC HEPARIN: 1 IU/L (ref 0.3–0.7)
AST SERPL-CCNC: 77 U/L (ref 10–50)
BACTERIA URNS QL MICRO: ABNORMAL
BASOPHILS # BLD: 0.06 K/UL (ref 0–0.2)
BASOPHILS NFR BLD: 1 % (ref 0–2)
BILIRUB SERPL-MCNC: 0.7 MG/DL (ref 0–1.2)
BILIRUB UR QL STRIP: NEGATIVE
BODY TEMPERATURE: 37
BUN SERPL-MCNC: 17 MG/DL (ref 8–23)
CALCIUM SERPL-MCNC: 10.2 MG/DL (ref 8.6–10.4)
CASTS #/AREA URNS LPF: ABNORMAL /LPF
CHLORIDE SERPL-SCNC: 110 MMOL/L (ref 98–107)
CLARITY UR: CLEAR
CO2 SERPL-SCNC: 24 MMOL/L (ref 20–31)
COHGB MFR BLD: 3.2 % (ref 0–5)
COLOR UR: YELLOW
CREAT SERPL-MCNC: 0.9 MG/DL (ref 0.7–1.2)
EOSINOPHIL # BLD: 0.23 K/UL (ref 0–0.44)
EOSINOPHILS RELATIVE PERCENT: 3 % (ref 0–4)
EPI CELLS #/AREA URNS HPF: ABNORMAL /HPF
ERYTHROCYTE [DISTWIDTH] IN BLOOD BY AUTOMATED COUNT: 12.9 % (ref 11.5–14.9)
FOLATE SERPL-MCNC: 22 NG/ML (ref 4.8–24.2)
GFR, ESTIMATED: 85 ML/MIN/1.73M2
GLUCOSE SERPL-MCNC: 103 MG/DL (ref 74–99)
GLUCOSE UR STRIP-MCNC: NEGATIVE MG/DL
HCO3 VENOUS: 26 MMOL/L (ref 24–30)
HCT VFR BLD AUTO: 45.9 % (ref 41–53)
HGB BLD-MCNC: 15.3 G/DL (ref 13.5–17.5)
HGB UR QL STRIP.AUTO: ABNORMAL
IMM GRANULOCYTES # BLD AUTO: 0.06 K/UL (ref 0–0.3)
IMM GRANULOCYTES NFR BLD: 1 %
INR PPP: 1.2
KETONES UR STRIP-MCNC: ABNORMAL MG/DL
LEUKOCYTE ESTERASE UR QL STRIP: NEGATIVE
LIPASE SERPL-CCNC: 126 U/L (ref 13–60)
LYMPHOCYTES NFR BLD: 1.24 K/UL (ref 1.1–3.7)
LYMPHOCYTES RELATIVE PERCENT: 15 % (ref 24–44)
MAGNESIUM SERPL-MCNC: 2 MG/DL (ref 1.6–2.4)
MCH RBC QN AUTO: 30.2 PG (ref 26–34)
MCHC RBC AUTO-ENTMCNC: 33.3 G/DL (ref 31–37)
MCV RBC AUTO: 90.7 FL (ref 80–100)
METHEMOGLOBIN: 0.2 % (ref 0–1.9)
MONOCYTES NFR BLD: 1.06 K/UL (ref 0.1–1.2)
MONOCYTES NFR BLD: 12 % (ref 3–12)
NEUTROPHILS NFR BLD: 68 % (ref 36–66)
NEUTS SEG NFR BLD: 5.91 K/UL (ref 1.5–8.1)
NITRITE UR QL STRIP: NEGATIVE
NRBC BLD-RTO: 0 PER 100 WBC
O2 SAT, VEN: 71.3 % (ref 60–85)
PARTIAL THROMBOPLASTIN TIME: 28.1 SEC (ref 24–36)
PARTIAL THROMBOPLASTIN TIME: 47.7 SEC (ref 24–36)
PARTIAL THROMBOPLASTIN TIME: 49.2 SEC (ref 24–36)
PCO2 VENOUS: 39.7 MM HG (ref 39–55)
PH UR STRIP: 5.5 [PH] (ref 5–8)
PH VENOUS: 7.43 (ref 7.32–7.42)
PLATELET # BLD AUTO: 211 K/UL (ref 150–450)
PMV BLD AUTO: 9.2 FL (ref 8–13.5)
PO2 VENOUS: 37.4 MM HG (ref 30–50)
POSITIVE BASE EXCESS, VEN: 1.7 MMOL/L (ref 0–2)
POTASSIUM SERPL-SCNC: 3.6 MMOL/L (ref 3.7–5.3)
PROT SERPL-MCNC: 7.1 G/DL (ref 6.6–8.7)
PROT UR STRIP-MCNC: NEGATIVE MG/DL
PROTHROMBIN TIME: 16.1 SEC (ref 11.8–14.6)
RBC # BLD AUTO: 5.06 M/UL (ref 4.21–5.77)
RBC #/AREA URNS HPF: ABNORMAL /HPF
SODIUM SERPL-SCNC: 146 MMOL/L (ref 136–145)
SP GR UR STRIP: 1.01 (ref 1–1.03)
TEXT FOR RESPIRATORY: ABNORMAL
TROPONIN I SERPL HS-MCNC: 538 NG/L (ref 0–22)
TROPONIN I SERPL HS-MCNC: 642 NG/L (ref 0–22)
TSH SERPL DL<=0.05 MIU/L-ACNC: 1.63 UIU/ML (ref 0.27–4.2)
UROBILINOGEN UR STRIP-ACNC: NORMAL EU/DL (ref 0–1)
VIT B12 SERPL-MCNC: 876 PG/ML (ref 232–1245)
WBC #/AREA URNS HPF: ABNORMAL /HPF
WBC OTHER # BLD: 8.6 K/UL (ref 3.5–11)

## 2025-08-02 PROCEDURE — 6360000002 HC RX W HCPCS: Performed by: EMERGENCY MEDICINE

## 2025-08-02 PROCEDURE — 83690 ASSAY OF LIPASE: CPT

## 2025-08-02 PROCEDURE — 6370000000 HC RX 637 (ALT 250 FOR IP)

## 2025-08-02 PROCEDURE — 93005 ELECTROCARDIOGRAM TRACING: CPT | Performed by: EMERGENCY MEDICINE

## 2025-08-02 PROCEDURE — 85520 HEPARIN ASSAY: CPT

## 2025-08-02 PROCEDURE — 99223 1ST HOSP IP/OBS HIGH 75: CPT

## 2025-08-02 PROCEDURE — 85730 THROMBOPLASTIN TIME PARTIAL: CPT

## 2025-08-02 PROCEDURE — 71045 X-RAY EXAM CHEST 1 VIEW: CPT

## 2025-08-02 PROCEDURE — 99232 SBSQ HOSP IP/OBS MODERATE 35: CPT | Performed by: INTERNAL MEDICINE

## 2025-08-02 PROCEDURE — 99222 1ST HOSP IP/OBS MODERATE 55: CPT | Performed by: STUDENT IN AN ORGANIZED HEALTH CARE EDUCATION/TRAINING PROGRAM

## 2025-08-02 PROCEDURE — 36415 COLL VENOUS BLD VENIPUNCTURE: CPT

## 2025-08-02 PROCEDURE — 2500000003 HC RX 250 WO HCPCS

## 2025-08-02 PROCEDURE — 51798 US URINE CAPACITY MEASURE: CPT

## 2025-08-02 PROCEDURE — 51701 INSERT BLADDER CATHETER: CPT

## 2025-08-02 PROCEDURE — 2500000003 HC RX 250 WO HCPCS: Performed by: INTERNAL MEDICINE

## 2025-08-02 PROCEDURE — 6360000002 HC RX W HCPCS: Performed by: INTERNAL MEDICINE

## 2025-08-02 PROCEDURE — 6360000002 HC RX W HCPCS

## 2025-08-02 PROCEDURE — 85610 PROTHROMBIN TIME: CPT

## 2025-08-02 PROCEDURE — 82800 BLOOD PH: CPT

## 2025-08-02 PROCEDURE — 96374 THER/PROPH/DIAG INJ IV PUSH: CPT

## 2025-08-02 PROCEDURE — 2000000000 HC ICU R&B

## 2025-08-02 PROCEDURE — 82746 ASSAY OF FOLIC ACID SERUM: CPT

## 2025-08-02 PROCEDURE — 81001 URINALYSIS AUTO W/SCOPE: CPT

## 2025-08-02 PROCEDURE — 82140 ASSAY OF AMMONIA: CPT

## 2025-08-02 PROCEDURE — 82805 BLOOD GASES W/O2 SATURATION: CPT

## 2025-08-02 PROCEDURE — 99285 EMERGENCY DEPT VISIT HI MDM: CPT

## 2025-08-02 PROCEDURE — 82607 VITAMIN B-12: CPT

## 2025-08-02 PROCEDURE — 70450 CT HEAD/BRAIN W/O DYE: CPT

## 2025-08-02 RX ORDER — EZETIMIBE 10 MG/1
10 TABLET ORAL NIGHTLY
Status: DISCONTINUED | OUTPATIENT
Start: 2025-08-02 | End: 2025-08-04 | Stop reason: HOSPADM

## 2025-08-02 RX ORDER — HEPARIN SODIUM 1000 [USP'U]/ML
2000 INJECTION, SOLUTION INTRAVENOUS; SUBCUTANEOUS PRN
Status: DISCONTINUED | OUTPATIENT
Start: 2025-08-02 | End: 2025-08-04

## 2025-08-02 RX ORDER — DOCUSATE SODIUM 100 MG/1
100 CAPSULE, LIQUID FILLED ORAL 2 TIMES DAILY
Status: DISCONTINUED | OUTPATIENT
Start: 2025-08-02 | End: 2025-08-03 | Stop reason: SDUPTHER

## 2025-08-02 RX ORDER — ATORVASTATIN CALCIUM 40 MG/1
40 TABLET, FILM COATED ORAL NIGHTLY
Status: DISCONTINUED | OUTPATIENT
Start: 2025-08-02 | End: 2025-08-04 | Stop reason: HOSPADM

## 2025-08-02 RX ORDER — ACETAMINOPHEN 650 MG/1
650 SUPPOSITORY RECTAL EVERY 6 HOURS PRN
Status: DISCONTINUED | OUTPATIENT
Start: 2025-08-02 | End: 2025-08-04 | Stop reason: HOSPADM

## 2025-08-02 RX ORDER — HEPARIN SODIUM 10000 [USP'U]/100ML
5-30 INJECTION, SOLUTION INTRAVENOUS CONTINUOUS
Status: DISCONTINUED | OUTPATIENT
Start: 2025-08-02 | End: 2025-08-04

## 2025-08-02 RX ORDER — METOPROLOL TARTRATE 1 MG/ML
5 INJECTION, SOLUTION INTRAVENOUS ONCE
Status: COMPLETED | OUTPATIENT
Start: 2025-08-02 | End: 2025-08-02

## 2025-08-02 RX ORDER — LANSOPRAZOLE 30 MG/1
30 TABLET, ORALLY DISINTEGRATING, DELAYED RELEASE ORAL
Status: DISCONTINUED | OUTPATIENT
Start: 2025-08-03 | End: 2025-08-04 | Stop reason: HOSPADM

## 2025-08-02 RX ORDER — ACETAMINOPHEN 325 MG/1
650 TABLET ORAL EVERY 6 HOURS PRN
Status: DISCONTINUED | OUTPATIENT
Start: 2025-08-02 | End: 2025-08-04 | Stop reason: HOSPADM

## 2025-08-02 RX ORDER — HEPARIN SODIUM 1000 [USP'U]/ML
4000 INJECTION, SOLUTION INTRAVENOUS; SUBCUTANEOUS PRN
Status: DISCONTINUED | OUTPATIENT
Start: 2025-08-02 | End: 2025-08-04

## 2025-08-02 RX ORDER — OLANZAPINE 5 MG/1
5 TABLET, FILM COATED ORAL NIGHTLY
Status: DISCONTINUED | OUTPATIENT
Start: 2025-08-02 | End: 2025-08-04 | Stop reason: HOSPADM

## 2025-08-02 RX ORDER — DEXMEDETOMIDINE HYDROCHLORIDE 4 UG/ML
.1-1.5 INJECTION, SOLUTION INTRAVENOUS CONTINUOUS
Status: DISCONTINUED | OUTPATIENT
Start: 2025-08-02 | End: 2025-08-04

## 2025-08-02 RX ORDER — NITROGLYCERIN 0.4 MG/1
0.4 TABLET SUBLINGUAL PRN
Status: DISCONTINUED | OUTPATIENT
Start: 2025-08-02 | End: 2025-08-04 | Stop reason: HOSPADM

## 2025-08-02 RX ORDER — POLYETHYLENE GLYCOL 3350 17 G/17G
17 POWDER, FOR SOLUTION ORAL DAILY PRN
Status: DISCONTINUED | OUTPATIENT
Start: 2025-08-02 | End: 2025-08-04 | Stop reason: HOSPADM

## 2025-08-02 RX ORDER — SODIUM CHLORIDE 0.9 % (FLUSH) 0.9 %
5-40 SYRINGE (ML) INJECTION PRN
Status: DISCONTINUED | OUTPATIENT
Start: 2025-08-02 | End: 2025-08-04 | Stop reason: HOSPADM

## 2025-08-02 RX ORDER — ISOSORBIDE MONONITRATE 60 MG/1
60 TABLET, EXTENDED RELEASE ORAL DAILY
Status: DISCONTINUED | OUTPATIENT
Start: 2025-08-02 | End: 2025-08-04 | Stop reason: HOSPADM

## 2025-08-02 RX ORDER — HEPARIN SODIUM 1000 [USP'U]/ML
4000 INJECTION, SOLUTION INTRAVENOUS; SUBCUTANEOUS ONCE
Status: COMPLETED | OUTPATIENT
Start: 2025-08-02 | End: 2025-08-02

## 2025-08-02 RX ORDER — POTASSIUM CHLORIDE 1500 MG/1
40 TABLET, EXTENDED RELEASE ORAL PRN
Status: DISCONTINUED | OUTPATIENT
Start: 2025-08-02 | End: 2025-08-04

## 2025-08-02 RX ORDER — TRAZODONE HYDROCHLORIDE 50 MG/1
50 TABLET ORAL NIGHTLY
Status: DISCONTINUED | OUTPATIENT
Start: 2025-08-02 | End: 2025-08-04 | Stop reason: HOSPADM

## 2025-08-02 RX ORDER — DILTIAZEM HYDROCHLORIDE 120 MG/1
120 CAPSULE, COATED, EXTENDED RELEASE ORAL DAILY
Status: DISCONTINUED | OUTPATIENT
Start: 2025-08-02 | End: 2025-08-04 | Stop reason: HOSPADM

## 2025-08-02 RX ORDER — TAMSULOSIN HYDROCHLORIDE 0.4 MG/1
0.4 CAPSULE ORAL DAILY
Status: DISCONTINUED | OUTPATIENT
Start: 2025-08-02 | End: 2025-08-04 | Stop reason: HOSPADM

## 2025-08-02 RX ORDER — PROMETHAZINE HYDROCHLORIDE 12.5 MG/1
12.5 TABLET ORAL EVERY 6 HOURS PRN
Status: DISCONTINUED | OUTPATIENT
Start: 2025-08-02 | End: 2025-08-04 | Stop reason: HOSPADM

## 2025-08-02 RX ORDER — LABETALOL HYDROCHLORIDE 5 MG/ML
10 INJECTION, SOLUTION INTRAVENOUS EVERY 4 HOURS PRN
Status: DISCONTINUED | OUTPATIENT
Start: 2025-08-02 | End: 2025-08-04 | Stop reason: HOSPADM

## 2025-08-02 RX ORDER — POTASSIUM CHLORIDE 7.45 MG/ML
10 INJECTION INTRAVENOUS PRN
Status: DISCONTINUED | OUTPATIENT
Start: 2025-08-02 | End: 2025-08-04

## 2025-08-02 RX ORDER — DOXAZOSIN 1 MG/1
2 TABLET ORAL NIGHTLY
Status: DISCONTINUED | OUTPATIENT
Start: 2025-08-02 | End: 2025-08-04 | Stop reason: HOSPADM

## 2025-08-02 RX ORDER — SODIUM CHLORIDE 9 MG/ML
INJECTION, SOLUTION INTRAVENOUS PRN
Status: DISCONTINUED | OUTPATIENT
Start: 2025-08-02 | End: 2025-08-04 | Stop reason: HOSPADM

## 2025-08-02 RX ORDER — MAGNESIUM SULFATE HEPTAHYDRATE 40 MG/ML
2000 INJECTION, SOLUTION INTRAVENOUS PRN
Status: DISCONTINUED | OUTPATIENT
Start: 2025-08-02 | End: 2025-08-04

## 2025-08-02 RX ORDER — LANOLIN ALCOHOL/MO/W.PET/CERES
1000 CREAM (GRAM) TOPICAL DAILY
Status: DISCONTINUED | OUTPATIENT
Start: 2025-08-02 | End: 2025-08-04 | Stop reason: HOSPADM

## 2025-08-02 RX ORDER — CLOPIDOGREL BISULFATE 75 MG/1
75 TABLET ORAL DAILY
Status: DISCONTINUED | OUTPATIENT
Start: 2025-08-02 | End: 2025-08-04 | Stop reason: HOSPADM

## 2025-08-02 RX ORDER — ACETAMINOPHEN 325 MG/1
650 TABLET ORAL 2 TIMES DAILY PRN
Status: DISCONTINUED | OUTPATIENT
Start: 2025-08-02 | End: 2025-08-04 | Stop reason: HOSPADM

## 2025-08-02 RX ORDER — METOPROLOL TARTRATE 25 MG/1
25 TABLET, FILM COATED ORAL 2 TIMES DAILY
Status: DISCONTINUED | OUTPATIENT
Start: 2025-08-02 | End: 2025-08-04 | Stop reason: HOSPADM

## 2025-08-02 RX ORDER — SODIUM CHLORIDE 0.9 % (FLUSH) 0.9 %
5-40 SYRINGE (ML) INJECTION EVERY 12 HOURS SCHEDULED
Status: DISCONTINUED | OUTPATIENT
Start: 2025-08-02 | End: 2025-08-04 | Stop reason: HOSPADM

## 2025-08-02 RX ADMIN — AMOXICILLIN AND CLAVULANATE POTASSIUM 1 TABLET: 875; 125 TABLET, FILM COATED ORAL at 21:30

## 2025-08-02 RX ADMIN — AMIODARONE HYDROCHLORIDE 0.5 MG/MIN: 1.8 INJECTION, SOLUTION INTRAVENOUS at 11:35

## 2025-08-02 RX ADMIN — HEPARIN SODIUM 4000 UNITS: 1000 INJECTION, SOLUTION INTRAVENOUS; SUBCUTANEOUS at 06:13

## 2025-08-02 RX ADMIN — OLANZAPINE 5 MG: 5 TABLET, FILM COATED ORAL at 20:51

## 2025-08-02 RX ADMIN — METOPROLOL TARTRATE 25 MG: 25 TABLET, FILM COATED ORAL at 11:13

## 2025-08-02 RX ADMIN — HEPARIN SODIUM AND DEXTROSE 11.36 UNITS/KG/HR: 10000; 5 INJECTION INTRAVENOUS at 06:36

## 2025-08-02 RX ADMIN — ATORVASTATIN CALCIUM 40 MG: 40 TABLET, FILM COATED ORAL at 19:52

## 2025-08-02 RX ADMIN — METOPROLOL TARTRATE 5 MG: 1 INJECTION, SOLUTION INTRAVENOUS at 17:28

## 2025-08-02 RX ADMIN — HEPARIN SODIUM 2000 UNITS: 1000 INJECTION, SOLUTION INTRAVENOUS; SUBCUTANEOUS at 11:27

## 2025-08-02 RX ADMIN — METOPROLOL TARTRATE 25 MG: 25 TABLET, FILM COATED ORAL at 19:52

## 2025-08-02 RX ADMIN — DEXMEDETOMIDINE HYDROCHLORIDE 0.2 MCG/KG/HR: 400 INJECTION INTRAVENOUS at 18:09

## 2025-08-02 RX ADMIN — DOCUSATE SODIUM 100 MG: 100 CAPSULE, LIQUID FILLED ORAL at 19:52

## 2025-08-02 RX ADMIN — AMIODARONE HYDROCHLORIDE 0.5 MG/MIN: 1.8 INJECTION, SOLUTION INTRAVENOUS at 07:45

## 2025-08-02 RX ADMIN — AMIODARONE HYDROCHLORIDE 0.5 MG/MIN: 1.8 INJECTION, SOLUTION INTRAVENOUS at 22:29

## 2025-08-02 RX ADMIN — TRAZODONE HYDROCHLORIDE 50 MG: 50 TABLET ORAL at 19:53

## 2025-08-02 RX ADMIN — CYANOCOBALAMIN TAB 1000 MCG 1000 MCG: 1000 TAB at 11:14

## 2025-08-02 RX ADMIN — DOXAZOSIN 2 MG: 1 TABLET ORAL at 19:52

## 2025-08-02 RX ADMIN — CLOPIDOGREL BISULFATE 75 MG: 75 TABLET, FILM COATED ORAL at 11:13

## 2025-08-02 RX ADMIN — HEPARIN SODIUM 2000 UNITS: 1000 INJECTION, SOLUTION INTRAVENOUS; SUBCUTANEOUS at 19:50

## 2025-08-02 RX ADMIN — LABETALOL HYDROCHLORIDE 10 MG: 5 INJECTION, SOLUTION INTRAVENOUS at 13:55

## 2025-08-02 RX ADMIN — EZETIMIBE 10 MG: 10 TABLET ORAL at 21:45

## 2025-08-02 RX ADMIN — POTASSIUM BICARBONATE 40 MEQ: 782 TABLET, EFFERVESCENT ORAL at 19:52

## 2025-08-02 RX ADMIN — AMOXICILLIN AND CLAVULANATE POTASSIUM 1 TABLET: 875; 125 TABLET, FILM COATED ORAL at 11:13

## 2025-08-02 ASSESSMENT — PAIN - FUNCTIONAL ASSESSMENT: PAIN_FUNCTIONAL_ASSESSMENT: NONE - DENIES PAIN

## 2025-08-02 NOTE — PROGRESS NOTES
Transporter came to  the patient and transfer to [facility].  Patient was cleaned first as he had large amount of stool and urine.  Files were hand over, NO belongings at bedside.    done

## 2025-08-03 LAB
AMORPH SED URNS QL MICRO: ABNORMAL
ANION GAP SERPL CALCULATED.3IONS-SCNC: 10 MMOL/L (ref 9–16)
BACTERIA URNS QL MICRO: ABNORMAL
BASOPHILS # BLD: 0.05 K/UL (ref 0–0.2)
BASOPHILS NFR BLD: 1 % (ref 0–2)
BILIRUB UR QL STRIP: NEGATIVE
BUN SERPL-MCNC: 16 MG/DL (ref 8–23)
CALCIUM SERPL-MCNC: 9.7 MG/DL (ref 8.6–10.4)
CASTS #/AREA URNS LPF: ABNORMAL /LPF
CHLORIDE SERPL-SCNC: 110 MMOL/L (ref 98–107)
CLARITY UR: ABNORMAL
CO2 SERPL-SCNC: 22 MMOL/L (ref 20–31)
COLOR UR: YELLOW
CREAT SERPL-MCNC: 0.8 MG/DL (ref 0.7–1.2)
EOSINOPHIL # BLD: 0.26 K/UL (ref 0–0.44)
EOSINOPHILS RELATIVE PERCENT: 3 % (ref 0–4)
EPI CELLS #/AREA URNS HPF: ABNORMAL /HPF
ERYTHROCYTE [DISTWIDTH] IN BLOOD BY AUTOMATED COUNT: 12.6 % (ref 11.5–14.9)
GFR, ESTIMATED: 88 ML/MIN/1.73M2
GLUCOSE SERPL-MCNC: 137 MG/DL (ref 74–99)
GLUCOSE UR STRIP-MCNC: NEGATIVE MG/DL
HCT VFR BLD AUTO: 40.3 % (ref 41–53)
HGB BLD-MCNC: 13.6 G/DL (ref 13.5–17.5)
HGB UR QL STRIP.AUTO: ABNORMAL
IMM GRANULOCYTES # BLD AUTO: 0.06 K/UL (ref 0–0.3)
IMM GRANULOCYTES NFR BLD: 1 %
KETONES UR STRIP-MCNC: NEGATIVE MG/DL
LEUKOCYTE ESTERASE UR QL STRIP: NEGATIVE
LIPASE SERPL-CCNC: 120 U/L (ref 13–60)
LYMPHOCYTES NFR BLD: 1.2 K/UL (ref 1.1–3.7)
LYMPHOCYTES RELATIVE PERCENT: 14 % (ref 24–44)
MCH RBC QN AUTO: 30.9 PG (ref 26–34)
MCHC RBC AUTO-ENTMCNC: 33.7 G/DL (ref 31–37)
MCV RBC AUTO: 91.6 FL (ref 80–100)
MONOCYTES NFR BLD: 1 K/UL (ref 0.1–1.2)
MONOCYTES NFR BLD: 12 % (ref 3–12)
NEUTROPHILS NFR BLD: 69 % (ref 36–66)
NEUTS SEG NFR BLD: 6 K/UL (ref 1.5–8.1)
NITRITE UR QL STRIP: NEGATIVE
NRBC BLD-RTO: 0 PER 100 WBC
PARTIAL THROMBOPLASTIN TIME: 103.6 SEC (ref 24–36)
PARTIAL THROMBOPLASTIN TIME: 61.9 SEC (ref 24–36)
PARTIAL THROMBOPLASTIN TIME: 81.4 SEC (ref 24–36)
PARTIAL THROMBOPLASTIN TIME: 86.5 SEC (ref 24–36)
PH UR STRIP: 7 [PH] (ref 5–8)
PLATELET # BLD AUTO: 197 K/UL (ref 150–450)
PMV BLD AUTO: 9.7 FL (ref 8–13.5)
POTASSIUM SERPL-SCNC: 3.7 MMOL/L (ref 3.7–5.3)
PROT UR STRIP-MCNC: NEGATIVE MG/DL
RBC # BLD AUTO: 4.4 M/UL (ref 4.21–5.77)
RBC #/AREA URNS HPF: ABNORMAL /HPF
SODIUM SERPL-SCNC: 142 MMOL/L (ref 136–145)
SP GR UR STRIP: 1.02 (ref 1–1.03)
TROPONIN I SERPL HS-MCNC: 376 NG/L (ref 0–22)
UROBILINOGEN UR STRIP-ACNC: NORMAL EU/DL (ref 0–1)
WBC #/AREA URNS HPF: ABNORMAL /HPF
WBC OTHER # BLD: 8.6 K/UL (ref 3.5–11)

## 2025-08-03 PROCEDURE — 6360000002 HC RX W HCPCS: Performed by: INTERNAL MEDICINE

## 2025-08-03 PROCEDURE — 2000000000 HC ICU R&B

## 2025-08-03 PROCEDURE — 6370000000 HC RX 637 (ALT 250 FOR IP)

## 2025-08-03 PROCEDURE — 6360000002 HC RX W HCPCS

## 2025-08-03 PROCEDURE — 51701 INSERT BLADDER CATHETER: CPT

## 2025-08-03 PROCEDURE — 85025 COMPLETE CBC W/AUTO DIFF WBC: CPT

## 2025-08-03 PROCEDURE — 51798 US URINE CAPACITY MEASURE: CPT

## 2025-08-03 PROCEDURE — 2500000003 HC RX 250 WO HCPCS

## 2025-08-03 PROCEDURE — 85730 THROMBOPLASTIN TIME PARTIAL: CPT

## 2025-08-03 PROCEDURE — 81001 URINALYSIS AUTO W/SCOPE: CPT

## 2025-08-03 PROCEDURE — 83690 ASSAY OF LIPASE: CPT

## 2025-08-03 PROCEDURE — 2500000003 HC RX 250 WO HCPCS: Performed by: INTERNAL MEDICINE

## 2025-08-03 PROCEDURE — 80048 BASIC METABOLIC PNL TOTAL CA: CPT

## 2025-08-03 PROCEDURE — 84484 ASSAY OF TROPONIN QUANT: CPT

## 2025-08-03 PROCEDURE — 99233 SBSQ HOSP IP/OBS HIGH 50: CPT

## 2025-08-03 PROCEDURE — 36415 COLL VENOUS BLD VENIPUNCTURE: CPT

## 2025-08-03 PROCEDURE — 99233 SBSQ HOSP IP/OBS HIGH 50: CPT | Performed by: INTERNAL MEDICINE

## 2025-08-03 RX ORDER — METOPROLOL TARTRATE 1 MG/ML
5 INJECTION, SOLUTION INTRAVENOUS ONCE
Status: COMPLETED | OUTPATIENT
Start: 2025-08-03 | End: 2025-08-03

## 2025-08-03 RX ORDER — DOCUSATE SODIUM 50 MG/5ML
100 LIQUID ORAL 2 TIMES DAILY
Status: DISCONTINUED | OUTPATIENT
Start: 2025-08-03 | End: 2025-08-04 | Stop reason: HOSPADM

## 2025-08-03 RX ORDER — ERGOCALCIFEROL 1.25 MG/1
50000 CAPSULE, LIQUID FILLED ORAL WEEKLY
Status: DISCONTINUED | OUTPATIENT
Start: 2025-08-03 | End: 2025-08-03 | Stop reason: SDUPTHER

## 2025-08-03 RX ORDER — ERGOCALCIFEROL (VITAMIN D2) 200 MCG/ML
2000 DROPS ORAL WEEKLY
Status: DISCONTINUED | OUTPATIENT
Start: 2025-08-03 | End: 2025-08-04 | Stop reason: HOSPADM

## 2025-08-03 RX ADMIN — METOPROLOL TARTRATE 5 MG: 1 INJECTION, SOLUTION INTRAVENOUS at 01:35

## 2025-08-03 RX ADMIN — DEXMEDETOMIDINE HYDROCHLORIDE 0.6 MCG/KG/HR: 400 INJECTION INTRAVENOUS at 04:00

## 2025-08-03 RX ADMIN — ATORVASTATIN CALCIUM 40 MG: 40 TABLET, FILM COATED ORAL at 21:43

## 2025-08-03 RX ADMIN — CLOPIDOGREL BISULFATE 75 MG: 75 TABLET, FILM COATED ORAL at 09:22

## 2025-08-03 RX ADMIN — HEPARIN SODIUM 2000 UNITS: 1000 INJECTION, SOLUTION INTRAVENOUS; SUBCUTANEOUS at 22:25

## 2025-08-03 RX ADMIN — OLANZAPINE 5 MG: 5 TABLET, FILM COATED ORAL at 21:43

## 2025-08-03 RX ADMIN — CYANOCOBALAMIN TAB 1000 MCG 1000 MCG: 1000 TAB at 09:22

## 2025-08-03 RX ADMIN — Medication 2000 UNITS: at 16:01

## 2025-08-03 RX ADMIN — SODIUM CHLORIDE, PRESERVATIVE FREE 10 ML: 5 INJECTION INTRAVENOUS at 21:43

## 2025-08-03 RX ADMIN — HEPARIN SODIUM AND DEXTROSE 13.36 UNITS/KG/HR: 10000; 5 INJECTION INTRAVENOUS at 19:13

## 2025-08-03 RX ADMIN — AMOXICILLIN AND CLAVULANATE POTASSIUM 1 TABLET: 875; 125 TABLET, FILM COATED ORAL at 09:24

## 2025-08-03 RX ADMIN — DOXAZOSIN 2 MG: 1 TABLET ORAL at 21:43

## 2025-08-03 RX ADMIN — DOCUSATE SODIUM 100 MG: 100 CAPSULE, LIQUID FILLED ORAL at 09:22

## 2025-08-03 RX ADMIN — ERGOCALCIFEROL 50000 UNITS: 1.25 CAPSULE ORAL at 12:00

## 2025-08-03 RX ADMIN — EZETIMIBE 10 MG: 10 TABLET ORAL at 21:42

## 2025-08-03 RX ADMIN — AMOXICILLIN AND CLAVULANATE POTASSIUM 1 TABLET: 875; 125 TABLET, FILM COATED ORAL at 21:42

## 2025-08-03 RX ADMIN — METOPROLOL TARTRATE 25 MG: 25 TABLET, FILM COATED ORAL at 21:43

## 2025-08-03 RX ADMIN — AMIODARONE HYDROCHLORIDE 0.5 MG/MIN: 1.8 INJECTION, SOLUTION INTRAVENOUS at 11:04

## 2025-08-03 RX ADMIN — LANSOPRAZOLE 30 MG: 30 TABLET, ORALLY DISINTEGRATING, DELAYED RELEASE ORAL at 12:12

## 2025-08-03 RX ADMIN — AMIODARONE HYDROCHLORIDE 0.5 MG/MIN: 1.8 INJECTION, SOLUTION INTRAVENOUS at 21:55

## 2025-08-03 RX ADMIN — HEPARIN SODIUM AND DEXTROSE 15.36 UNITS/KG/HR: 10000; 5 INJECTION INTRAVENOUS at 01:57

## 2025-08-03 RX ADMIN — METOPROLOL TARTRATE 25 MG: 25 TABLET, FILM COATED ORAL at 09:22

## 2025-08-03 RX ADMIN — TRAZODONE HYDROCHLORIDE 50 MG: 50 TABLET ORAL at 21:42

## 2025-08-03 ASSESSMENT — PAIN SCALES - GENERAL
PAINLEVEL_OUTOF10: 0
PAINLEVEL_OUTOF10: 0

## 2025-08-04 VITALS
TEMPERATURE: 98.3 F | DIASTOLIC BLOOD PRESSURE: 101 MMHG | HEIGHT: 65 IN | HEART RATE: 109 BPM | SYSTOLIC BLOOD PRESSURE: 146 MMHG | OXYGEN SATURATION: 99 % | WEIGHT: 194 LBS | BODY MASS INDEX: 32.32 KG/M2 | RESPIRATION RATE: 23 BRPM

## 2025-08-04 PROBLEM — I48.91 NEW ONSET A-FIB (HCC): Status: ACTIVE | Noted: 2025-08-04

## 2025-08-04 LAB
ANION GAP SERPL CALCULATED.3IONS-SCNC: 8 MMOL/L (ref 9–16)
BASOPHILS # BLD: 0.05 K/UL (ref 0–0.2)
BASOPHILS NFR BLD: 1 % (ref 0–2)
BUN SERPL-MCNC: 15 MG/DL (ref 8–23)
CALCIUM SERPL-MCNC: 9.6 MG/DL (ref 8.6–10.4)
CHLORIDE SERPL-SCNC: 109 MMOL/L (ref 98–107)
CO2 SERPL-SCNC: 25 MMOL/L (ref 20–31)
CREAT SERPL-MCNC: 0.8 MG/DL (ref 0.7–1.2)
EKG Q-T INTERVAL: 312 MS
EKG Q-T INTERVAL: 348 MS
EKG QRS DURATION: 80 MS
EKG QRS DURATION: 88 MS
EKG QTC CALCULATION (BAZETT): 425 MS
EKG QTC CALCULATION (BAZETT): 459 MS
EKG R AXIS: 102 DEGREES
EKG R AXIS: 91 DEGREES
EKG T AXIS: -44 DEGREES
EKG T AXIS: -45 DEGREES
EKG VENTRICULAR RATE: 105 BPM
EKG VENTRICULAR RATE: 112 BPM
EOSINOPHIL # BLD: 0.3 K/UL (ref 0–0.44)
EOSINOPHILS RELATIVE PERCENT: 4 % (ref 0–4)
ERYTHROCYTE [DISTWIDTH] IN BLOOD BY AUTOMATED COUNT: 12.7 % (ref 11.5–14.9)
GFR, ESTIMATED: 88 ML/MIN/1.73M2
GLUCOSE SERPL-MCNC: 118 MG/DL (ref 74–99)
HCT VFR BLD AUTO: 42.1 % (ref 41–53)
HGB BLD-MCNC: 13.8 G/DL (ref 13.5–17.5)
IMM GRANULOCYTES # BLD AUTO: 0.05 K/UL (ref 0–0.3)
IMM GRANULOCYTES NFR BLD: 1 %
LYMPHOCYTES NFR BLD: 0.98 K/UL (ref 1.1–3.7)
LYMPHOCYTES RELATIVE PERCENT: 13 % (ref 24–44)
MAGNESIUM SERPL-MCNC: 2.1 MG/DL (ref 1.6–2.4)
MCH RBC QN AUTO: 29.7 PG (ref 26–34)
MCHC RBC AUTO-ENTMCNC: 32.8 G/DL (ref 31–37)
MCV RBC AUTO: 90.7 FL (ref 80–100)
MONOCYTES NFR BLD: 0.79 K/UL (ref 0.1–1.2)
MONOCYTES NFR BLD: 11 % (ref 3–12)
NEUTROPHILS NFR BLD: 70 % (ref 36–66)
NEUTS SEG NFR BLD: 5.33 K/UL (ref 1.5–8.1)
NRBC BLD-RTO: 0 PER 100 WBC
PARTIAL THROMBOPLASTIN TIME: 94.5 SEC (ref 24–36)
PARTIAL THROMBOPLASTIN TIME: 95.6 SEC (ref 24–36)
PLATELET # BLD AUTO: 215 K/UL (ref 150–450)
PMV BLD AUTO: 9.4 FL (ref 8–13.5)
POTASSIUM SERPL-SCNC: 3.6 MMOL/L (ref 3.7–5.3)
RBC # BLD AUTO: 4.64 M/UL (ref 4.21–5.77)
SODIUM SERPL-SCNC: 142 MMOL/L (ref 136–145)
WBC OTHER # BLD: 7.5 K/UL (ref 3.5–11)

## 2025-08-04 PROCEDURE — 2500000003 HC RX 250 WO HCPCS

## 2025-08-04 PROCEDURE — 80048 BASIC METABOLIC PNL TOTAL CA: CPT

## 2025-08-04 PROCEDURE — 85025 COMPLETE CBC W/AUTO DIFF WBC: CPT

## 2025-08-04 PROCEDURE — 85730 THROMBOPLASTIN TIME PARTIAL: CPT

## 2025-08-04 PROCEDURE — 6360000002 HC RX W HCPCS

## 2025-08-04 PROCEDURE — 92610 EVALUATE SWALLOWING FUNCTION: CPT

## 2025-08-04 PROCEDURE — 6370000000 HC RX 637 (ALT 250 FOR IP)

## 2025-08-04 PROCEDURE — 36415 COLL VENOUS BLD VENIPUNCTURE: CPT

## 2025-08-04 PROCEDURE — 93010 ELECTROCARDIOGRAM REPORT: CPT | Performed by: INTERNAL MEDICINE

## 2025-08-04 PROCEDURE — 83735 ASSAY OF MAGNESIUM: CPT

## 2025-08-04 PROCEDURE — 93005 ELECTROCARDIOGRAM TRACING: CPT

## 2025-08-04 PROCEDURE — 99239 HOSP IP/OBS DSCHRG MGMT >30: CPT

## 2025-08-04 RX ORDER — POTASSIUM CHLORIDE 1500 MG/1
40 TABLET, EXTENDED RELEASE ORAL PRN
Status: DISCONTINUED | OUTPATIENT
Start: 2025-08-04 | End: 2025-08-04 | Stop reason: HOSPADM

## 2025-08-04 RX ORDER — AMIODARONE HYDROCHLORIDE 200 MG/1
400 TABLET ORAL 3 TIMES DAILY
Status: DISCONTINUED | OUTPATIENT
Start: 2025-08-04 | End: 2025-08-04 | Stop reason: HOSPADM

## 2025-08-04 RX ORDER — AMIODARONE HYDROCHLORIDE 400 MG/1
400 TABLET ORAL 3 TIMES DAILY
Qty: 9 TABLET | Refills: 0 | Status: SHIPPED | OUTPATIENT
Start: 2025-08-04 | End: 2025-08-07

## 2025-08-04 RX ORDER — ISOSORBIDE MONONITRATE 60 MG/1
60 TABLET, EXTENDED RELEASE ORAL DAILY
Qty: 30 TABLET | Refills: 3 | Status: SHIPPED | OUTPATIENT
Start: 2025-08-04

## 2025-08-04 RX ORDER — AMIODARONE HYDROCHLORIDE 100 MG/1
200 TABLET ORAL DAILY
Qty: 30 TABLET | Refills: 1 | Status: SHIPPED | OUTPATIENT
Start: 2025-08-07

## 2025-08-04 RX ORDER — DILTIAZEM HYDROCHLORIDE 120 MG/1
120 CAPSULE, COATED, EXTENDED RELEASE ORAL DAILY
Qty: 30 CAPSULE | Refills: 3 | Status: SHIPPED | OUTPATIENT
Start: 2025-08-07

## 2025-08-04 RX ORDER — POTASSIUM CHLORIDE 7.45 MG/ML
10 INJECTION INTRAVENOUS PRN
Status: DISCONTINUED | OUTPATIENT
Start: 2025-08-04 | End: 2025-08-04 | Stop reason: HOSPADM

## 2025-08-04 RX ORDER — MAGNESIUM SULFATE HEPTAHYDRATE 40 MG/ML
2000 INJECTION, SOLUTION INTRAVENOUS PRN
Status: DISCONTINUED | OUTPATIENT
Start: 2025-08-04 | End: 2025-08-04 | Stop reason: HOSPADM

## 2025-08-04 RX ADMIN — POTASSIUM BICARBONATE 40 MEQ: 782 TABLET, EFFERVESCENT ORAL at 09:02

## 2025-08-04 RX ADMIN — AMIODARONE HYDROCHLORIDE 400 MG: 200 TABLET ORAL at 10:26

## 2025-08-04 RX ADMIN — ACETAMINOPHEN 650 MG: 325 TABLET ORAL at 07:37

## 2025-08-04 RX ADMIN — AMIODARONE HYDROCHLORIDE 0.5 MG/MIN: 1.8 INJECTION, SOLUTION INTRAVENOUS at 09:01

## 2025-08-04 RX ADMIN — METOPROLOL TARTRATE 25 MG: 25 TABLET, FILM COATED ORAL at 07:37

## 2025-08-04 RX ADMIN — APIXABAN 5 MG: 5 TABLET, FILM COATED ORAL at 10:26

## 2025-08-04 RX ADMIN — AMIODARONE HYDROCHLORIDE 400 MG: 200 TABLET ORAL at 14:58

## 2025-08-04 RX ADMIN — AMOXICILLIN AND CLAVULANATE POTASSIUM 1 TABLET: 875; 125 TABLET, FILM COATED ORAL at 09:10

## 2025-08-04 RX ADMIN — LANSOPRAZOLE 30 MG: 30 TABLET, ORALLY DISINTEGRATING, DELAYED RELEASE ORAL at 06:07

## 2025-08-04 RX ADMIN — LABETALOL HYDROCHLORIDE 10 MG: 5 INJECTION, SOLUTION INTRAVENOUS at 12:21

## 2025-08-04 RX ADMIN — CYANOCOBALAMIN TAB 1000 MCG 1000 MCG: 1000 TAB at 07:37

## 2025-08-04 RX ADMIN — CLOPIDOGREL BISULFATE 75 MG: 75 TABLET, FILM COATED ORAL at 07:37

## 2025-08-04 ASSESSMENT — PAIN SCALES - GENERAL
PAINLEVEL_OUTOF10: 0
PAINLEVEL_OUTOF10: 1

## 2025-08-04 ASSESSMENT — ENCOUNTER SYMPTOMS
SHORTNESS OF BREATH: 0
GASTROINTESTINAL NEGATIVE: 1
RESPIRATORY NEGATIVE: 1
CHEST TIGHTNESS: 0

## 2025-08-04 ASSESSMENT — PAIN DESCRIPTION - DESCRIPTORS: DESCRIPTORS: SORE

## 2025-08-04 ASSESSMENT — PAIN DESCRIPTION - LOCATION: LOCATION: THROAT

## (undated) DEVICE — RUNTHROUGH NS EXTRA FLOPPY PTCA GUIDEWIRE: Brand: RUNTHROUGH

## (undated) DEVICE — GUIDE CATHETER: Brand: MACH1™

## (undated) DEVICE — GUIDEWIRE 35/260/FC/PTFE/3J: Brand: GUIDEWIRE

## (undated) DEVICE — DEFENDO AIR WATER SUCTION AND BIOPSY VALVE KIT FOR  OLYMPUS: Brand: DEFENDO AIR/WATER/SUCTION AND BIOPSY VALVE

## (undated) DEVICE — ANGIOGRAPHIC CATHETER: Brand: IMPULSE™

## (undated) DEVICE — CATHETER ANGIO 5FR L100CM GRY S STL NYL JR4 3 SEG BRAID L

## (undated) DEVICE — CATH BLLN ANGIO 1.50X12MM SC EUPHORA RX

## (undated) DEVICE — ENDO KIT W/SYRINGE: Brand: MEDLINE INDUSTRIES, INC.

## (undated) DEVICE — INFLATION KIT CUST J REV

## (undated) DEVICE — CATH BLLN ANGIO 3.50X12MM NC EUPHORIA RX

## (undated) DEVICE — FORCEP BX MESH TOOTH MIC 2.8 MMX240 CM NDL STRL RADIAL JAW 4

## (undated) DEVICE — Device

## (undated) DEVICE — CATH BLLN ANGIO 3X12MM SC EUPHORA RX

## (undated) DEVICE — TRAY SURG CUST CRD CATH TOLEDO

## (undated) DEVICE — KIT MICRO INTRO 4FR STIFF 40CM NIGHTENALL TUNGSTEN 7SMT

## (undated) DEVICE — RADIFOCUS GLIDEWIRE: Brand: GLIDEWIRE

## (undated) DEVICE — CATH BLLN ANGIO 2X12MM SC EUPHORA RX

## (undated) DEVICE — INTRODUCER SHTH 6FR L11CM 0.038IN STD SIDEPRT EXTN 3 W

## (undated) DEVICE — GLOVE ORANGE PI 7 1/2   MSG9075

## (undated) DEVICE — DISPOSABLE ADAPTER: Type: IMPLANTABLE DEVICE | Status: NON-FUNCTIONAL

## (undated) DEVICE — VALVE HEMSTAS W/ GWIRE INSRTN TOOL GRDIAN II NC

## (undated) DEVICE — INTRODUCER SHTH AD L12CM DIA6FR 0.032IN CV PERM VLV NO RADPQ